# Patient Record
Sex: FEMALE | Race: WHITE | NOT HISPANIC OR LATINO | URBAN - METROPOLITAN AREA
[De-identification: names, ages, dates, MRNs, and addresses within clinical notes are randomized per-mention and may not be internally consistent; named-entity substitution may affect disease eponyms.]

---

## 2021-03-01 ENCOUNTER — INPATIENT (INPATIENT)
Facility: HOSPITAL | Age: 76
LOS: 3 days | Discharge: HOME | End: 2021-03-05
Attending: STUDENT IN AN ORGANIZED HEALTH CARE EDUCATION/TRAINING PROGRAM | Admitting: STUDENT IN AN ORGANIZED HEALTH CARE EDUCATION/TRAINING PROGRAM
Payer: MEDICARE

## 2021-03-01 VITALS
HEART RATE: 71 BPM | DIASTOLIC BLOOD PRESSURE: 97 MMHG | RESPIRATION RATE: 19 BRPM | OXYGEN SATURATION: 95 % | TEMPERATURE: 97 F | WEIGHT: 153 LBS | SYSTOLIC BLOOD PRESSURE: 108 MMHG

## 2021-03-01 DIAGNOSIS — D18.00 HEMANGIOMA UNSPECIFIED SITE: Chronic | ICD-10-CM

## 2021-03-01 LAB
ALBUMIN SERPL ELPH-MCNC: 3.9 G/DL — SIGNIFICANT CHANGE UP (ref 3.5–5.2)
ALP SERPL-CCNC: 102 U/L — SIGNIFICANT CHANGE UP (ref 30–115)
ALT FLD-CCNC: 18 U/L — SIGNIFICANT CHANGE UP (ref 0–41)
ANION GAP SERPL CALC-SCNC: 17 MMOL/L — HIGH (ref 7–14)
ANISOCYTOSIS BLD QL: SLIGHT — SIGNIFICANT CHANGE UP
APTT BLD: 26.9 SEC — LOW (ref 27–39.2)
AST SERPL-CCNC: 44 U/L — HIGH (ref 0–41)
BASOPHILS # BLD AUTO: 0.06 K/UL — SIGNIFICANT CHANGE UP (ref 0–0.2)
BASOPHILS NFR BLD AUTO: 0.5 % — SIGNIFICANT CHANGE UP (ref 0–1)
BILIRUB SERPL-MCNC: 0.2 MG/DL — SIGNIFICANT CHANGE UP (ref 0.2–1.2)
BLD GP AB SCN SERPL QL: SIGNIFICANT CHANGE UP
BUN SERPL-MCNC: 34 MG/DL — HIGH (ref 10–20)
CALCIUM SERPL-MCNC: 9.6 MG/DL — SIGNIFICANT CHANGE UP (ref 8.5–10.1)
CHLORIDE SERPL-SCNC: 105 MMOL/L — SIGNIFICANT CHANGE UP (ref 98–110)
CO2 SERPL-SCNC: 16 MMOL/L — LOW (ref 17–32)
CREAT SERPL-MCNC: 1.5 MG/DL — SIGNIFICANT CHANGE UP (ref 0.7–1.5)
DACRYOCYTES BLD QL SMEAR: SLIGHT — SIGNIFICANT CHANGE UP
ELLIPTOCYTES BLD QL SMEAR: SLIGHT — SIGNIFICANT CHANGE UP
EOSINOPHIL # BLD AUTO: 0 K/UL — SIGNIFICANT CHANGE UP (ref 0–0.7)
EOSINOPHIL NFR BLD AUTO: 0 % — SIGNIFICANT CHANGE UP (ref 0–8)
GLUCOSE SERPL-MCNC: 119 MG/DL — HIGH (ref 70–99)
HCT VFR BLD CALC: 16.2 % — LOW (ref 37–47)
HGB BLD-MCNC: 4.9 G/DL — CRITICAL LOW (ref 12–16)
IMM GRANULOCYTES NFR BLD AUTO: 0.6 % — HIGH (ref 0.1–0.3)
INR BLD: 1.03 RATIO — SIGNIFICANT CHANGE UP (ref 0.65–1.3)
LYMPHOCYTES # BLD AUTO: 1.16 K/UL — LOW (ref 1.2–3.4)
LYMPHOCYTES # BLD AUTO: 9.6 % — LOW (ref 20.5–51.1)
MAGNESIUM SERPL-MCNC: 2.1 MG/DL — SIGNIFICANT CHANGE UP (ref 1.8–2.4)
MANUAL SMEAR VERIFICATION: SIGNIFICANT CHANGE UP
MCHC RBC-ENTMCNC: 20.2 PG — LOW (ref 27–31)
MCHC RBC-ENTMCNC: 30.2 G/DL — LOW (ref 32–37)
MCV RBC AUTO: 66.9 FL — LOW (ref 81–99)
MICROCYTES BLD QL: SIGNIFICANT CHANGE UP
MONOCYTES # BLD AUTO: 0.56 K/UL — SIGNIFICANT CHANGE UP (ref 0.1–0.6)
MONOCYTES NFR BLD AUTO: 4.6 % — SIGNIFICANT CHANGE UP (ref 1.7–9.3)
NEUTROPHILS # BLD AUTO: 10.22 K/UL — HIGH (ref 1.4–6.5)
NEUTROPHILS NFR BLD AUTO: 84.7 % — HIGH (ref 42.2–75.2)
NRBC # BLD: 0 /100 WBCS — SIGNIFICANT CHANGE UP (ref 0–0)
NRBC # BLD: 1 /100 — HIGH (ref 0–0)
NT-PROBNP SERPL-SCNC: HIGH PG/ML (ref 0–300)
OVALOCYTES BLD QL SMEAR: SIGNIFICANT CHANGE UP
PLAT MORPH BLD: NORMAL — SIGNIFICANT CHANGE UP
PLATELET # BLD AUTO: 452 K/UL — HIGH (ref 130–400)
POIKILOCYTOSIS BLD QL AUTO: SLIGHT — SIGNIFICANT CHANGE UP
POTASSIUM SERPL-MCNC: 4.2 MMOL/L — SIGNIFICANT CHANGE UP (ref 3.5–5)
POTASSIUM SERPL-SCNC: 4.2 MMOL/L — SIGNIFICANT CHANGE UP (ref 3.5–5)
PROT SERPL-MCNC: 7 G/DL — SIGNIFICANT CHANGE UP (ref 6–8)
PROTHROM AB SERPL-ACNC: 11.9 SEC — SIGNIFICANT CHANGE UP (ref 9.95–12.87)
RAPID RVP RESULT: SIGNIFICANT CHANGE UP
RBC # BLD: 2.42 M/UL — LOW (ref 4.2–5.4)
RBC # FLD: 16.5 % — HIGH (ref 11.5–14.5)
RBC BLD AUTO: ABNORMAL
SARS-COV-2 RNA SPEC QL NAA+PROBE: SIGNIFICANT CHANGE UP
SCHISTOCYTES BLD QL AUTO: SLIGHT — SIGNIFICANT CHANGE UP
SODIUM SERPL-SCNC: 138 MMOL/L — SIGNIFICANT CHANGE UP (ref 135–146)
TARGETS BLD QL SMEAR: SLIGHT — SIGNIFICANT CHANGE UP
TROPONIN T SERPL-MCNC: 0.12 NG/ML — CRITICAL HIGH
WBC # BLD: 12.07 K/UL — HIGH (ref 4.8–10.8)
WBC # FLD AUTO: 12.07 K/UL — HIGH (ref 4.8–10.8)

## 2021-03-01 PROCEDURE — 71045 X-RAY EXAM CHEST 1 VIEW: CPT | Mod: 26

## 2021-03-01 PROCEDURE — 99291 CRITICAL CARE FIRST HOUR: CPT

## 2021-03-01 PROCEDURE — 99285 EMERGENCY DEPT VISIT HI MDM: CPT

## 2021-03-01 RX ORDER — MORPHINE SULFATE 50 MG/1
2 CAPSULE, EXTENDED RELEASE ORAL ONCE
Refills: 0 | Status: DISCONTINUED | OUTPATIENT
Start: 2021-03-01 | End: 2021-03-01

## 2021-03-01 RX ORDER — FUROSEMIDE 40 MG
60 TABLET ORAL ONCE
Refills: 0 | Status: COMPLETED | OUTPATIENT
Start: 2021-03-01 | End: 2021-03-01

## 2021-03-01 RX ORDER — ASPIRIN/CALCIUM CARB/MAGNESIUM 324 MG
324 TABLET ORAL ONCE
Refills: 0 | Status: COMPLETED | OUTPATIENT
Start: 2021-03-01 | End: 2021-03-01

## 2021-03-01 RX ORDER — MORPHINE SULFATE 50 MG/1
4 CAPSULE, EXTENDED RELEASE ORAL ONCE
Refills: 0 | Status: DISCONTINUED | OUTPATIENT
Start: 2021-03-01 | End: 2021-03-01

## 2021-03-01 RX ORDER — ALPRAZOLAM 0.25 MG
1 TABLET ORAL ONCE
Refills: 0 | Status: DISCONTINUED | OUTPATIENT
Start: 2021-03-01 | End: 2021-03-01

## 2021-03-01 RX ADMIN — MORPHINE SULFATE 4 MILLIGRAM(S): 50 CAPSULE, EXTENDED RELEASE ORAL at 23:51

## 2021-03-01 RX ADMIN — MORPHINE SULFATE 2 MILLIGRAM(S): 50 CAPSULE, EXTENDED RELEASE ORAL at 21:33

## 2021-03-01 RX ADMIN — Medication 324 MILLIGRAM(S): at 20:03

## 2021-03-01 RX ADMIN — Medication 1 MILLIGRAM(S): at 20:02

## 2021-03-01 RX ADMIN — Medication 60 MILLIGRAM(S): at 23:15

## 2021-03-01 NOTE — ED PROVIDER NOTE - CLINICAL SUMMARY MEDICAL DECISION MAKING FREE TEXT BOX
75yF pmhx htn   + smoker -presents  form NP for  CT of chest -  Ppt had routine appointment  today with NP  and mentioned that over past 2 weeks right shoulder  /back pain sharp - constant -   s.w  SOB .   EKG  with avR elevation and  diffuse  st depressions,   hgb 4,   dw cardiology  no stemi , + demand ischemia,   trop returned at  0.12,  rectal  negative   pt  denies any bleeding. CT chest

## 2021-03-01 NOTE — ED PROVIDER NOTE - PROGRESS NOTE DETAILS
WKG with avr elevation and  diffuse st depression-   pt is clinically pale appearing - likely subendocardial demand ischemia -  medical treatment-  willg et cardiology on board but no STEmi call - no elevatison in any other lead including avL-  pt is pain free and asymptomatic LYDIA: late entry-- consent for blood obtained and given to  to scan. not a candidate for ICU. discussed all with dr. lewis who accepts admission

## 2021-03-01 NOTE — ED PROVIDER NOTE - SECONDARY DIAGNOSIS.
JERMAINE (acute kidney injury) Chest pain NSTEMI (non-ST elevated myocardial infarction) Anemia Thyroid mass

## 2021-03-01 NOTE — CONSULT NOTE ADULT - ASSESSMENT
IMPRESSION/PLAN:  NSTEMI  R sided chest pain  Anemia  Myasthenia Gravis  Chest nodule/mass    Pt found to be profoundly anemic without any noted bloody bm or abd pain, there is possible mass in the chest , anemia can possibly be from undiagnosed malignancy.  Recommend GI and Hem evals. Transfuse 2U PRBC's.  Pt has mildly elevated TN-T, in the setting of profound anemia may very well be demand ischemia.  F/U serial CE repeat EKG and cardio eval. Obtain Ct chest to further characterize nodule/mass. Continue Mestinon, neuro eval for Myasthenia f/u. reconsult ICU prn.            CRITICAL CARE TIME SPENT: 30 minutes

## 2021-03-01 NOTE — ED PROVIDER NOTE - CARE PLAN
Principal Discharge DX:	Shortness of breath  Secondary Diagnosis:	NSTEMI (non-ST elevated myocardial infarction)  Secondary Diagnosis:	Anemia  Secondary Diagnosis:	JERMAINE (acute kidney injury)  Secondary Diagnosis:	Thyroid mass  Secondary Diagnosis:	Chest pain

## 2021-03-01 NOTE — ED ADULT NURSE REASSESSMENT NOTE - NS ED NURSE REASSESS COMMENT FT1
Pt took her own pyridostigmine bromide extended release 180 mg pill for her myasthenia gravis, as per MD Chance. RN  witnessed self medication administration

## 2021-03-01 NOTE — ED PROVIDER NOTE - OBJECTIVE STATEMENT
76 y/o F with PMH HTN, smoker, myasthenia gravis (with only ocular manifestations) on mesantoin, depression sent in by her NP PCP for abnormal CXR indicating L tracheal deviation 2/2 goiter v medsiastinal mass and b/l pleural effusions performed today.  pt relates a 2 wk hx of R CP, R upper back pain and SOB-- constant moderate pressure like, non-radiating, worse today. no palliating/provoking factors.   She is fully vaccianted against covid (2nd dose 2/14/2021).   no cough/congestion/fever.  Denies hemoptysis, recent surgery/immobilization, hx cancers, hx PE/DVT,  hormone use, leg pain or swelling.   does not follow with cardio; has never had a stress test.   Pts parter ibis = [phone]

## 2021-03-01 NOTE — ED PROVIDER NOTE - NS ED ROS FT
Review of Systems    Constitutional: (-) fever   Eyes/ENT: (-) vision changes  Cardiovascular: (+) chest pain, (-) syncope (-) palpitations  Respiratory: (-) cough, (+) shortness of breath  Gastrointestinal: (-) vomiting, (-) diarrhea  (-) abdominal pain  Genitourinary:  (-) dysuria   Musculoskeletal: (-) neck pain, (-) back pain, (-) leg pain/swelling  Integumentary: (-) rash, (-) edema  Neurological: (-) headache, (-) confusion  Hematologic: (-) easy bruising   Allergic/Immunologic: (-) pruritus

## 2021-03-01 NOTE — ED PROVIDER NOTE - PHYSICAL EXAMINATION
PHYSICAL EXAM:    GENERAL: Alert, appears stated age, well appearing, non-toxic  SKIN: Warm, pale and dry.   HEAD: NC  EYE: Normal lids/conjunctiva  ENT: Normal hearing, patent oropharynx   NECK: +supple. No meningismus, or JVD  Pulm: Bilateral BS, normal resp effort, no wheezes, stridor, or retractions. speaking in complete sentences.   CV: RRR, no M/R/G, 2+and = radial pulses  Abd: soft, non-tender, non-distended, no rebound/guarding.   Mskel: no erythema, cyanosis, edema. no calf tenderness  Neuro: AAOx3, 5/5 strength throughout. normal gait.

## 2021-03-01 NOTE — ED ADULT TRIAGE NOTE - CHIEF COMPLAINT QUOTE
Dr Byers wanted pt to come to ED for CT Scan / On xray pt had a deviated trachea. MD wanted to rule out goiter vs mediastinal lesion

## 2021-03-02 LAB
ALBUMIN SERPL ELPH-MCNC: 4 G/DL — SIGNIFICANT CHANGE UP (ref 3.5–5.2)
ALP SERPL-CCNC: 104 U/L — SIGNIFICANT CHANGE UP (ref 30–115)
ALT FLD-CCNC: 14 U/L — SIGNIFICANT CHANGE UP (ref 0–41)
ANION GAP SERPL CALC-SCNC: 12 MMOL/L — SIGNIFICANT CHANGE UP (ref 7–14)
APPEARANCE UR: CLEAR — SIGNIFICANT CHANGE UP
AST SERPL-CCNC: 22 U/L — SIGNIFICANT CHANGE UP (ref 0–41)
BACTERIA # UR AUTO: SIGNIFICANT CHANGE UP /HPF
BASOPHILS # BLD AUTO: 0.05 K/UL — SIGNIFICANT CHANGE UP (ref 0–0.2)
BASOPHILS NFR BLD AUTO: 0.4 % — SIGNIFICANT CHANGE UP (ref 0–1)
BILIRUB DIRECT SERPL-MCNC: <0.2 MG/DL — SIGNIFICANT CHANGE UP (ref 0–0.2)
BILIRUB INDIRECT FLD-MCNC: >0.5 MG/DL — SIGNIFICANT CHANGE UP (ref 0.2–1.2)
BILIRUB SERPL-MCNC: 0.7 MG/DL — SIGNIFICANT CHANGE UP (ref 0.2–1.2)
BILIRUB UR-MCNC: NEGATIVE — SIGNIFICANT CHANGE UP
BUN SERPL-MCNC: 26 MG/DL — HIGH (ref 10–20)
CALCIUM SERPL-MCNC: 9.4 MG/DL — SIGNIFICANT CHANGE UP (ref 8.5–10.1)
CHLORIDE SERPL-SCNC: 104 MMOL/L — SIGNIFICANT CHANGE UP (ref 98–110)
CK MB CFR SERPL CALC: 8.3 NG/ML — HIGH (ref 0.6–6.3)
CK SERPL-CCNC: 118 U/L — SIGNIFICANT CHANGE UP (ref 0–225)
CO2 SERPL-SCNC: 22 MMOL/L — SIGNIFICANT CHANGE UP (ref 17–32)
COLOR SPEC: YELLOW — SIGNIFICANT CHANGE UP
CREAT ?TM UR-MCNC: 130 MG/DL — SIGNIFICANT CHANGE UP
CREAT SERPL-MCNC: 1 MG/DL — SIGNIFICANT CHANGE UP (ref 0.7–1.5)
DIFF PNL FLD: ABNORMAL
EOSINOPHIL # BLD AUTO: 0.02 K/UL — SIGNIFICANT CHANGE UP (ref 0–0.7)
EOSINOPHIL NFR BLD AUTO: 0.2 % — SIGNIFICANT CHANGE UP (ref 0–8)
EPI CELLS # UR: ABNORMAL /HPF
GLUCOSE SERPL-MCNC: 127 MG/DL — HIGH (ref 70–99)
GLUCOSE UR QL: NEGATIVE MG/DL — SIGNIFICANT CHANGE UP
HCT VFR BLD CALC: 27.9 % — LOW (ref 37–47)
HCT VFR BLD CALC: 30.4 % — LOW (ref 37–47)
HGB BLD-MCNC: 8.9 G/DL — LOW (ref 12–16)
HGB BLD-MCNC: 9.4 G/DL — LOW (ref 12–16)
IMM GRANULOCYTES NFR BLD AUTO: 0.3 % — SIGNIFICANT CHANGE UP (ref 0.1–0.3)
IRON SATN MFR SERPL: 21 UG/DL — LOW (ref 35–150)
IRON SATN MFR SERPL: 21 UG/DL — LOW (ref 35–150)
IRON SATN MFR SERPL: 5 % — LOW (ref 15–50)
KETONES UR-MCNC: NEGATIVE — SIGNIFICANT CHANGE UP
LDH SERPL L TO P-CCNC: 180 — SIGNIFICANT CHANGE UP (ref 50–242)
LEUKOCYTE ESTERASE UR-ACNC: ABNORMAL
LYMPHOCYTES # BLD AUTO: 1.04 K/UL — LOW (ref 1.2–3.4)
LYMPHOCYTES # BLD AUTO: 8.9 % — LOW (ref 20.5–51.1)
MAGNESIUM SERPL-MCNC: 1.8 MG/DL — SIGNIFICANT CHANGE UP (ref 1.8–2.4)
MANUAL DIF COMMENT BLD-IMP: SIGNIFICANT CHANGE UP
MCHC RBC-ENTMCNC: 22.5 PG — LOW (ref 27–31)
MCHC RBC-ENTMCNC: 22.8 PG — LOW (ref 27–31)
MCHC RBC-ENTMCNC: 30.9 G/DL — LOW (ref 32–37)
MCHC RBC-ENTMCNC: 31.9 G/DL — LOW (ref 32–37)
MCV RBC AUTO: 71.4 FL — LOW (ref 81–99)
MCV RBC AUTO: 72.9 FL — LOW (ref 81–99)
MONOCYTES # BLD AUTO: 0.82 K/UL — HIGH (ref 0.1–0.6)
MONOCYTES NFR BLD AUTO: 7 % — SIGNIFICANT CHANGE UP (ref 1.7–9.3)
NEUTROPHILS # BLD AUTO: 9.77 K/UL — HIGH (ref 1.4–6.5)
NEUTROPHILS NFR BLD AUTO: 83.2 % — HIGH (ref 42.2–75.2)
NITRITE UR-MCNC: NEGATIVE — SIGNIFICANT CHANGE UP
NRBC # BLD: 0 /100 WBCS — SIGNIFICANT CHANGE UP (ref 0–0)
NRBC # BLD: 0 /100 WBCS — SIGNIFICANT CHANGE UP (ref 0–0)
OSMOLALITY SERPL: 290 MOS/KG — SIGNIFICANT CHANGE UP (ref 280–301)
PH UR: 5.5 — SIGNIFICANT CHANGE UP (ref 5–8)
PLATELET # BLD AUTO: 370 K/UL — SIGNIFICANT CHANGE UP (ref 130–400)
PLATELET # BLD AUTO: 435 K/UL — HIGH (ref 130–400)
POTASSIUM SERPL-MCNC: 3.3 MMOL/L — LOW (ref 3.5–5)
POTASSIUM SERPL-SCNC: 3.3 MMOL/L — LOW (ref 3.5–5)
PROT SERPL-MCNC: 6.7 G/DL — SIGNIFICANT CHANGE UP (ref 6–8)
PROT UR-MCNC: NEGATIVE MG/DL — SIGNIFICANT CHANGE UP
RBC # BLD: 3.91 M/UL — LOW (ref 4.2–5.4)
RBC # BLD: 3.91 M/UL — LOW (ref 4.2–5.4)
RBC # BLD: 4.17 M/UL — LOW (ref 4.2–5.4)
RBC # FLD: 19.9 % — HIGH (ref 11.5–14.5)
RBC # FLD: 19.9 % — HIGH (ref 11.5–14.5)
RBC CASTS # UR COMP ASSIST: SIGNIFICANT CHANGE UP /HPF
RETICS #: 82.9 K/UL — SIGNIFICANT CHANGE UP (ref 25–125)
RETICS/RBC NFR: 2.1 % — HIGH (ref 0.5–1.5)
SODIUM SERPL-SCNC: 138 MMOL/L — SIGNIFICANT CHANGE UP (ref 135–146)
SODIUM UR-SCNC: <20 MMOL/L — SIGNIFICANT CHANGE UP
SP GR SPEC: 1.01 — SIGNIFICANT CHANGE UP (ref 1.01–1.03)
TIBC SERPL-MCNC: 437 UG/DL — HIGH (ref 220–430)
TROPONIN T SERPL-MCNC: 0.22 NG/ML — CRITICAL HIGH
TROPONIN T SERPL-MCNC: 0.26 NG/ML — CRITICAL HIGH
UIBC SERPL-MCNC: 416 UG/DL — HIGH (ref 110–370)
UROBILINOGEN FLD QL: 0.2 MG/DL — SIGNIFICANT CHANGE UP (ref 0.2–0.2)
WBC # BLD: 11.74 K/UL — HIGH (ref 4.8–10.8)
WBC # BLD: 11.87 K/UL — HIGH (ref 4.8–10.8)
WBC # FLD AUTO: 11.74 K/UL — HIGH (ref 4.8–10.8)
WBC # FLD AUTO: 11.87 K/UL — HIGH (ref 4.8–10.8)
WBC UR QL: >50 /HPF

## 2021-03-02 PROCEDURE — 99222 1ST HOSP IP/OBS MODERATE 55: CPT

## 2021-03-02 PROCEDURE — 99223 1ST HOSP IP/OBS HIGH 75: CPT

## 2021-03-02 PROCEDURE — 71275 CT ANGIOGRAPHY CHEST: CPT | Mod: 26

## 2021-03-02 PROCEDURE — 99223 1ST HOSP IP/OBS HIGH 75: CPT | Mod: 25

## 2021-03-02 PROCEDURE — 99406 BEHAV CHNG SMOKING 3-10 MIN: CPT

## 2021-03-02 RX ORDER — OXYCODONE AND ACETAMINOPHEN 5; 325 MG/1; MG/1
1 TABLET ORAL EVERY 8 HOURS
Refills: 0 | Status: DISCONTINUED | OUTPATIENT
Start: 2021-03-02 | End: 2021-03-05

## 2021-03-02 RX ORDER — PYRIDOSTIGMINE BROMIDE 60 MG/5ML
60 SOLUTION ORAL
Refills: 0 | Status: DISCONTINUED | OUTPATIENT
Start: 2021-03-02 | End: 2021-03-05

## 2021-03-02 RX ORDER — MORPHINE SULFATE 50 MG/1
2 CAPSULE, EXTENDED RELEASE ORAL ONCE
Refills: 0 | Status: DISCONTINUED | OUTPATIENT
Start: 2021-03-02 | End: 2021-03-02

## 2021-03-02 RX ORDER — PYRIDOSTIGMINE BROMIDE 60 MG/5ML
0 SOLUTION ORAL
Qty: 0 | Refills: 0 | DISCHARGE

## 2021-03-02 RX ORDER — POTASSIUM CHLORIDE 20 MEQ
20 PACKET (EA) ORAL ONCE
Refills: 0 | Status: COMPLETED | OUTPATIENT
Start: 2021-03-02 | End: 2021-03-02

## 2021-03-02 RX ORDER — AMLODIPINE BESYLATE 2.5 MG/1
5 TABLET ORAL DAILY
Refills: 0 | Status: DISCONTINUED | OUTPATIENT
Start: 2021-03-02 | End: 2021-03-05

## 2021-03-02 RX ORDER — SERTRALINE 25 MG/1
100 TABLET, FILM COATED ORAL DAILY
Refills: 0 | Status: DISCONTINUED | OUTPATIENT
Start: 2021-03-02 | End: 2021-03-05

## 2021-03-02 RX ORDER — PYRIDOSTIGMINE BROMIDE 60 MG/5ML
180 SOLUTION ORAL
Refills: 0 | Status: DISCONTINUED | OUTPATIENT
Start: 2021-03-02 | End: 2021-03-05

## 2021-03-02 RX ORDER — PANTOPRAZOLE SODIUM 20 MG/1
40 TABLET, DELAYED RELEASE ORAL EVERY 12 HOURS
Refills: 0 | Status: DISCONTINUED | OUTPATIENT
Start: 2021-03-02 | End: 2021-03-05

## 2021-03-02 RX ORDER — SODIUM CHLORIDE 9 MG/ML
1000 INJECTION INTRAMUSCULAR; INTRAVENOUS; SUBCUTANEOUS
Refills: 0 | Status: DISCONTINUED | OUTPATIENT
Start: 2021-03-02 | End: 2021-03-03

## 2021-03-02 RX ORDER — LIDOCAINE 4 G/100G
1 CREAM TOPICAL DAILY
Refills: 0 | Status: DISCONTINUED | OUTPATIENT
Start: 2021-03-02 | End: 2021-03-05

## 2021-03-02 RX ADMIN — PANTOPRAZOLE SODIUM 40 MILLIGRAM(S): 20 TABLET, DELAYED RELEASE ORAL at 18:30

## 2021-03-02 RX ADMIN — AMLODIPINE BESYLATE 5 MILLIGRAM(S): 2.5 TABLET ORAL at 06:59

## 2021-03-02 RX ADMIN — PANTOPRAZOLE SODIUM 40 MILLIGRAM(S): 20 TABLET, DELAYED RELEASE ORAL at 06:59

## 2021-03-02 RX ADMIN — Medication 20 MILLIEQUIVALENT(S): at 15:10

## 2021-03-02 RX ADMIN — MORPHINE SULFATE 2 MILLIGRAM(S): 50 CAPSULE, EXTENDED RELEASE ORAL at 06:00

## 2021-03-02 RX ADMIN — OXYCODONE AND ACETAMINOPHEN 1 TABLET(S): 5; 325 TABLET ORAL at 10:21

## 2021-03-02 RX ADMIN — PYRIDOSTIGMINE BROMIDE 60 MILLIGRAM(S): 60 SOLUTION ORAL at 15:11

## 2021-03-02 RX ADMIN — PYRIDOSTIGMINE BROMIDE 180 MILLIGRAM(S): 60 SOLUTION ORAL at 06:21

## 2021-03-02 RX ADMIN — SODIUM CHLORIDE 65 MILLILITER(S): 9 INJECTION INTRAMUSCULAR; INTRAVENOUS; SUBCUTANEOUS at 10:22

## 2021-03-02 RX ADMIN — PYRIDOSTIGMINE BROMIDE 180 MILLIGRAM(S): 60 SOLUTION ORAL at 21:26

## 2021-03-02 RX ADMIN — SERTRALINE 100 MILLIGRAM(S): 25 TABLET, FILM COATED ORAL at 10:23

## 2021-03-02 NOTE — CONSULT NOTE ADULT - CONSULT REASON
abnormal CT chest
thyroid mass
Bx of mass abutting left T2 neuroforamen
Anemia
microcytic anemia no gross GI bleeding
abnormal cardiac enzymes

## 2021-03-02 NOTE — CHART NOTE - NSCHARTNOTEFT_GEN_A_CORE
Patient seen and examined. Changes to plan as noted below    -- Imaging ---    3/1 CXR -- Pulmonary vascular congestion. Bilateral opacities and effusions. See subsequently performed chest CT report for detailed evaluation.    3/2 CTA chest   1.  No pulmonary embolus.  2.  Right thyroid lobe 3.4 cm mass with resulting mild leftward tracheal deviation. Recommend ultrasound of the thyroid for further evaluation.  3.  Bilateral pleural effusions with adjacent compressive atelectasis.  4.  Left upper lobe 7 mm groundglass nodule. Recommend CT chest in 6-12 months for continued follow-up.  5.  3.5 x 1.8 cm ovoid mass abutting left T2 neural foramen. It is probably pleural-based. This may also be neural in origin. Further evaluation with MRI with and without contrast may be of benefit. Patient seen and examined. Changes to plan as noted below    - Thyroid mass -- Thyroid US pending, Endocrinology consulted  - Pleural based lesion invading in T2 -- Pulmonary / Neurosurgery / IR consulted. MR chest pending  - Anemia with NSTEMI II -- s/p 3u PRBCs. iron deficiency. Pending further anemia workup. GI post-poning EGD/C-scope for outpatient due to NSTEMI II, cardiology following, monitoring troponins  - JERMAINE -- resolved with transfusions. 1.6 --> 1.0  - bilateral effusions /Elevated BNP -- Echo pending

## 2021-03-02 NOTE — CONSULT NOTE ADULT - SUBJECTIVE AND OBJECTIVE BOX
Consult called for incidental ~ 3 cm right lobe thyroid mass on CT (with tracheal deviation but no compression).  This requires an outpatient work-up; pt should be scheduled for an office visit where we can do a sonogram-guided fine needle aspiration.  Office phone is [phone]; we will reach out to the patient as well to schedule.
  Patient is a 75y old  Female who presents with a chief complaint of r sided chest pain x 2 weeks, sent by her PMD for further eval and for findings of tracheal deviation on cxr    HPI: as above      PAST MEDICAL & SURGICAL HISTORY:  High cholesterol    HTN (hypertension)    Myasthenia gravis    Hemangioma      Allergies    No Known Allergies    Intolerances      FAMILY HISTORY:    Home Medications:  losartan 100 mg oral tablet: 1 tab(s) orally once a day (01 Mar 2021 22:49)  pyridostigmine 180 mg oral tablet, extended release: 1 tab(s) orally 2 times a day (01 Mar 2021 22:49)  pyridostigmine 60 mg oral tablet: orally 5 times a day (01 Mar 2021 22:49)  sertraline 100 mg oral tablet: 1 tab(s) orally once a day (01 Mar 2021 22:49)    Occupation:  Alochol: Denied  Smoking: Denied  Drug Use: Denied  Marital Status:         ROS: as in HPI; All other systems reviewed are negative    ICU Vital Signs Last 24 Hrs  T(C): 36.5 (01 Mar 2021 22:50), Max: 36.5 (01 Mar 2021 22:50)  T(F): 97.7 (01 Mar 2021 22:50), Max: 97.7 (01 Mar 2021 22:50)  HR: 100 (01 Mar 2021 22:50) (71 - 100)  BP: 131/63 (01 Mar 2021 22:50) (108/97 - 134/61)  BP(mean): --  ABP: --  ABP(mean): --  RR: 18 (01 Mar 2021 22:50) (18 - 20)  SpO2: 99% (01 Mar 2021 22:50) (95% - 99%)        Physical Examination:    General: No acute distress.  Alert, oriented, interactive, nonfocal    HEENT: Pupils equal, reactive to light.  Symmetric.    PULM: Clear to auscultation bilaterally, no significant sputum production    CVS: Regular rate and rhythm, no murmurs, rubs, or gallops    ABD: Soft, nondistended, nontender, normoactive bowel sounds, no masses    EXT: No edema, nontender    SKIN: Warm and well perfused, no rashes noted.              I&O's Detail        LABS:                        4.9    12.07 )-----------( 452      ( 01 Mar 2021 19:26 )             16.2     01 Mar 2021 19:26    138    |  105    |  34     ----------------------------<  119    4.2     |  16     |  1.5      Ca    9.6        01 Mar 2021 19:26  Mg     2.1       01 Mar 2021 19:26    TPro  7.0    /  Alb  3.9    /  TBili  0.2    /  DBili  x      /  AST  44     /  ALT  18     /  AlkPhos  102    01 Mar 2021 19:26  Amylase x     lipase x          CARDIAC MARKERS ( 01 Mar 2021 19:26 )  x     / 0.12 ng/mL / x     / x     / x          CAPILLARY BLOOD GLUCOSE        PT/INR - ( 01 Mar 2021 19:26 )   PT: 11.90 sec;   INR: 1.03 ratio         PTT - ( 01 Mar 2021 19:26 )  PTT:26.9 sec    Culture        MEDICATIONS  (STANDING):    MEDICATIONS  (PRN):        RADIOLOGY: ***     CXR: tracheal deviation         
CARDIOLOGY CONSULT NOTE     CHIEF COMPLAINT/REASON FOR CONSULT:    HPI:  75 yr old female with hx of Myasthenia gravis, HTN was sent by PMD for progressive sob, tracheal deviation on XR. For the past two weeks patietn having upper back pain (right scapula area), 6-8/10 intensity, localized, worse with mvt. She also noticed having sob on exertion for the past two weeks also. She went to her pmd for check up and CXR showing tracheal deviation so pmd referred pt to ER. Patient denies any fever, chills, sob, chest pain, no abdominal pain, no urinary or bowel issues. Lives home with partner, fully functional , social negx3 (02 Mar 2021 03:03)      PAST MEDICAL & SURGICAL HISTORY:  High cholesterol    HTN (hypertension)    Myasthenia gravis    Hemangioma        Cardiac Risks:   [x ]HTN, [ ] DM, [ x] Smoking, [ x] FH,  [ x] Lipids   Quit smoking 2 weeks        MEDICATIONS:  MEDICATIONS  (STANDING):  amLODIPine   Tablet 5 milliGRAM(s) Oral daily  pantoprazole    Tablet 40 milliGRAM(s) Oral every 12 hours  pyridostigmine  milliGRAM(s) Oral two times a day  sertraline 100 milliGRAM(s) Oral daily  sodium chloride 0.9%. 1000 milliLiter(s) (65 mL/Hr) IV Continuous <Continuous>      FAMILY HISTORY:      SOCIAL HISTORY:      [ ] Marital status partner  Allergies    No Known Allergies      	    REVIEW OF SYSTEMS:  CONSTITUTIONAL: No fever, weight loss, or fatigue  EYES: No eye pain, visual disturbances, or discharge  ENMT:  No difficulty hearing, tinnitus, vertigo; No sinus or throat pain  NECK: No pain or stiffness  RESPIRATORY: No cough, wheezing, chills or hemoptysis; No Shortness of Breath  CARDIOVASCULAR: No chest pain, palpitations, passing out, dizziness, or leg swelling  GASTROINTESTINAL: No abdominal or epigastric pain. No nausea, vomiting, or hematemesis; No diarrhea or constipation. No melena or hematochezia.  GENITOURINARY: No dysuria, frequency, hematuria, or incontinence  NEUROLOGICAL: No headaches, memory loss, loss of strength, numbness, or tremors  SKIN: No itching, burning, rashes, or lesions   	        PHYSICAL EXAM:  T(C): 35.9 (03-02-21 @ 05:25), Max: 37.1 (03-02-21 @ 04:40)  HR: 105 (03-02-21 @ 05:25) (71 - 105)  BP: 141/65 (03-02-21 @ 05:25) (108/97 - 141/65)  RR: 18 (03-02-21 @ 05:25) (17 - 20)  SpO2: 100% (03-02-21 @ 04:40) (95% - 100%)  Wt(kg): --  I&O's Summary      Appearance: Normal	  Psychiatry: A & O x 3, Mood & affect appropriate  HEENT:   Normal oral mucosa, PERRL, EOMI	  Lymphatic: No lymphadenopathy  Cardiovascular: Normal S1 S2,RRR, No JVD, i/vi cyndie lsb  Respiratory: Lungs clear to auscultation	  Gastrointestinal:  Soft, Non-tender, + BS	  Skin: No rashes, No ecchymoses, No cyanosis	  Neurologic: Non-focal  Extremities: Normal range of motion, No clubbing, cyanosis or edema  Vascular: Peripheral pulses palpable 2+ bilaterally      ECG:  	st inf lat ischemia    	  LABS:	 	    CARDIAC MARKERS:          Serum Pro-Brain Natriuretic Peptide: 84765 pg/mL (03-01 @ 19:26)                            4.9    12.07 )-----------( 452      ( 01 Mar 2021 19:26 )             16.2     03-01    138  |  105  |  34<H>  ----------------------------<  119<H>  4.2   |  16<L>  |  1.5    Ca    9.6      01 Mar 2021 19:26  Mg     2.1     03-01    TPro  7.0  /  Alb  3.9  /  TBili  0.2  /  DBili  x   /  AST  44<H>  /  ALT  18  /  AlkPhos  102  03-01    PT/INR - ( 01 Mar 2021 19:26 )   PT: 11.90 sec;   INR: 1.03 ratio         PTT - ( 01 Mar 2021 19:26 )  PTT:26.9 sec  proBNP: Serum Pro-Brain Natriuretic Peptide: 39404 pg/mL (03-01 @ 19:26)              
Chief complaint/Reason for consult: microcytic anemia     HPI:  75 yr old female with hx of Myasthenia gravis, HTN was sent by PMD for progressive sob, tracheal deviation on XR. For the past two weeks patient having upper back pain (right scapula area), 6-8/10 intensity, localized, worse with mvt. She also noticed having sob on exertion for the past two weeks also. She went to her pmd for check up and XR showing tracheal deviation so pmd referred pt to ER. Patient denies any fever, chills, sob, chest pain, no abdominal pain, no urinary or bowel issues. Lives home with partner, fully functional , social negx3 (02 Mar 2021 03:03)    GI Updates: 75yFemale pmh MG, HTN presented to ER after concerning chest x-ray findings. GI consulted for anemia, Patient denies nausea, vomiting, hematemesis, melena, blood in stool, diarrhea, constipation, abdominal pain. Patient has never had an endoscopy or colonoscopy and notes she was taking N-Saids for shoulder pain about 8 a day for a month.    PAST MEDICAL & SURGICAL HISTORY:   High cholesterol    HTN (hypertension)    Myasthenia gravis    Hemangioma          Family history:  FAMILY HISTORY:    No GI cancers in first or second degree relatives    Social History: No smoking. No alcohol. No illegal drug use.    Allergies:   No Known Allergies            MEDICATIONS: Home Medications:  losartan 100 mg oral tablet: 1 tab(s) orally once a day (01 Mar 2021 22:49)  pyridostigmine 180 mg oral tablet, extended release: 1 tab(s) orally 2 times a day (01 Mar 2021 22:49)  pyridostigmine 60 mg oral tablet: 1 tab(s) orally 3 to 4 times a day, As Needed (02 Mar 2021 04:23)  sertraline 100 mg oral tablet: 1 tab(s) orally once a day (01 Mar 2021 22:49)    MEDICATIONS  (STANDING):  amLODIPine   Tablet 5 milliGRAM(s) Oral daily  pantoprazole    Tablet 40 milliGRAM(s) Oral every 12 hours  pyridostigmine  milliGRAM(s) Oral two times a day  sertraline 100 milliGRAM(s) Oral daily  sodium chloride 0.9%. 1000 milliLiter(s) (65 mL/Hr) IV Continuous <Continuous>    MEDICATIONS  (PRN):  oxycodone    5 mG/acetaminophen 325 mG 1 Tablet(s) Oral every 8 hours PRN Moderate Pain (4 - 6)  pyridostigmine 60 milliGRAM(s) Oral four times a day PRN double vision, ptosis        REVIEW OF SYSTEMS  General:  No weight loss, fevers, or chills.  Eyes:  No reported pain or visual changes  ENT:  No sore throat or runny nose.  NECK: No stiffness or lymphadenopathy  CV:  No chest pain or palpitations.  Resp:  No shortness of breath, cough, wheezing or hemoptysis  GI:  No abdominal pain, nausea, vomiting, dysphagia, diarrhea or constipation. No rectal bleeding, melena, or hematemesis.  Neuro:  No tingling, numbness       VITALS:   T(F): 96.7 (03-02-21 @ 05:25), Max: 98.7 (03-02-21 @ 04:40)  HR: 105 (03-02-21 @ 05:25) (71 - 105)  BP: 141/65 (03-02-21 @ 05:25) (108/97 - 141/65)  RR: 18 (03-02-21 @ 05:25) (17 - 20)  SpO2: 100% (03-02-21 @ 04:40) (95% - 100%)    PHYSICAL EXAM:  GENERAL: AAOx3, no acute distress.  HEAD:  Atraumatic, Normocephalic  EYES: conjunctiva and sclera clear  NECK: Supple, No thyromegaly   CHEST/LUNG: b/l rales  HEART: Regular rate and rhythm; normal S1, S2, No murmurs.  ABDOMEN: Soft, nontender, nondistended; Bowel sounds present  NEUROLOGY: No asterixis or tremor  SKIN: Intact, no jaundice  Rectal exam-brown stool in rectal vault and on finger        LABS:  03-01    138  |  105  |  34<H>  ----------------------------<  119<H>  4.2   |  16<L>  |  1.5    Ca    9.6      01 Mar 2021 19:26  Mg     2.1     03-01    TPro  7.0  /  Alb  3.9  /  TBili  0.2  /  DBili  x   /  AST  44<H>  /  ALT  18  /  AlkPhos  102  03-01                          4.9    12.07 )-----------( 452      ( 01 Mar 2021 19:26 )             16.2     LIVER FUNCTIONS - ( 01 Mar 2021 19:26 )  Alb: 3.9 g/dL / Pro: 7.0 g/dL / ALK PHOS: 102 U/L / ALT: 18 U/L / AST: 44 U/L / GGT: x           PT/INR - ( 01 Mar 2021 19:26 )   PT: 11.90 sec;   INR: 1.03 ratio         PTT - ( 01 Mar 2021 19:26 )  PTT:26.9 sec    IMAGING:    < from: CT Angio Chest PE Protocol w/ IV Cont (03.02.21 @ 02:14) >  EXAM:  CT ANGIO CHEST PE PROTOCOL IC            PROCEDURE DATE:  03/02/2021            INTERPRETATION:  CLINICAL STATEMENT: Evaluate for pulmonary embolus. Shortness of breath and chest pain.    TECHNIQUE: Multislice helical sections were obtained from the thoracic inlet to the lung bases during rapid administration of 95 cc Optiray 320 intravenous contrast using a CTA protocol. Thin sections were reconstructed through the pulmonary vasculature. Sagittal and coronal reformatted images were acquired, as well as MIP reconstructed images.    COMPARISON CT: None.    OTHER STUDIES USED FOR CORRELATION: None.      FINDINGS:    PULMONARY EMBOLUS: No pulmonary embolus.    LUNGS: Emphysema. Bilateral pleural effusions with adjacent compressive atelectasis. Additional areas of linear scarring/atelectasis. No lobar consolidation or pneumothorax. Left upper lobe 7 mm groundglass nodule. 3.5 x 1.8 cm ovoid mass abutting left T2 neural foramen    PLEURA/PERICARDIUM: Heart measures upper limits of normal. No pericardial effusion.    MEDIASTINUM/THORACIC NODES: No mediastinal, hilar or axillary lymphadenopathy. Partially calcified 3 x 3.4 cm mass within the right thyroid lobe. There is mass effect upon the trachea with resulting mild leftward tracheal deviation.    VISUALIZED UPPER ABDOMEN: Probable cholelithiasis    BONES/SOFT TISSUES: No acute osseous abnormality. Degenerative changes of the spine.    OTHER: Severe atherosclerotic disease throughout the aorta. Irregular intraluminal aortic plaque and the level of the kidneys and hiatus.      IMPRESSION:  1.  No pulmonary embolus.  2.  Right thyroid lobe 3.4 cm mass with resulting mild leftward tracheal deviation. Recommend ultrasound of the thyroid for further evaluation.  3.  Bilateral pleural effusions with adjacent compressive atelectasis.  4.  Left upper lobe 7 mm groundglass nodule. Recommend CT chest in 6-12 months for continued follow-up.  5.  3.5 x 1.8 cm ovoid mass abutting left T2 neural foramen. It is probably pleural-based. This may also be neural in origin. Further evaluation with MRI with and without contrast may be of benefit.            FREDRICK WATT M.D., RESIDENT RADIOLOGIST  This document has been electronically signed.  KI MAIER MD; Attending Radiologist  This document has been electronically signed. Mar  2 2021  2:51AM    < end of copied text >      
I discussed the case in detail with the hospitalist.  I reviewed the radiology tests and hospital record prior to visiting the patient.      HPI:  75 yr old female with hx of Myasthenia gravis, HTN was sent by PMD for progressive sob, tracheal deviation on XR. For the past two weeks patient having upper back pain (right scapula area), 6-8/10 intensity, localized, worse with mvt. She also noticed having sob on exertion for the past two weeks also. She went to her pmd for check up and XR showing tracheal deviation so pmd referred pt to ER. Patient denies any fever, chills, sob, chest pain, no abdominal pain, no urinary or bowel issues. Lives home with partner, fully functional , social negx3 (02 Mar 2021 03:03)      I discussed the case in detail case with the hospitalist . I reviewed the radiology tests and hospital record prior to me visiting the patient.    CC/ HPI Patient is a 75y old  Female who presents with a chief complaint of thyroid mass (02 Mar 2021 11:17)      SHORTNESS OF BREATH;  NSTEMI(NON-ST ELEVATED MYOCARDIAL INFARCTION);  ANEMIA    ^SOB      High cholesterol    HTN (hypertension)    Myasthenia gravis    Shortness of breath    Hemangioma    SOB    Chest pain    Thyroid mass    JERMAINE (acute kidney injury)    Anemia    NSTEMI (non-ST elevated myocardial infarction)        FAMILY HISTORY:      No Known Allergies        Marital Status:  ( x  )    (   ) Single    (   )    (  )   Occupation:   Lives with: (  ) alone  (  ) children   (x  ) spouse   (  ) parents  (  ) other  Recent Travel:     Substance Use (street drugs): ( x ) never used  (  ) other:  Tobacco Usage:  (   ) never smoked   (   ) former smoker   (   ) current smoker  (     ) pack year  Alcohol Usage:        Home prescriptions  losartan 100 mg oral tablet: 1 tab(s) orally once a day  pyridostigmine 180 mg oral tablet, extended release: 1 tab(s) orally 2 times a day  pyridostigmine 60 mg oral tablet: 1 tab(s) orally 3 to 4 times a day, As Needed  sertraline 100 mg oral tablet: 1 tab(s) orally once a day      MEDICATIONS  (STANDING):  amLODIPine   Tablet 5 milliGRAM(s) Oral daily  pantoprazole    Tablet 40 milliGRAM(s) Oral every 12 hours  pyridostigmine  milliGRAM(s) Oral two times a day  sertraline 100 milliGRAM(s) Oral daily  sodium chloride 0.9%. 1000 milliLiter(s) (65 mL/Hr) IV Continuous <Continuous>    MEDICATIONS  (PRN):  oxycodone    5 mG/acetaminophen 325 mG 1 Tablet(s) Oral every 8 hours PRN Moderate Pain (4 - 6)  pyridostigmine 60 milliGRAM(s) Oral four times a day PRN double vision, ptosis      sodium chloride 0.9%.: Solution, 1000 milliLiter(s) infuse at 65 mL/Hr  Provider's Contact #: [phone]      T(C): 35.9 (21 @ 05:25), Max: 37.1 (21 @ 04:40)  HR: 105 (21 @ 05:25) (71 - 105)  BP: 141/65 (21 @ 05:25) (108/97 - 141/65)  RR: 18 (21 @ 05:25) (17 - 20)  SpO2: 100% (21 @ 04:40) (95% - 100%)  ICU Vital Signs Last 24 Hrs  T(C): 35.9 (02 Mar 2021 05:25), Max: 37.1 (02 Mar 2021 04:40)  T(F): 96.7 (02 Mar 2021 05:25), Max: 98.7 (02 Mar 2021 04:40)  HR: 105 (02 Mar 2021 05:25) (71 - 105)  BP: 141/65 (02 Mar 2021 05:25) (108/97 - 141/65)  BP(mean): 82 (02 Mar 2021 00:17) (82 - 82)  RR: 18 (02 Mar 2021 05:25) (17 - 20)  SpO2: 100% (02 Mar 2021 04:40) (95% - 100%)      I&O's Summary      I&O's Detail      Drug Dosing Weight  Height (cm): 167.6 (02 Mar 2021 05:25)  Weight (kg): 71.5 (02 Mar 2021 05:25)  BMI (kg/m2): 25.5 (02 Mar 2021 05:25)  BSA (m2): 1.81 (02 Mar 2021 05:25)    LABS:                          4.9    12.07 )-----------( 452      ( 01 Mar 2021 19:26 )             16.2           138  |  105  |  34<H>  ----------------------------<  119<H>  4.2   |  16<L>  |  1.5    Ca    9.6      01 Mar 2021 19:26  Mg     2.1     -    TPro  7.0  /  Alb  3.9  /  TBili  0.2  /  DBili  x   /  AST  44<H>  /  ALT  18  /  AlkPhos  102  03-      LIVER FUNCTIONS - ( 01 Mar 2021 19:26 )  Alb: 3.9 g/dL / Pro: 7.0 g/dL / ALK PHOS: 102 U/L / ALT: 18 U/L / AST: 44 U/L / GGT: x             CARDIAC MARKERS ( 01 Mar 2021 19:26 )  x     / 0.12 ng/mL / x     / x     / x                Serum Pro-Brain Natriuretic Peptide: 03947 pg/mL (21 @ 19:26)              Urinalysis Basic - ( 02 Mar 2021 02:45 )    Color: Yellow / Appearance: Clear / S.015 / pH: x  Gluc: x / Ketone: Negative  / Bili: Negative / Urobili: 0.2 mg/dL   Blood: x / Protein: Negative mg/dL / Nitrite: Negative   Leuk Esterase: Small / RBC: 1-2 /HPF / WBC >50 /HPF   Sq Epi: x / Non Sq Epi: Few /HPF / Bacteria: Occasional /HPF                      RADIOLOGY:  I have personally reviewed all chest and other pertinent radiology films.      REVIEW OF SYSTEMS:     · CONSTITUTIONAL:   no fever   no chills.      · EYES:   no discharge,   no irritation,    no visual changes.    · ENMT:   Ears: no ear pain and no hearing problems.  Nose: no nasal congestion and no nasal drainage.      · CARDIOVASCULAR:   no chest pain,   no swelling  no palpitations      · RESPIRATORY:  no SOB,  no wheezing ,  no respiratory difficulty  no sputum production    · GASTROINTESTINAL:   no abdominal pain,    no nausea   no vomiting.    · GENITOURINARY:  no dysuria,   no frequency,       · MUSCULOSKELETAL:   no back pain,   no neck pain,   no weakness.    · SKIN:   no pruritis,   no rashes.    · NEURO:   no loss of consciousness,   no headache,   no weakness.        ALLERGIC/IMMUNOLOGIC:   No active allergic or immunologic issues    all other systems are negative        PHYSICAL EXAM:     · CONSTITUTIONAL:   not septic appearing,   well nourished,   NAD    · ENMT:   Airway patent,   Nasal mucosa clear.  Mouth with normal mucosa.   No thrush    · EYES:   Clear bilaterally,   pupils equal,   round and reactive to light.    · CARDIAC:   Normal rate,   regular rhythm.    Heart sounds S1, S2.   no thrills or bruits on palpitation  normal  cardiac impulse  No murmurs, no rubs or gallops on auscultation  no edema        CAROTID:   normal systolic impulse  no bruits    · RESPIRATORY:   rales  no stridor  normal chest expansion  not tachypneic,  no retractions or use of accessory muscles  palpation of chest is normal with no fremitus  percussion of chest demonstrates no hyperresonance or dullness    · GASTROINTESTINAL:  Abdomen soft,   non-tender,   no guarding,   + BS  liver spleen not palpable    · MUSCULOSKELETAL:   range of motion is not limited,  no clubbing, cyanosis      · NEUROLOGICAL:   Alert and oriented   no obvious focal deficits in cranial nerve areas        · SKIN:   Skin normal color for race,   warm,   dry and intact.       · HEME LYMPH:   no splenomegaly.  No cervical  lymphadenopathy.  no inguinal lymphadenopathy

## 2021-03-02 NOTE — H&P ADULT - HISTORY OF PRESENT ILLNESS
75 yr old famale with hx of Myasthenia gravis, HTN, current smoker was sent by NP for abnormal imaging. As per patient, past 2 weeks pt having right shoulder / back pain sharp - constant so went to see NP.       Patient denies any fever, chills, sob, chest pain, no abdominal pain, no urinary or bowel issues. Lives home with  75 yr old female with hx of Myasthenia gravis, HTN was sent by PMD for progressive sob, tracheal deviation on XR. For the past two weeks patietn having upper back pain (right scapula area), 6-8/10 intensity, localized, worse with mvt. She also noticed having sob on exertion for the past two weeks also. She went to her pmd for check up and XR showing tracheal deviation so pmd referred pt to ER. Patient denies any fever, chills, sob, chest pain, no abdominal pain, no urinary or bowel issues. Lives home with partner, fully functional , social negx3

## 2021-03-02 NOTE — CONSULT NOTE ADULT - ASSESSMENT
Patient with myasthenia gravis. Not able to take beta. She has for 2 weeks because shoulder pain taken 8 advil a day. She developed marked OLIVAREZ. No chest pain. She presented hemoglobin 4.9. Troponin increased. Suspect type 2 mi. Secondary profound anemia. Anemia possibly secondary gastritis. Secondary advil. She has CAD. Transfuse. Check stool occult blood. Amlodipine. No asa now. Statin echo. W/u thyroid abnormal CT chest. Prognosis guarded

## 2021-03-02 NOTE — H&P ADULT - ATTENDING COMMENTS
75 yr old female with hx of Myasthenia gravis, HTN, current smoker was sent by NP for abnormal imaging.    # Anemia  - Hb 4.9 --> s/p 3U pRBC   - hemodynamically stable; pt denies any bleeding   - ROMA neg  - CTA chest: No pulmonary embolus  - trend h/h daily  - clear liquid diet  - start Protonix PO  - f/u occult stool   - active type and screen  - GI consult    # Thyroid mass  - CT chest: Right thyroid lobe 3.4 cm mass with resulting mild leftward tracheal deviation.  - check thyroid profile   - f/u thyroid US    # Mass compressing left T2 foramen   - CT chest: 3.5 x 1.8 cm ovoid mass abutting left T2 neural foramen. It is probably pleural-based. This may also be neural in origin.  - order MR chest with and without IV contrast once JERMAINE resolves     # Elevated Trop  - Trop 0.12 --> f/u AM trop  - EKG: NSR with anterolateral ST depression  - BNP: 68959  - CT chest: Bilateral pleural effusions with adjacent compressive atelectasis.  - clinically euvolemic  - check ECHO  - cardiology consult      # JERMAINE vs CKD 3  - f/u urine studies   - start IVF@65      # HTN  - bp controlled  - holding losartan due to JERMAINE  - if bp elevated --> start amlodipine    # Hx of Myasthenia gravis  - c/w pyridostigmine     # DVT ppx  - SCD    # Ambulate as tolerated    # Full code 75 yr old female with hx of Myasthenia gravis, HTN, current smoker was sent by PMD for progressive sob, tracheal deviation on XR.     # Anemia  - hemodynamically stable; pt denies any bleeding   - ROMA neg  - CTA chest: No pulmonary embolus; - Hb 4.9  - s/p 3U pRBC   - trend h/h daily  - clear liquid diet  - start Protonix PO  - f/u retic count, LDH, haptoglobin  - f/u iron profile (called lab to run from initial sample)  - f/u occult stool   - active type and screen  - GI consult    # Thyroid mass  - CT chest: Right thyroid lobe 3.4 cm mass with resulting mild leftward tracheal deviation.  - check thyroid profile   - f/u thyroid US    # Right upper back pain   # Mass compressing left T2 foramen   - CT chest: 3.5 x 1.8 cm ovoid mass abutting left T2 neural foramen. It is probably pleural-based. This may also be neural in origin.  - order MR chest with and without IV contrast once JERMAINE resolves   - start percocet prn     # Elevated Trop  - Trop 0.12 --> f/u AM trop  - EKG: NSR with anterolateral ST depression  - BNP: 02858  - CT chest: Bilateral pleural effusions with adjacent compressive atelectasis.  - clinically euvolemic  - check ECHO  - cardiology consult      # JERMAINE vs CKD 3  - likely renal injury secondary to advil use   - f/u urine studies   - start IVF@65      # HTN  - holding losartan due to JERMAINE  - start amlodipine     # Hx of Myasthenia gravis  - c/w pyridostigmine     # DVT Ppx  - SCD    # Nicotine Abuse  - quit 3 weeks ago   - encouraged to refrain from smoking    # Ambulate as tolerated    # Full code

## 2021-03-02 NOTE — PATIENT PROFILE ADULT - NSTRANSFERBELONGINGSDISPO_GEN_A_NUR
Problem: Pain Management  Goal: Pain level will decrease to patient's comfort goal  Outcome: PROGRESSING AS EXPECTED  Pt reporting no pain in nasal region, but admits to having chronic pain. Norco given per pt request; tolerating well.     Problem: Psychosocial Needs:  Goal: Level of anxiety will decrease  Outcome: PROGRESSING AS EXPECTED  Pt verbalizes want to leave, but re educated on why the physician ordered overnight hospital stay. Pt verbalized understanding.        not applicable

## 2021-03-02 NOTE — CONSULT NOTE ADULT - ASSESSMENT
INTERVENTIONAL RADIOLOGY CONSULT:     Procedure Requested:     HPI:  75 yr old female with hx of Myasthenia gravis, HTN was sent by PMD for progressive sob, tracheal deviation on XR. For the past two weeks patietn having upper back pain (right scapula area), 6-8/10 intensity, localized, worse with mvt. She also noticed having sob on exertion for the past two weeks also. She went to her pmd for check up and XR showing tracheal deviation so pmd referred pt to ER. Patient denies any fever, chills, sob, chest pain, no abdominal pain, no urinary or bowel issues. Lives home with partner, fully functional , social negx3 (02 Mar 2021 03:03)      PAST MEDICAL & SURGICAL HISTORY:  High cholesterol    HTN (hypertension)    Myasthenia gravis    Hemangioma        MEDICATIONS  (STANDING):  amLODIPine   Tablet 5 milliGRAM(s) Oral daily  lidocaine   Patch 1 Patch Transdermal daily  pantoprazole    Tablet 40 milliGRAM(s) Oral every 12 hours  potassium chloride    Tablet ER 20 milliEquivalent(s) Oral once  pyridostigmine  milliGRAM(s) Oral two times a day  sertraline 100 milliGRAM(s) Oral daily  sodium chloride 0.9%. 1000 milliLiter(s) (65 mL/Hr) IV Continuous <Continuous>    MEDICATIONS  (PRN):  oxycodone    5 mG/acetaminophen 325 mG 1 Tablet(s) Oral every 8 hours PRN Moderate Pain (4 - 6)  pyridostigmine 60 milliGRAM(s) Oral four times a day PRN double vision, ptosis      Allergies    No Known Allergies    Intolerances        Social History:   Smoking: Yes [ ]  No [ ]   ______pk yrs  ETOH  Yes [ ]  No [ ]  Social [ ]  DRUGS:  Yes [ ]  No [ ]  if so what______________    FAMILY HISTORY:      Physical Exam:   Vital Signs Last 24 Hrs  T(C): 35.9 (02 Mar 2021 05:25), Max: 37.1 (02 Mar 2021 04:40)  T(F): 96.7 (02 Mar 2021 05:25), Max: 98.7 (02 Mar 2021 04:40)  HR: 105 (02 Mar 2021 05:25) (71 - 105)  BP: 141/65 (02 Mar 2021 05:25) (108/97 - 141/65)  BP(mean): 82 (02 Mar 2021 00:17) (82 - 82)  RR: 18 (02 Mar 2021 05:25) (17 - 20)  SpO2: 100% (02 Mar 2021 04:40) (95% - 100%)    Labs:                         8.9    11.74 )-----------( 370      ( 02 Mar 2021 11:35 )             27.9     03-02    138  |  104  |  26<H>  ----------------------------<  127<H>  3.3<L>   |  22  |  1.0    Ca    9.4      02 Mar 2021 11:35  Mg     1.8     03-02    TPro  6.7  /  Alb  4.0  /  TBili  0.7  /  DBili  <0.2  /  AST  22  /  ALT  14  /  AlkPhos  104  03-02    PT/INR - ( 01 Mar 2021 19:26 )   PT: 11.90 sec;   INR: 1.03 ratio         PTT - ( 01 Mar 2021 19:26 )  PTT:26.9 sec    Pertinent labs:                      8.9    11.74 )-----------( 370      ( 02 Mar 2021 11:35 )             27.9       03-02    138  |  104  |  26<H>  ----------------------------<  127<H>  3.3<L>   |  22  |  1.0    Ca    9.4      02 Mar 2021 11:35  Mg     1.8     03-02    TPro  6.7  /  Alb  4.0  /  TBili  0.7  /  DBili  <0.2  /  AST  22  /  ALT  14  /  AlkPhos  104  03-02      PT/INR - ( 01 Mar 2021 19:26 )   PT: 11.90 sec;   INR: 1.03 ratio         PTT - ( 01 Mar 2021 19:26 )  PTT:26.9 sec    ASSESSMENT AND PLAN:  75 yr old female with hx of Myasthenia gravis, HTN was sent by PMD for progressive sob.    Severe anemia with hgb 4.9, which responded appropriately after 3 units.  NSTEMI likely 2/2 severe anemia.  Thyroid mass causing tracheal deviation, but not compressing the trachea. Unlikely to be source of SOB.  Imaging also showed ovoid mass abutting the left T2 neuroforamen    IR consulted for bx of left paraspinal mass.    No need for IR intervention as inpatient.  Pt needs outpatient work up with MRI and PET.    Thank you for the courtesy of this consult, please call [phone] with any further questions.    INTERVENTIONAL RADIOLOGY CONSULT:     Procedure Requested:     HPI:  75 yr old female with hx of Myasthenia gravis, HTN was sent by PMD for progressive sob, tracheal deviation on XR. For the past two weeks patietn having upper back pain (right scapula area), 6-8/10 intensity, localized, worse with mvt. She also noticed having sob on exertion for the past two weeks also. She went to her pmd for check up and XR showing tracheal deviation so pmd referred pt to ER. Patient denies any fever, chills, sob, chest pain, no abdominal pain, no urinary or bowel issues. Lives home with partner, fully functional , social negx3 (02 Mar 2021 03:03)      PAST MEDICAL & SURGICAL HISTORY:  High cholesterol    HTN (hypertension)    Myasthenia gravis    Hemangioma        MEDICATIONS  (STANDING):  amLODIPine   Tablet 5 milliGRAM(s) Oral daily  lidocaine   Patch 1 Patch Transdermal daily  pantoprazole    Tablet 40 milliGRAM(s) Oral every 12 hours  potassium chloride    Tablet ER 20 milliEquivalent(s) Oral once  pyridostigmine  milliGRAM(s) Oral two times a day  sertraline 100 milliGRAM(s) Oral daily  sodium chloride 0.9%. 1000 milliLiter(s) (65 mL/Hr) IV Continuous <Continuous>    MEDICATIONS  (PRN):  oxycodone    5 mG/acetaminophen 325 mG 1 Tablet(s) Oral every 8 hours PRN Moderate Pain (4 - 6)  pyridostigmine 60 milliGRAM(s) Oral four times a day PRN double vision, ptosis      Allergies    No Known Allergies    FAMILY HISTORY:      Physical Exam:   Vital Signs Last 24 Hrs  T(C): 35.9 (02 Mar 2021 05:25), Max: 37.1 (02 Mar 2021 04:40)  T(F): 96.7 (02 Mar 2021 05:25), Max: 98.7 (02 Mar 2021 04:40)  HR: 105 (02 Mar 2021 05:25) (71 - 105)  BP: 141/65 (02 Mar 2021 05:25) (108/97 - 141/65)  BP(mean): 82 (02 Mar 2021 00:17) (82 - 82)  RR: 18 (02 Mar 2021 05:25) (17 - 20)  SpO2: 100% (02 Mar 2021 04:40) (95% - 100%)    Labs:                         8.9    11.74 )-----------( 370      ( 02 Mar 2021 11:35 )             27.9     03-02    138  |  104  |  26<H>  ----------------------------<  127<H>  3.3<L>   |  22  |  1.0    Ca    9.4      02 Mar 2021 11:35  Mg     1.8     03-02    TPro  6.7  /  Alb  4.0  /  TBili  0.7  /  DBili  <0.2  /  AST  22  /  ALT  14  /  AlkPhos  104  03-02    PT/INR - ( 01 Mar 2021 19:26 )   PT: 11.90 sec;   INR: 1.03 ratio      PTT - ( 01 Mar 2021 19:26 )  PTT:26.9 sec    ASSESSMENT AND PLAN:  75 yr old female with hx of Myasthenia gravis, HTN was sent by PMD for progressive sob.    Severe anemia with hgb 4.9, which responded appropriately after 3 units.  NSTEMI likely 2/2 severe anemia.  Thyroid mass causing tracheal deviation, but not compressing the trachea. Unlikely to be source of SOB.  Imaging also showed ovoid mass abutting the left T2 neuroforamen    IR consulted for bx of left paraspinal mass.    No need for IR intervention as inpatient.  Pt needs outpatient work up with MRI and PET.    Thank you for the courtesy of this consult, please call [phone] with any further questions.

## 2021-03-02 NOTE — H&P ADULT - NSHPSOCIALHISTORY_GEN_ALL_CORE
Marital Status:  (   )    (   ) Single    (   )    (  )   Lives with: (  ) alone  (  ) children   (  ) spouse   (  ) parents  (  ) other  Recent Travel: No recent travel  Occupation:    Substance Use (street drugs): ( x ) never used  (  ) other:  Tobacco Usage:  ( x  ) never smoked   (   ) former smoker   (   ) current smoker  (     ) pack year  Alcohol Usage: None Marital Status:  (   )    ( x  ) Single    (   )    (  )   Lives with: (  ) alone  (  ) children   (  ) spouse   (  ) parents  ( x ) other: partner  Recent Travel: No recent travel  Occupation: reitred    Substance Use (street drugs): ( x ) never used  (  ) other:  Tobacco Usage:  (   ) never smoked   ( x  ) former smoker   (   ) current smoker  (     ) pack year: quit last month   Alcohol Usage: None

## 2021-03-02 NOTE — H&P ADULT - NSHPPHYSICALEXAM_GEN_ALL_CORE
T(C): 36.7 (03-02-21 @ 02:45), Max: 36.8 (03-02-21 @ 02:26)  HR: 72 (03-02-21 @ 02:45) (71 - 100)  BP: 129/59 (03-02-21 @ 02:45) (108/97 - 134/61)  RR: 18 (03-02-21 @ 02:45) (17 - 20)  SpO2: 96% (03-02-21 @ 02:45) (95% - 99%)    GENERAL: NAD, well-developed  HEAD:  Atraumatic, Normocephalic  EYES: EOMI, PERRLA, conjunctiva and sclera clear  ENT: Normal tympanic membrane. No nasal obstruction or discharge. No tonsillar exudate, swelling or erythema.  NECK: Supple, No JVD  CHEST/LUNG: Clear to auscultation bilaterally; No wheeze  HEART: Regular rate and rhythm; No murmurs, rubs, or gallops  ABDOMEN: Soft, Nontender, Nondistended; Bowel sounds present  EXTREMITIES:  2+ Peripheral Pulses, No clubbing, cyanosis, or edema  PSYCH: AAOx3  NEUROLOGY: non-focal  SKIN: No rashes or lesions

## 2021-03-02 NOTE — CONSULT NOTE ADULT - ASSESSMENT
IMPRESSION:  severe anemia  R thyroid mass  L paraspinal oval mass  small B/L effusions    SUGGEST:  GI w/u for the anemia  the thyroid mass is NOT obstructing the trachea  the dyspnea is more related to the anemia than thyroid compression  transfuse Hgb >8.0  the patient needs surgical evaluation of the thyroid mass  the paraspinal mass has a wide differential some malignant some not  an MRI followed by a PET is needed to evaluate the paraspinal mass  pleural effusions are likely related to the severe anemia

## 2021-03-02 NOTE — H&P ADULT - NSHPLABSRESULTS_GEN_ALL_CORE
CT Angio Chest PE Protocol w/ IV Cont (03.02.21)  1.  No pulmonary embolus.  2.  Right thyroid lobe 3.4 cm mass with resulting mild leftward tracheal deviation. Recommend ultrasound of the thyroid for further evaluation.  3.  Bilateral pleural effusions with adjacent compressive atelectasis.  4.  Left upper lobe 7 mm ground-glass nodule. Recommend CT chest in 6-12 months for continued follow-up.  5.  3.5 x 1.8 cm ovoid mass abutting left T2 neural foramen. It is probably pleural-based. This may also be neural in origin. Further evaluation with MRI with and without contrast may be of benefit.

## 2021-03-02 NOTE — CONSULT NOTE ADULT - ASSESSMENT
75yFemale pmh MG, HTN presented to ER after concerning chest x-ray findings. GI consulted for anemia, Patient denies nausea, vomiting, hematemesis, melena, blood in stool, diarrhea, constipation, abdominal pain. Patient has never had an endoscopy or colonoscopy and notes she was taking N-Saids for shoulder pain about 8 a day for a month.    Problem 1-Microcytic anemia no gross GI bleeding  -likely N-Said induced gastritis vs PUD  Rec  -diet as tolerated   -PPI BID  -In light of suspected Type 2 MI and no gross GI bleeding, would hold off on EGD/Colonoscopy for one month, Follow up with our [facility] located at [address] [place] [address]. Phone Number: [phone] to schedule EGD/Colonoscopy after cardiac clearance   -Maintain Hemodynamic Stability   -Monitor CBC  -CMP,Optimize Electrolytes  -PT,PTT,INR  -EKG, Chest-Xray   -Transfuse prn to hgb >8  -Two large bore IV lines  -Monitor Vital Signs  -Monitor Stool For blood, frequency, consistency, melena  -Active Type and Screen  -Iron Studies, Folate, Vitamin B12 levels     Problem 2-Right thyroid lobe 3.4 cm mass with resulting mild leftward tracheal deviation.   Rec  - Care as per primary team     Problem 3- Bilateral pleural effusions with adjacent compressive atelectasis. Left upper lobe 7 mm groundglass nodule. 3.5 x 1.8 cm ovoid mass abutting left T2 neural foramen. It is probably pleural-based. This may also be neural in origin  Rec  - Care as per primary team

## 2021-03-02 NOTE — CONSULT NOTE ADULT - CONSULT REQUESTED DATE/TIME
02-Mar-2021 11:42
01-Mar-2021 23:18
02-Mar-2021 08:21
02-Mar-2021 11:17
02-Mar-2021 17:49
02-Mar-2021 13:33

## 2021-03-03 LAB
ANION GAP SERPL CALC-SCNC: 11 MMOL/L — SIGNIFICANT CHANGE UP (ref 7–14)
BUN SERPL-MCNC: 23 MG/DL — HIGH (ref 10–20)
CALCIUM SERPL-MCNC: 9.1 MG/DL — SIGNIFICANT CHANGE UP (ref 8.5–10.1)
CHLORIDE SERPL-SCNC: 110 MMOL/L — SIGNIFICANT CHANGE UP (ref 98–110)
CO2 SERPL-SCNC: 21 MMOL/L — SIGNIFICANT CHANGE UP (ref 17–32)
CREAT SERPL-MCNC: 1.1 MG/DL — SIGNIFICANT CHANGE UP (ref 0.7–1.5)
CULTURE RESULTS: SIGNIFICANT CHANGE UP
FERRITIN SERPL-MCNC: 8 NG/ML — LOW (ref 15–150)
GLUCOSE SERPL-MCNC: 103 MG/DL — HIGH (ref 70–99)
HAPTOGLOB SERPL-MCNC: 286 MG/DL — HIGH (ref 34–200)
HCT VFR BLD CALC: 26.5 % — LOW (ref 37–47)
HCV AB S/CO SERPL IA: 0.03 COI — SIGNIFICANT CHANGE UP
HCV AB SERPL-IMP: SIGNIFICANT CHANGE UP
HGB BLD-MCNC: 8.3 G/DL — LOW (ref 12–16)
MCHC RBC-ENTMCNC: 22.7 PG — LOW (ref 27–31)
MCHC RBC-ENTMCNC: 31.3 G/DL — LOW (ref 32–37)
MCV RBC AUTO: 72.4 FL — LOW (ref 81–99)
NRBC # BLD: 0 /100 WBCS — SIGNIFICANT CHANGE UP (ref 0–0)
OB PNL STL: POSITIVE
OSMOLALITY UR: 552 MOS/KG — SIGNIFICANT CHANGE UP (ref 50–1200)
PLATELET # BLD AUTO: 371 K/UL — SIGNIFICANT CHANGE UP (ref 130–400)
POTASSIUM SERPL-MCNC: 3.6 MMOL/L — SIGNIFICANT CHANGE UP (ref 3.5–5)
POTASSIUM SERPL-SCNC: 3.6 MMOL/L — SIGNIFICANT CHANGE UP (ref 3.5–5)
RBC # BLD: 3.66 M/UL — LOW (ref 4.2–5.4)
RBC # FLD: 19.9 % — HIGH (ref 11.5–14.5)
SARS-COV-2 IGG SERPL QL IA: NEGATIVE — SIGNIFICANT CHANGE UP
SARS-COV-2 IGM SERPL IA-ACNC: 0.11 INDEX — SIGNIFICANT CHANGE UP
SODIUM SERPL-SCNC: 142 MMOL/L — SIGNIFICANT CHANGE UP (ref 135–146)
SPECIMEN SOURCE: SIGNIFICANT CHANGE UP
T3 SERPL-MCNC: 70 NG/DL — LOW (ref 80–200)
T4 AB SER-ACNC: 7.6 UG/DL — SIGNIFICANT CHANGE UP (ref 4.6–12)
TROPONIN T SERPL-MCNC: 0.25 NG/ML — CRITICAL HIGH
TSH SERPL-MCNC: 0.5 UIU/ML — SIGNIFICANT CHANGE UP (ref 0.27–4.2)
WBC # BLD: 8.55 K/UL — SIGNIFICANT CHANGE UP (ref 4.8–10.8)
WBC # FLD AUTO: 8.55 K/UL — SIGNIFICANT CHANGE UP (ref 4.8–10.8)

## 2021-03-03 PROCEDURE — 99233 SBSQ HOSP IP/OBS HIGH 50: CPT

## 2021-03-03 RX ORDER — ALPRAZOLAM 0.25 MG
1 TABLET ORAL ONCE
Refills: 0 | Status: DISCONTINUED | OUTPATIENT
Start: 2021-03-03 | End: 2021-03-03

## 2021-03-03 RX ADMIN — Medication 1 MILLIGRAM(S): at 12:49

## 2021-03-03 RX ADMIN — SODIUM CHLORIDE 65 MILLILITER(S): 9 INJECTION INTRAMUSCULAR; INTRAVENOUS; SUBCUTANEOUS at 02:08

## 2021-03-03 RX ADMIN — PYRIDOSTIGMINE BROMIDE 180 MILLIGRAM(S): 60 SOLUTION ORAL at 17:51

## 2021-03-03 RX ADMIN — SERTRALINE 100 MILLIGRAM(S): 25 TABLET, FILM COATED ORAL at 11:47

## 2021-03-03 RX ADMIN — AMLODIPINE BESYLATE 5 MILLIGRAM(S): 2.5 TABLET ORAL at 06:02

## 2021-03-03 RX ADMIN — PANTOPRAZOLE SODIUM 40 MILLIGRAM(S): 20 TABLET, DELAYED RELEASE ORAL at 17:51

## 2021-03-03 RX ADMIN — PYRIDOSTIGMINE BROMIDE 180 MILLIGRAM(S): 60 SOLUTION ORAL at 06:02

## 2021-03-03 RX ADMIN — PANTOPRAZOLE SODIUM 40 MILLIGRAM(S): 20 TABLET, DELAYED RELEASE ORAL at 06:02

## 2021-03-03 NOTE — PROGRESS NOTE ADULT - ASSESSMENT
75 yr old female with hx of Myasthenia gravis, HTN, current smoker was sent by PMD for progressive sob, tracheal deviation on XR.     # Microcytic anemia possibly related to NSAIDs induced gastritis  -no gross GI bleeding  - hemodynamically stable; pt denies any bleeding   - ROMA neg  - CTA chest: No pulmonary embolus; - Hb 4.9  - s/p 3U pRBC   PLAN  - trend h/h daily  - diet advanced  - started Protonix PO 40mg BID  - GI eval appreciated; in the setting of type 2 MI, plan is to hold off EGD/Colonoscopy for 1 months  - Patient to follow up with [facility] located at [address] [place] [address]. Phone Number: [phone] to schedule EGD/Colonoscopy after cardiac clearance   - Transfuse prn to hgb >8  - Two large bore IV lines    # Right thyroid mass with mild leftward tracheal deviation  - CT chest: Right thyroid lobe 3.4 cm mass with resulting mild leftward tracheal deviation.  PLAN  - f/u thyroid US  - endo eval appreciated: needs outpatient workup; patient will need outpatient office visit for a sonogram-guided fine needle aspiration.  Office phone is [phone]; endo to reach out to the patient as well to schedule  - Pulm eval appreciated: thyroid mass isn't obstructing the trachea   - outpatient surgical eval for thyroid mass    # Right upper back pain- resolved  # Paraspinal mass compressing left T2 foramen   - CT chest: 3.5 x 1.8 cm ovoid mass abutting left T2 neural foramen. It is probably pleural-based. This may also be neural in origin.  PLAN  - percocet prn   - IR was consulted for possible bx of left paraspinal mass: recommended outpatient workup with MRI and PET  - discussed with radiologist today- will give patient referral for outpatient MRI     # b/l pleural effusions  - elevated pBNP  PLAN  - f/u ECHO  - monitor O2    # Elevated Trop likely demand ischemia in the setting of severe anemia  - EKG: NSR with anterolateral ST depression  - BNP: 06777  - Trop 0.12 --> 0.26--> 0.25  - CT chest: Bilateral pleural effusions with adjacent compressive atelectasis.  - clinically euvolemic  PLAN  - check ECHO  - cardiology on board    # JERMAINE vs CKD 3- improved  - likely renal injury secondary to advil use   - improved with IVF  PLAN  - encourage PO intake    # HTN  - holding losartan due to JERMAINE  - start amlodipine     # Hx of Myasthenia gravis  - c/w pyridostigmine     # Nicotine Abuse  - quit 3 weeks ago   - encouraged to refrain from smoking    # DVT Ppx: SCD  # Full code.  Progress Note Handoff  Pending Consults: None  Pending Tests: ECHO, US thyroid  Pending Results: none  Family Discussion: Patient  Disposition: Home___X__/SNF______/Other_____/Unknown at this time_____ 75 yr old female with hx of Myasthenia gravis, HTN, current smoker was sent by PMD for progressive sob, tracheal deviation on XR.     # Microcytic anemia possibly related to NSAIDs induced gastritis  -no gross GI bleeding  - hemodynamically stable; pt denies any bleeding   - ROMA neg  - CTA chest: No pulmonary embolus; - Hb 4.9  - s/p 3U pRBC   PLAN  - trend h/h daily  - diet advanced  - started Protonix PO 40mg BID  - GI eval appreciated; in the setting of type 2 MI, plan is to hold off EGD/Colonoscopy for 1 months  - Patient to follow up with [facility] located at [address] [place] [address]. Phone Number: [phone] to schedule EGD/Colonoscopy after cardiac clearance   - Transfuse prn to hgb >8  - Two large bore IV lines    # Right thyroid mass with mild leftward tracheal deviation  - CT chest: Right thyroid lobe 3.4 cm mass with resulting mild leftward tracheal deviation.  PLAN  - f/u thyroid US  - endo eval appreciated: needs outpatient workup; patient will need outpatient office visit for a sonogram-guided fine needle aspiration.  Office phone is [phone]; endo to reach out to the patient as well to schedule  - Pulm eval appreciated: thyroid mass isn't obstructing the trachea   - outpatient surgical eval for thyroid mass    # Right upper back pain- resolved  # Paraspinal mass compressing left T2 foramen   - CT chest: 3.5 x 1.8 cm ovoid mass abutting left T2 neural foramen. It is probably pleural-based. This may also be neural in origin.  PLAN  - percocet prn   - IR was consulted for possible bx of left paraspinal mass: recommended outpatient workup with MRI and PET  - discussed with radiologist today- will give patient referral for outpatient MRI and phone number for radiology to schedule the scan    # b/l pleural effusions  - elevated pBNP  PLAN  - f/u ECHO  - monitor O2    # Elevated Trop likely demand ischemia in the setting of severe anemia  - EKG: NSR with anterolateral ST depression  - BNP: 70219  - Trop 0.12 --> 0.26--> 0.25  - CT chest: Bilateral pleural effusions with adjacent compressive atelectasis.  - clinically euvolemic  PLAN  - check ECHO  - cardiology on board    # JERMAINE vs CKD 3- improved  - likely renal injury secondary to advil use   - improved with IVF  PLAN  - encourage PO intake    # HTN  - holding losartan due to JERMAINE  - start amlodipine     # Hx of Myasthenia gravis  - c/w pyridostigmine     # Nicotine Abuse  - quit 3 weeks ago   - encouraged to refrain from smoking    # DVT Ppx: SCD  # Full code.  Progress Note Handoff  Pending Consults: None  Pending Tests: ECHO, US thyroid  Pending Results: none  Family Discussion: Patient; spoke to patient's [phone]  Disposition: Home___X__/SNF______/Other_____/Unknown at this time_____

## 2021-03-03 NOTE — PROGRESS NOTE ADULT - ASSESSMENT
75yFemale pmh MG, HTN presented to ER after concerning chest x-ray findings. GI consulted for anemia, Patient denies nausea, vomiting, hematemesis, melena, blood in stool, diarrhea, constipation, abdominal pain. Patient has never had an endoscopy or colonoscopy and notes she was taking N-Saids for shoulder pain about 8 a day for a month.    Problem 1-Microcytic anemia no gross GI bleeding  -likely N-Said induced gastritis vs PUD  Rec  -h and h stable   -diet as tolerated   -PPI BID  -In light of suspected Type 2 MI and no gross GI bleeding, would hold off on EGD/Colonoscopy for one month, Follow up with our [facility] located at [address] [place] [address]. Phone Number: [phone] to schedule EGD/Colonoscopy after cardiac clearance   -Maintain Hemodynamic Stability   -Monitor CBC  -CMP,Optimize Electrolytes  -PT,PTT,INR  -EKG, Chest-Xray   -Transfuse prn to hgb >8  -Two large bore IV lines  -Monitor Vital Signs  -Monitor Stool For blood, frequency, consistency, melena  -Active Type and Screen  -Iron Studies, Folate, Vitamin B12 levels     Problem 2-Right thyroid lobe 3.4 cm mass with resulting mild leftward tracheal deviation.   Rec  - Care as per primary team     Problem 3- Bilateral pleural effusions with adjacent compressive atelectasis. Left upper lobe 7 mm groundglass nodule. 3.5 x 1.8 cm ovoid mass abutting left T2 neural foramen. It is probably pleural-based. This may also be neural in origin  Rec  - Care as per primary team

## 2021-03-03 NOTE — CHART NOTE - NSCHARTNOTEFT_GEN_A_CORE
Imaging reviewed and discussed with attending. Agree with radiology recommendations for outpatient MRI/work up and outpatient follow up with neurosurgery once imaging completed. Imaging reviewed and discussed with attending. Agree with radiology recommendations for outpatient MRI/work up and outpatient follow up with Dr. Woodruff at [address] [place] [address] ave [phone],  once imaging completed.

## 2021-03-04 LAB
ALBUMIN SERPL ELPH-MCNC: 3.5 G/DL — SIGNIFICANT CHANGE UP (ref 3.5–5.2)
ALP SERPL-CCNC: 87 U/L — SIGNIFICANT CHANGE UP (ref 30–115)
ALT FLD-CCNC: 11 U/L — SIGNIFICANT CHANGE UP (ref 0–41)
ANION GAP SERPL CALC-SCNC: 10 MMOL/L — SIGNIFICANT CHANGE UP (ref 7–14)
AST SERPL-CCNC: 16 U/L — SIGNIFICANT CHANGE UP (ref 0–41)
BILIRUB SERPL-MCNC: 0.3 MG/DL — SIGNIFICANT CHANGE UP (ref 0.2–1.2)
BUN SERPL-MCNC: 17 MG/DL — SIGNIFICANT CHANGE UP (ref 10–20)
CALCIUM SERPL-MCNC: 9.2 MG/DL — SIGNIFICANT CHANGE UP (ref 8.5–10.1)
CHLORIDE SERPL-SCNC: 111 MMOL/L — HIGH (ref 98–110)
CO2 SERPL-SCNC: 22 MMOL/L — SIGNIFICANT CHANGE UP (ref 17–32)
CREAT SERPL-MCNC: 0.8 MG/DL — SIGNIFICANT CHANGE UP (ref 0.7–1.5)
GLUCOSE SERPL-MCNC: 85 MG/DL — SIGNIFICANT CHANGE UP (ref 70–99)
HCT VFR BLD CALC: 27.3 % — LOW (ref 37–47)
HGB BLD-MCNC: 8.4 G/DL — LOW (ref 12–16)
MCHC RBC-ENTMCNC: 22.6 PG — LOW (ref 27–31)
MCHC RBC-ENTMCNC: 30.8 G/DL — LOW (ref 32–37)
MCV RBC AUTO: 73.6 FL — LOW (ref 81–99)
NRBC # BLD: 0 /100 WBCS — SIGNIFICANT CHANGE UP (ref 0–0)
PLATELET # BLD AUTO: 388 K/UL — SIGNIFICANT CHANGE UP (ref 130–400)
POTASSIUM SERPL-MCNC: 3.6 MMOL/L — SIGNIFICANT CHANGE UP (ref 3.5–5)
POTASSIUM SERPL-SCNC: 3.6 MMOL/L — SIGNIFICANT CHANGE UP (ref 3.5–5)
PROT SERPL-MCNC: 6 G/DL — SIGNIFICANT CHANGE UP (ref 6–8)
RBC # BLD: 3.71 M/UL — LOW (ref 4.2–5.4)
RBC # FLD: 20.3 % — HIGH (ref 11.5–14.5)
SODIUM SERPL-SCNC: 143 MMOL/L — SIGNIFICANT CHANGE UP (ref 135–146)
WBC # BLD: 7.51 K/UL — SIGNIFICANT CHANGE UP (ref 4.8–10.8)
WBC # FLD AUTO: 7.51 K/UL — SIGNIFICANT CHANGE UP (ref 4.8–10.8)

## 2021-03-04 PROCEDURE — 99233 SBSQ HOSP IP/OBS HIGH 50: CPT

## 2021-03-04 RX ADMIN — PANTOPRAZOLE SODIUM 40 MILLIGRAM(S): 20 TABLET, DELAYED RELEASE ORAL at 17:10

## 2021-03-04 RX ADMIN — PYRIDOSTIGMINE BROMIDE 180 MILLIGRAM(S): 60 SOLUTION ORAL at 05:37

## 2021-03-04 RX ADMIN — PYRIDOSTIGMINE BROMIDE 180 MILLIGRAM(S): 60 SOLUTION ORAL at 17:10

## 2021-03-04 RX ADMIN — PANTOPRAZOLE SODIUM 40 MILLIGRAM(S): 20 TABLET, DELAYED RELEASE ORAL at 05:37

## 2021-03-04 RX ADMIN — OXYCODONE AND ACETAMINOPHEN 1 TABLET(S): 5; 325 TABLET ORAL at 12:02

## 2021-03-04 RX ADMIN — AMLODIPINE BESYLATE 5 MILLIGRAM(S): 2.5 TABLET ORAL at 05:37

## 2021-03-04 RX ADMIN — OXYCODONE AND ACETAMINOPHEN 1 TABLET(S): 5; 325 TABLET ORAL at 13:09

## 2021-03-04 RX ADMIN — SERTRALINE 100 MILLIGRAM(S): 25 TABLET, FILM COATED ORAL at 11:03

## 2021-03-04 NOTE — CHART NOTE - NSCHARTNOTEFT_GEN_A_CORE
PA [facility] 7[address]    Notified by RN for patient  IGNACIO PAL  75y  Female    Complaint of chest pain.  Patient sts has mild right breast pain.  Patient denies any other complaints, denies dyspnea, denies any n/v.   Pt state similar to prior pain but rated as 3/10.  Denies any other issues, asking for pain medicine.    T(C): 36.3 (03-04-21 @ 14:08), Max: 37 (03-03-21 @ 22:13)  HR: 79 (03-04-21 @ 14:08) (79 - 93)  BP: 106/63 (03-04-21 @ 14:08) (106/63 - 138/75)  RR: 16 (03-04-21 @ 14:08) (16 - 16)  SpO2: 98% (03-04-21 @ 11:52) (98% - 98%)  Wt(kg): --        Medications:  amLODIPine   Tablet 5 milliGRAM(s) daily  lidocaine   Patch 1 Patch daily  pantoprazole    Tablet 40 milliGRAM(s) every 12 hours  pyridostigmine  milliGRAM(s) two times a day  sertraline 100 milliGRAM(s) daily        PHYSICAL EXAM:    NERVOUS SYSTEM:  Alert & Oriented X3,  PULMONARY: Clear to percussion bilaterally; No rales, rhonchi, wheezing, or rubs  CARDIOVASCULAR: Regular rate and rhythm; No murmurs, rubs, or gallops  GI: Soft, Nontender, Nondistended; Bowel sounds present  EXTREMITIES:  2+ Peripheral Pulses, No clubbing, cyanosis, or edema  SKIN: warm and dry    Assesment/Plan:  Chest pain- d/w Dr [place]  agrees with PRN Percocet   no further intervention  Advised patient to notify PA for any questions or issues.              Discussed with Provider:

## 2021-03-04 NOTE — PROGRESS NOTE ADULT - SUBJECTIVE AND OBJECTIVE BOX
IGNACIO PAL  75y  Female      Patient is a 75y old  Female who presents with a chief complaint of thyroid mass (03 Mar 2021 11:15)      INTERVAL HPI/OVERNIGHT EVENTS:  Patient seen and examined earlier this morning. Patient denies any complaints; reports her right upper back pain has resolved completely; denies any F/C, CP, palpitations, abd pain, diarrhea, constipation, bloody stool.       REVIEW OF SYSTEMS:  CONSTITUTIONAL: No fever, weight loss, or fatigue  ENMT:  No difficulty hearing, tinnitus, vertigo; No sinus or throat pain  NECK: No pain or stiffness  RESPIRATORY: No cough, wheezing, chills or hemoptysis; No shortness of breath  CARDIOVASCULAR: No chest pain, palpitations, dizziness, or leg swelling  GASTROINTESTINAL: No abdominal or epigastric pain. No nausea, vomiting, or hematemesis; No diarrhea or constipation.  GENITOURINARY: No dysuria, frequency, hematuria, or incontinence  NEUROLOGICAL: No headaches, memory loss, loss of strength, numbness, or tremors  SKIN: No itching, burning, rashes, or lesions   MUSCULOSKELETAL: No joint pain or swelling; No muscle, back, or extremity pain  PSYCHIATRIC: No depression, anxiety, mood swings, or difficulty sleeping      T(C): 36.7 (21 @ 04:59), Max: 36.7 (21 @ 04:59)  HR: 77 (21 @ 04:59) (77 - 88)  BP: 106/52 (21 @ 04:59) (106/52 - 130/63)  RR: 18 (21 @ 04:59) (16 - 18)  SpO2: --    PHYSICAL EXAM:  GENERAL: NAD, well-groomed, well-developed  HEAD:  Atraumatic, Normocephalic  EYES: EOMI, PERRLA, conjunctiva and sclera clear  ENMT: No tonsillar erythema, exudates, or enlargement; Moist mucous membranes  NECK: Supple, No JVD, Normal thyroid  NERVOUS SYSTEM:  Alert & Oriented X3, Good concentration; Motor Strength 5/5 B/L upper and lower extremities  CHEST/LUNG: Clear to percussion bilaterally; No rales, rhonchi, wheezing, or rubs  HEART: Regular rate and rhythm; No murmurs, rubs, or gallops, no LE earl  ABDOMEN: Soft, Nontender, Nondistended; Bowel sounds present  EXTREMITIES:  2+ Peripheral Pulses, No clubbing, cyanosis, or edema  Psych: slightly anxious, normal affect    Consultant(s) Notes Reviewed:  [x ] YES  [ ] NO  Care Discussed with Consultants/Other Providers [ x] YES  [ ] NO    LAB:                        8.3    8.55  )-----------( 371      ( 03 Mar 2021 07:34 )             26.5     03-03    142  |  110  |  23<H>  ----------------------------<  103<H>  3.6   |  21  |  1.1    Ca    9.1      03 Mar 2021 07:34  Mg     1.8     03-02    TPro  6.7  /  Alb  4.0  /  TBili  0.7  /  DBili  <0.2  /  AST  22  /  ALT  14  /  AlkPhos  104  03-02    LIVER FUNCTIONS - ( 02 Mar 2021 11:35 )  Alb: 4.0 g/dL / Pro: 6.7 g/dL / ALK PHOS: 104 U/L / ALT: 14 U/L / AST: 22 U/L / GGT: x           CARDIAC MARKERS ( 03 Mar 2021 07:34 )  x     / 0.25 ng/mL / x     / x     / x      CARDIAC MARKERS ( 02 Mar 2021 19:04 )  x     / 0.26 ng/mL / 118 U/L / x     / 8.3 ng/mL  CARDIAC MARKERS ( 02 Mar 2021 11:35 )  x     / 0.22 ng/mL / x     / x     / x      CARDIAC MARKERS ( 01 Mar 2021 19:26 )  x     / 0.12 ng/mL / x     / x     / x                  Drug Dosing Weight  Height (cm): 167.6 (02 Mar 2021 05:25)  Weight (kg): 71.5 (02 Mar 2021 05:25)  BMI (kg/m2): 25.5 (02 Mar 2021 05:25)  BSA (m2): 1.81 (02 Mar 2021 05:25)    CAPILLARY BLOOD GLUCOSE        I&O's Summary    02 Mar 2021 07:01  -  03 Mar 2021 07:00  --------------------------------------------------------  IN: 0 mL / OUT: 400 mL / NET: -400 mL      Urinalysis Basic - ( 02 Mar 2021 02:45 )    Color: Yellow / Appearance: Clear / S.015 / pH: x  Gluc: x / Ketone: Negative  / Bili: Negative / Urobili: 0.2 mg/dL   Blood: x / Protein: Negative mg/dL / Nitrite: Negative   Leuk Esterase: Small / RBC: 1-2 /HPF / WBC >50 /HPF   Sq Epi: x / Non Sq Epi: Few /HPF / Bacteria: Occasional /HPF        RADIOLOGY & ADDITIONAL TESTS:  Imaging Personally Reviewed:  [x] YES  [ ] NO    HEALTH ISSUES - PROBLEM Dx:          MEDS:  amLODIPine   Tablet 5 milliGRAM(s) Oral daily  lidocaine   Patch 1 Patch Transdermal daily  oxycodone    5 mG/acetaminophen 325 mG 1 Tablet(s) Oral every 8 hours PRN  pantoprazole    Tablet 40 milliGRAM(s) Oral every 12 hours  pyridostigmine 60 milliGRAM(s) Oral four times a day PRN  pyridostigmine  milliGRAM(s) Oral two times a day  sertraline 100 milliGRAM(s) Oral daily  
75yFemale  Being followed for microcytic anemia no gross GI bleeding  Interval history: Patient denies nausea, vomiting, hematemesis, melena, blood in stool, diarrhea, constipation, abdominal pain. Patient tolerating DASH diet.      PAST MEDICAL & SURGICAL HISTORY:  High cholesterol    HTN (hypertension)    Myasthenia gravis    Hemangioma            Social History: No smoking. No alcohol. No illegal drug use.            MEDICATIONS  (STANDING):  amLODIPine   Tablet 5 milliGRAM(s) Oral daily  lidocaine   Patch 1 Patch Transdermal daily  pantoprazole    Tablet 40 milliGRAM(s) Oral every 12 hours  pyridostigmine  milliGRAM(s) Oral two times a day  sertraline 100 milliGRAM(s) Oral daily    MEDICATIONS  (PRN):  ALPRAZolam 1 milliGRAM(s) Oral once PRN give it prior to going for PRN  oxycodone    5 mG/acetaminophen 325 mG 1 Tablet(s) Oral every 8 hours PRN Moderate Pain (4 - 6)  pyridostigmine 60 milliGRAM(s) Oral four times a day PRN double vision, ptosis      Allergies:  No Known Allergies              REVIEW OF SYSTEMS:  General:  No weight loss, fevers, or chills.  Eyes:  No reported pain or visual changes  ENT:  No sore throat or runny nose.  NECK: No stiffness   CV:  No chest pain or palpitations.  Resp:  No shortness of breath, cough  GI:  No abdominal pain, nausea, vomiting, dysphagia, diarrhea or constipation. No rectal bleeding, melena, or hematemesis.  Neuro:  No tingling, numbness         VITAL SIGNS:   T(F): 98.1 (03-03-21 @ 04:59), Max: 98.1 (03-03-21 @ 04:59)  HR: 77 (03-03-21 @ 04:59) (77 - 88)  BP: 106/52 (03-03-21 @ 04:59) (106/52 - 130/63)  RR: 18 (03-03-21 @ 04:59) (16 - 18)  SpO2: --    PHYSICAL EXAM:  GENERAL: AAOx3, no acute distress.  HEAD:  Atraumatic, Normocephalic  EYES: conjunctiva and sclera clear  NECK: Supple, no JVD or thyromegaly  CHEST/LUNG: Clear to auscultation bilaterally; No wheeze, rhonchi, or rales  HEART: Regular rate and rhythm; normal S1, S2, No murmurs.  ABDOMEN: Soft, nontender, nondistended; Bowel sounds present  NEUROLOGY: No asterixis or tremor.   SKIN: Intact, no jaundice            LABS:                        8.3    8.55  )-----------( 371      ( 03 Mar 2021 07:34 )             26.5     03-03    142  |  110  |  23<H>  ----------------------------<  103<H>  3.6   |  21  |  1.1    Ca    9.1      03 Mar 2021 07:34  Mg     1.8     03-02    TPro  6.7  /  Alb  4.0  /  TBili  0.7  /  DBili  <0.2  /  AST  22  /  ALT  14  /  AlkPhos  104  03-02    LIVER FUNCTIONS - ( 02 Mar 2021 11:35 )  Alb: 4.0 g/dL / Pro: 6.7 g/dL / ALK PHOS: 104 U/L / ALT: 14 U/L / AST: 22 U/L / GGT: x           PT/INR - ( 01 Mar 2021 19:26 )   PT: 11.90 sec;   INR: 1.03 ratio         PTT - ( 01 Mar 2021 19:26 )  PTT:26.9 sec    IMAGING:    < from: CT Angio Chest PE Protocol w/ IV Cont (03.02.21 @ 02:14) >  EXAM:  CT ANGIO CHEST PE PROTOCOL IC            PROCEDURE DATE:  03/02/2021            INTERPRETATION:  CLINICAL STATEMENT: Evaluate for pulmonary embolus. Shortness of breath and chest pain.    TECHNIQUE: Multislice helical sections were obtained from the thoracic inlet to the lung bases during rapid administration of 95 cc Optiray 320 intravenous contrast using a CTA protocol. Thin sections were reconstructed through the pulmonary vasculature. Sagittal and coronal reformatted images were acquired, as well as MIP reconstructed images.    COMPARISON CT: None.    OTHER STUDIES USED FOR CORRELATION: None.      FINDINGS:    PULMONARY EMBOLUS: No pulmonary embolus.    LUNGS: Emphysema. Bilateral pleural effusions with adjacent compressive atelectasis. Additional areas of linear scarring/atelectasis. No lobar consolidation or pneumothorax. Left upper lobe 7 mm groundglass nodule. 3.5 x 1.8 cm ovoid mass abutting left T2 neural foramen    PLEURA/PERICARDIUM: Heart measures upper limits of normal. No pericardial effusion.    MEDIASTINUM/THORACIC NODES: No mediastinal, hilar or axillary lymphadenopathy. Partially calcified 3 x 3.4 cm mass within the right thyroid lobe. There is mass effect upon the trachea with resulting mild leftward tracheal deviation.    VISUALIZED UPPER ABDOMEN: Probable cholelithiasis    BONES/SOFT TISSUES: No acute osseous abnormality. Degenerative changes of the spine.    OTHER: Severe atherosclerotic disease throughout the aorta. Irregular intraluminal aortic plaque and the level of the kidneys and hiatus.      IMPRESSION:  1.  No pulmonary embolus.  2.  Right thyroid lobe 3.4 cm mass with resulting mild leftward tracheal deviation. Recommend ultrasound of the thyroid for further evaluation.  3.  Bilateral pleural effusions with adjacent compressive atelectasis.  4.  Left upper lobe 7 mm groundglass nodule. Recommend CT chest in 6-12 months for continued follow-up.  5.  3.5 x 1.8 cm ovoid mass abutting left T2 neural foramen. It is probably pleural-based. This may also be neural in origin. Further evaluation with MRI with and without contrast may be of benefit.            FREDRICK WATT M.D., RESIDENT RADIOLOGIST  This document has been electronically signed.  KI MAIER MD; Attending Radiologist  This document has been electronically signed. Mar  2 2021  2:51AM    < end of copied text >        
  IGNACIO PAL  75y  Female      Patient is a 75y old  Female who presents with a chief complaint of thyroid mass (03 Mar 2021 11:15)      INTERVAL HPI/OVERNIGHT EVENTS:  Patient seen and examined earlier this morning. Patient denies any complaints; Denies any F/C, CP, palpitations, abd pain, diarrhea, constipation, bloody stool. She reports that she had very mild right sided chest pain which she thinks is musculoskeletal and now resolved.       REVIEW OF SYSTEMS:  CONSTITUTIONAL: No fever, weight loss, or fatigue  ENMT:  No difficulty hearing, tinnitus, vertigo; No sinus or throat pain  NECK: No pain or stiffness  RESPIRATORY: No cough, wheezing, chills or hemoptysis; No shortness of breath  CARDIOVASCULAR: No chest pain, palpitations, dizziness, or leg swelling  GASTROINTESTINAL: No abdominal or epigastric pain. No nausea, vomiting, or hematemesis; No diarrhea or constipation.  GENITOURINARY: No dysuria, frequency, hematuria, or incontinence  NEUROLOGICAL: No headaches, memory loss, loss of strength, numbness, or tremors  SKIN: No itching, burning, rashes, or lesions   MUSCULOSKELETAL: No joint pain or swelling; No muscle, back, or extremity pain  PSYCHIATRIC: No depression, anxiety, mood swings, or difficulty sleeping    Vital Signs Last 24 Hrs  T(C): 36.6 (04 Mar 2021 05:20), Max: 37 (03 Mar 2021 22:13)  T(F): 97.8 (04 Mar 2021 05:20), Max: 98.6 (03 Mar 2021 22:13)  HR: 80 (04 Mar 2021 11:52) (80 - 93)  BP: 131/60 (04 Mar 2021 11:52) (115/55 - 138/75)  BP(mean): --  RR: 16 (04 Mar 2021 11:52) (16 - 18)  SpO2: 98% (04 Mar 2021 11:52) (98% - 98%)    PHYSICAL EXAM:  GENERAL: NAD, well-groomed, well-developed  HEAD:  Atraumatic, Normocephalic  EYES: EOMI, PERRLA, conjunctiva and sclera clear  ENMT: No tonsillar erythema, exudates, or enlargement; Moist mucous membranes  NECK: Supple, No JVD, Normal thyroid  NERVOUS SYSTEM:  Alert & Oriented X3, Good concentration; Motor Strength 5/5 B/L upper and lower extremities  CHEST/LUNG: Clear to percussion bilaterally; No rales, rhonchi, wheezing, or rubs  HEART: Regular rate and rhythm; No murmurs, rubs, or gallops, no LE edema  ABDOMEN: Soft, Nontender, Nondistended; Bowel sounds present  EXTREMITIES:  2+ Peripheral Pulses, No clubbing, cyanosis, or edema  Psych: slightly anxious, normal affect    Consultant(s) Notes Reviewed:  [x ] YES  [ ] NO  Care Discussed with Consultants/Other Providers [ x] YES  [ ] NO    LAB:                        8.4    7.51  )-----------( 388      ( 04 Mar 2021 07:04 )             27.3   03-04    143  |  111<H>  |  17  ----------------------------<  85  3.6   |  22  |  0.8    Ca    9.2      04 Mar 2021 07:04    TPro  6.0  /  Alb  3.5  /  TBili  0.3  /  DBili  x   /  AST  16  /  ALT  11  /  AlkPhos  87  03-04            Drug Dosing Weight  Height (cm): 167.6 (02 Mar 2021 05:25)  Weight (kg): 71.5 (02 Mar 2021 05:25)  BMI (kg/m2): 25.5 (02 Mar 2021 05:25)  BSA (m2): 1.81 (02 Mar 2021 05:25)    CAPILLARY BLOOD GLUCOSE      I&O's Summary        Urinalysis Basic - ( 02 Mar 2021 02:45 )    Color: Yellow / Appearance: Clear / S.015 / pH: x  Gluc: x / Ketone: Negative  / Bili: Negative / Urobili: 0.2 mg/dL   Blood: x / Protein: Negative mg/dL / Nitrite: Negative   Leuk Esterase: Small / RBC: 1-2 /HPF / WBC >50 /HPF   Sq Epi: x / Non Sq Epi: Few /HPF / Bacteria: Occasional /HPF        RADIOLOGY & ADDITIONAL TESTS:  Imaging Personally Reviewed:  [x] YES  [ ] NO    HEALTH ISSUES - PROBLEM Dx:      MEDICATIONS  (STANDING):  amLODIPine   Tablet 5 milliGRAM(s) Oral daily  lidocaine   Patch 1 Patch Transdermal daily  pantoprazole    Tablet 40 milliGRAM(s) Oral every 12 hours  pyridostigmine  milliGRAM(s) Oral two times a day  sertraline 100 milliGRAM(s) Oral daily    MEDICATIONS  (PRN):  oxycodone    5 mG/acetaminophen 325 mG 1 Tablet(s) Oral every 8 hours PRN Moderate Pain (4 - 6)  pyridostigmine 60 milliGRAM(s) Oral four times a day PRN double vision, ptosis

## 2021-03-04 NOTE — PROGRESS NOTE ADULT - ASSESSMENT
75 yr old female with hx of Myasthenia gravis, HTN, current smoker was sent by PMD for progressive sob, tracheal deviation on XR.     # Microcytic anemia possibly related to NSAIDs induced gastritis  -no gross GI bleeding  - hemodynamically stable; pt denies any bleeding   - ROMA neg  - CTA chest: No pulmonary embolus; - Hb 4.9  - s/p 3U pRBC   PLAN  - trend h/h daily  - Protonix PO 40mg BID  - GI eval appreciated; in the setting of type 2 MI, plan is to hold off EGD/Colonoscopy for 1 months  - Patient to follow up with [facility] located at [address] [place] [address]. Phone Number: [phone] to schedule EGD/Colonoscopy after cardiac clearance   - Transfuse prn to hgb >8  - Two large bore IV lines    # Right thyroid mass with mild leftward tracheal deviation  - CT chest: Right thyroid lobe 3.4 cm mass with resulting mild leftward tracheal deviation.  PLAN  - f/u thyroid US  - endo eval appreciated: needs outpatient workup; patient will need outpatient office visit for a sonogram-guided fine needle aspiration.  Office phone is [phone]; endo to reach out to the patient as well to schedule  - Pulm eval appreciated: thyroid mass isn't obstructing the trachea   - outpatient surgical eval for thyroid mass    # Right upper back pain- resolved  # Paraspinal mass compressing left T2 foramen   - CT chest: 3.5 x 1.8 cm ovoid mass abutting left T2 neural foramen. It is probably pleural-based. This may also be neural in origin.  PLAN  - percocet prn   - IR was consulted for possible bx of left paraspinal mass: recommended outpatient workup with MRI and PET  - discussed with radiologist today- will give patient referral for outpatient MRI and phone number for radiology to schedule the scan    # b/l pleural effusions  - elevated pBNP  PLAN  - f/u ECHO report  - monitor O2    # Elevated Trop likely demand ischemia in the setting of severe anemia  - EKG: NSR with anterolateral ST depression  - BNP: 23639  - Trop 0.12 --> 0.26--> 0.25  - CT chest: Bilateral pleural effusions with adjacent compressive atelectasis.  - clinically euvolemic  PLAN  - check ECHO  - cardiology on board    # JERMAINE vs CKD 3- improved  - likely renal injury secondary to advil use   - improved with IVF  PLAN  - encourage PO intake    # HTN  - holding losartan due to JERMAINE  - start amlodipine     # Hx of Myasthenia gravis  - c/w pyridostigmine     # Nicotine Abuse  - quit 3 weeks ago   - encouraged to refrain from smoking    # DVT Ppx: SCD  # Full code.  Progress Note Handoff  Pending Consults: None  Pending Tests: ECHO, US thyroid  Pending Results: none  Family Discussion: Patient; spoke to patient's -[phone]  Disposition: Home___X__/SNF______/Other_____/Unknown at this time_____

## 2021-03-05 ENCOUNTER — TRANSCRIPTION ENCOUNTER (OUTPATIENT)
Age: 76
End: 2021-03-05

## 2021-03-05 VITALS
HEART RATE: 82 BPM | SYSTOLIC BLOOD PRESSURE: 130 MMHG | TEMPERATURE: 99 F | DIASTOLIC BLOOD PRESSURE: 61 MMHG | RESPIRATION RATE: 16 BRPM

## 2021-03-05 LAB
ALBUMIN SERPL ELPH-MCNC: 3.4 G/DL — LOW (ref 3.5–5.2)
ALP SERPL-CCNC: 85 U/L — SIGNIFICANT CHANGE UP (ref 30–115)
ALT FLD-CCNC: 15 U/L — SIGNIFICANT CHANGE UP (ref 0–41)
ANION GAP SERPL CALC-SCNC: 8 MMOL/L — SIGNIFICANT CHANGE UP (ref 7–14)
AST SERPL-CCNC: 19 U/L — SIGNIFICANT CHANGE UP (ref 0–41)
BILIRUB SERPL-MCNC: 0.4 MG/DL — SIGNIFICANT CHANGE UP (ref 0.2–1.2)
BUN SERPL-MCNC: 19 MG/DL — SIGNIFICANT CHANGE UP (ref 10–20)
CALCIUM SERPL-MCNC: 9.4 MG/DL — SIGNIFICANT CHANGE UP (ref 8.5–10.1)
CHLORIDE SERPL-SCNC: 110 MMOL/L — SIGNIFICANT CHANGE UP (ref 98–110)
CO2 SERPL-SCNC: 26 MMOL/L — SIGNIFICANT CHANGE UP (ref 17–32)
CREAT SERPL-MCNC: 0.9 MG/DL — SIGNIFICANT CHANGE UP (ref 0.7–1.5)
GLUCOSE SERPL-MCNC: 97 MG/DL — SIGNIFICANT CHANGE UP (ref 70–99)
HCT VFR BLD CALC: 25.8 % — LOW (ref 37–47)
HGB BLD-MCNC: 7.8 G/DL — LOW (ref 12–16)
MCHC RBC-ENTMCNC: 22.5 PG — LOW (ref 27–31)
MCHC RBC-ENTMCNC: 30.2 G/DL — LOW (ref 32–37)
MCV RBC AUTO: 74.6 FL — LOW (ref 81–99)
NRBC # BLD: 0 /100 WBCS — SIGNIFICANT CHANGE UP (ref 0–0)
PLATELET # BLD AUTO: 347 K/UL — SIGNIFICANT CHANGE UP (ref 130–400)
POTASSIUM SERPL-MCNC: 4 MMOL/L — SIGNIFICANT CHANGE UP (ref 3.5–5)
POTASSIUM SERPL-SCNC: 4 MMOL/L — SIGNIFICANT CHANGE UP (ref 3.5–5)
PROT SERPL-MCNC: 5.9 G/DL — LOW (ref 6–8)
RBC # BLD: 3.46 M/UL — LOW (ref 4.2–5.4)
RBC # FLD: 20.9 % — HIGH (ref 11.5–14.5)
SODIUM SERPL-SCNC: 144 MMOL/L — SIGNIFICANT CHANGE UP (ref 135–146)
WBC # BLD: 8.3 K/UL — SIGNIFICANT CHANGE UP (ref 4.8–10.8)
WBC # FLD AUTO: 8.3 K/UL — SIGNIFICANT CHANGE UP (ref 4.8–10.8)

## 2021-03-05 PROCEDURE — 99239 HOSP IP/OBS DSCHRG MGMT >30: CPT

## 2021-03-05 PROCEDURE — 76536 US EXAM OF HEAD AND NECK: CPT | Mod: 26

## 2021-03-05 RX ORDER — FERROUS SULFATE 325(65) MG
1 TABLET ORAL
Qty: 30 | Refills: 0
Start: 2021-03-05 | End: 2021-04-03

## 2021-03-05 RX ORDER — PANTOPRAZOLE SODIUM 20 MG/1
1 TABLET, DELAYED RELEASE ORAL
Qty: 60 | Refills: 0
Start: 2021-03-05 | End: 2021-04-03

## 2021-03-05 RX ORDER — LOSARTAN POTASSIUM 100 MG/1
1 TABLET, FILM COATED ORAL
Qty: 0 | Refills: 0 | DISCHARGE

## 2021-03-05 RX ORDER — FERROUS SULFATE 325(65) MG
325 TABLET ORAL DAILY
Refills: 0 | Status: DISCONTINUED | OUTPATIENT
Start: 2021-03-05 | End: 2021-03-05

## 2021-03-05 RX ORDER — AMLODIPINE BESYLATE 2.5 MG/1
1 TABLET ORAL
Qty: 30 | Refills: 0
Start: 2021-03-05 | End: 2021-04-03

## 2021-03-05 RX ADMIN — OXYCODONE AND ACETAMINOPHEN 1 TABLET(S): 5; 325 TABLET ORAL at 13:54

## 2021-03-05 RX ADMIN — SERTRALINE 100 MILLIGRAM(S): 25 TABLET, FILM COATED ORAL at 11:46

## 2021-03-05 RX ADMIN — LIDOCAINE 1 PATCH: 4 CREAM TOPICAL at 09:17

## 2021-03-05 RX ADMIN — Medication 325 MILLIGRAM(S): at 11:45

## 2021-03-05 RX ADMIN — AMLODIPINE BESYLATE 5 MILLIGRAM(S): 2.5 TABLET ORAL at 05:16

## 2021-03-05 RX ADMIN — PANTOPRAZOLE SODIUM 40 MILLIGRAM(S): 20 TABLET, DELAYED RELEASE ORAL at 05:16

## 2021-03-05 RX ADMIN — PYRIDOSTIGMINE BROMIDE 180 MILLIGRAM(S): 60 SOLUTION ORAL at 05:17

## 2021-03-05 NOTE — DISCHARGE NOTE PROVIDER - PROVIDER TOKENS
PROVIDER:[TOKEN:[[id number]],FOLLOWUP:[2 weeks]] PROVIDER:[TOKEN:[[id number]],FOLLOWUP:[2 weeks]],PROVIDER:[TOKEN:[[id number]],FOLLOWUP:[1 month]],PROVIDER:[TOKEN:[[id number]],FOLLOWUP:[2 weeks]],PROVIDER:[TOKEN:[[id number]],FOLLOWUP:[2 weeks]],PROVIDER:[TOKEN:[[id number]],FOLLOWUP:[1 month]]

## 2021-03-05 NOTE — DISCHARGE NOTE PROVIDER - CARE PROVIDERS DIRECT ADDRESSES
,[email or web address] [email or web address]

## 2021-03-05 NOTE — DISCHARGE NOTE PROVIDER - NSDCCPCAREPLAN_GEN_ALL_CORE_FT
PRINCIPAL DISCHARGE DIAGNOSIS  Diagnosis: Shortness of breath  Assessment and Plan of Treatment: -improved  -ECHO was unremarkable (LV > 55%, no WMA, mild AR, mild AS, mild to moderate MR)      SECONDARY DISCHARGE DIAGNOSES  Diagnosis: Chest pain  Assessment and Plan of Treatment: -resolved    Diagnosis: Thyroid mass  Assessment and Plan of Treatment: -follow up with PCP within 1-2 weeks  -For the thyroid mass, patient needs endocrine follow up as outpatient office visit for a sonogram-guided fine needle aspiration.  Office phone is [phone]; endo to reach out to the patient as well to schedule  -Patient also needs outpatient surgical eval for thyroid mass  -Patient will need outpatient MRI and PET scan for further workup of paraspinal mass compressing left T2 foramen (CT chest: 3.5 x 1.8 cm ovoid mass abutting left T2 neural foramen. It is probably pleural-based. This may also be neural in origin); please followup with your PCP   -Referral is being given referral for outpatient MRI and phone number for radiology to schedule the scan; Please follow up with Dr. Woodruff (Neurosurgery) at [address] [place] [address] [phone] once MRI is completed    Diagnosis: JERMAINE (acute kidney injury)  Assessment and Plan of Treatment: Drink plenty of fluids a day (6-8 glasses of water a day)    Diagnosis: Anemia  Assessment and Plan of Treatment: -follow up with PCP within 1-2 weeks  -For anemia, patient will need endoscopy and colonoscopy in 1 month as outpatient; patient will need follow up with [facility] located at [address] [place] [address]. Phone Number: [phone] to schedule Endoscopy/Colonoscopy after cardiac clearance   -patient will need cardiology outpatient follow up  -Patient needs Protonix 40mg twice a day  -take iron tablets daily    Diagnosis: NSTEMI (non-ST elevated myocardial infarction)  Assessment and Plan of Treatment: Downtrended; likely demand ischemia due to anemia

## 2021-03-05 NOTE — DISCHARGE NOTE PROVIDER - NSDCFUADDINST_GEN_ALL_CORE_FT
Things to follow up:  -follow up with PCP within 1-2 weeks  -For anemia, patient will need endoscopy and colonoscopy in 1 month as outpatient; patient will need follow up with [facility] located at [address] [place] [address]. Phone Number: [phone] to schedule Endoscopy/Colonoscopy after cardiac clearance   -patient will need cardiology outpatient follow up  -Patient needs Protonix 40mg twice a day  -For the thyroid mass, patient needs endocrine follow up as outpatient office visit for a sonogram-guided fine needle aspiration.  Office phone is [phone]; endo to reach out to the patient as well to schedule  -Patient also needs outpatient surgical eval for thyroid mass  -Patient will need outpatient MRI and PET scan for further workup of paraspinal mass compressing left T2 foramen (CT chest: 3.5 x 1.8 cm ovoid mass abutting left T2 neural foramen. It is probably pleural-based. This may also be neural in origin); please followup with your PCP   -Referral is being given referral for outpatient MRI and phone number for radiology to schedule the scan; Please follow up with Dr. Woodruff (Neurosurgery) at [address] [place] [address] [phone] once MRI is completed  -Patient's losartan was stopped and was started on Amlodipine instead

## 2021-03-05 NOTE — DISCHARGE NOTE NURSING/CASE MANAGEMENT/SOCIAL WORK - PATIENT PORTAL LINK FT
You can access the FollowMyHealth Patient Portal offered by [facility] by registering at the following website: [email or web address]. By joining [employer]’s FollowMyHealth portal, you will also be able to view your health information using other applications (apps) compatible with our system.

## 2021-03-05 NOTE — DISCHARGE NOTE PROVIDER - HOSPITAL COURSE
HPI: 75 yr old female with hx of Myasthenia gravis, HTN was sent by PMD for progressive sob, tracheal deviation on XR. For the past two weeks patietn having upper back pain (right scapula area), 6-8/10 intensity, localized, worse with mvt. She also noticed having sob on exertion for the past two weeks also. She went to her pmd for check up and XR showing tracheal deviation so pmd referred pt to ER. Patient denies any fever, chills, sob, chest pain, no abdominal pain, no urinary or bowel issues. Lives home with partner, fully functional , social negx3    Hospital course:  Patient is a 75 year old female with hx of Myasthenia gravis, HTN, current smoker was sent by PMD for progressive sob, tracheal deviation on XR. She was found to be anemic to 4.9; there was no gross GI bleed; patient was hemodynamically stable . She was transfused with 3 units of pRBC. She was also found to have elevated troponins which was thought to be due to demand ischemia in the setting of severe anemia. Her pBNP was found to be elevated and CXR and CT chest showed minimal bilateral pleural effusion. CTA chest revealed no PE. She was monitored on telemetry and evaluated by cardiologist. Her ECHO revealed____________________________________________________. She was also found to have JERMAINE which was treated with IVF; her JERMAINE resolved. For microcytic anemia, GI evaluated her and her anemia was thought to be possibly NSAIDs related gastritis. She was started on protonix 40mg BID. In the setting of type 2 MI, GI planned is to hold off EGD/Colonoscopy for 1 month. Her CT chest showed Right thyroid lobe 3.4 cm mass with resulting mild leftward tracheal deviation. She was evaluated by endocrinologist who recommended outpatient workup including an outpatient office visit for a sonogram-guided fine needle aspiration. Pulmonologist also evaluated her regarding the tracheal deviation and it was determined that thyroid mass isn't obstructing the trachea. Patient was also expiencing right upper back pain which has now resolved. On CT chest she was found to have Paraspinal mass compressing left T2 foramen (CT chest: 3.5 x 1.8 cm ovoid mass abutting left T2 neural foramen. It is probably pleural-based. This may also be neural in origin.) She was treated with pain control. Interventional radiologist evaluated her for possible bx of left paraspinal mass; they recommended outpatient workup with MRI and PET. Case was discussed with radiologist as well who recommended referral for outpatient MRI.     Things to follow up:  -follow up with PCP within 1-2 weeks  -For anemia, patient will need endoscopy and colonoscopy in 1 month as outpatient; patient will need follow up with [facility] located at [address] [place] [address]. Phone Number: [phone] to schedule Endoscopy/Colonoscopy after cardiac clearance   -patient will need cardiology outpatient follow up  -Patient needs Protonix 40mg twice a day  -For the thyroid mass, patient needs endocrine follow up as outpatient office visit for a sonogram-guided fine needle aspiration.  Office phone is [phone]; endo to reach out to the patient as well to schedule  -Patient also needs outpatient surgical eval for thyroid mass  -Patient will need outpatient MRI and PET scan for further workup of paraspinal mass compressing left T2 foramen (CT chest: 3.5 x 1.8 cm ovoid mass abutting left T2 neural foramen. It is probably pleural-based. This may also be neural in origin); please followup with your PCP   -Referral is being given referral for outpatient MRI and phone number for radiology to schedule the scan  -Patient's losartan was stopped and was started on Amlodipine instead       HPI: 75 yr old female with hx of Myasthenia gravis, HTN was sent by PMD for progressive sob, tracheal deviation on XR. For the past two weeks patietn having upper back pain (right scapula area), 6-8/10 intensity, localized, worse with mvt. She also noticed having sob on exertion for the past two weeks also. She went to her pmd for check up and XR showing tracheal deviation so pmd referred pt to ER. Patient denies any fever, chills, sob, chest pain, no abdominal pain, no urinary or bowel issues. Lives home with partner, fully functional , social negx3    Hospital course:  Patient is a 75 year old female with hx of Myasthenia gravis, HTN, current smoker was sent by PMD for progressive sob, tracheal deviation on XR. She was found to be anemic to 4.9; there was no gross GI bleed; patient was hemodynamically stable . She was transfused with 3 units of pRBC. She was also found to have elevated troponins which was thought to be due to demand ischemia in the setting of severe anemia. Her pBNP was found to be elevated and CXR and CT chest showed minimal bilateral pleural effusion. CTA chest revealed no PE. She was monitored on telemetry and evaluated by cardiologist. Her ECHO revealed____________________________________________________. She was also found to have JERMAINE which was treated with IVF; her JERMAINE resolved. For microcytic anemia, GI evaluated her and her anemia was thought to be possibly NSAIDs related gastritis. She was started on protonix 40mg BID. In the setting of type 2 MI, GI planned is to hold off EGD/Colonoscopy for 1 month. Her CT chest showed Right thyroid lobe 3.4 cm mass with resulting mild leftward tracheal deviation. She was evaluated by endocrinologist who recommended outpatient workup including an outpatient office visit for a sonogram-guided fine needle aspiration. Pulmonologist also evaluated her regarding the tracheal deviation and it was determined that thyroid mass isn't obstructing the trachea. Patient was also expiencing right upper back pain which has now resolved. On CT chest she was found to have Paraspinal mass compressing left T2 foramen (CT chest: 3.5 x 1.8 cm ovoid mass abutting left T2 neural foramen. It is probably pleural-based. This may also be neural in origin.) She was treated with pain control. Interventional radiologist evaluated her for possible bx of left paraspinal mass; they recommended outpatient workup with MRI and PET. Case was discussed with radiologist as well who recommended referral for outpatient MRI.     Things to follow up:  -follow up with PCP within 1-2 weeks  -For anemia, patient will need endoscopy and colonoscopy in 1 month as outpatient; patient will need follow up with [facility] located at [address] [place] [address]. Phone Number: [phone] to schedule Endoscopy/Colonoscopy after cardiac clearance   -patient will need cardiology outpatient follow up  -Patient needs Protonix 40mg twice a day  -For the thyroid mass, patient needs endocrine follow up as outpatient office visit for a sonogram-guided fine needle aspiration.  Office phone is [phone]; endo to reach out to the patient as well to schedule  -Patient also needs outpatient surgical eval for thyroid mass  -Patient will need outpatient MRI and PET scan for further workup of paraspinal mass compressing left T2 foramen (CT chest: 3.5 x 1.8 cm ovoid mass abutting left T2 neural foramen. It is probably pleural-based. This may also be neural in origin); please followup with your PCP   -Referral is being given referral for outpatient MRI and phone number for radiology to schedule the scan; Please follow up with Dr. Woodruff (Neurosurgery) at [facility] [address] [phone] once MRI is completed  -Patient's losartan was stopped and was started on Amlodipine instead       HPI: 75 yr old female with hx of Myasthenia gravis, HTN was sent by PMD for progressive sob, tracheal deviation on XR. For the past two weeks patietn having upper back pain (right scapula area), 6-8/10 intensity, localized, worse with mvt. She also noticed having sob on exertion for the past two weeks also. She went to her pmd for check up and XR showing tracheal deviation so pmd referred pt to ER. Patient denies any fever, chills, sob, chest pain, no abdominal pain, no urinary or bowel issues. Lives home with partner, fully functional , social negx3    Hospital course:  Patient is a 75 year old female with hx of Myasthenia gravis, HTN, current smoker was sent by PMD for progressive sob, tracheal deviation on XR. She was found to be anemic to 4.9; there was no gross GI bleed; patient was hemodynamically stable . She was transfused with 3 units of pRBC. She was also found to have elevated troponins which was thought to be due to demand ischemia in the setting of severe anemia. Her pBNP was found to be elevated and CXR and CT chest showed minimal bilateral pleural effusion. CTA chest revealed no PE. She was monitored on telemetry and evaluated by cardiologist. Her ECHO revealed normal EF >55%, no wall motion abnormality, mild AR, mild AS, mild to moderate MR. She was also found to have JERMAINE which was treated with IVF; her JERMAINE resolved. For microcytic anemia, GI evaluated her and her anemia was thought to be possibly NSAIDs related gastritis. She was started on protonix 40mg BID. In the setting of type 2 MI, GI planned is to hold off EGD/Colonoscopy for 1 month. Her CT chest showed Right thyroid lobe 3.4 cm mass with resulting mild leftward tracheal deviation. She was evaluated by endocrinologist who recommended outpatient workup including an outpatient office visit for a sonogram-guided fine needle aspiration. Pulmonologist also evaluated her regarding the tracheal deviation and it was determined that thyroid mass isn't obstructing the trachea. Ultrasound of thyroid showed Both nodule 1 and nodule 2 require further evaluation with fine-needle aspiration to exclude thyroid neoplasm. Patient was also expiencing right upper back pain which has now resolved. On CT chest she was found to have Paraspinal mass compressing left T2 foramen (CT chest: 3.5 x 1.8 cm ovoid mass abutting left T2 neural foramen. It is probably pleural-based. This may also be neural in origin.) She was treated with pain control. Interventional radiologist evaluated her for possible bx of left paraspinal mass; they recommended outpatient workup with MRI and PET. Case was discussed with radiologist as well who recommended referral for outpatient MRI.     Patient was seen and examined this AM.  Vital Signs Last 24 Hrs  T(C): 36.8 (05 Mar 2021 05:48), Max: 37.1 (04 Mar 2021 21:00)  T(F): 98.3 (05 Mar 2021 05:48), Max: 98.8 (04 Mar 2021 21:00)  HR: 86 (05 Mar 2021 05:48) (79 - 86)  BP: 116/53 (05 Mar 2021 05:48) (106/63 - 116/53)  BP(mean): --  RR: 16 (04 Mar 2021 21:00) (16 - 16)  SpO2: --    PHYSICAL EXAM:  GENERAL: NAD, well-groomed, well-developed  HEAD:  Atraumatic, Normocephalic  EYES: EOMI, PERRLA, conjunctiva and sclera clear  ENMT: No tonsillar erythema, exudates, or enlargement; Moist mucous membranes  NECK: Supple, No JVD, Normal thyroid  NERVOUS SYSTEM:  Alert & Oriented X3, Good concentration; Motor Strength 5/5 B/L upper and lower extremities  CHEST/LUNG: Clear to percussion bilaterally; No rales, rhonchi, wheezing, or rubs  HEART: Regular rate and rhythm; No murmurs, rubs, or gallops, no LE edema  ABDOMEN: Soft, Nontender, Nondistended; Bowel sounds present  EXTREMITIES:  2+ Peripheral Pulses, No clubbing, cyanosis, or edema  Psych: slightly anxious, normal affect    Things to follow up:  -follow up with PCP within 1-2 weeks  -For anemia, patient will need endoscopy and colonoscopy in 1 month as outpatient; patient will need follow up with [facility] located at [address] [place] [address]. Phone Number: [phone] to schedule Endoscopy/Colonoscopy after cardiac clearance   -patient will need cardiology outpatient follow up  -Patient needs Protonix 40mg twice a day  -For the thyroid mass, patient needs endocrine follow up as outpatient office visit for a sonogram-guided fine needle aspiration.  Office phone is [phone]; endo to reach out to the patient as well to schedule  -Patient also needs outpatient surgical eval for thyroid mass  -Patient will need outpatient MRI and PET scan for further workup of paraspinal mass compressing left T2 foramen (CT chest: 3.5 x 1.8 cm ovoid mass abutting left T2 neural foramen. It is probably pleural-based. This may also be neural in origin); please followup with your PCP   -Referral is being given referral for outpatient MRI and phone number for radiology to schedule the scan; Please follow up with Dr. Woodruff (Neurosurgery) at [address] [place] [address] ave [phone] once MRI is completed  -Patient's losartan was stopped and was started on Amlodipine instead

## 2021-03-05 NOTE — DISCHARGE NOTE PROVIDER - CARE PROVIDER_API CALL
Modesta John (NP; RN)  NP in Family Health  [address] [place] [address]  Phone: [phone]  Fax: [phone]  Follow Up Time: 2 weeks   Modesta John (NP; RN)  NP in Family Health  [address] [place] [address]  Phone: [phone]  Fax: [phone]  Follow Up Time: 2 weeks    Idris Matamoros)  Gastroenterology; Internal Medicine  [address] [place] [address]  Phone: [phone]  Fax: [phone]  Follow Up Time: 1 month    Yuval Tsai)  EndocrinologyMetabDiabetes; Internal Medicine  [address] [place] [address]  Phone: [phone]  Fax: [phone]  Follow Up Time: 2 weeks    Eris Guan)  Cardiovascular Disease; Internal Medicine  [address] [place] [address]  Phone: [phone]  Fax: [phone]  Follow Up Time: 2 weeks    Emely Woodruff)  Neurosurgery  [address] [facility], [address]  [place] [address]  Phone: [phone]  Fax: [phone]  Follow Up Time: 1 month

## 2021-03-05 NOTE — DISCHARGE NOTE PROVIDER - NSDCMRMEDTOKEN_GEN_ALL_CORE_FT
losartan 100 mg oral tablet: 1 tab(s) orally once a day  pyridostigmine 180 mg oral tablet, extended release: 1 tab(s) orally 2 times a day  pyridostigmine 60 mg oral tablet: 1 tab(s) orally 3 to 4 times a day, As Needed  sertraline 100 mg oral tablet: 1 tab(s) orally once a day   amLODIPine 5 mg oral tablet: 1 tab(s) orally once a day  ferrous sulfate 325 mg (65 mg elemental iron) oral tablet: 1 tab(s) orally once a day  pantoprazole 40 mg oral delayed release tablet: 1 tab(s) orally every 12 hours  pyridostigmine 180 mg oral tablet, extended release: 1 tab(s) orally 2 times a day  pyridostigmine 60 mg oral tablet: 1 tab(s) orally 3 to 4 times a day, As Needed  sertraline 100 mg oral tablet: 1 tab(s) orally once a day

## 2021-03-08 ENCOUNTER — INPATIENT (INPATIENT)
Facility: HOSPITAL | Age: 76
LOS: 14 days | Discharge: HOME HEALTH | End: 2021-03-23
Attending: INTERNAL MEDICINE | Admitting: INTERNAL MEDICINE
Payer: MEDICARE

## 2021-03-08 VITALS
SYSTOLIC BLOOD PRESSURE: 91 MMHG | RESPIRATION RATE: 20 BRPM | HEIGHT: 66 IN | HEART RATE: 58 BPM | DIASTOLIC BLOOD PRESSURE: 53 MMHG | WEIGHT: 151.9 LBS | TEMPERATURE: 98 F | OXYGEN SATURATION: 100 %

## 2021-03-08 DIAGNOSIS — D18.00 HEMANGIOMA UNSPECIFIED SITE: Chronic | ICD-10-CM

## 2021-03-08 PROBLEM — I10 ESSENTIAL (PRIMARY) HYPERTENSION: Chronic | Status: ACTIVE | Noted: 2021-03-01

## 2021-03-08 PROBLEM — G70.00 MYASTHENIA GRAVIS WITHOUT (ACUTE) EXACERBATION: Chronic | Status: ACTIVE | Noted: 2021-03-01

## 2021-03-08 PROBLEM — E78.00 PURE HYPERCHOLESTEROLEMIA, UNSPECIFIED: Chronic | Status: ACTIVE | Noted: 2021-03-01

## 2021-03-08 LAB
ALBUMIN SERPL ELPH-MCNC: 3.5 G/DL — SIGNIFICANT CHANGE UP (ref 3.5–5.2)
ALP SERPL-CCNC: 85 U/L — SIGNIFICANT CHANGE UP (ref 30–115)
ALT FLD-CCNC: 12 U/L — SIGNIFICANT CHANGE UP (ref 0–41)
ANION GAP SERPL CALC-SCNC: 16 MMOL/L — HIGH (ref 7–14)
APTT BLD: 28.4 SEC — SIGNIFICANT CHANGE UP (ref 27–39.2)
AST SERPL-CCNC: 18 U/L — SIGNIFICANT CHANGE UP (ref 0–41)
BASOPHILS # BLD AUTO: 0.06 K/UL — SIGNIFICANT CHANGE UP (ref 0–0.2)
BASOPHILS NFR BLD AUTO: 0.4 % — SIGNIFICANT CHANGE UP (ref 0–1)
BILIRUB SERPL-MCNC: <0.2 MG/DL — SIGNIFICANT CHANGE UP (ref 0.2–1.2)
BLD GP AB SCN SERPL QL: SIGNIFICANT CHANGE UP
BUN SERPL-MCNC: 32 MG/DL — HIGH (ref 10–20)
CALCIUM SERPL-MCNC: 9.2 MG/DL — SIGNIFICANT CHANGE UP (ref 8.5–10.1)
CHLORIDE SERPL-SCNC: 102 MMOL/L — SIGNIFICANT CHANGE UP (ref 98–110)
CO2 SERPL-SCNC: 19 MMOL/L — SIGNIFICANT CHANGE UP (ref 17–32)
CREAT SERPL-MCNC: 1.2 MG/DL — SIGNIFICANT CHANGE UP (ref 0.7–1.5)
EOSINOPHIL # BLD AUTO: 0.06 K/UL — SIGNIFICANT CHANGE UP (ref 0–0.7)
EOSINOPHIL NFR BLD AUTO: 0.4 % — SIGNIFICANT CHANGE UP (ref 0–8)
GLUCOSE SERPL-MCNC: 143 MG/DL — HIGH (ref 70–99)
HCT VFR BLD CALC: 14 % — LOW (ref 37–47)
HGB BLD-MCNC: 4.3 G/DL — CRITICAL LOW (ref 12–16)
IMM GRANULOCYTES NFR BLD AUTO: 1.4 % — HIGH (ref 0.1–0.3)
INR BLD: 1.03 RATIO — SIGNIFICANT CHANGE UP (ref 0.65–1.3)
LACTATE SERPL-SCNC: 2.4 MMOL/L — HIGH (ref 0.7–2)
LACTATE SERPL-SCNC: 6.9 MMOL/L — CRITICAL HIGH (ref 0.7–2)
LYMPHOCYTES # BLD AUTO: 1.49 K/UL — SIGNIFICANT CHANGE UP (ref 1.2–3.4)
LYMPHOCYTES # BLD AUTO: 10.4 % — LOW (ref 20.5–51.1)
MCHC RBC-ENTMCNC: 23 PG — LOW (ref 27–31)
MCHC RBC-ENTMCNC: 30.7 G/DL — LOW (ref 32–37)
MCV RBC AUTO: 74.9 FL — LOW (ref 81–99)
MONOCYTES # BLD AUTO: 0.64 K/UL — HIGH (ref 0.1–0.6)
MONOCYTES NFR BLD AUTO: 4.5 % — SIGNIFICANT CHANGE UP (ref 1.7–9.3)
NEUTROPHILS # BLD AUTO: 11.9 K/UL — HIGH (ref 1.4–6.5)
NEUTROPHILS NFR BLD AUTO: 82.9 % — HIGH (ref 42.2–75.2)
NRBC # BLD: 0 /100 WBCS — SIGNIFICANT CHANGE UP (ref 0–0)
PLATELET # BLD AUTO: 339 K/UL — SIGNIFICANT CHANGE UP (ref 130–400)
POTASSIUM SERPL-MCNC: 4.5 MMOL/L — SIGNIFICANT CHANGE UP (ref 3.5–5)
POTASSIUM SERPL-SCNC: 4.5 MMOL/L — SIGNIFICANT CHANGE UP (ref 3.5–5)
PROT SERPL-MCNC: 5.6 G/DL — LOW (ref 6–8)
PROTHROM AB SERPL-ACNC: 11.9 SEC — SIGNIFICANT CHANGE UP (ref 9.95–12.87)
RAPID RVP RESULT: SIGNIFICANT CHANGE UP
RBC # BLD: 1.87 M/UL — LOW (ref 4.2–5.4)
RBC # FLD: 22.5 % — HIGH (ref 11.5–14.5)
SARS-COV-2 RNA SPEC QL NAA+PROBE: SIGNIFICANT CHANGE UP
SODIUM SERPL-SCNC: 137 MMOL/L — SIGNIFICANT CHANGE UP (ref 135–146)
TROPONIN T SERPL-MCNC: 0.09 NG/ML — CRITICAL HIGH
WBC # BLD: 14.35 K/UL — HIGH (ref 4.8–10.8)
WBC # FLD AUTO: 14.35 K/UL — HIGH (ref 4.8–10.8)

## 2021-03-08 PROCEDURE — 99291 CRITICAL CARE FIRST HOUR: CPT | Mod: CS

## 2021-03-08 PROCEDURE — 99223 1ST HOSP IP/OBS HIGH 75: CPT

## 2021-03-08 PROCEDURE — 71045 X-RAY EXAM CHEST 1 VIEW: CPT | Mod: 26,76

## 2021-03-08 PROCEDURE — 99223 1ST HOSP IP/OBS HIGH 75: CPT | Mod: 25,AI

## 2021-03-08 RX ORDER — MORPHINE SULFATE 50 MG/1
2 CAPSULE, EXTENDED RELEASE ORAL EVERY 4 HOURS
Refills: 0 | Status: DISCONTINUED | OUTPATIENT
Start: 2021-03-08 | End: 2021-03-08

## 2021-03-08 RX ORDER — PYRIDOSTIGMINE BROMIDE 60 MG/5ML
180 SOLUTION ORAL
Refills: 0 | Status: DISCONTINUED | OUTPATIENT
Start: 2021-03-08 | End: 2021-03-23

## 2021-03-08 RX ORDER — HYDROMORPHONE HYDROCHLORIDE 2 MG/ML
1 INJECTION INTRAMUSCULAR; INTRAVENOUS; SUBCUTANEOUS ONCE
Refills: 0 | Status: DISCONTINUED | OUTPATIENT
Start: 2021-03-08 | End: 2021-03-08

## 2021-03-08 RX ORDER — MORPHINE SULFATE 50 MG/1
4 CAPSULE, EXTENDED RELEASE ORAL ONCE
Refills: 0 | Status: DISCONTINUED | OUTPATIENT
Start: 2021-03-08 | End: 2021-03-08

## 2021-03-08 RX ORDER — SODIUM CHLORIDE 9 MG/ML
1000 INJECTION, SOLUTION INTRAVENOUS ONCE
Refills: 0 | Status: COMPLETED | OUTPATIENT
Start: 2021-03-08 | End: 2021-03-08

## 2021-03-08 RX ORDER — PANTOPRAZOLE SODIUM 20 MG/1
8 TABLET, DELAYED RELEASE ORAL
Qty: 80 | Refills: 0 | Status: DISCONTINUED | OUTPATIENT
Start: 2021-03-08 | End: 2021-03-12

## 2021-03-08 RX ORDER — PYRIDOSTIGMINE BROMIDE 60 MG/5ML
60 SOLUTION ORAL EVERY 6 HOURS
Refills: 0 | Status: DISCONTINUED | OUTPATIENT
Start: 2021-03-08 | End: 2021-03-08

## 2021-03-08 RX ORDER — PANTOPRAZOLE SODIUM 20 MG/1
80 TABLET, DELAYED RELEASE ORAL ONCE
Refills: 0 | Status: COMPLETED | OUTPATIENT
Start: 2021-03-08 | End: 2021-03-08

## 2021-03-08 RX ORDER — PYRIDOSTIGMINE BROMIDE 60 MG/5ML
180 SOLUTION ORAL ONCE
Refills: 0 | Status: DISCONTINUED | OUTPATIENT
Start: 2021-03-08 | End: 2021-03-16

## 2021-03-08 RX ORDER — SERTRALINE 25 MG/1
100 TABLET, FILM COATED ORAL DAILY
Refills: 0 | Status: DISCONTINUED | OUTPATIENT
Start: 2021-03-08 | End: 2021-03-23

## 2021-03-08 RX ORDER — ONDANSETRON 8 MG/1
8 TABLET, FILM COATED ORAL ONCE
Refills: 0 | Status: DISCONTINUED | OUTPATIENT
Start: 2021-03-08 | End: 2021-03-08

## 2021-03-08 RX ORDER — FUROSEMIDE 40 MG
40 TABLET ORAL ONCE
Refills: 0 | Status: COMPLETED | OUTPATIENT
Start: 2021-03-08 | End: 2021-03-08

## 2021-03-08 RX ORDER — ONDANSETRON 8 MG/1
4 TABLET, FILM COATED ORAL ONCE
Refills: 0 | Status: COMPLETED | OUTPATIENT
Start: 2021-03-08 | End: 2021-03-08

## 2021-03-08 RX ORDER — MORPHINE SULFATE 50 MG/1
2 CAPSULE, EXTENDED RELEASE ORAL ONCE
Refills: 0 | Status: DISCONTINUED | OUTPATIENT
Start: 2021-03-08 | End: 2021-03-08

## 2021-03-08 RX ORDER — HYDROMORPHONE HYDROCHLORIDE 2 MG/ML
1 INJECTION INTRAMUSCULAR; INTRAVENOUS; SUBCUTANEOUS EVERY 6 HOURS
Refills: 0 | Status: DISCONTINUED | OUTPATIENT
Start: 2021-03-08 | End: 2021-03-11

## 2021-03-08 RX ADMIN — PYRIDOSTIGMINE BROMIDE 180 MILLIGRAM(S): 60 SOLUTION ORAL at 17:43

## 2021-03-08 RX ADMIN — MORPHINE SULFATE 2 MILLIGRAM(S): 50 CAPSULE, EXTENDED RELEASE ORAL at 16:54

## 2021-03-08 RX ADMIN — PANTOPRAZOLE SODIUM 10 MG/HR: 20 TABLET, DELAYED RELEASE ORAL at 22:14

## 2021-03-08 RX ADMIN — SODIUM CHLORIDE 1000 MILLILITER(S): 9 INJECTION, SOLUTION INTRAVENOUS at 13:00

## 2021-03-08 RX ADMIN — HYDROMORPHONE HYDROCHLORIDE 1 MILLIGRAM(S): 2 INJECTION INTRAMUSCULAR; INTRAVENOUS; SUBCUTANEOUS at 21:00

## 2021-03-08 RX ADMIN — Medication 40 MILLIGRAM(S): at 15:04

## 2021-03-08 RX ADMIN — HYDROMORPHONE HYDROCHLORIDE 1 MILLIGRAM(S): 2 INJECTION INTRAMUSCULAR; INTRAVENOUS; SUBCUTANEOUS at 16:54

## 2021-03-08 RX ADMIN — ONDANSETRON 4 MILLIGRAM(S): 8 TABLET, FILM COATED ORAL at 20:41

## 2021-03-08 RX ADMIN — MORPHINE SULFATE 2 MILLIGRAM(S): 50 CAPSULE, EXTENDED RELEASE ORAL at 15:04

## 2021-03-08 RX ADMIN — SERTRALINE 100 MILLIGRAM(S): 25 TABLET, FILM COATED ORAL at 22:10

## 2021-03-08 RX ADMIN — MORPHINE SULFATE 4 MILLIGRAM(S): 50 CAPSULE, EXTENDED RELEASE ORAL at 11:04

## 2021-03-08 RX ADMIN — PANTOPRAZOLE SODIUM 10 MG/HR: 20 TABLET, DELAYED RELEASE ORAL at 11:05

## 2021-03-08 RX ADMIN — HYDROMORPHONE HYDROCHLORIDE 1 MILLIGRAM(S): 2 INJECTION INTRAMUSCULAR; INTRAVENOUS; SUBCUTANEOUS at 20:40

## 2021-03-08 RX ADMIN — MORPHINE SULFATE 4 MILLIGRAM(S): 50 CAPSULE, EXTENDED RELEASE ORAL at 13:00

## 2021-03-08 RX ADMIN — HYDROMORPHONE HYDROCHLORIDE 1 MILLIGRAM(S): 2 INJECTION INTRAMUSCULAR; INTRAVENOUS; SUBCUTANEOUS at 15:05

## 2021-03-08 RX ADMIN — PANTOPRAZOLE SODIUM 80 MILLIGRAM(S): 20 TABLET, DELAYED RELEASE ORAL at 11:05

## 2021-03-08 NOTE — ED ADULT TRIAGE NOTE - CHIEF COMPLAINT QUOTE
pt states she vomited blood x 1 today and has had black stool. Pt states she was here 1 week ago and received a blood transfusion

## 2021-03-08 NOTE — ED PROVIDER NOTE - OBJECTIVE STATEMENT
77 yo female, pmh of MG, htn, hld, anemia, presents to ed for one episode of vomitus with steaks of blood, a/w black stools, no abd pain or radiation, noted today, d/c from hospital; found to have thyroid mass causing tracheal shift, thoracic mass found, pt was anemic at that time transfused, dc with op f/u for anemia, thyroid mass and thoracic mass. Pt denies fever, chills, cp, sob, abd pain, diarrhea.

## 2021-03-08 NOTE — ED PROVIDER NOTE - CLINICAL SUMMARY MEDICAL DECISION MAKING FREE TEXT BOX
76y female above PMH with vomiting food particles with scant blood and 2-3 episodes black tarry stool, +weakness, no abd pain, up until a week ago was taking frequent nsaid for neck pain, no asa/ac use, on exam vital signs appreciated, nontoxic but pale, cor reg lungs cta abd snt imtiaz with melena, labs and studies reviewed, resuscitated with improved BP, trop likely secondary to demand ischemia, d/w GI and intensivist, will admit ICU for scop e in am

## 2021-03-08 NOTE — H&P ADULT - NSICDXPASTMEDICALHX_GEN_ALL_CORE_FT
PAST MEDICAL HISTORY:  Anemia     High cholesterol     HTN (hypertension)     Myasthenia gravis

## 2021-03-08 NOTE — GOALS OF CARE CONVERSATION - ADVANCED CARE PLANNING - CONVERSATION DETAILS
medical history of MG, GERD, thyroid mass, medical history of MG, GERD, thyroid mass, suspected CAD, chronic anemis  now with GI bleed and acute blood loss anemia  pt wishes to be DNR / DNI

## 2021-03-08 NOTE — H&P ADULT - HISTORY OF PRESENT ILLNESS
76yFemale pmh HTN, [facility] with suspected type 2 MI and chronic anemia patient had no gross GI bleeding at the time. Patient now presents with an episode of vomiting her food early in the morning with speckles of blood in it. Patient also reports two melenic stools earlier in the morning today. Patient also reports two melenic stools earlier in the morning today. Patient with history of severe NSAID usage. Hb in ED was 4.3, lactate 6.8.

## 2021-03-08 NOTE — CONSULT NOTE ADULT - SUBJECTIVE AND OBJECTIVE BOX
Chief complaint/Reason for consult:    HPI:    76yFemale      PAST MEDICAL & SURGICAL HISTORY:      Family history:  FAMILY HISTORY:    No GI cancers in first or second degree relatives    Social History: No smoking. No alcohol. No illegal drug use.    Allergies:        MEDICATIONS:        MEDICATIONS  (STANDING):  lactated ringers Bolus 1000 milliLiter(s) IV Bolus once  morphine  - Injectable 4 milliGRAM(s) IV Push Once  pantoprazole Infusion 8 mG/Hr (10 mL/Hr) IV Continuous <Continuous>  pyridostigmine 180 milliGRAM(s) Oral Once    MEDICATIONS  (PRN):        REVIEW OF SYSTEMS  General:  No weight loss, fevers, or chills.  Eyes:  No reported pain or visual changes  ENT:  No sore throat or runny nose.  NECK: No stiffness or lymphadenopathy  CV:  No chest pain or palpitations.  Resp:  No shortness of breath, cough, wheezing or hemoptysis  GI:  No abdominal pain, nausea, vomiting, dysphagia, diarrhea or constipation. No rectal bleeding, melena, or hematemesis.  Muscle:  No aches or weakness  Neuro:  No tingling, numbness       VITALS:   T(F): 97.7 (03-08-21 @ 09:51), Max: 97.7 (03-08-21 @ 09:51)  HR: 89 (03-08-21 @ 10:52) (58 - 89)  BP: 98/54 (03-08-21 @ 10:52) (91/53 - 98/54)  RR: 20 (03-08-21 @ 10:52) (20 - 20)  SpO2: 98% (03-08-21 @ 10:52) (98% - 100%)    PHYSICAL EXAM:  GENERAL: AAOx3, no acute distress.  HEAD:  Atraumatic, Normocephalic  EYES: conjunctiva and sclera clear  NECK: Supple, No thyromegaly   CHEST/LUNG: Clear to auscultation bilaterally; No wheeze, rhonchi, or rales  HEART: Regular rate and rhythm; normal S1, S2, No murmurs.  ABDOMEN: Soft, nontender, nondistended; Bowel sounds present, no abdominal bruit, masses, or hepatosplenomegaly  EXTREMITIES:  2+ Peripheral Pulses. No clubbing, cyanosis, or edema, warm   NEUROLOGY: No asterixis or tremor  SKIN: Intact, no jaundice  Rectal exam-no melenic stools or fresh blood appreciated         LABS:  03-08    137  |  102  |  32<H>  ----------------------------<  143<H>  4.5   |  19  |  1.2    Ca    9.2      08 Mar 2021 10:39    TPro  5.6<L>  /  Alb  3.5  /  TBili  <0.2  /  DBili  x   /  AST  18  /  ALT  12  /  AlkPhos  85  03-08                          4.3    14.35 )-----------( 339      ( 08 Mar 2021 10:39 )             14.0     LIVER FUNCTIONS - ( 08 Mar 2021 10:39 )  Alb: 3.5 g/dL / Pro: 5.6 g/dL / ALK PHOS: 85 U/L / ALT: 12 U/L / AST: 18 U/L / GGT: x           PT/INR - ( 08 Mar 2021 10:39 )   PT: 11.90 sec;   INR: 1.03 ratio         PTT - ( 08 Mar 2021 10:39 )  PTT:28.4 sec    IMAGING:         Chief complaint/Reason for consult: hematemesis, intermittent melenic stools    HPI: 76yFemale pmh HTN[place] 2 [place] and chronic anemia patient had no gross GI bleeding at the time. Patient now presents with an episode of vomiting her food early in the morning with speckles of blood in it. Patient also reports two melenic stools earlier in the morning today.      PAST MEDICAL & SURGICAL HISTORY:   Anemia    High cholesterol    HTN (hypertension)    Myasthenia gravis    Hemangioma          Family history:  FAMILY HISTORY:    No GI cancers in first or second degree relatives    Social History: No smoking. No alcohol. No illegal drug use.    Allergies:  No Known Allergies              MEDICATIONS: Home Medications:  pyridostigmine 180 mg oral tablet, extended release: 1 tab(s) orally 2 times a day (01 Mar 2021 22:49)  pyridostigmine 60 mg oral tablet: 1 tab(s) orally 3 to 4 times a day, As Needed (02 Mar 2021 04:23)  sertraline 100 mg oral tablet: 1 tab(s) orally once a day (01 Mar 2021 22:49)    MEDICATIONS  (STANDING):  lactated ringers Bolus 1000 milliLiter(s) IV Bolus once  morphine  - Injectable 4 milliGRAM(s) IV Push Once  pantoprazole Infusion 8 mG/Hr (10 mL/Hr) IV Continuous <Continuous>  pyridostigmine 180 milliGRAM(s) Oral Once            REVIEW OF SYSTEMS  General:  No weight loss, fevers, or chills.  Eyes:  No reported pain or visual changes  ENT:  No sore throat or runny nose.  NECK: No stiffness or lymphadenopathy  CV:  No chest pain or palpitations.  Resp:  No shortness of breath, cough, wheezing or hemoptysis  GI:  No abdominal pain, nausea, vomiting, dysphagia, diarrhea or constipation. No rectal bleeding, melena, or hematemesis.  Muscle:  No aches or weakness  Neuro:  No tingling, numbness       VITALS:   T(F): 97.7 (03-08-21 @ 09:51), Max: 97.7 (03-08-21 @ 09:51)  HR: 89 (03-08-21 @ 10:52) (58 - 89)  BP: 98/54 (03-08-21 @ 10:52) (91/53 - 98/54)  RR: 20 (03-08-21 @ 10:52) (20 - 20)  SpO2: 98% (03-08-21 @ 10:52) (98% - 100%)    PHYSICAL EXAM:  GENERAL: AAOx3, no acute distress.  HEAD:  Atraumatic, Normocephalic  EYES: conjunctiva and sclera clear  NECK: Supple, No thyromegaly   CHEST/LUNG: Clear to auscultation bilaterally; No wheeze, rhonchi, or rales  HEART: Regular rate and rhythm; normal S1, S2, No murmurs.  ABDOMEN: Soft, nontender, nondistended; Bowel sounds present, no abdominal bruit, masses, or hepatosplenomegaly  EXTREMITIES:  2+ Peripheral Pulses. No clubbing, cyanosis, or edema, warm   NEUROLOGY: No asterixis or tremor  SKIN: Intact, no jaundice  Rectal exam-no melenic stools or fresh blood appreciated         LABS:  03-08    137  |  102  |  32<H>  ----------------------------<  143<H>  4.5   |  19  |  1.2    Ca    9.2      08 Mar 2021 10:39    TPro  5.6<L>  /  Alb  3.5  /  TBili  <0.2  /  DBili  x   /  AST  18  /  ALT  12  /  AlkPhos  85  03-08                          4.3    14.35 )-----------( 339      ( 08 Mar 2021 10:39 )             14.0     LIVER FUNCTIONS - ( 08 Mar 2021 10:39 )  Alb: 3.5 g/dL / Pro: 5.6 g/dL / ALK PHOS: 85 U/L / ALT: 12 U/L / AST: 18 U/L / GGT: x           PT/INR - ( 08 Mar 2021 10:39 )   PT: 11.90 sec;   INR: 1.03 ratio         PTT - ( 08 Mar 2021 10:39 )  PTT:28.4 sec    IMAGING:    < from: Xray Chest 1 View-PORTABLE IMMEDIATE (03.08.21 @ 10:25) >  EXAM:  XR CHEST PORTABLE IMMED 1V            PROCEDURE DATE:  03/08/2021            INTERPRETATION:  Clinical History / Reason for exam: Vomiting.    Comparison : Chest radiograph and CT March 2, 2021.    Technique/Positioning: Single frontal chestx-ray obtained.    Findings:    Support devices: None.    Cardiac/mediastinum/hilum: Unchanged.    Lung parenchyma/Pleura: Stable bilateral pleural effusions.    Skeleton/soft tissues: Unchanged.    Impression:    Stable bilateral pleural effusions.                LUIS ANGEL BURROUGHS MD; Attending Radiologist  This document has been electronically signed. Mar  8 2021 10:44AM    < end of copied text >    < from: CT Angio Chest PE Protocol w/ IV Cont (03.02.21 @ 02:14) >  EXAM:  CT ANGIO CHEST PE PROTOCOL IC            PROCEDURE DATE:  03/02/2021            INTERPRETATION:  CLINICAL STATEMENT: Evaluate for pulmonary embolus. Shortness of breath and chest pain.    TECHNIQUE: Multislice helical sections were obtained from the thoracic inlet to the lung bases during rapid administration of 95 cc Optiray 320 intravenous contrast using a CTA protocol. Thin sections were reconstructed through the pulmonary vasculature. Sagittal and coronal reformatted images were acquired, as well as MIP reconstructed images.    COMPARISON CT: None.    OTHER STUDIES USED FOR CORRELATION: None.      FINDINGS:    PULMONARY EMBOLUS: No pulmonary embolus.    LUNGS: Emphysema. Bilateral pleural effusions with adjacent compressive atelectasis. Additional areas of linear scarring/atelectasis. No lobar consolidation or pneumothorax. Left upper lobe 7 mm groundglass nodule. 3.5 x 1.8 cm ovoid mass abutting left T2 neural foramen    PLEURA/PERICARDIUM: Heart measures upper limits of normal. No pericardial effusion.    MEDIASTINUM/THORACIC NODES: No mediastinal, hilar or axillary lymphadenopathy. Partially calcified 3 x 3.4 cm mass within the right thyroid lobe. There is mass effect upon the trachea with resulting mild leftward tracheal deviation.    VISUALIZED UPPER ABDOMEN: Probable cholelithiasis    BONES/SOFT TISSUES: No acute osseous abnormality. Degenerative changes of the spine.    OTHER: Severe atherosclerotic disease throughout the aorta. Irregular intraluminal aortic plaque and the level of the kidneys and hiatus.      IMPRESSION:  1.  No pulmonary embolus.  2.  Right thyroid lobe 3.4 cm mass with resulting mild leftward tracheal deviation. Recommend ultrasound of the thyroid for further evaluation.  3.  Bilateral pleural effusions with adjacent compressive atelectasis.  4.  Left upper lobe 7 mm groundglass nodule. Recommend CT chest in 6-12 months for continued follow-up.  5.  3.5 x 1.8 cm ovoid mass abutting left T2 neural foramen. It is probably pleural-based. This may also be neural in origin. Further evaluation with MRI with and without contrast may be of benefit.            FREDRICK WATT M.D., RESIDENT RADIOLOGIST  This document has been electronically signed.  KI MAIER MD; Attending Radiologist  This document has been electronically signed. Mar  2 2021  2:51AM    < end of copied text >     Chief complaint/Reason for consult: hematemesis, intermittent melenic stools    HPI: 76yFemale pmh HTN, [place] 2 [place] and chronic anemia patient had no gross GI bleeding at the time. Patient now presents with an episode of vomiting her food early in the morning with speckles of blood in it. Patient also reports two melenic stools earlier in the morning today.      PAST MEDICAL & SURGICAL HISTORY:   Anemia    High cholesterol    HTN (hypertension)    Myasthenia gravis    Hemangioma          Family history:  FAMILY HISTORY:    No GI cancers in first or second degree relatives    Social History: No smoking. No alcohol. No illegal drug use.    Allergies:  No Known Allergies              MEDICATIONS: Home Medications:  pyridostigmine 180 mg oral tablet, extended release: 1 tab(s) orally 2 times a day (01 Mar 2021 22:49)  pyridostigmine 60 mg oral tablet: 1 tab(s) orally 3 to 4 times a day, As Needed (02 Mar 2021 04:23)  sertraline 100 mg oral tablet: 1 tab(s) orally once a day (01 Mar 2021 22:49)    MEDICATIONS  (STANDING):  lactated ringers Bolus 1000 milliLiter(s) IV Bolus once  morphine  - Injectable 4 milliGRAM(s) IV Push Once  pantoprazole Infusion 8 mG/Hr (10 mL/Hr) IV Continuous <Continuous>  pyridostigmine 180 milliGRAM(s) Oral Once            REVIEW OF SYSTEMS  General:  No weight loss, fevers, or chills.  Eyes:  No reported pain or visual changes  ENT:  No sore throat or runny nose.  NECK: No stiffness or lymphadenopathy  CV:  No chest pain or palpitations.  Resp:  No shortness of breath, cough, wheezing or hemoptysis  GI:  No abdominal pain, nausea, vomiting, dysphagia, diarrhea or constipation. No rectal bleeding, melena, or hematemesis.  Muscle:  No aches or weakness  Neuro:  No tingling, numbness       VITALS:   T(F): 97.7 (03-08-21 @ 09:51), Max: 97.7 (03-08-21 @ 09:51)  HR: 89 (03-08-21 @ 10:52) (58 - 89)  BP: 98/54 (03-08-21 @ 10:52) (91/53 - 98/54)  RR: 20 (03-08-21 @ 10:52) (20 - 20)  SpO2: 98% (03-08-21 @ 10:52) (98% - 100%)    PHYSICAL EXAM:  GENERAL: AAOx3, no acute distress.  HEAD:  Atraumatic, Normocephalic  EYES: conjunctiva and sclera clear  NECK: Supple, No thyromegaly   CHEST/LUNG: Clear to auscultation bilaterally; No wheeze, rhonchi, or rales  HEART: Regular rate and rhythm; normal S1, S2, No murmurs.  ABDOMEN: Soft, nontender, nondistended; Bowel sounds present, no abdominal bruit, masses, or hepatosplenomegaly  NEUROLOGY: No asterixis or tremor  SKIN: Intact, no jaundice  Rectal exam-no melenic stools or fresh blood appreciated         LABS:  03-08    137  |  102  |  32<H>  ----------------------------<  143<H>  4.5   |  19  |  1.2    Ca    9.2      08 Mar 2021 10:39    TPro  5.6<L>  /  Alb  3.5  /  TBili  <0.2  /  DBili  x   /  AST  18  /  ALT  12  /  AlkPhos  85  03-08                          4.3    14.35 )-----------( 339      ( 08 Mar 2021 10:39 )             14.0     LIVER FUNCTIONS - ( 08 Mar 2021 10:39 )  Alb: 3.5 g/dL / Pro: 5.6 g/dL / ALK PHOS: 85 U/L / ALT: 12 U/L / AST: 18 U/L / GGT: x           PT/INR - ( 08 Mar 2021 10:39 )   PT: 11.90 sec;   INR: 1.03 ratio         PTT - ( 08 Mar 2021 10:39 )  PTT:28.4 sec    IMAGING:    < from: Xray Chest 1 View-PORTABLE IMMEDIATE (03.08.21 @ 10:25) >  EXAM:  XR CHEST PORTABLE IMMED 1V            PROCEDURE DATE:  03/08/2021            INTERPRETATION:  Clinical History / Reason for exam: Vomiting.    Comparison : Chest radiograph and CT March 2, 2021.    Technique/Positioning: Single frontal chestx-ray obtained.    Findings:    Support devices: None.    Cardiac/mediastinum/hilum: Unchanged.    Lung parenchyma/Pleura: Stable bilateral pleural effusions.    Skeleton/soft tissues: Unchanged.    Impression:    Stable bilateral pleural effusions.                LUIS ANGEL BURROUGHS MD; Attending Radiologist  This document has been electronically signed. Mar  8 2021 10:44AM    < end of copied text >    < from: CT Angio Chest PE Protocol w/ IV Cont (03.02.21 @ 02:14) >  EXAM:  CT ANGIO CHEST PE PROTOCOL IC            PROCEDURE DATE:  03/02/2021            INTERPRETATION:  CLINICAL STATEMENT: Evaluate for pulmonary embolus. Shortness of breath and chest pain.    TECHNIQUE: Multislice helical sections were obtained from the thoracic inlet to the lung bases during rapid administration of 95 cc Optiray 320 intravenous contrast using a CTA protocol. Thin sections were reconstructed through the pulmonary vasculature. Sagittal and coronal reformatted images were acquired, as well as MIP reconstructed images.    COMPARISON CT: None.    OTHER STUDIES USED FOR CORRELATION: None.      FINDINGS:    PULMONARY EMBOLUS: No pulmonary embolus.    LUNGS: Emphysema. Bilateral pleural effusions with adjacent compressive atelectasis. Additional areas of linear scarring/atelectasis. No lobar consolidation or pneumothorax. Left upper lobe 7 mm groundglass nodule. 3.5 x 1.8 cm ovoid mass abutting left T2 neural foramen    PLEURA/PERICARDIUM: Heart measures upper limits of normal. No pericardial effusion.    MEDIASTINUM/THORACIC NODES: No mediastinal, hilar or axillary lymphadenopathy. Partially calcified 3 x 3.4 cm mass within the right thyroid lobe. There is mass effect upon the trachea with resulting mild leftward tracheal deviation.    VISUALIZED UPPER ABDOMEN: Probable cholelithiasis    BONES/SOFT TISSUES: No acute osseous abnormality. Degenerative changes of the spine.    OTHER: Severe atherosclerotic disease throughout the aorta. Irregular intraluminal aortic plaque and the level of the kidneys and hiatus.      IMPRESSION:  1.  No pulmonary embolus.  2.  Right thyroid lobe 3.4 cm mass with resulting mild leftward tracheal deviation. Recommend ultrasound of the thyroid for further evaluation.  3.  Bilateral pleural effusions with adjacent compressive atelectasis.  4.  Left upper lobe 7 mm groundglass nodule. Recommend CT chest in 6-12 months for continued follow-up.  5.  3.5 x 1.8 cm ovoid mass abutting left T2 neural foramen. It is probably pleural-based. This may also be neural in origin. Further evaluation with MRI with and without contrast may be of benefit.            FREDRICK WATT M.D., RESIDENT RADIOLOGIST  This document has been electronically signed.  KI MAIER MD; Attending Radiologist  This document has been electronically signed. Mar  2 2021  2:51AM    < end of copied text >

## 2021-03-08 NOTE — ED PROVIDER NOTE - CARE PLAN
Principal Discharge DX:	GI bleed  Secondary Diagnosis:	Elevated lactic acid level   Principal Discharge DX:	GI bleed  Secondary Diagnosis:	Elevated lactic acid level  Secondary Diagnosis:	Elevated troponin  Secondary Diagnosis:	Anemia  Secondary Diagnosis:	Myasthenia gravis

## 2021-03-08 NOTE — ED ADULT NURSE REASSESSMENT NOTE - NS ED NURSE REASSESS COMMENT FT1
1430- pt to CCU, pt c/o anxiety, HR 120s before leaving ED ASHLEY Griggs evaluated opt, pt okay to go to ccu, pt transferred and bedside report given to PATRICIA Lewis

## 2021-03-08 NOTE — ED PROVIDER NOTE - NS ED ATTENDING STATEMENT MOD
I have personally performed a face to face diagnostic evaluation on this patient. I have reviewed the ACP note and agree with the history, exam and plan of care, except as noted. I have personally provided the amount of critical care time documented below concurrently with the resident/fellow.  This time excludes time spent on separate procedures and time spent teaching. I have reviewed the resident’s / fellow’s documentation and I agree with the history, exam, and assessment and plan of care.

## 2021-03-08 NOTE — PATIENT PROFILE ADULT - FUNCTIONAL SCREEN CURRENT LEVEL: SWALLOWING (IF SCORE 2 OR MORE FOR ANY ITEM, CONSULT REHAB SERVICES), MLM)
NPO, but endorses no problems with swallowing foods or liquids/0 = swallows foods/liquids without difficulty

## 2021-03-08 NOTE — ED PROVIDER NOTE - PHYSICAL EXAMINATION
Physical Exam    Vital Signs: I have reviewed the initial vital signs.  Constitutional: well-nourished, appears stated age, no acute distress  Eyes: Conjunctiva pink, Sclera clear,  Cardiovascular: S1 and S2, regular rate, regular rhythm, well-perfused extremities, radial pulses equal and 2+  Respiratory: unlabored respiratory effort, clear to auscultation bilaterally no wheezing, rales and rhonchi  Gastrointestinal: soft, non-tender abdomen, no pulsatile mass, normal bowl sounds  Rectal: black stool with blood streaks.   Musculoskeletal: supple neck, no lower extremity edema, no midline tenderness  Integumentary: warm, dry, no rash  Neurologic: awake, alert, nvi

## 2021-03-08 NOTE — H&P ADULT - ASSESSMENT
77 y/o Female with PMH of HTN, [place] 2 [place] and chronic anemia patient had no gross GI bleeding at the time. Patient now presents with an episode of vomiting her food early in the morning with speckles of blood in it. Patient also reports two melenic stools earlier in the morning today. Patient with history of severe NSAID usage     # Acute blood loss anemia likely 2/2 Upper GI bleed   -c/o melena and hematemesis  -s/p 1Liter bolus. Hemodynamically stable  - Hb 4 on admission. s/p 2U pRBC  -Repeat CBC at 1500, 2200 and am  -Keep NPO, protonix gtt  -Active Type and Screen  -Transfuse PRN to hgb >8  -never had EGD/Colonoscopy before  -Cardiology clearance before EGD  -Two large bore IV lines  -GI following    #Hypertension  -Hold amlodipine    #Myasthenia Gravis  -c/w pyridostigmine    #Diet: NPO  #DVT ppx: SCD  #GI ppx: protonix  #Dispo: Acute

## 2021-03-08 NOTE — ED ADULT NURSE REASSESSMENT NOTE - NS ED NURSE REASSESS COMMENT FT1
1155- pt hypotensive, alert and awake, Dr. Powell aware ordered for blood to be put on pressure bag and given within 1 hour  1220- pt tolerating blood well, no s/s of reactions noted, bp remains low 86/36 hr 96  1304- 1st unit PRBC's completed, pt tolerated blood well bp improved 125/53 hr 95, pt denies any SOB/chest pain at this time, LS clear

## 2021-03-08 NOTE — ED PROVIDER NOTE - PROGRESS NOTE DETAILS
s/w gi vincenzo, transfuse will get ip cardiac clearance, plan for scope in am. dov approves unit, samel aware of pt. will follow lactate rpt.

## 2021-03-08 NOTE — ED PROVIDER NOTE - NS ED MD DISPO DIVISION
GDM ENCOUNTER    EDUCATION & GOAL PLAN    AADE7 Self-Care Behaviors    Pursuant to the emergency declaration under the [employer] and [employer], 1135 waiver authority and the [employer] Act, this Virtual Visit was conducted, with patient's consent, to reduce the patient's risk of exposure to COVID-19 and provide continuity of care for an established patient. Services were provided through a video synchronous discussion virtually to substitute for in-person appointment. I was in the office for the appointment and time spent: 60 minutes    Comments: This is her 2nd pregnancy and had GDM first time. Has good recall of testing steps. Little recall of what is GDM and meal plan which were both reviewed today as well as steps to prevent diabetes in the future. Goal:  To follow GDM meal plan daily    Next Steps:  1. Call [employer] and see what meter they are paying for today. Call Dr. Ladene Burkitt office with the meter name so they can order from pharmacy and start testing    Keep a 3 day record of what you eat and will share at our next telehealth visit on 4/13/20 along with BS results      Gino Weiss RD, [facility]    Topic Rating Education Completed Geri Dotson's  Personalized Goals   Healthy Eating       2  [x] How food impacts             BG levels   [] Carb food sources   [x] Reading food labels   [] Balanced Plate   [x] Meal size & portions   [x] Meal timing, schedule   [x] Carb counting                           Sample meal plan provided. Pt instructed on 2000 Kcal based on current trimester needs. Goal:    Being Active   2  [x] Exercise effects on        blood glucose   [] Physical activity &         insulin   [x] Goals for exercise Daily activity:none while under quarantine  Exercise goals:  If able walk around block daily   Monitoring  3  [] Glucometer teaching   [x] Frequency of   monitoring, BG schedule   [] A1c interpretation    BG Trends         [] Per meter         [] Per log book         Goals:   Fastin-95 mg/dl  1 hour post meals: <140 mg/dl    Last visit A1C:   No results found for: HBA1C, HGBE8, CLI9RCLM, [id number]    GTT:   Fasting 101 mg/dl  1 hr  243 mg/dl  2 hr 237 mg/dl  3 hr 209 mg/dl   Taking Medication n/a  [] Medication review    [] Medication schedule   [] Insulin: rapid- and        long-acting   [] Using pens or         vial/syringe    [] Insulin storage       /expiration-  UNOPENED AND STORED IN REFRIGERATOR : good until expiration date on the label  OPENED AND STORED AT ROOM TEMPERATURE : good for 28 days  Exceptions : Tresiba good for 52 days; Toujeo good for 42 days;  Levemir good for 42 days  Opened is defined as: the seal has been broken by pen needle or pierced by syringe  Note: it is not recommended to refrigerate pens after opening  [] Rotation of sites                        Problem Solving             n/a  Hypoglycemia   [] Signs/Symptoms   [] Prevention/Treatment   [] Rule of 15      Hyperglycemia    [] Signs/Symptoms   [] Prevention/Treatment   [] Sick days        Healthy Coping            2  [x] Barriers to        adequate control       [x] Knowledge deficits       [] Economic       [] Social, behavioral       [] Lack of support   [x] Readiness for change   [] Goals / next steps Barriers identified: recall of previous info             Reducing Risks   2  [x] Health Maintenance       [x] 6-12 week post            partum check of             glucose   [x] After Delivery Care        covered                   Ratings: 1 = needs instruction; 2 = needs review; 3 = comprehends key points;     4 = demonstrates competency; N/A = not assessed [facility]

## 2021-03-08 NOTE — CONSULT NOTE ADULT - ASSESSMENT
75yFemale pmh MG, HTN presented to ER after concerning chest x-ray findings. GI consulted for anemia, Patient denies nausea, vomiting, hematemesis, melena, blood in stool, diarrhea, constipation, abdominal pain. Patient has never had an endoscopy or colonoscopy and notes she was taking N-Saids for shoulder pain about 8 a day for a month.    Problem 1-Microcytic anemia no gross GI bleeding  -likely N-Said induced gastritis vs PUD  Rec  -h and h stable   -diet as tolerated   -PPI BID  -In light of suspected Type 2 MI and no gross GI bleeding, would hold off on EGD/Colonoscopy for one month, Follow up with our [facility] located at [address] [place] [address]. Phone Number: [phone] to schedule EGD/Colonoscopy after cardiac clearance   -Maintain Hemodynamic Stability   -Monitor CBC  -CMP,Optimize Electrolytes  -PT,PTT,INR  -EKG, Chest-Xray   -Transfuse prn to hgb >8  -Two large bore IV lines  -Monitor Vital Signs  -Monitor Stool For blood, frequency, consistency, melena  -Active Type and Screen  -Iron Studies, Folate, Vitamin B12 levels     Problem 2-Right thyroid lobe 3.4 cm mass with resulting mild leftward tracheal deviation.   Rec  - Care as per primary team     Problem 3- Bilateral pleural effusions with adjacent compressive atelectasis. Left upper lobe 7 mm groundglass nodule. 3.5 x 1.8 cm ovoid mass abutting left T2 neural foramen. It is probably pleural-based. This may also be neural in origin  Rec  - Care as per primary team    76yFemale pmh HTN, [place] 2 MI and chronic anemia patient had no gross GI bleeding at the time. Patient now presents with an episode of vomiting her food early in the morning with speckles of blood in it. Patient also reports two melenic stools earlier in the morning today. Patient with history of severe N-SAID usage     Problem 1-Microcytic anemia with intermittent melenic stools and one episode of hematemesis   never had EGD/Colonoscopy before  -likely N-Said induced gastritis vs PUD  Rec  -Aim for EGD tomorrow risks and benefits discussed   -Cardio evaluation and risk stratification in AM  -h and h stable   -diet as tolerated   -PPI BID  -Maintain Hemodynamic Stability   -Monitor CBC  -CMP,Optimize Electrolytes  -PT,PTT,INR  -EKG, Chest-Xray   -Transfuse prn to hgb >8  -Two large bore IV lines  -Monitor Vital Signs  -Monitor Stool For blood, frequency, consistency, melena  -Active Type and Screen      Problem 2-Right thyroid lobe 3.4 cm mass with resulting mild leftward tracheal deviation.   Rec  - Care as per primary team     Problem 3- Bilateral pleural effusions with adjacent compressive atelectasis. Left upper lobe 7 mm groundglass nodule. 3.5 x 1.8 cm ovoid mass abutting left T2 neural foramen. It is probably pleural-based. This may also be neural in origin  Rec  - Care as per primary team    76yFemale pmh HTN, [place] type 2 MI and chronic anemia patient had no gross GI bleeding at the time. Patient now presents with an episode of vomiting her food early in the morning with speckles of blood in it. Patient also reports two melenic stools earlier in the morning today. Patient with history of severe N-SAID usage     Problem 1-Microcytic anemia with intermittent melenic stools and one episode of hematemesis   never had EGD/Colonoscopy before  -likely N-Said induced gastritis vs PUD  Rec  - EGD tomorrow risks and benefits discussed   -Cardio evaluation and risk stratification in AM  -h and h stable   -diet as tolerated   -PPI BID  -Maintain Hemodynamic Stability   -Monitor CBC  -CMP,Optimize Electrolytes  -PT,PTT,INR  -EKG, Chest-Xray   -Transfuse prn to hgb >8  -Two large bore IV lines  -Monitor Vital Signs  -Monitor Stool For blood, frequency, consistency, melena  -Active Type and Screen      Problem 2-Right thyroid lobe 3.4 cm mass with resulting mild leftward tracheal deviation.   Rec  - Care as per primary team     Problem 3- Bilateral pleural effusions with adjacent compressive atelectasis. Left upper lobe 7 mm groundglass nodule. 3.5 x 1.8 cm ovoid mass abutting left T2 neural foramen. It is probably pleural-based. This may also be neural in origin  Rec  - Care as per primary team    76yFemale pmh HTN, [place] type 2 MI and chronic anemia patient had no gross GI bleeding at the time. Patient now presents with an episode of vomiting her food early in the morning with speckles of blood in it. Patient also reports two melenic stools earlier in the morning today. Patient with history of severe N-SAID usage     Problem 1-Microcytic anemia with intermittent melenic stools and one episode of hematemesis   never had EGD/Colonoscopy before  -likely N-Said induced gastritis vs PUD  Rec  - EGD tomorrow risks and benefits discussed   -Clear liquids when h and h stable  -NPO after midnight   -Cardio evaluation and risk stratification in AM  -h and h stable   -PPI BID  -Maintain Hemodynamic Stability   -Monitor CBC  -CMP,Optimize Electrolytes  -PT,PTT,INR  -EKG, Chest-Xray   -Transfuse prn to hgb >8  -Two large bore IV lines  -Monitor Vital Signs  -Monitor Stool For blood, frequency, consistency, melena  -Active Type and Screen      Problem 2-Right thyroid lobe 3.4 cm mass with resulting mild leftward tracheal deviation.   Rec  - Care as per primary team     Problem 3- Bilateral pleural effusions with adjacent compressive atelectasis. Left upper lobe 7 mm groundglass nodule. 3.5 x 1.8 cm ovoid mass abutting left T2 neural foramen. It is probably pleural-based. This may also be neural in origin  Rec  - Care as per primary team

## 2021-03-09 ENCOUNTER — RESULT REVIEW (OUTPATIENT)
Age: 76
End: 2021-03-09

## 2021-03-09 ENCOUNTER — TRANSCRIPTION ENCOUNTER (OUTPATIENT)
Age: 76
End: 2021-03-09

## 2021-03-09 LAB
ALBUMIN SERPL ELPH-MCNC: 3.2 G/DL — LOW (ref 3.5–5.2)
ALP SERPL-CCNC: 80 U/L — SIGNIFICANT CHANGE UP (ref 30–115)
ALT FLD-CCNC: 30 U/L — SIGNIFICANT CHANGE UP (ref 0–41)
ANION GAP SERPL CALC-SCNC: 10 MMOL/L — SIGNIFICANT CHANGE UP (ref 7–14)
AST SERPL-CCNC: 155 U/L — HIGH (ref 0–41)
BILIRUB SERPL-MCNC: 0.3 MG/DL — SIGNIFICANT CHANGE UP (ref 0.2–1.2)
BUN SERPL-MCNC: 52 MG/DL — HIGH (ref 10–20)
CALCIUM SERPL-MCNC: 8.4 MG/DL — LOW (ref 8.5–10.1)
CHLORIDE SERPL-SCNC: 106 MMOL/L — SIGNIFICANT CHANGE UP (ref 98–110)
CO2 SERPL-SCNC: 23 MMOL/L — SIGNIFICANT CHANGE UP (ref 17–32)
CREAT SERPL-MCNC: 2 MG/DL — HIGH (ref 0.7–1.5)
GAS PNL BLDA: SIGNIFICANT CHANGE UP
GAS PNL BLDA: SIGNIFICANT CHANGE UP
GLUCOSE SERPL-MCNC: 130 MG/DL — HIGH (ref 70–99)
HCT VFR BLD CALC: 23.1 % — LOW (ref 37–47)
HCT VFR BLD CALC: 25.6 % — LOW (ref 37–47)
HCT VFR BLD CALC: 27 % — LOW (ref 37–47)
HCT VFR BLD CALC: 28 % — LOW (ref 37–47)
HGB BLD-MCNC: 7.3 G/DL — LOW (ref 12–16)
HGB BLD-MCNC: 8.4 G/DL — LOW (ref 12–16)
HGB BLD-MCNC: 9 G/DL — LOW (ref 12–16)
HGB BLD-MCNC: 9.4 G/DL — LOW (ref 12–16)
LACTATE SERPL-SCNC: 1.4 MMOL/L — SIGNIFICANT CHANGE UP (ref 0.7–2)
LACTATE SERPL-SCNC: 1.7 MMOL/L — SIGNIFICANT CHANGE UP (ref 0.7–2)
MAGNESIUM SERPL-MCNC: 2.5 MG/DL — HIGH (ref 1.8–2.4)
MCHC RBC-ENTMCNC: 25.4 PG — LOW (ref 27–31)
MCHC RBC-ENTMCNC: 26.2 PG — LOW (ref 27–31)
MCHC RBC-ENTMCNC: 27 PG — SIGNIFICANT CHANGE UP (ref 27–31)
MCHC RBC-ENTMCNC: 27.1 PG — SIGNIFICANT CHANGE UP (ref 27–31)
MCHC RBC-ENTMCNC: 31.6 G/DL — LOW (ref 32–37)
MCHC RBC-ENTMCNC: 32.8 G/DL — SIGNIFICANT CHANGE UP (ref 32–37)
MCHC RBC-ENTMCNC: 33.3 G/DL — SIGNIFICANT CHANGE UP (ref 32–37)
MCHC RBC-ENTMCNC: 33.6 G/DL — SIGNIFICANT CHANGE UP (ref 32–37)
MCV RBC AUTO: 79.8 FL — LOW (ref 81–99)
MCV RBC AUTO: 80.5 FL — LOW (ref 81–99)
MCV RBC AUTO: 80.7 FL — LOW (ref 81–99)
MCV RBC AUTO: 81.1 FL — SIGNIFICANT CHANGE UP (ref 81–99)
NRBC # BLD: 0 /100 WBCS — SIGNIFICANT CHANGE UP (ref 0–0)
PLATELET # BLD AUTO: 231 K/UL — SIGNIFICANT CHANGE UP (ref 130–400)
PLATELET # BLD AUTO: 234 K/UL — SIGNIFICANT CHANGE UP (ref 130–400)
PLATELET # BLD AUTO: 272 K/UL — SIGNIFICANT CHANGE UP (ref 130–400)
PLATELET # BLD AUTO: 293 K/UL — SIGNIFICANT CHANGE UP (ref 130–400)
POTASSIUM SERPL-MCNC: 5.3 MMOL/L — HIGH (ref 3.5–5)
POTASSIUM SERPL-SCNC: 5.3 MMOL/L — HIGH (ref 3.5–5)
PROT SERPL-MCNC: 5 G/DL — LOW (ref 6–8)
RBC # BLD: 2.87 M/UL — LOW (ref 4.2–5.4)
RBC # BLD: 3.21 M/UL — LOW (ref 4.2–5.4)
RBC # BLD: 3.33 M/UL — LOW (ref 4.2–5.4)
RBC # BLD: 3.47 M/UL — LOW (ref 4.2–5.4)
RBC # FLD: 17.3 % — HIGH (ref 11.5–14.5)
RBC # FLD: 17.4 % — HIGH (ref 11.5–14.5)
RBC # FLD: 19.1 % — HIGH (ref 11.5–14.5)
RBC # FLD: 19.1 % — HIGH (ref 11.5–14.5)
SARS-COV-2 IGG SERPL QL IA: NEGATIVE — SIGNIFICANT CHANGE UP
SARS-COV-2 IGM SERPL IA-ACNC: 0.08 INDEX — SIGNIFICANT CHANGE UP
SODIUM SERPL-SCNC: 139 MMOL/L — SIGNIFICANT CHANGE UP (ref 135–146)
WBC # BLD: 17.49 K/UL — HIGH (ref 4.8–10.8)
WBC # BLD: 17.98 K/UL — HIGH (ref 4.8–10.8)
WBC # BLD: 18.39 K/UL — HIGH (ref 4.8–10.8)
WBC # BLD: 21.08 K/UL — HIGH (ref 4.8–10.8)
WBC # FLD AUTO: 17.49 K/UL — HIGH (ref 4.8–10.8)
WBC # FLD AUTO: 17.98 K/UL — HIGH (ref 4.8–10.8)
WBC # FLD AUTO: 18.39 K/UL — HIGH (ref 4.8–10.8)
WBC # FLD AUTO: 21.08 K/UL — HIGH (ref 4.8–10.8)

## 2021-03-09 PROCEDURE — 88312 SPECIAL STAINS GROUP 1: CPT | Mod: 26

## 2021-03-09 PROCEDURE — 99233 SBSQ HOSP IP/OBS HIGH 50: CPT

## 2021-03-09 PROCEDURE — 99291 CRITICAL CARE FIRST HOUR: CPT

## 2021-03-09 PROCEDURE — 88305 TISSUE EXAM BY PATHOLOGIST: CPT | Mod: 26

## 2021-03-09 PROCEDURE — 43239 EGD BIOPSY SINGLE/MULTIPLE: CPT | Mod: XU

## 2021-03-09 PROCEDURE — 43255 EGD CONTROL BLEEDING ANY: CPT | Mod: 59

## 2021-03-09 PROCEDURE — 71045 X-RAY EXAM CHEST 1 VIEW: CPT | Mod: 26

## 2021-03-09 RX ORDER — PHENYLEPHRINE HYDROCHLORIDE 10 MG/ML
0.1 INJECTION INTRAVENOUS
Qty: 160 | Refills: 0 | Status: DISCONTINUED | OUTPATIENT
Start: 2021-03-09 | End: 2021-03-09

## 2021-03-09 RX ORDER — MIDAZOLAM HYDROCHLORIDE 1 MG/ML
2 INJECTION, SOLUTION INTRAMUSCULAR; INTRAVENOUS ONCE
Refills: 0 | Status: DISCONTINUED | OUTPATIENT
Start: 2021-03-09 | End: 2021-03-09

## 2021-03-09 RX ORDER — FUROSEMIDE 40 MG
40 TABLET ORAL ONCE
Refills: 0 | Status: COMPLETED | OUTPATIENT
Start: 2021-03-09 | End: 2021-03-09

## 2021-03-09 RX ORDER — NOREPINEPHRINE BITARTRATE/D5W 8 MG/250ML
0.05 PLASTIC BAG, INJECTION (ML) INTRAVENOUS
Qty: 8 | Refills: 0 | Status: DISCONTINUED | OUTPATIENT
Start: 2021-03-09 | End: 2021-03-11

## 2021-03-09 RX ORDER — FENTANYL CITRATE 50 UG/ML
0.5 INJECTION INTRAVENOUS
Qty: 2500 | Refills: 0 | Status: DISCONTINUED | OUTPATIENT
Start: 2021-03-09 | End: 2021-03-10

## 2021-03-09 RX ORDER — SODIUM CHLORIDE 9 MG/ML
250 INJECTION INTRAMUSCULAR; INTRAVENOUS; SUBCUTANEOUS ONCE
Refills: 0 | Status: COMPLETED | OUTPATIENT
Start: 2021-03-09 | End: 2021-03-09

## 2021-03-09 RX ORDER — MAGNESIUM SULFATE 500 MG/ML
2 VIAL (ML) INJECTION ONCE
Refills: 0 | Status: COMPLETED | OUTPATIENT
Start: 2021-03-09 | End: 2021-03-09

## 2021-03-09 RX ORDER — HYDROMORPHONE HYDROCHLORIDE 2 MG/ML
0.5 INJECTION INTRAMUSCULAR; INTRAVENOUS; SUBCUTANEOUS ONCE
Refills: 0 | Status: DISCONTINUED | OUTPATIENT
Start: 2021-03-09 | End: 2021-03-09

## 2021-03-09 RX ORDER — ACETAMINOPHEN 500 MG
650 TABLET ORAL EVERY 6 HOURS
Refills: 0 | Status: DISCONTINUED | OUTPATIENT
Start: 2021-03-09 | End: 2021-03-17

## 2021-03-09 RX ORDER — DEXMEDETOMIDINE HYDROCHLORIDE IN 0.9% SODIUM CHLORIDE 4 UG/ML
0.2 INJECTION INTRAVENOUS
Qty: 400 | Refills: 0 | Status: DISCONTINUED | OUTPATIENT
Start: 2021-03-09 | End: 2021-03-10

## 2021-03-09 RX ADMIN — PANTOPRAZOLE SODIUM 10 MG/HR: 20 TABLET, DELAYED RELEASE ORAL at 20:27

## 2021-03-09 RX ADMIN — PYRIDOSTIGMINE BROMIDE 180 MILLIGRAM(S): 60 SOLUTION ORAL at 06:00

## 2021-03-09 RX ADMIN — DEXMEDETOMIDINE HYDROCHLORIDE IN 0.9% SODIUM CHLORIDE 3.57 MICROGRAM(S)/KG/HR: 4 INJECTION INTRAVENOUS at 22:26

## 2021-03-09 RX ADMIN — FENTANYL CITRATE 3.57 MICROGRAM(S)/KG/HR: 50 INJECTION INTRAVENOUS at 13:00

## 2021-03-09 RX ADMIN — HYDROMORPHONE HYDROCHLORIDE 0.5 MILLIGRAM(S): 2 INJECTION INTRAMUSCULAR; INTRAVENOUS; SUBCUTANEOUS at 08:11

## 2021-03-09 RX ADMIN — HYDROMORPHONE HYDROCHLORIDE 0.5 MILLIGRAM(S): 2 INJECTION INTRAMUSCULAR; INTRAVENOUS; SUBCUTANEOUS at 08:30

## 2021-03-09 RX ADMIN — FENTANYL CITRATE 3.57 MICROGRAM(S)/KG/HR: 50 INJECTION INTRAVENOUS at 22:26

## 2021-03-09 RX ADMIN — DEXMEDETOMIDINE HYDROCHLORIDE IN 0.9% SODIUM CHLORIDE 3.57 MICROGRAM(S)/KG/HR: 4 INJECTION INTRAVENOUS at 20:25

## 2021-03-09 RX ADMIN — MIDAZOLAM HYDROCHLORIDE 2 MILLIGRAM(S): 1 INJECTION, SOLUTION INTRAMUSCULAR; INTRAVENOUS at 12:49

## 2021-03-09 RX ADMIN — Medication 650 MILLIGRAM(S): at 20:18

## 2021-03-09 RX ADMIN — DEXMEDETOMIDINE HYDROCHLORIDE IN 0.9% SODIUM CHLORIDE 3.57 MICROGRAM(S)/KG/HR: 4 INJECTION INTRAVENOUS at 14:30

## 2021-03-09 RX ADMIN — DEXMEDETOMIDINE HYDROCHLORIDE IN 0.9% SODIUM CHLORIDE 3.57 MICROGRAM(S)/KG/HR: 4 INJECTION INTRAVENOUS at 13:44

## 2021-03-09 RX ADMIN — Medication 650 MILLIGRAM(S): at 22:26

## 2021-03-09 RX ADMIN — FENTANYL CITRATE 3.57 MICROGRAM(S)/KG/HR: 50 INJECTION INTRAVENOUS at 20:25

## 2021-03-09 RX ADMIN — PANTOPRAZOLE SODIUM 10 MG/HR: 20 TABLET, DELAYED RELEASE ORAL at 13:00

## 2021-03-09 RX ADMIN — SODIUM CHLORIDE 250 MILLILITER(S): 9 INJECTION INTRAMUSCULAR; INTRAVENOUS; SUBCUTANEOUS at 16:06

## 2021-03-09 RX ADMIN — Medication 6.69 MICROGRAM(S)/KG/MIN: at 20:26

## 2021-03-09 RX ADMIN — DEXMEDETOMIDINE HYDROCHLORIDE IN 0.9% SODIUM CHLORIDE 3.57 MICROGRAM(S)/KG/HR: 4 INJECTION INTRAVENOUS at 17:26

## 2021-03-09 RX ADMIN — HYDROMORPHONE HYDROCHLORIDE 1 MILLIGRAM(S): 2 INJECTION INTRAMUSCULAR; INTRAVENOUS; SUBCUTANEOUS at 03:42

## 2021-03-09 RX ADMIN — MIDAZOLAM HYDROCHLORIDE 2 MILLIGRAM(S): 1 INJECTION, SOLUTION INTRAMUSCULAR; INTRAVENOUS at 14:30

## 2021-03-09 RX ADMIN — Medication 50 GRAM(S): at 03:46

## 2021-03-09 RX ADMIN — Medication 40 MILLIGRAM(S): at 08:11

## 2021-03-09 RX ADMIN — HYDROMORPHONE HYDROCHLORIDE 1 MILLIGRAM(S): 2 INJECTION INTRAMUSCULAR; INTRAVENOUS; SUBCUTANEOUS at 02:54

## 2021-03-09 NOTE — PROGRESS NOTE ADULT - SUBJECTIVE AND OBJECTIVE BOX
IGNACIO PAL 76y Female  MRN#: [id number]     SUBJECTIVE  Patient is a 76y old Female who presents with a chief complaint of Melena (09 Mar 2021 11:47)  Currently admitted to medicine with the primary diagnosis of GI bleed  Today is hospital day 1d, and this morning she reports hematochezia overnight.     OBJECTIVE  PAST MEDICAL & SURGICAL HISTORY  Anemia    High cholesterol    HTN (hypertension)    Myasthenia gravis    Hemangioma      ALLERGIES:  No Known Allergies    MEDICATIONS:  STANDING MEDICATIONS  dexMEDEtomidine Infusion 0.2 MICROgram(s)/kG/Hr IV Continuous <Continuous>  fentaNYL   Infusion. 0.5 MICROgram(s)/kG/Hr IV Continuous <Continuous>  midazolam Injectable 2 milliGRAM(s) IV Push once  norepinephrine Infusion 0.05 MICROgram(s)/kG/Min IV Continuous <Continuous>  pantoprazole Infusion 8 mG/Hr IV Continuous <Continuous>  pyridostigmine 180 milliGRAM(s) Oral Once  pyridostigmine  milliGRAM(s) Oral two times a day  sertraline 100 milliGRAM(s) Oral daily  sodium chloride 0.9% Bolus 250 milliLiter(s) IV Bolus once    PRN MEDICATIONS  HYDROmorphone  Injectable 1 milliGRAM(s) IV Push every 6 hours PRN      VITAL SIGNS: Last 24 Hours  T(C): 35.8 (09 Mar 2021 10:17), Max: 36.6 (08 Mar 2021 22:46)  T(F): 96.5 (09 Mar 2021 08:30), Max: 97.9 (08 Mar 2021 22:46)  HR: 106 (09 Mar 2021 13:56) (78 - 106)  BP: 125/61 (09 Mar 2021 13:56) (83/45 - 125/61)  BP(mean): 84 (09 Mar 2021 13:56) (59 - 85)  RR: 31 (09 Mar 2021 13:56) (18 - 55)  SpO2: 99% (09 Mar 2021 13:56) (90% - 100%)    LABS:                        7.3    17.49 )-----------( 293      ( 09 Mar 2021 06:20 )             23.1     03-09    139  |  106  |  52<H>  ----------------------------<  130<H>  5.3<H>   |  23  |  2.0<H>    Ca    8.4<L>      09 Mar 2021 06:20  Mg     2.5     03-09    TPro  5.0<L>  /  Alb  3.2<L>  /  TBili  0.3  /  DBili  x   /  AST  155<H>  /  ALT  30  /  AlkPhos  80  03-09    PT/INR - ( 09 Mar 2021 02:15 )   PT: 12.40 sec;   INR: 1.08 ratio         PTT - ( 09 Mar 2021 02:15 )  PTT:29.7 sec    ABG - ( 09 Mar 2021 12:32 )  pH, Arterial: 7.31  pH, Blood: x     /  pCO2: 39    /  pO2: 281   / HCO3: 19    / Base Excess: x     /  SaO2: 100         Lactate, Blood: 1.4 mmol/L (03-09-21 @ 06:20)  Lactate, Blood: 1.7 mmol/L (03-09-21 @ 02:15)  Lactate, Blood: 2.4 mmol/L <H> (03-08-21 @ 19:32)      CARDIAC MARKERS ( 08 Mar 2021 10:39 )  x     / 0.09 ng/mL / x     / x     / x          RADIOLOGY:    < from: Xray Chest 1 View-PORTABLE IMMEDIATE (Xray Chest 1 View-PORTABLE IMMEDIATE .) (03.09.21 @ 08:39) >  IMPRESSION:    No significant change in right greater than left airspace opacities.      < end of copied text >    PHYSICAL EXAM:    GENERAL: NAD, well-developed, AAOx3  HEENT:  Atraumatic, Normocephalic. EOMI, PERRLA, conjunctiva and sclera clear, No JVD  PULMONARY: Clear to auscultation bilaterally; No wheeze  CARDIOVASCULAR: Regular rate and rhythm; No murmurs, rubs, or gallops  GASTROINTESTINAL: Soft, Nontender, Nondistended; Bowel sounds present  MUSCULOSKELETAL:  2+ Peripheral Pulses, No clubbing, cyanosis, or edema  NEUROLOGY: non-focal  SKIN: No rashes or lesions      ADMISSION SUMMARY  Patient is a 76y old Female who presents with a chief complaint of Melena (09 Mar 2021 11:47)  Currently admitted to medicine with the primary diagnosis of GI bleed    ASSESSMENT & PLAN     IGNACIO PAL 76y Female  MRN#: [id number]     SUBJECTIVE  Patient is a 76y old Female who presents with a chief complaint of Melena (09 Mar 2021 11:47)  Currently admitted to medicine with the primary diagnosis of GI bleed  Today is hospital day 1d, and this morning she reports hematochezia overnight.     OBJECTIVE  PAST MEDICAL & SURGICAL HISTORY  Anemia    High cholesterol    HTN (hypertension)    Myasthenia gravis    Hemangioma      ALLERGIES:  No Known Allergies    MEDICATIONS:  STANDING MEDICATIONS  dexMEDEtomidine Infusion 0.2 MICROgram(s)/kG/Hr IV Continuous <Continuous>  fentaNYL   Infusion. 0.5 MICROgram(s)/kG/Hr IV Continuous <Continuous>  midazolam Injectable 2 milliGRAM(s) IV Push once  norepinephrine Infusion 0.05 MICROgram(s)/kG/Min IV Continuous <Continuous>  pantoprazole Infusion 8 mG/Hr IV Continuous <Continuous>  pyridostigmine 180 milliGRAM(s) Oral Once  pyridostigmine  milliGRAM(s) Oral two times a day  sertraline 100 milliGRAM(s) Oral daily  sodium chloride 0.9% Bolus 250 milliLiter(s) IV Bolus once    PRN MEDICATIONS  HYDROmorphone  Injectable 1 milliGRAM(s) IV Push every 6 hours PRN      VITAL SIGNS: Last 24 Hours  T(C): 35.8 (09 Mar 2021 10:17), Max: 36.6 (08 Mar 2021 22:46)  T(F): 96.5 (09 Mar 2021 08:30), Max: 97.9 (08 Mar 2021 22:46)  HR: 106 (09 Mar 2021 13:56) (78 - 106)  BP: 125/61 (09 Mar 2021 13:56) (83/45 - 125/61)  BP(mean): 84 (09 Mar 2021 13:56) (59 - 85)  RR: 31 (09 Mar 2021 13:56) (18 - 55)  SpO2: 99% (09 Mar 2021 13:56) (90% - 100%)    LABS:                        7.3    17.49 )-----------( 293      ( 09 Mar 2021 06:20 )             23.1     03-09    139  |  106  |  52<H>  ----------------------------<  130<H>  5.3<H>   |  23  |  2.0<H>    Ca    8.4<L>      09 Mar 2021 06:20  Mg     2.5     03-09    TPro  5.0<L>  /  Alb  3.2<L>  /  TBili  0.3  /  DBili  x   /  AST  155<H>  /  ALT  30  /  AlkPhos  80  03-09    PT/INR - ( 09 Mar 2021 02:15 )   PT: 12.40 sec;   INR: 1.08 ratio         PTT - ( 09 Mar 2021 02:15 )  PTT:29.7 sec    ABG - ( 09 Mar 2021 12:32 )  pH, Arterial: 7.31  pH, Blood: x     /  pCO2: 39    /  pO2: 281   / HCO3: 19    / Base Excess: x     /  SaO2: 100         Lactate, Blood: 1.4 mmol/L (03-09-21 @ 06:20)  Lactate, Blood: 1.7 mmol/L (03-09-21 @ 02:15)  Lactate, Blood: 2.4 mmol/L <H> (03-08-21 @ 19:32)      CARDIAC MARKERS ( 08 Mar 2021 10:39 )  x     / 0.09 ng/mL / x     / x     / x          RADIOLOGY:    < from: Xray Chest 1 View-PORTABLE IMMEDIATE (Xray Chest 1 View-PORTABLE IMMEDIATE .) (03.09.21 @ 08:39) >  IMPRESSION:    No significant change in right greater than left airspace opacities.      < end of copied text >    < from: CT Angio Chest PE Protocol w/ IV Cont (03.02.21 @ 02:14) >    IMPRESSION:  1.  No pulmonary embolus.  2.  Right thyroid lobe 3.4 cm mass with resulting mild leftward tracheal deviation. Recommend ultrasound of the thyroid for further evaluation.  3.  Bilateral pleural effusions with adjacent compressive atelectasis.  4.  Left upper lobe 7 mm groundglass nodule. Recommend CT chest in 6-12 months for continued follow-up.  5.  3.5 x 1.8 cm ovoid mass abutting left T2 neural foramen. It is probably pleural-based. This may also be neural in origin. Further evaluation with MRI with and without contrast may be of benefit.    < end of copied text >    PHYSICAL EXAM:    GENERAL: NAD, well-developed, AAOx3  HEENT:  Atraumatic, Normocephalic. EOMI, PERRLA, conjunctiva and sclera clear, No JVD  PULMONARY: Clear to auscultation bilaterally; No wheeze  CARDIOVASCULAR: Regular rate and rhythm; No murmurs, rubs, or gallops  GASTROINTESTINAL: Soft, Nontender, Nondistended; Bowel sounds present  MUSCULOSKELETAL:  2+ Peripheral Pulses, No clubbing, cyanosis, or edema  NEUROLOGY: non-focal  SKIN: No rashes or lesions      ADMISSION SUMMARY  Patient is a 76y old Female who presents with a chief complaint of Melena (09 Mar 2021 11:47)  Currently admitted to medicine with the primary diagnosis of GI bleed    ASSESSMENT & PLAN  77 y/o Female with PMH of HTN, [place] type 2 MI and chronic anemia patient had no gross GI bleeding at the time. Patient now presents with an episode of vomiting her food early in the morning with speckles of blood in it. Patient also reports two melenic stools earlier in the morning today. Patient with history of severe NSAID usage     # Acute blood loss anemia 2/2 Bleeding duodenal ulcer  -c/o melena and hematemesis, h/o NSAID abuse  -Hb 4 on admission. s/p 5U PRBC ( 3U today)  -Patient intubated for EGD today due to history of MG and tracheal deviation, considered high risk as per anesthesia. Currently remains intubated  -EGD showed actively spurting vessel in posterior wall of duodenal bulb. s/p epinephrine inj and clipping  -Repeat CBC at 1500, 2200 and am  -Keep NPO, protonix gtt  -Active Type and Screen  -Transfuse PRN to hgb >8  -Two large bore IV lines, A line  -GI following    #Thyroid mass  -Patient has 2 solid nodules in the thyroid abutting the trachea, paraspinal mass at T2 level likely pleural in origin  -Mass was never biopsied on last admission  -c/o severe shoulder pain, was using NSAIDs for intractable pain from the mass  -Might need biopsy before discharge    #Hypertension  -Hold amlodipine    #Myasthenia Gravis  -c/w pyridostigmine    #Diet: NPO  #DVT ppx: SCD  #GI ppx: protonix  #Dispo: Acute    Updated the plan to her partner.

## 2021-03-09 NOTE — CONSULT NOTE ADULT - ATTENDING COMMENTS
Patient seen and examined with surgery team in CCU and discussed management plans. Patient admitted yesterday with acute GI bleeding and underwent endoscopy for bleeding control. Received total of 6 units for past 24 hours. This am patient comfortable. c/o dry mouth , no abd pain, abd soft benign Vs stable now. will watch for recurrent bleeding. to remain in unit for 24 hours. Radiology studies reviewed. Patient has a scar left neck of prior surgery.

## 2021-03-09 NOTE — CONSULT NOTE ADULT - SUBJECTIVE AND OBJECTIVE BOX
IGNACIO PAL [id number]  76y Female  1d    HPI:  76yFemale pmh HTN, MG, thyroid mass, [place] type 2 [place], chronic anemia and left paraspinal mass - no gross GI bleeding at the time. Patient now presents with an episode of vomiting her food early in the morning with speckles of blood in it. Patient also reports two melenic stools earlier in the morning today. Patient also reports two melenic stools earlier in the morning today. Patient with history of severe NSAID usage. Hb in ED was 4.3, lactate 6.8.  (08 Mar 2021 14:19)    SURGERY: Surgical consult requested as pt still with melena. She received 2 units PRBC, getting an additional unit now; became dyspneic earlier today, was on NRB, no on NC;  had drop in Hgb today. SHe denied any abdominal pain prior to admission; admits to NSAID use for "neck pain".  Admits to having an anxiety attack at this moment and difficult for her to answer questions because of it. Remainder of HPI from chart. She is going for emergent EGD today      PAST MEDICAL & SURGICAL HISTORY:  Anemia    High cholesterol    HTN (hypertension)    Myasthenia gravis    Hemangioma      MEDICATIONS  (STANDING):  norepinephrine Infusion 0.05 MICROgram(s)/kG/Min (6.69 mL/Hr) IV Continuous <Continuous>  pantoprazole Infusion 8 mG/Hr (10 mL/Hr) IV Continuous <Continuous>  phenylephrine    Infusion 0.1 MICROgram(s)/kG/Min (1.34 mL/Hr) IV Continuous <Continuous>  pyridostigmine 180 milliGRAM(s) Oral Once  pyridostigmine  milliGRAM(s) Oral two times a day  sertraline 100 milliGRAM(s) Oral daily    MEDICATIONS  (PRN):  HYDROmorphone  Injectable 1 milliGRAM(s) IV Push every 6 hours PRN Pain      Allergies    No Known Allergies    Intolerances    Social Hx:  denies tob/etoh/illicit drugs    REVIEW OF SYSTEMS    [ ] A ten-point review of systems was otherwise negative except as noted.  [ ] Due to altered mental status/intubation, subjective information were not able to be obtained from the patient. History was obtained, to the extent possible, from review of the chart and collateral sources of information.      Vital Signs Last 24 Hrs  T(C): 35.8 (09 Mar 2021 10:17), Max: 36.6 (08 Mar 2021 22:46)  T(F): 96.5 (09 Mar 2021 08:30), Max: 97.9 (08 Mar 2021 22:46)  HR: 89 (09 Mar 2021 12:19) (78 - 136)  BP: 102/52 (09 Mar 2021 10:17) (62/37 - 129/71)  BP(mean): 75 (09 Mar 2021 10:17) (59 - 85)  RR: 18 (09 Mar 2021 10:17) (18 - 35)  SpO2: 100% (09 Mar 2021 12:19) (90% - 100%)    PHYSICAL EXAM:  ABDOMEN: Soft, Nontender, Nondistended;       LABS:                          7.3    17.49 )-----------( 293      ( 09 Mar 2021 06:20 )             23.1       Hemoglobin: 7.3 g/dL (03-09 @ 06:20)  Hemoglobin: 8.4 g/dL (03-09 @ 02:15)  Hemoglobin: 4.3 g/dL (03-08 @ 10:39)  Hemoglobin: 7.8 g/dL (03-05 @ 06:07)      03-09    139  |  106  |  52<H>  ----------------------------<  130<H>  5.3<H>   |  23  |  2.0<H>      Calcium, Total Serum: 8.4 mg/dL (03-09-21 @ 06:20)      LFTs:             5.0  | 0.3  | 155      ------------------[80      ( 09 Mar 2021 06:20 )  3.2  | x    | 30          Lipase:x      Amylase:x         Blood Gas Arterial, Lactate: 1.4 mmoL/L (03-09-21 @ 10:20)  Lactate, Blood: 1.4 mmol/L (03-09-21 @ 06:20)  Lactate, Blood: 1.7 mmol/L (03-09-21 @ 02:15)  Lactate, Blood: 2.4 mmol/L (03-08-21 @ 19:32)  Lactate, Blood: 6.9 mmol/L (03-08-21 @ 10:39)    ABG - ( 09 Mar 2021 10:20 )  pH: 7.42  /  pCO2: 32    /  pO2: 67    / HCO3: 21    / Base Excess: -2.9  /  SaO2: 95          Coags:     12.40  ----< 1.08    ( 09 Mar 2021 02:15 )     29.7        CARDIAC MARKERS ( 08 Mar 2021 10:39 )  x     / 0.09 ng/mL / x     / x     / x          Serum Pro-Brain Natriuretic Peptide: 20446 pg/mL (03-01-21 @ 19:26)    EGD (03.09.21 @ 10:30) >  Impressions:    Normal mucosa in the whole esophagus.    Erythema in the stomach compatible with non-erosive gastritis. (Biopsy).    Moderate amount of fresh blood suctioned from stomach. No ulcler in stomach-.    Ulcer with visible vessel spurting, in the posterior wall of duodenal bulb.  (Injection, Endoclip). Excellent hemostacis obtained. .     Plan: Protonix 8 mg/hr continuous drip  NPO  Await pathology results  EGD in 2 months    RADIOLOGY & ADDITIONAL STUDIES:    Xray Chest 1 View-PORTABLE IMMEDIATE (Xray Chest 1 View-PORTABLE IMMEDIATE .) (03.08.21 @ 16:00) >  Impression:    Right greater than left airspace opacities/pleural effusions, increased since prior x-ray.    US Thyroid (03.05.21 @ 09:55) >  Impression:    Both nodule 1 and nodule 2 require further evaluation with fine-needle aspiration to exclude thyroid neoplasm    CT Angio Chest PE Protocol w/ IV Cont (03.02.21 @ 02:14) >    IMPRESSION:  1.  No pulmonary embolus.  2.  Right thyroid lobe 3.4 cm mass with resulting mild leftward tracheal deviation. Recommend ultrasound of the thyroid for further evaluation.  3.  Bilateral pleural effusions with adjacent compressive atelectasis.  4.  Left upper lobe 7 mm groundglass nodule. Recommend CT chest in 6-12 months for continued follow-up.  5.  3.5 x 1.8 cm ovoid mass abutting left T2 neural foramen. It is probably pleural-based. This may also be neural in origin. Further evaluation with MRI with and without contrast may be of benefit.

## 2021-03-09 NOTE — CONSULT NOTE ADULT - SUBJECTIVE AND OBJECTIVE BOX
CARDIOLOGY CONSULT NOTE     CHIEF COMPLAINT/REASON FOR CONSULT:    HPI:  76yFemale pmh HTN, [place] 2 MI and chronic anemia patient had no gross GI bleeding at the time. Patient now presents with an episode of vomiting her food early in the morning with speckles of blood in it. Patient also reports two melenic stools earlier in the morning today. Patient also reports two melenic stools earlier in the morning today. Patient with history of severe NSAID usage. Hb in ED was 4.3, lactate 6.8.        (08 Mar 2021 14:19)      PAST MEDICAL & SURGICAL HISTORY:  Anemia    High cholesterol    HTN (hypertension)    Myasthenia gravis    Hemangioma        Cardiac Risks:   [ ]HTN, [ ] DM, [ ] Smoking, [ ] FH,  [x ] Lipids        MEDICATIONS:  MEDICATIONS  (STANDING):  pantoprazole Infusion 8 mG/Hr (10 mL/Hr) IV Continuous <Continuous>  pyridostigmine 180 milliGRAM(s) Oral Once  pyridostigmine  milliGRAM(s) Oral two times a day  sertraline 100 milliGRAM(s) Oral daily      FAMILY HISTORY:      SOCIAL HISTORY:      [ ] Marital status single  Allergies    No Known Allergies    Intolerances    	    REVIEW OF SYSTEMS:  CONSTITUTIONAL: No fever, weight loss, or fatigue  EYES: No eye pain, visual disturbances, or discharge  ENMT:  No difficulty hearing, tinnitus, vertigo; No sinus or throat pain  NECK: No pain or stiffness  RESPIRATORY: No cough, wheezing, chills or hemoptysis; No Shortness of Breath  CARDIOVASCULAR: No chest pain, palpitations, passing out, dizziness, or leg swelling  GASTROINTESTINAL: See above  GENITOURINARY: No dysuria, frequency, hematuria, or incontinence  NEUROLOGICAL: No headaches, memory loss, loss of strength, numbness, or tremors  SKIN: No itching, burning, rashes, or lesions   	        PHYSICAL EXAM:  T(C): 36.2 (03-08-21 @ 23:00), Max: 36.6 (03-08-21 @ 22:46)  HR: 97 (03-09-21 @ 06:00) (58 - 136)  BP: 109/56 (03-09-21 @ 06:00) (62/37 - 129/71)  RR: 33 (03-09-21 @ 06:00) (18 - 35)  SpO2: 91% (03-09-21 @ 06:00) (90% - 100%)  Wt(kg): --  I&O's Summary    08 Mar 2021 07:01  -  09 Mar 2021 07:00  --------------------------------------------------------  IN: 962 mL / OUT: 200 mL / NET: 762 mL        Appearance: Normal	  Psychiatry: A & O x 3, Mood & affect appropriate  HEENT:   Normal oral mucosa, PERRL, EOMI	  Lymphatic: No lymphadenopathy  Cardiovascular: Normal S1 S2,RRR, No JVD, i/vi cyndie lsb  Respiratory: Lungs clear to auscultation	  Gastrointestinal:  Soft, Non-tender, + BS	  Skin: No rashes, No ecchymoses, No cyanosis	  Neurologic: Non-focal  Extremities: Normal range of motion, No clubbing, cyanosis or edema  Vascular: Peripheral pulses palpable 2+ bilaterally      ECG:  	  < from: 12 Lead ECG (03.01.21 @ 19:40) >  Diagnosis Line Sinus tachycardia  Marked ST abnormality, possible inferolateral subendocardial injury  Abnormal ECG    Confirmed by TOSIN GIBBS MD ([id number]) on 3/2/2021 11:33:04 AM    < end of copied text >    	  LABS:	 	    CARDIAC MARKERS:                                    8.4    17.98 )-----------( 272      ( 09 Mar 2021 02:15 )             25.6     03-09    136  |  104  |  47<H>  ----------------------------<  118<H>  6.3<HH>   |  21  |  1.8<H>    Ca    8.6      09 Mar 2021 02:15  Mg     1.6     03-09    TPro  5.9<L>  /  Alb  3.3<L>  /  TBili  0.3  /  DBili  x   /  AST  154<H>  /  ALT  29  /  AlkPhos  86  03-09    PT/INR - ( 09 Mar 2021 02:15 )   PT: 12.40 sec;   INR: 1.08 ratio         PTT - ( 09 Mar 2021 02:15 )  PTT:29.7 sec

## 2021-03-09 NOTE — CONSULT NOTE ADULT - SUBJECTIVE AND OBJECTIVE BOX
Patient is a 76y old  Female who presents with a chief complaint of Melena (09 Mar 2021 07:00)      HPI:    76yFemale pmh HTN, [place] [facility] [place] 2 [place] and chronic anemia patient had no gross GI bleeding at the time. Patient now presents with an episode of vomiting her food early in the morning with speckles of blood in it. Patient also reports two melenic stools earlier in the morning today. Patient also reports two melenic stools earlier in the morning today. Patient with history of severe NSAID usage. Hb in ED was 4.3, lactate 6.8.     S/p 2 units prbc, became short of breath and required NRB. now on nasal canula, having balck stools thuis am   (08 Mar 2021 14:19)      Allergies    No Known Allergies    Intolerances        pyridostigmine 180 mg oral tablet, extended release: 1 tab(s) orally 2 times a day (08 Mar 2021 14:24)  pyridostigmine 60 mg oral tablet: 1 tab(s) orally 3 to 4 times a day, As Needed (08 Mar 2021 14:24)  sertraline 100 mg oral tablet: 1 tab(s) orally once a day (08 Mar 2021 14:24)      PAST MEDICAL & SURGICAL HISTORY:  Anemia    High cholesterol    HTN (hypertension)    Myasthenia gravis    Hemangioma        SOCIAL HX:     Smoking  none                        ETOH  Occupation                             FAMILY HISTORY:  :  No known cardiovacular/pulmonary family hisotry     All ROS are negative except per HPI     PHYSICAL EXAM    ICU Vital Signs Last 24 Hrs  T(C): 35.8 (09 Mar 2021 08:30), Max: 36.6 (08 Mar 2021 22:46)  T(F): 96.5 (09 Mar 2021 08:30), Max: 97.9 (08 Mar 2021 22:46)  HR: 86 (09 Mar 2021 08:30) (58 - 136)  BP: 102/52 (09 Mar 2021 08:30) (62/37 - 129/71)  BP(mean): 75 (09 Mar 2021 08:00) (59 - 85)  ABP: --  ABP(mean): --  RR: 18 (09 Mar 2021 08:30) (18 - 35)  SpO2: 95% (09 Mar 2021 08:30) (90% - 100%)      CONSTITUTIONAL:  Well nourished.  NAD    ENT:   Airway patent,   Mouth with normal mucosa.   No thrush    EYES:   pupils equal,   round and reactive to light.    CARDIAC:   Normal rate,   Regular rhythm.    Heart sounds S1, S2.   No edema    Vascular:   normal systolic impulse  no bruits    RESPIRATORY:   No wheezing   Normal chest expansion  No use of accessory muscles    GASTROINTESTINAL:  Abdomen soft   Non-tender,   No guarding,   + BS    GENITOURINARY  normal genitalia for sex  no edema    MUSCULOSKELETAL:   Range of motion is not limited,  Nno clubbing, cyanosis    NEUROLOGICAL:   Alert and oriented   No motor or sensory deficits.  Pertinent DTRs normal    SKIN:   Skin normal color for race,   Warm and dry  No evidence of rash.    PSYCHIATRIC:   Normal mood and affect.   No apparent risk to self or others.    HEME LYMPH:   No cervical  lymphadenopathy.  No inguinal lymphadenopathy      03-08-21 @ 07:01  -  03-09-21 @ 07:00  --------------------------------------------------------  IN: 962 mL / OUT: 200 mL / NET: 762 mL    03-09-21 @ 07:01  -  03-09-21 @ 09:36  --------------------------------------------------------  IN: 20 mL / OUT: 0 mL / NET: 20 mL        LABS:                          7.3    17.49 )-----------( 293      ( 09 Mar 2021 06:20 )             23.1                                               03-09    139  |  106  |  52<H>  ----------------------------<  130<H>  5.3<H>   |  23  |  2.0<H>    Ca    8.4<L>      09 Mar 2021 06:20  Mg     2.5     03-09    TPro  5.0<L>  /  Alb  3.2<L>  /  TBili  0.3  /  DBili  x   /  AST  155<H>  /  ALT  30  /  AlkPhos  80  03-09      PT/INR - ( 09 Mar 2021 02:15 )   PT: 12.40 sec;   INR: 1.08 ratio         PTT - ( 09 Mar 2021 02:15 )  PTT:29.7 sec                                           CARDIAC MARKERS ( 08 Mar 2021 10:39 )  x     / 0.09 ng/mL / x     / x     / x                                                LIVER FUNCTIONS - ( 09 Mar 2021 06:20 )  Alb: 3.2 g/dL / Pro: 5.0 g/dL / ALK PHOS: 80 U/L / ALT: 30 U/L / AST: 155 U/L / GGT: x                                                                                                                                                CXR interpreted by me:    MEDICATIONS  (STANDING):  pantoprazole Infusion 8 mG/Hr (10 mL/Hr) IV Continuous <Continuous>  pyridostigmine 180 milliGRAM(s) Oral Once  pyridostigmine  milliGRAM(s) Oral two times a day  sertraline 100 milliGRAM(s) Oral daily    MEDICATIONS  (PRN):  HYDROmorphone  Injectable 1 milliGRAM(s) IV Push every 6 hours PRN Pain         Patient is a 76y old  Female who presents with a chief complaint of Melena (09 Mar 2021 07:00)      HPI:    76yFemale pmh HTN, [place] [facility] [place] type 2 MI and chronic anemia patient had no gross GI bleeding at the time. Patient now presents with an episode of vomiting her food early in the morning with speckles of blood in it. Patient also reports two melenic stools earlier in the morning today. Patient also reports two melenic stools earlier in the morning today. Patient with history of severe NSAID usage. Hb in ED was 4.3, lactate 6.8.     S/p 2 units prbc, became short of breath and required NRB. now on nasal canula, having balck stools thuis am   (08 Mar 2021 14:19)      Allergies    No Known Allergies    Intolerances        pyridostigmine 180 mg oral tablet, extended release: 1 tab(s) orally 2 times a day (08 Mar 2021 14:24)  pyridostigmine 60 mg oral tablet: 1 tab(s) orally 3 to 4 times a day, As Needed (08 Mar 2021 14:24)  sertraline 100 mg oral tablet: 1 tab(s) orally once a day (08 Mar 2021 14:24)      PAST MEDICAL & SURGICAL HISTORY:  Anemia    High cholesterol    HTN (hypertension)    Myasthenia gravis    Hemangioma        SOCIAL HX:     Smoking  none                        ETOH  Occupation                             FAMILY HISTORY:  :  No known cardiovacular/pulmonary family hisotry     All ROS are negative except per HPI     PHYSICAL EXAM    ICU Vital Signs Last 24 Hrs  T(C): 35.8 (09 Mar 2021 08:30), Max: 36.6 (08 Mar 2021 22:46)  T(F): 96.5 (09 Mar 2021 08:30), Max: 97.9 (08 Mar 2021 22:46)  HR: 86 (09 Mar 2021 08:30) (58 - 136)  BP: 102/52 (09 Mar 2021 08:30) (62/37 - 129/71)  BP(mean): 75 (09 Mar 2021 08:00) (59 - 85)  ABP: --  ABP(mean): --  RR: 18 (09 Mar 2021 08:30) (18 - 35)  SpO2: 95% (09 Mar 2021 08:30) (90% - 100%)      CONSTITUTIONAL:  Well nourished.  NAD    ENT:   Airway patent,   Mouth with normal mucosa.   No thrush    EYES:   pupils equal,   round and reactive to light.    CARDIAC:   Normal rate,   Regular rhythm.    Heart sounds S1, S2.   No edema    Vascular:   normal systolic impulse  no bruits    RESPIRATORY:   No wheezing   Normal chest expansion  No use of accessory muscles    GASTROINTESTINAL:  Abdomen soft   Non-tender,   No guarding,   + BS    GENITOURINARY  normal genitalia for sex  no edema    MUSCULOSKELETAL:   Range of motion is not limited,  Nno clubbing, cyanosis    NEUROLOGICAL:   Alert and oriented   No motor or sensory deficits.  Pertinent DTRs normal    SKIN:   pale   Warm and dry  No evidence of rash.    PSYCHIATRIC:   Normal mood and affect.   No apparent risk to self or others.    HEME LYMPH:   No cervical  lymphadenopathy.  No inguinal lymphadenopathy      03-08-21 @ 07:01  -  03-09-21 @ 07:00  --------------------------------------------------------  IN: 962 mL / OUT: 200 mL / NET: 762 mL    03-09-21 @ 07:01  -  03-09-21 @ 09:36  --------------------------------------------------------  IN: 20 mL / OUT: 0 mL / NET: 20 mL        LABS:                          7.3    17.49 )-----------( 293      ( 09 Mar 2021 06:20 )             23.1                                               03-09    139  |  106  |  52<H>  ----------------------------<  130<H>  5.3<H>   |  23  |  2.0<H>    Ca    8.4<L>      09 Mar 2021 06:20  Mg     2.5     03-09    TPro  5.0<L>  /  Alb  3.2<L>  /  TBili  0.3  /  DBili  x   /  AST  155<H>  /  ALT  30  /  AlkPhos  80  03-09      PT/INR - ( 09 Mar 2021 02:15 )   PT: 12.40 sec;   INR: 1.08 ratio         PTT - ( 09 Mar 2021 02:15 )  PTT:29.7 sec                                           CARDIAC MARKERS ( 08 Mar 2021 10:39 )  x     / 0.09 ng/mL / x     / x     / x                                                LIVER FUNCTIONS - ( 09 Mar 2021 06:20 )  Alb: 3.2 g/dL / Pro: 5.0 g/dL / ALK PHOS: 80 U/L / ALT: 30 U/L / AST: 155 U/L / GGT: x                                                                                                                                                CXR interpreted by me:    MEDICATIONS  (STANDING):  pantoprazole Infusion 8 mG/Hr (10 mL/Hr) IV Continuous <Continuous>  pyridostigmine 180 milliGRAM(s) Oral Once  pyridostigmine  milliGRAM(s) Oral two times a day  sertraline 100 milliGRAM(s) Oral daily    MEDICATIONS  (PRN):  HYDROmorphone  Injectable 1 milliGRAM(s) IV Push every 6 hours PRN Pain

## 2021-03-09 NOTE — CONSULT NOTE ADULT - ASSESSMENT
Patient denies chest pian or sob. She presented very anemic gi bleed. No cardiac symptoms. Recent type 2 mi. Risk gi w/u intermediate. Transfuse. Need repeat lytes k high. ? lab error.

## 2021-03-09 NOTE — CONSULT NOTE ADULT - ATTENDING COMMENTS
patient seen and examined agree above note   patient now active bleeding fresh blood per rectum   cxr b/l edema   s/p lasix   case discussed with anesthesia and GI in setting of active bleed and pulmonary edema will need more transfusion   and history of thyroid mass and tracheal deviation want patient to be intubated electively for procedure because high chance then need to be intubated emergently during scope   case discussed with patient and over phone with her partner   she canceled the DNR,DNi and agree for intubation and procedure   repeat K 4.5   patient being transfuse now

## 2021-03-09 NOTE — CONSULT NOTE ADULT - ASSESSMENT
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 maintain pox 92-98%, fup CT chest 6-12 month, pyridostigmine    CARDIOVASCULAR: Goal MAP >65, I=O    GI: GI prophylaxis npo, fup gi, PPI drip    RENAL:  F/u  lytes.  Correct as needed.  Accurate I/O    INFECTIOUS DISEASE: no abx    HEMATOLOGICAL:  SCDs, transfuse prbc to keep hb>7    ENDOCRINE:  Follow up FS.  Insulin protocol if needed.    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 maintain pox 92-98%, fup CT chest 6-12 month, pyridostigmine    CARDIOVASCULAR: Goal MAP >65, I=O    GI: GI prophylaxis npo, fup gi, PPI drip    RENAL:  F/u  lytes.  Correct as needed.  Accurate I/O    INFECTIOUS DISEASE: no abx    HEMATOLOGICAL:  SCDs, transfuse prbc to keep hb>7    ENDOCRINE:  Follow up FS.  Insulin protocol if needed.    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 maintain pox 92-98%, fup CT chest 6-12 month, pyridostigmine    CARDIOVASCULAR: Goal MAP >65, I=O    GI: GI prophylaxis npo, fup gi, PPI drip    RENAL:  F/u  lytes.  Correct as needed.  Accurate I/O    INFECTIOUS DISEASE: no abx    HEMATOLOGICAL:  SCDs, transfuse prbc to keep hb>7    ENDOCRINE:  Follow up FS.  Insulin protocol if needed.    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 maintain pox 92-98%, fup CT chest 6-12 month, pyridostigmine    CARDIOVASCULAR: Goal MAP >65, I=O    GI: GI prophylaxis npo, fup gi, PPI drip    RENAL:  F/u  lytes.  Correct as needed.  Accurate I/O    INFECTIOUS DISEASE: no abx    HEMATOLOGICAL:  SCDs, transfuse prbc to keep hb>7    ENDOCRINE:  Follow up FS.  Insulin protocol if needed.    MUSCULOSKELETAL: Bedrest    CODE STATUS: FULL CODE    DISPOSITION: Pt requires continued monitoring in the MICU  Patient needs intubation for endoscopy  please see attestation note

## 2021-03-09 NOTE — CONSULT NOTE ADULT - ASSESSMENT
76 yr old F with PMHX: HTN, MG, thyroid mass, [place] [facility] [place] 2 [place], chronic anemia and left paraspinal mass - now presents with melena, ?hematemesis and worsening anemia. She is s/p EGD today which showed actively bleeding duodenal ulcer, s/p injection/clipping with hemostasis    1. UGIB - duodenal ulcer, s/p injection/clipping; likely NSAID induced   - no acute surgical intervention   - keep NPO for now   - Protonix drip   - serial CBC's  - continue critical care monitoring   - will follow  - above D/W Dr. Michelle 76 yr old F with PMHX: HTN, MG, thyroid mass, [place] [facility] [place] type 2 MI, chronic anemia and left paraspinal mass - now presents with melena, ?hematemesis and worsening anemia. She is s/p EGD today which showed actively bleeding duodenal ulcer, s/p injection/clipping with hemostasis    1. UGIB - duodenal ulcer, s/p injection/clipping; likely NSAID induced   - no acute surgical intervention   - keep NPO for now   - Protonix drip   - serial CBC's  - continue critical care monitoring   - will follow  - above D/W Dr. Michelle    ADDENDUM:  Case D/W anesthesia: paralytics were not used due to Hx myasthenia gravis; she had some initial hypotension, apprx 80/50- ?due to induction/active bleeding -  and placed on low dose phenylephrine but BP remained stable throughout the case. She also received 2 units PRBC during EGD (for a total of 3 units today).  She was kept intubated post procedure and returned to CCU in stable condition.  Pt seen in CCU: intubated, awake, still on low dose phenylephrine, which will try to be weaned as per RN and ICU resident. She will remained intubated overnight

## 2021-03-10 DIAGNOSIS — D64.9 ANEMIA, UNSPECIFIED: ICD-10-CM

## 2021-03-10 DIAGNOSIS — Z51.5 ENCOUNTER FOR PALLIATIVE CARE: ICD-10-CM

## 2021-03-10 DIAGNOSIS — E07.9 DISORDER OF THYROID, UNSPECIFIED: ICD-10-CM

## 2021-03-10 DIAGNOSIS — G70.00 MYASTHENIA GRAVIS WITHOUT (ACUTE) EXACERBATION: ICD-10-CM

## 2021-03-10 LAB
ALBUMIN SERPL ELPH-MCNC: 2.8 G/DL — LOW (ref 3.5–5.2)
ALP SERPL-CCNC: 72 U/L — SIGNIFICANT CHANGE UP (ref 30–115)
ALT FLD-CCNC: 22 U/L — SIGNIFICANT CHANGE UP (ref 0–41)
ANION GAP SERPL CALC-SCNC: 12 MMOL/L — SIGNIFICANT CHANGE UP (ref 7–14)
AST SERPL-CCNC: 67 U/L — HIGH (ref 0–41)
BILIRUB SERPL-MCNC: 0.4 MG/DL — SIGNIFICANT CHANGE UP (ref 0.2–1.2)
BUN SERPL-MCNC: 46 MG/DL — HIGH (ref 10–20)
CALCIUM SERPL-MCNC: 7.8 MG/DL — LOW (ref 8.5–10.1)
CHLORIDE SERPL-SCNC: 108 MMOL/L — SIGNIFICANT CHANGE UP (ref 98–110)
CO2 SERPL-SCNC: 22 MMOL/L — SIGNIFICANT CHANGE UP (ref 17–32)
CREAT SERPL-MCNC: 1.5 MG/DL — SIGNIFICANT CHANGE UP (ref 0.7–1.5)
GLUCOSE SERPL-MCNC: 137 MG/DL — HIGH (ref 70–99)
HCT VFR BLD CALC: 24.9 % — LOW (ref 37–47)
HCT VFR BLD CALC: 25.9 % — LOW (ref 37–47)
HCT VFR BLD CALC: 27.7 % — LOW (ref 37–47)
HGB BLD-MCNC: 8.2 G/DL — LOW (ref 12–16)
HGB BLD-MCNC: 8.4 G/DL — LOW (ref 12–16)
HGB BLD-MCNC: 9.1 G/DL — LOW (ref 12–16)
MAGNESIUM SERPL-MCNC: 2 MG/DL — SIGNIFICANT CHANGE UP (ref 1.8–2.4)
MCHC RBC-ENTMCNC: 26.8 PG — LOW (ref 27–31)
MCHC RBC-ENTMCNC: 27.2 PG — SIGNIFICANT CHANGE UP (ref 27–31)
MCHC RBC-ENTMCNC: 27.2 PG — SIGNIFICANT CHANGE UP (ref 27–31)
MCHC RBC-ENTMCNC: 32.4 G/DL — SIGNIFICANT CHANGE UP (ref 32–37)
MCHC RBC-ENTMCNC: 32.9 G/DL — SIGNIFICANT CHANGE UP (ref 32–37)
MCHC RBC-ENTMCNC: 32.9 G/DL — SIGNIFICANT CHANGE UP (ref 32–37)
MCV RBC AUTO: 82.5 FL — SIGNIFICANT CHANGE UP (ref 81–99)
MCV RBC AUTO: 82.5 FL — SIGNIFICANT CHANGE UP (ref 81–99)
MCV RBC AUTO: 82.7 FL — SIGNIFICANT CHANGE UP (ref 81–99)
NRBC # BLD: 0 /100 WBCS — SIGNIFICANT CHANGE UP (ref 0–0)
PLATELET # BLD AUTO: 228 K/UL — SIGNIFICANT CHANGE UP (ref 130–400)
PLATELET # BLD AUTO: 231 K/UL — SIGNIFICANT CHANGE UP (ref 130–400)
PLATELET # BLD AUTO: 238 K/UL — SIGNIFICANT CHANGE UP (ref 130–400)
POTASSIUM SERPL-MCNC: 3.9 MMOL/L — SIGNIFICANT CHANGE UP (ref 3.5–5)
POTASSIUM SERPL-SCNC: 3.9 MMOL/L — SIGNIFICANT CHANGE UP (ref 3.5–5)
PROT SERPL-MCNC: 4.8 G/DL — LOW (ref 6–8)
RBC # BLD: 3.02 M/UL — LOW (ref 4.2–5.4)
RBC # BLD: 3.14 M/UL — LOW (ref 4.2–5.4)
RBC # BLD: 3.35 M/UL — LOW (ref 4.2–5.4)
RBC # FLD: 17.7 % — HIGH (ref 11.5–14.5)
RBC # FLD: 17.8 % — HIGH (ref 11.5–14.5)
RBC # FLD: 17.8 % — HIGH (ref 11.5–14.5)
SODIUM SERPL-SCNC: 142 MMOL/L — SIGNIFICANT CHANGE UP (ref 135–146)
SURGICAL PATHOLOGY STUDY: SIGNIFICANT CHANGE UP
WBC # BLD: 17.1 K/UL — HIGH (ref 4.8–10.8)
WBC # BLD: 19.22 K/UL — HIGH (ref 4.8–10.8)
WBC # BLD: 19.31 K/UL — HIGH (ref 4.8–10.8)
WBC # FLD AUTO: 17.1 K/UL — HIGH (ref 4.8–10.8)
WBC # FLD AUTO: 19.22 K/UL — HIGH (ref 4.8–10.8)
WBC # FLD AUTO: 19.31 K/UL — HIGH (ref 4.8–10.8)

## 2021-03-10 PROCEDURE — 99233 SBSQ HOSP IP/OBS HIGH 50: CPT

## 2021-03-10 PROCEDURE — 99222 1ST HOSP IP/OBS MODERATE 55: CPT

## 2021-03-10 PROCEDURE — 99497 ADVNCD CARE PLAN 30 MIN: CPT | Mod: 25

## 2021-03-10 PROCEDURE — 71045 X-RAY EXAM CHEST 1 VIEW: CPT | Mod: 26

## 2021-03-10 RX ORDER — SODIUM CHLORIDE 9 MG/ML
1000 INJECTION, SOLUTION INTRAVENOUS
Refills: 0 | Status: DISCONTINUED | OUTPATIENT
Start: 2021-03-10 | End: 2021-03-11

## 2021-03-10 RX ORDER — FUROSEMIDE 40 MG
20 TABLET ORAL ONCE
Refills: 0 | Status: COMPLETED | OUTPATIENT
Start: 2021-03-10 | End: 2021-03-10

## 2021-03-10 RX ADMIN — Medication 650 MILLIGRAM(S): at 05:22

## 2021-03-10 RX ADMIN — PANTOPRAZOLE SODIUM 10 MG/HR: 20 TABLET, DELAYED RELEASE ORAL at 10:24

## 2021-03-10 RX ADMIN — SODIUM CHLORIDE 50 MILLILITER(S): 9 INJECTION, SOLUTION INTRAVENOUS at 19:30

## 2021-03-10 RX ADMIN — Medication 650 MILLIGRAM(S): at 03:42

## 2021-03-10 RX ADMIN — SODIUM CHLORIDE 50 MILLILITER(S): 9 INJECTION, SOLUTION INTRAVENOUS at 13:00

## 2021-03-10 RX ADMIN — DEXMEDETOMIDINE HYDROCHLORIDE IN 0.9% SODIUM CHLORIDE 3.57 MICROGRAM(S)/KG/HR: 4 INJECTION INTRAVENOUS at 06:04

## 2021-03-10 RX ADMIN — PYRIDOSTIGMINE BROMIDE 180 MILLIGRAM(S): 60 SOLUTION ORAL at 17:36

## 2021-03-10 RX ADMIN — Medication 20 MILLIGRAM(S): at 08:32

## 2021-03-10 RX ADMIN — PANTOPRAZOLE SODIUM 10 MG/HR: 20 TABLET, DELAYED RELEASE ORAL at 19:30

## 2021-03-10 RX ADMIN — DEXMEDETOMIDINE HYDROCHLORIDE IN 0.9% SODIUM CHLORIDE 3.57 MICROGRAM(S)/KG/HR: 4 INJECTION INTRAVENOUS at 02:43

## 2021-03-10 RX ADMIN — SERTRALINE 100 MILLIGRAM(S): 25 TABLET, FILM COATED ORAL at 12:24

## 2021-03-10 NOTE — PROGRESS NOTE ADULT - ASSESSMENT
Appreciate egd. Patient on vent low dose  pressors and sedation. But awake no complaints. Febrile. Check labs cxr. Check ekg. When stable try wean vent. CDT prophylaxis

## 2021-03-10 NOTE — PROGRESS NOTE ADULT - CONVERSATION DETAILS
would like to be dnr/i - was only rescinded for procedure - would still like medical workup and biopsy of lesions found on CT

## 2021-03-10 NOTE — PROGRESS NOTE ADULT - ASSESSMENT
76yFemale pmh HTN, MG [place] [facility] [place] [facility] type 2 MI and chronic anemia patient had no gross GI bleeding at the time. Patient now presents with an episode of vomiting her food early in the morning with speckles of blood in it. Patient also reports two melenic stools earlier in the morning today. Patient with history of severe N-SAID usage     Problem 1-Microcytic anemia with intermittent melenic stools and one episode of hematemesis   never had EGD/Colonoscopy before  Rec  s/p EGD yesterday   Impressions:    Normal mucosa in the whole esophagus.    Erythema in the stomach compatible with non-erosive gastritis. (Biopsy).    Moderate amount of fresh blood suctioned from stomach. No ulcler in stomach-.    Ulcer with visible vessel spurting, in the posterior wall of duodenal bulb.  (Injection, Endoclip). Excellent hemostacis obtained. .     Plan:   -Protonix 8 mg/hr continuous drip for 72 hours total  -can start clear liquids for dinner if no further bleeding   -Await pathology results  -EGD in 2 months  -Maintain Hemodynamic Stability   -Monitor CBC  -CMP,Optimize Electrolytes  -PT,PTT,INR  -Transfuse prn to hgb >8  -Two large bore IV lines  -Monitor Vital Signs  -Monitor Stool For blood, frequency, consistency, melena  -Active Type and Screen  - Follow up with our [facility] located at [address] [place] [address]. Phone Number: [phone]       Problem 2-Right thyroid lobe 3.4 cm mass with resulting mild leftward tracheal deviation.   Rec  - Care as per primary team     Problem 3- Bilateral pleural effusions with adjacent compressive atelectasis. Left upper lobe 7 mm groundglass nodule. 3.5 x 1.8 cm ovoid mass abutting left T2 neural foramen. It is probably pleural-based. This may also be neural in origin  Rec  - Care as per primary team  76yFemale pmh HTN, MG [place] type 2 MI and chronic anemia patient had no gross GI bleeding at the time. Patient now presents with an episode of vomiting her food early in the morning with speckles of blood in it. Patient also reports two melenic stools earlier in the morning today. Patient with history of severe N-SAID usage     Problem 1-Microcytic anemia with intermittent melenic stools and one episode of hematemesis   never had EGD/Colonoscopy before  Rec  s/p EGD yesterday   Impressions:    Normal mucosa in the whole esophagus.    Erythema in the stomach compatible with non-erosive gastritis. (Biopsy).    Moderate amount of fresh blood suctioned from stomach. No ulcler in stomach-.    Ulcer with visible vessel spurting, in the posterior wall of duodenal bulb.  (Injection, Endoclip). Excellent hemostacis obtained. .     Plan:   -Protonix 80 mg/hr continuous drip for 72 hours total  -can start clear liquids for dinner if no further bleeding   -Await pathology results  -EGD in 2 months  -Maintain Hemodynamic Stability   -Monitor CBC  -CMP,Optimize Electrolytes  -Transfuse prn to hgb >8  -Two large bore IV lines  -Monitor Vital Signs  -Monitor Stool For blood, frequency, consistency, melena  -Active Type and Screen  - Follow up with our [facility] located at [address] [place] [address]. Phone Number: [phone]       Problem 2-Right thyroid lobe 3.4 cm mass with resulting mild leftward tracheal deviation.   Rec  - Care as per primary team     Problem 3- Bilateral pleural effusions with adjacent compressive atelectasis. Left upper lobe 7 mm groundglass nodule. 3.5 x 1.8 cm ovoid mass abutting left T2 neural foramen. It is probably pleural-based. This may also be neural in origin  Rec  - Care as per primary team

## 2021-03-10 NOTE — CHART NOTE - NSCHARTNOTEFT_GEN_A_CORE
Palliative Care Biopsychosocial Assessment:    Patient is a 76yFemale pmh HTN, [place] [facility] [place] type 2 MI and chronic anemia patient had no gross GI bleeding at the time. Patient now presents with an episode of vomiting her food early in the morning with speckles of blood in it. Patient also reports two melenic stools earlier in the morning today. Patient also reports two melenic stools earlier in the morning today. Patient with history of severe NSAID usage. Palliative care team consulted for assistance with goals of care.    Patient is A&Ox4 and able to direct self care. Patient resides with partner Dalila Justin. Patient identified partner as her caregiver/emergency contact. Patient would benefit from completing a HCP; no HCP found in onecontent. Patient is indpendent with all needs at home. Currently, she would like to pursue medical work up. Endorsed DNR/DNI.  No needs identified at this time. Will remain available as appropriate.     Patient Coping Status:       [  xx ]   coping well        [   ]    coping with  some difficulty       [   ]   difficulty coping     [   ]   other                                                       Patient Emotional Status:     [  xx ]   anxious         [   ]   depressed           [   ]  overwhelmed          [   ]   angry         [   ]accepting       [   ]   not accepting           [   ]   other     Patient Mental Status:      [  xx ]   alert              [xx  ]   oriented         [    ]   confused         [   ] lethargic         [   ]   non-responsive   [   ]   other     Advance Directives:     [   ]    Health Care Surrogate: Name:                             [   ]    Health Care Proxy: Name:   [ xx  ]    MOLST  [   ]    Living Will  [  xx ]    DNR  [ xx  ]    DNI    Patient Needs:     [   ]   Supportive Counseling                  [   ]   Family Meeting            [   ]    Education            [   ]   Advance Care Planning         Caregiver Name:   Caregiver needs:     [   ]   Supportive Counseling      [   ]   Family Conference      [   ]   Education    [   ]   Other     Referral:      [   ]   Community Resources         [   ]   Cancer Support Group     [   ]    Hospice       [   ]   Bereavement support     [   ]   Pastoral Care      [   ]  [facility] [place]       [   ]  Child Life Services     [ xx  ]   Other: TBD                    Spectra #: [phone]

## 2021-03-10 NOTE — PROGRESS NOTE ADULT - ASSESSMENT
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  s/p lasix   s/p extubation keep pox > 92 %     CARDIOVASCULAR: Goal MAP >65, I=O  s/p lasix   start LR 50cc/ hr     GI: GI prophylaxis npo, fu/p gi, PPI drip    RENAL:  F/u  lytes.  Correct as needed.  Accurate I/O    INFECTIOUS DISEASE: no abx    HEMATOLOGICAL:  SCDs,  seriel cbc     ENDOCRINE:  Follow up FS.  Insulin protocol if needed.    MUSCULOSKELETAL: Bedrest    CODE STATUS: FULL CODE

## 2021-03-10 NOTE — PROGRESS NOTE ADULT - SUBJECTIVE AND OBJECTIVE BOX
Patient is a 76y old  Female who presents with a chief complaint of Melena (10 Mar 2021 07:14)      Over Night Events:  Patient seen and examined.   s/p EGD and clipping duodenal ulcer   h/h stable seen this morning s/p weaning trial and was extubated     ROS:  See HPI    PHYSICAL EXAM    ICU Vital Signs Last 24 Hrs  T(C): 37 (10 Mar 2021 07:01), Max: 39 (09 Mar 2021 23:31)  T(F): 98.6 (10 Mar 2021 07:01), Max: 102.2 (09 Mar 2021 23:31)  HR: 95 (10 Mar 2021 09:20) (75 - 108)  BP: 96/50 (10 Mar 2021 07:10) (85/53 - 143/77)  BP(mean): 71 (10 Mar 2021 07:10) (55 - 103)  ABP: 83/63 (10 Mar 2021 07:10) (80/72 - 146/66)  ABP(mean): 73 (10 Mar 2021 07:10) (59 - 97)  RR: 16 (10 Mar 2021 07:10) (15 - 55)  SpO2: 100% (10 Mar 2021 09:20) (94% - 100%)      General: awake   HEENT:           prema     Lymph Nodes: NO cervical LN   Lungs: Bilateral BS  Cardiovascular: Regular   Abdomen: Soft, Positive BS  Extremities: No clubbing   Skin: warm   Neurological: no focal   Musculoskeletal: move all ext     I&O's Detail    09 Mar 2021 07:01  -  10 Mar 2021 07:00  --------------------------------------------------------  IN:    Dexmedetomidine: 509.2 mL    FentaNYL: 485 mL    Norepinephrine: 141.5 mL    Pantoprazole: 220 mL    PRBCs (Packed Red Blood Cells): 317 mL    Sodium Chloride 0.9% Bolus: 250 mL  Total IN: 1922.7 mL    OUT:    Indwelling Catheter - Urethral (mL): 1605 mL  Total OUT: 1605 mL    Total NET: 317.7 mL      10 Mar 2021 07:01  -  10 Mar 2021 09:52  --------------------------------------------------------  IN:  Total IN: 0 mL    OUT:    Indwelling Catheter - Urethral (mL): 375 mL  Total OUT: 375 mL    Total NET: -375 mL          LABS:                          8.4    17.10 )-----------( 231      ( 10 Mar 2021 05:30 )             25.9         10 Mar 2021 05:30    142    |  108    |  46     ----------------------------<  137    3.9     |  22     |  1.5      Ca    7.8        10 Mar 2021 05:30  Mg     2.0       10 Mar 2021 05:30    TPro  4.8    /  Alb  2.8    /  TBili  0.4    /  DBili  x      /  AST  67     /  ALT  22     /  AlkPhos  72     10 Mar 2021 05:30  Amylase x     lipase x                                                 PT/INR - ( 09 Mar 2021 02:15 )   PT: 12.40 sec;   INR: 1.08 ratio         PTT - ( 09 Mar 2021 02:15 )  PTT:29.7 sec                                           Lactate, Blood: 1.4 mmol/L (03-09-21 @ 06:20)  Lactate, Blood: 1.7 mmol/L (03-09-21 @ 02:15)  Lactate, Blood: 2.4 mmol/L (03-08-21 @ 19:32)  Lactate, Blood: 6.9 mmol/L (03-08-21 @ 10:39)    CARDIAC MARKERS ( 08 Mar 2021 10:39 )  x     / 0.09 ng/mL / x     / x     / x                                                                                                         Mode: standby                                      ABG - ( 10 Mar 2021 04:47 )  pH, Arterial: 7.39  pH, Blood: x     /  pCO2: 39    /  pO2: 180   / HCO3: 24    / Base Excess: -1.1  /  SaO2: 100                 MEDICATIONS  (STANDING):  dexMEDEtomidine Infusion 0.2 MICROgram(s)/kG/Hr (3.57 mL/Hr) IV Continuous <Continuous>  fentaNYL   Infusion. 0.5 MICROgram(s)/kG/Hr (3.57 mL/Hr) IV Continuous <Continuous>  norepinephrine Infusion 0.05 MICROgram(s)/kG/Min (6.69 mL/Hr) IV Continuous <Continuous>  pantoprazole Infusion 8 mG/Hr (10 mL/Hr) IV Continuous <Continuous>  pyridostigmine 180 milliGRAM(s) Oral Once  pyridostigmine  milliGRAM(s) Oral two times a day  sertraline 100 milliGRAM(s) Oral daily    MEDICATIONS  (PRN):  acetaminophen  Suppository .. 650 milliGRAM(s) Rectal every 6 hours PRN Temp greater or equal to 38C (100.4F)  HYDROmorphone  Injectable 1 milliGRAM(s) IV Push every 6 hours PRN Pain          Xrays:  TLC:  OG:  ET tube:                                                                                    b/l opacity ,ild    ECHO:  [facility] ICU:

## 2021-03-10 NOTE — GOALS OF CARE CONVERSATION - ADVANCED CARE PLANNING - CONVERSATION DETAILS
Palliative care team in collaboration with the primary team met with patient at bedside to review goals of care. Patient provided with a current medical update. Patient would like to continue pursuing a medical work up. Patient endorsed DNR/DNI status.  Palliative care team will remain available as appropriate.     Current plan is to continue ongoing medical management. DNR/DNI.

## 2021-03-10 NOTE — CONSULT NOTE ADULT - ASSESSMENT
76yFemale being evaluated for goals of care in setting of upper GIB related to NSAID use with further findings of thyroid mass/lung nodules. Hx as stated in HPI.         See Recs below.    Please call [phone] with questions or concerns 24/7.   We will continue to follow.

## 2021-03-10 NOTE — PROGRESS NOTE ADULT - SUBJECTIVE AND OBJECTIVE BOX
Patient is a 76y old  Female who presents with a chief complaint of Melena (09 Mar 2021 14:39)      T(F): 101 (03-10-21 @ 03:43), Max: 102.2 (03-09-21 @ 23:31)  HR: 76 (03-10-21 @ 06:40)  BP: 95/53 (03-10-21 @ 06:40)  RR: 15 (03-10-21 @ 06:40)  SpO2: 100% (03-10-21 @ 06:40) (93% - 100%)    PHYSICAL EXAM:  GENERAL: NAD, well-groomed, well-developed  HEAD:  Atraumatic, Normocephalic  EYES: EOMI, PERRLA, conjunctiva and sclera clear  ENMT: No tonsillar erythema, exudates, or enlargement; Moist mucous membranes, Good dentition, No lesions  NECK: Supple, No JVD, Normal thyroid  NERVOUS SYSTEM:  Alert & Oriented X3,  Motor Strength 5/5 B/L upper and lower extremities  CHEST/LUNG: Clear to percussion bilaterally; No rales, rhonchi, wheezing, or rubs  HEART: Regular rate and rhythm; No murmurs, rubs, or gallops  ABDOMEN: Soft, Nontender, Nondistended; Bowel sounds present  EXTREMITIES:   No clubbing, cyanosis, or edema  LYMPH: No lymphadenopathy noted  SKIN: No rashes or lesions    labs  03-09    139  |  106  |  52<H>  ----------------------------<  130<H>  5.3<H>   |  23  |  2.0<H>    Ca    8.4<L>      09 Mar 2021 06:20  Mg     2.5     03-09    TPro  5.0<L>  /  Alb  3.2<L>  /  TBili  0.3  /  DBili  x   /  AST  155<H>  /  ALT  30  /  AlkPhos  80  03-09                          8.4    17.10 )-----------( 231      ( 10 Mar 2021 05:30 )             25.9       PT/INR - ( 09 Mar 2021 02:15 )   PT: 12.40 sec;   INR: 1.08 ratio         PTT - ( 09 Mar 2021 02:15 )  PTT:29.7 sec  Mode: Auto Mode: PRVC/ Volume Support  RR (machine): 15  TV (machine): 450  FiO2: 40  PEEP: 5  MAP: 9  PIP: 17        acetaminophen  Suppository .. 650 milliGRAM(s) Rectal every 6 hours PRN  dexMEDEtomidine Infusion 0.2 MICROgram(s)/kG/Hr IV Continuous <Continuous>  fentaNYL   Infusion. 0.5 MICROgram(s)/kG/Hr IV Continuous <Continuous>  HYDROmorphone  Injectable 1 milliGRAM(s) IV Push every 6 hours PRN  norepinephrine Infusion 0.05 MICROgram(s)/kG/Min IV Continuous <Continuous>  pantoprazole Infusion 8 mG/Hr IV Continuous <Continuous>  pyridostigmine 180 milliGRAM(s) Oral Once  pyridostigmine  milliGRAM(s) Oral two times a day  sertraline 100 milliGRAM(s) Oral daily

## 2021-03-10 NOTE — PROGRESS NOTE ADULT - SUBJECTIVE AND OBJECTIVE BOX
IGNACIO PAL 76y Female  MRN#: [id number]     SUBJECTIVE  Patient is a 76y old Female who presents with a chief complaint of Melena (10 Mar 2021 10:21)  Currently admitted to medicine with the primary diagnosis of GI bleed  Today is hospital day 2d, and this morning she reports no overnight events.     OBJECTIVE  PAST MEDICAL & SURGICAL HISTORY  Anemia    High cholesterol    HTN (hypertension)    Myasthenia gravis    Hemangioma      ALLERGIES:  No Known Allergies    MEDICATIONS:  STANDING MEDICATIONS  norepinephrine Infusion 0.05 MICROgram(s)/kG/Min IV Continuous <Continuous>  pantoprazole Infusion 8 mG/Hr IV Continuous <Continuous>  pyridostigmine 180 milliGRAM(s) Oral Once  pyridostigmine  milliGRAM(s) Oral two times a day  sertraline 100 milliGRAM(s) Oral daily    PRN MEDICATIONS  acetaminophen  Suppository .. 650 milliGRAM(s) Rectal every 6 hours PRN  HYDROmorphone  Injectable 1 milliGRAM(s) IV Push every 6 hours PRN      VITAL SIGNS: Last 24 Hours  T(C): 37 (10 Mar 2021 07:01), Max: 39 (09 Mar 2021 23:31)  T(F): 98.6 (10 Mar 2021 07:01), Max: 102.2 (09 Mar 2021 23:31)  HR: 95 (10 Mar 2021 09:20) (75 - 108)  BP: 96/50 (10 Mar 2021 07:10) (85/53 - 143/77)  BP(mean): 71 (10 Mar 2021 07:10) (55 - 103)  RR: 20 (10 Mar 2021 11:00) (15 - 38)  SpO2: 100% (10 Mar 2021 09:20) (94% - 100%)    LABS:                        8.4    17.10 )-----------( 231      ( 10 Mar 2021 05:30 )             25.9     03-10    142  |  108  |  46<H>  ----------------------------<  137<H>  3.9   |  22  |  1.5    Ca    7.8<L>      10 Mar 2021 05:30  Mg     2.0     03-10    TPro  4.8<L>  /  Alb  2.8<L>  /  TBili  0.4  /  DBili  x   /  AST  67<H>  /  ALT  22  /  AlkPhos  72  03-10    PT/INR - ( 09 Mar 2021 02:15 )   PT: 12.40 sec;   INR: 1.08 ratio         PTT - ( 09 Mar 2021 02:15 )  PTT:29.7 sec    ABG - ( 10 Mar 2021 04:47 )  pH, Arterial: 7.39  pH, Blood: x     /  pCO2: 39    /  pO2: 180   / HCO3: 24    / Base Excess: -1.1  /  SaO2: 100       RADIOLOGY:    < from: Xray Chest 1 View-PORTABLE IMMEDIATE (Xray Chest 1 View-PORTABLE IMMEDIATE .) (03.09.21 @ 08:39) >  IMPRESSION:    No significant change in right greater than left airspace opacities.      < end of copied text >    < from: CT Angio Chest PE Protocol w/ IV Cont (03.02.21 @ 02:14) >    IMPRESSION:  1.  No pulmonary embolus.  2.  Right thyroid lobe 3.4 cm mass with resulting mild leftward tracheal deviation. Recommend ultrasound of the thyroid for further evaluation.  3.  Bilateral pleural effusions with adjacent compressive atelectasis.  4.  Left upper lobe 7 mm groundglass nodule. Recommend CT chest in 6-12 months for continued follow-up.  5.  3.5 x 1.8 cm ovoid mass abutting left T2 neural foramen. It is probably pleural-based. This may also be neural in origin. Further evaluation with MRI with and without contrast may be of benefit.    < end of copied text >    PHYSICAL EXAM:    GENERAL: NAD, well-developed, AAOx3  HEENT:  Atraumatic, Normocephalic. EOMI, PERRLA, conjunctiva and sclera clear, No JVD  PULMONARY: Clear to auscultation bilaterally; No wheeze  CARDIOVASCULAR: Regular rate and rhythm; No murmurs, rubs, or gallops  GASTROINTESTINAL: Soft, Nontender, Nondistended; Bowel sounds present  MUSCULOSKELETAL:  2+ Peripheral Pulses, No clubbing, cyanosis, or edema  NEUROLOGY: non-focal  SKIN: No rashes or lesions      ADMISSION SUMMARY  Patient is a 76y old Female who presents with a chief complaint of Melena (10 Mar 2021 10:21)  Currently admitted to medicine with the primary diagnosis of GI bleed    ASSESSMENT & PLAN  77 y/o Female with PMH of HTN, [place] 2 MI and chronic anemia patient had no gross GI bleeding at the time. Patient now presents with an episode of vomiting her food early in the morning with speckles of blood in it. Patient also reports two melenic stools earlier in the morning today. Patient with history of severe NSAID usage     # Acute blood loss anemia 2/2 Bleeding duodenal ulcer  -c/o melena and hematemesis, h/o NSAID abuse  -Hb 4 on admission. s/p 5U PRBC in total  -Patient intubated for EGD on 3/9 and extubated  today due to history of MG and tracheal deviation, considered high risk as per anesthesia  -EGD on 3/9 showed actively spurting vessel in posterior wall of duodenal bulb. s/p epinephrine inj and clipping  -Repeat CBC at 1500 and am  -Still requiring low dose pressors  -Keep NPO, protonix gtt  -Active Type and Screen  -Transfuse PRN to hgb >8  -Two large bore IV lines, A line  -GI following    #Thyroid mass  -Patient has 2 solid nodules in the thyroid abutting the trachea, paraspinal mass at T2 level likely pleural in origin  -Mass was never biopsied on last admission  -c/o severe shoulder pain, was using NSAIDs for intractable pain from the mass  -Might need biopsy before discharge    #Hypertension  -Hold amlodipine    #Myasthenia Gravis  -c/w pyridostigmine    #Diet: NPO  #DVT ppx: SCD  #GI ppx: protonix  #Dispo: Acute

## 2021-03-10 NOTE — PROGRESS NOTE ADULT - SUBJECTIVE AND OBJECTIVE BOX
76yFemale  Being followed for duodenal ulcer  Interval history: Patient denies nausea, vomiting, hematemesis, melena, blood in stool, diarrhea, constipation, abdominal pain. Patient without any further melenic stools overnight.      PAST MEDICAL & SURGICAL HISTORY:   Anemia    High cholesterol    HTN (hypertension)    Myasthenia gravis    Hemangioma            Social History: No smoking. No alcohol. No illegal drug use.          MEDICATIONS  (STANDING):  dexMEDEtomidine Infusion 0.2 MICROgram(s)/kG/Hr (3.57 mL/Hr) IV Continuous <Continuous>  fentaNYL   Infusion. 0.5 MICROgram(s)/kG/Hr (3.57 mL/Hr) IV Continuous <Continuous>  norepinephrine Infusion 0.05 MICROgram(s)/kG/Min (6.69 mL/Hr) IV Continuous <Continuous>  pantoprazole Infusion 8 mG/Hr (10 mL/Hr) IV Continuous <Continuous>  pyridostigmine 180 milliGRAM(s) Oral Once  pyridostigmine  milliGRAM(s) Oral two times a day  sertraline 100 milliGRAM(s) Oral daily    MEDICATIONS  (PRN):  acetaminophen  Suppository .. 650 milliGRAM(s) Rectal every 6 hours PRN Temp greater or equal to 38C (100.4F)  HYDROmorphone  Injectable 1 milliGRAM(s) IV Push every 6 hours PRN Pain      Allergies:  No Known Allergies          REVIEW OF SYSTEMS:  General:  No weight loss, fevers, or chills.  Eyes:  No reported pain or visual changes  ENT:  No sore throat or runny nose.  NECK: No stiffness   CV:  No chest pain or palpitations.  Resp:  No shortness of breath, cough  GI:  No abdominal pain, nausea, vomiting, dysphagia, diarrhea or constipation. No rectal bleeding, melena, or hematemesis.  Neuro:  No tingling, numbness         VITAL SIGNS:   T(F): 98.6 (03-10-21 @ 07:01), Max: 102.2 (21 @ 23:31)  HR: 95 (03-10-21 @ 09:20) (75 - 108)  BP: 96/50 (03-10-21 @ 07:10) (85/53 - 143/77)  RR: 16 (03-10-21 @ 07:10) (15 - 38)  SpO2: 100% (03-10-21 @ 09:20) (94% - 100%)    PHYSICAL EXAM:  GENERAL: AAOx3, no acute distress.  HEAD:  Atraumatic, Normocephalic  EYES: conjunctiva and sclera clear  NECK: Supple, no JVD or thyromegaly  CHEST/LUNG: Clear to auscultation bilaterally; No wheeze, rhonchi, or rales  HEART: Regular rate and rhythm; normal S1, S2, No murmurs.  ABDOMEN: Soft, nontender, nondistended; Bowel sounds present  NEUROLOGY: No asterixis or tremor.   SKIN: Intact, no jaundice            LABS:                        8.4    17.10 )-----------( 231      ( 10 Mar 2021 05:30 )             25.9     03-10    142  |  108  |  46<H>  ----------------------------<  137<H>  3.9   |  22  |  1.5    Ca    7.8<L>      10 Mar 2021 05:30  Mg     2.0     03-10    TPro  4.8<L>  /  Alb  2.8<L>  /  TBili  0.4  /  DBili  x   /  AST  67<H>  /  ALT  22  /  AlkPhos  72  03-10    LIVER FUNCTIONS - ( 10 Mar 2021 05:30 )  Alb: 2.8 g/dL / Pro: 4.8 g/dL / ALK PHOS: 72 U/L / ALT: 22 U/L / AST: 67 U/L / GGT: x           PT/INR - ( 09 Mar 2021 02:15 )   PT: 12.40 sec;   INR: 1.08 ratio         PTT - ( 09 Mar 2021 02:15 )  PTT:29.7 sec    IMAGING:    < from: Xray Chest 1 View-PORTABLE IMMEDIATE (Xray Chest 1 View-PORTABLE IMMEDIATE .) (21 @ 12:57) >    EXAM:  XR CHEST PORTABLE IMMED 1V            PROCEDURE DATE:  2021            INTERPRETATION:  CLINICAL HISTORY: s/p Intubation.    COMPARISON: Chest radiograph from earlier the same day.    TECHNIQUE: Portable frontal chest radiograph. Adequate positioning.    FINDINGS:    Support devices: Endotracheal tube is in the midtrachea.    Cardiac/mediastinum/hilum: Stable.    Lung parenchyma/Pleura: No change in bilateral opacities/effusions. No pneumothorax.    Skeleton/soft tissues: Stable.      IMPRESSION:    Endotracheal tube is in the midtrachea.    No change in bilateral opacities/effusions.              JIMENA GALAVIZ MD; Attending Radiologist  This document has been electronically signed. Mar  9 2021  6:11PM    < end of copied text >    < from: EGD (21 @ 10:30) >    EGD Report Date: 3/9/2021 10:30 AM   Patient Name: IGNACIO PAL   MRN: [id number]   Account Number: [id number]  Gender: Female    (age): 1945 (76)   Instrument(s): [id number] ([id number])([id number])    Attending/Fellow:   Idris Matamoros MD       Procedure Room #: [facility]    Referring Physician:   ED PHYSICIAN UNASSIGNED         ASA Class: P4 - 3/9/2021 11:46 AM Idris Matamoros MD         History of Present Illness:   H&P was previously reviewed.       Indications: Gastrointestinal hemorrhage: 578.9 - K92.2  Procedure:   The procedure, indications, preparation and potential complications were  explained to the patient, who indicated understanding and signed the  corresponding consent forms. MAC was administered by anesthesiologist.  Continuous pulse oximetry and blood pressure monitoring were used throughout the  procedure. Supplemental oxygen was used. Patient was placed in the left lateral  decubitus position. The endoscope was introduced through the mouth and advanced  under direct visualization until the second part of the duodenum was reached.  Patient tolerance to the procedure was good. The procedure was not difficult.  Blood loss was minimal.      Limitations/Complications: Blood limited visualization.  Findings:   Esophagus Mucosa Normal mucosa was noted in the whole esophagus.   Stomach Mucosa Diffuse erythema of the mucosa was noted in the stomach. These  findings are compatible with non-erosive gastritis. Multiple cold forceps  biopsies were performed for histology.   Additional stomach findings Moderate amount of fresh blood suctioned from  stomach. No ulcler in stomach-.   Duodenum Excavated lesions A single cratered spurting 3 cm ulcer was found in  the duodenal bulb and posterior bulb. 2 cc of 1/10,000 epi injected into the  actively bleeding visible vessel. 2 endoclips placed with excellent hemostacis.  Epinephrine 1/15937 injection was successfully applied for hemostasis. One  endoclip was successfully applied for the purpose of hemostasis.                          Impressions:    Normal mucosa in the whole esophagus.    Erythema in the stomach compatible with non-erosive gastritis. (Biopsy).    Moderate amount of fresh blood suctioned from stomach. No ulcler in stomach-.    Ulcer with visible vessel spurting, in the posterior wall of duodenal bulb.  (Injection, Endoclip). Excellent hemostacis obtained. .     Plan: Protonix 8 mg/hr continuous drip  NPO  Await pathology results  EGD in 2 months         Idris Matamoros MD    Version 1, Electronically signed on 3/9/2021 11:59:07 AM by Idris Matamoros MD    < end of copied text >

## 2021-03-10 NOTE — CONSULT NOTE ADULT - SUBJECTIVE AND OBJECTIVE BOX
IGNACIO PAL          MRN-[id number]              HPI:  76yFemale pmh HTN, [place] and chronic anemia patient had no gross GI bleeding at the time. Patient now presents with an episode of vomiting her food early in the morning with speckles of blood in it. Patient also reports two melenic stools earlier in the morning today. Patient also reports two melenic stools earlier in the morning today. Patient with history of severe NSAID usage. Hb in ED was 4.3, lactate 6.8.        (08 Mar 2021 14:19)    Pt seen, chart reviewed, case d/w ICU team and Hospitalist. Pt extubated this am, c/o dry mouth. No pain      PAST MEDICAL & SURGICAL HISTORY:  Anemia    High cholesterol    HTN (hypertension)    Myasthenia gravis    Hemangioma        FAMILY HISTORY:   Reviewed and found non contributory in mother or father    SOCIAL HISTORY: see HPI     ROS:    Unable to attain due to:                      Dyspnea (Jamin 0-10): 0                       N/V (Y/N): No                             Secretions (Y/N) : No                                          Agitation(Y/N): No                              Pain (Y/N): No                                 -Provocation/Palliation: N/A  -Quality/Quantity: N/A  -Radiating: N/A  -Severity: No pain  -Timing/Frequency: N/A  -Impact on ADLs: N/A    General:  Denied  HEENT:    Denied  Neck:  Denied  CVS:  Denied  Resp:  Denied  GI:  Denied    :  Denied  Musc:  Denied  Neuro:  Denied  Psych:  Denied  Skin:  Denied  Lymph:  Denied    Allergies    No Known Allergies    Intolerances      Opiate Naive (Y/N): Y  -iStop reviewed (Y/N):   Ref#:              Medications:      MEDICATIONS  (STANDING):  lactated ringers. 1000 milliLiter(s) (50 mL/Hr) IV Continuous <Continuous>  norepinephrine Infusion 0.05 MICROgram(s)/kG/Min (6.69 mL/Hr) IV Continuous <Continuous>  pantoprazole Infusion 8 mG/Hr (10 mL/Hr) IV Continuous <Continuous>  pyridostigmine 180 milliGRAM(s) Oral Once  pyridostigmine  milliGRAM(s) Oral two times a day  sertraline 100 milliGRAM(s) Oral daily    MEDICATIONS  (PRN):  acetaminophen  Suppository .. 650 milliGRAM(s) Rectal every 6 hours PRN Temp greater or equal to 38C (100.4F)  HYDROmorphone  Injectable 1 milliGRAM(s) IV Push every 6 hours PRN Pain      Labs:    CBC:                        8.4    17.10 )-----------( 231      ( 10 Mar 2021 05:30 )             25.9     CMP:    03-10    142  |  108  |  46<H>  ----------------------------<  137<H>  3.9   |  22  |  1.5    Ca    7.8<L>      10 Mar 2021 05:30  Mg     2.0     03-10    TPro  4.8<L>  /  Alb  2.8<L>  /  TBili  0.4  /  DBili  x   /  AST  67<H>  /  ALT  22  /  AlkPhos  72  03-10     Albumin, Serum: 2.8 g/dL (03-10-21 @ 05:30)    PT/INR - ( 09 Mar 2021 02:15 )   PT: 12.40 sec;   INR: 1.08 ratio         PTT - ( 09 Mar 2021 02:15 )  PTT:29.7 sec         Imaging:  Reviewed    PEx:  T(C): 36.9 (03-10-21 @ 11:00), Max: 39 (21 @ 23:31)  HR: 95 (03-10-21 @ 09:20) (75 - 108)  BP: 96/50 (03-10-21 @ 07:10) (85/53 - 143/77)  RR: 20 (03-10-21 @ 11:00) (15 - 38)  SpO2: 100% (03-10-21 @ 09:20) (94% - 100%)  Wt(kg): --  Daily     Daily Weight in k.1 (10 Mar 2021 06:10)    General: AAOx3    found in bed in NAD  HEENT:  NCAT PERRL EOMI Non icteric MOM  Neck: Supple no masses  CVS: RR S1S2 No M/G/R  Resp: Unlabored Non tachypneic No increased WOB  GI:  Soft NT ND BS+  :   Nuñez   Musc: No C/C/E    Neuro: Follows commands No focal deficits  Psych: Calm Pleasant  Skin: Non jaundiced   Lymph: Normal    Preadmit Karnofsky: 80  %           Current Karnofsky: 50    %  http://www.npcrc.org/files/news/karnofsky_performance_scale.pdf   http://www.npcrc.org/files/news/palliative_performance_scale_PPSv2.pdf  Cachexia (Y/N):   BMI:    Advanced Directives:       DNR/DNI        Decision maker: The patient is able to participate in complex medical decision making conversations.   Legal surrogate:    GOALS OF CARE DISCUSSION       Palliative care info/counseling provided	             Documentation of GOC: see GOC note 	    REFERRALS	        Palliative Med

## 2021-03-11 DIAGNOSIS — T39.395A ADVERSE EFFECT OF OTHER NONSTEROIDAL ANTI-INFLAMMATORY DRUGS [NSAID], INITIAL ENCOUNTER: ICD-10-CM

## 2021-03-11 DIAGNOSIS — K29.60 OTHER GASTRITIS WITHOUT BLEEDING: ICD-10-CM

## 2021-03-11 DIAGNOSIS — R91.1 SOLITARY PULMONARY NODULE: ICD-10-CM

## 2021-03-11 DIAGNOSIS — G70.00 MYASTHENIA GRAVIS WITHOUT (ACUTE) EXACERBATION: ICD-10-CM

## 2021-03-11 DIAGNOSIS — I21.A1 MYOCARDIAL INFARCTION TYPE 2: ICD-10-CM

## 2021-03-11 DIAGNOSIS — R06.02 SHORTNESS OF BREATH: ICD-10-CM

## 2021-03-11 DIAGNOSIS — E07.9 DISORDER OF THYROID, UNSPECIFIED: ICD-10-CM

## 2021-03-11 DIAGNOSIS — F17.200 NICOTINE DEPENDENCE, UNSPECIFIED, UNCOMPLICATED: ICD-10-CM

## 2021-03-11 DIAGNOSIS — E04.1 NONTOXIC SINGLE THYROID NODULE: ICD-10-CM

## 2021-03-11 DIAGNOSIS — D50.9 IRON DEFICIENCY ANEMIA, UNSPECIFIED: ICD-10-CM

## 2021-03-11 DIAGNOSIS — N17.9 ACUTE KIDNEY FAILURE, UNSPECIFIED: ICD-10-CM

## 2021-03-11 DIAGNOSIS — J39.8 OTHER SPECIFIED DISEASES OF UPPER RESPIRATORY TRACT: ICD-10-CM

## 2021-03-11 DIAGNOSIS — J90 PLEURAL EFFUSION, NOT ELSEWHERE CLASSIFIED: ICD-10-CM

## 2021-03-11 DIAGNOSIS — I24.8 OTHER FORMS OF ACUTE ISCHEMIC HEART DISEASE: ICD-10-CM

## 2021-03-11 DIAGNOSIS — N18.30 CHRONIC KIDNEY DISEASE, STAGE 3 UNSPECIFIED: ICD-10-CM

## 2021-03-11 LAB
ALBUMIN SERPL ELPH-MCNC: 3.5 G/DL — SIGNIFICANT CHANGE UP (ref 3.5–5.2)
ALP SERPL-CCNC: 315 U/L — HIGH (ref 30–115)
ALT FLD-CCNC: 77 U/L — HIGH (ref 0–41)
ANION GAP SERPL CALC-SCNC: 10 MMOL/L — SIGNIFICANT CHANGE UP (ref 7–14)
ANION GAP SERPL CALC-SCNC: 19 MMOL/L — HIGH (ref 7–14)
ANISOCYTOSIS BLD QL: SIGNIFICANT CHANGE UP
AST SERPL-CCNC: 142 U/L — HIGH (ref 0–41)
BILIRUB SERPL-MCNC: 0.9 MG/DL — SIGNIFICANT CHANGE UP (ref 0.2–1.2)
BLD GP AB SCN SERPL QL: SIGNIFICANT CHANGE UP
BLD GP AB SCN SERPL QL: SIGNIFICANT CHANGE UP
BUN SERPL-MCNC: 30 MG/DL — HIGH (ref 10–20)
BUN SERPL-MCNC: 32 MG/DL — HIGH (ref 10–20)
CALCIUM SERPL-MCNC: 8.4 MG/DL — LOW (ref 8.5–10.1)
CALCIUM SERPL-MCNC: 9.1 MG/DL — SIGNIFICANT CHANGE UP (ref 8.5–10.1)
CHLORIDE SERPL-SCNC: 106 MMOL/L — SIGNIFICANT CHANGE UP (ref 98–110)
CHLORIDE SERPL-SCNC: 107 MMOL/L — SIGNIFICANT CHANGE UP (ref 98–110)
CK MB CFR SERPL CALC: 11.6 NG/ML — HIGH (ref 0.6–6.3)
CK MB CFR SERPL CALC: 7.7 NG/ML — HIGH (ref 0.6–6.3)
CK MB CFR SERPL CALC: 8.4 NG/ML — HIGH (ref 0.6–6.3)
CO2 SERPL-SCNC: 17 MMOL/L — SIGNIFICANT CHANGE UP (ref 17–32)
CO2 SERPL-SCNC: 25 MMOL/L — SIGNIFICANT CHANGE UP (ref 17–32)
CREAT SERPL-MCNC: 1.2 MG/DL — SIGNIFICANT CHANGE UP (ref 0.7–1.5)
CREAT SERPL-MCNC: 1.3 MG/DL — SIGNIFICANT CHANGE UP (ref 0.7–1.5)
GLUCOSE SERPL-MCNC: 152 MG/DL — HIGH (ref 70–99)
GLUCOSE SERPL-MCNC: 85 MG/DL — SIGNIFICANT CHANGE UP (ref 70–99)
HCT VFR BLD CALC: 24.5 % — LOW (ref 37–47)
HCT VFR BLD CALC: 30.7 % — LOW (ref 37–47)
HCT VFR BLD CALC: 31 % — LOW (ref 37–47)
HGB BLD-MCNC: 7.8 G/DL — LOW (ref 12–16)
HGB BLD-MCNC: 9.5 G/DL — LOW (ref 12–16)
HGB BLD-MCNC: 9.5 G/DL — LOW (ref 12–16)
HYPOCHROMIA BLD QL: SLIGHT — SIGNIFICANT CHANGE UP
MAGNESIUM SERPL-MCNC: 2 MG/DL — SIGNIFICANT CHANGE UP (ref 1.8–2.4)
MANUAL SMEAR VERIFICATION: SIGNIFICANT CHANGE UP
MCHC RBC-ENTMCNC: 26.5 PG — LOW (ref 27–31)
MCHC RBC-ENTMCNC: 26.9 PG — LOW (ref 27–31)
MCHC RBC-ENTMCNC: 26.9 PG — LOW (ref 27–31)
MCHC RBC-ENTMCNC: 30.6 G/DL — LOW (ref 32–37)
MCHC RBC-ENTMCNC: 30.9 G/DL — LOW (ref 32–37)
MCHC RBC-ENTMCNC: 31.8 G/DL — LOW (ref 32–37)
MCV RBC AUTO: 84.5 FL — SIGNIFICANT CHANGE UP (ref 81–99)
MCV RBC AUTO: 86.4 FL — SIGNIFICANT CHANGE UP (ref 81–99)
MCV RBC AUTO: 87 FL — SIGNIFICANT CHANGE UP (ref 81–99)
NRBC # BLD: 0 /100 WBCS — SIGNIFICANT CHANGE UP (ref 0–0)
NRBC # BLD: 0 /100 — SIGNIFICANT CHANGE UP (ref 0–0)
PLAT MORPH BLD: NORMAL — SIGNIFICANT CHANGE UP
PLATELET # BLD AUTO: 207 K/UL — SIGNIFICANT CHANGE UP (ref 130–400)
PLATELET # BLD AUTO: 347 K/UL — SIGNIFICANT CHANGE UP (ref 130–400)
PLATELET # BLD AUTO: 394 K/UL — SIGNIFICANT CHANGE UP (ref 130–400)
PLATELET CLUMP BLD QL SMEAR: SIGNIFICANT CHANGE UP
PLATELET COUNT - ESTIMATE: NORMAL — SIGNIFICANT CHANGE UP
POTASSIUM SERPL-MCNC: 3.4 MMOL/L — LOW (ref 3.5–5)
POTASSIUM SERPL-MCNC: 4.1 MMOL/L — SIGNIFICANT CHANGE UP (ref 3.5–5)
POTASSIUM SERPL-SCNC: 3.4 MMOL/L — LOW (ref 3.5–5)
POTASSIUM SERPL-SCNC: 4.1 MMOL/L — SIGNIFICANT CHANGE UP (ref 3.5–5)
PROT SERPL-MCNC: 6.1 G/DL — SIGNIFICANT CHANGE UP (ref 6–8)
RBC # BLD: 2.9 M/UL — LOW (ref 4.2–5.4)
RBC # BLD: 3.53 M/UL — LOW (ref 4.2–5.4)
RBC # BLD: 3.59 M/UL — LOW (ref 4.2–5.4)
RBC # FLD: 17.9 % — HIGH (ref 11.5–14.5)
RBC # FLD: 18 % — HIGH (ref 11.5–14.5)
RBC # FLD: 18.4 % — HIGH (ref 11.5–14.5)
RBC BLD AUTO: ABNORMAL
SODIUM SERPL-SCNC: 142 MMOL/L — SIGNIFICANT CHANGE UP (ref 135–146)
SODIUM SERPL-SCNC: 142 MMOL/L — SIGNIFICANT CHANGE UP (ref 135–146)
TROPONIN T SERPL-MCNC: 2.67 NG/ML — CRITICAL HIGH
TROPONIN T SERPL-MCNC: 2.93 NG/ML — CRITICAL HIGH
TROPONIN T SERPL-MCNC: 3.16 NG/ML — CRITICAL HIGH
TROPONIN T SERPL-MCNC: 3.41 NG/ML — CRITICAL HIGH
WBC # BLD: 13.02 K/UL — HIGH (ref 4.8–10.8)
WBC # BLD: 24.75 K/UL — HIGH (ref 4.8–10.8)
WBC # BLD: 31.53 K/UL — HIGH (ref 4.8–10.8)
WBC # FLD AUTO: 13.02 K/UL — HIGH (ref 4.8–10.8)
WBC # FLD AUTO: 24.75 K/UL — HIGH (ref 4.8–10.8)
WBC # FLD AUTO: 31.53 K/UL — HIGH (ref 4.8–10.8)

## 2021-03-11 PROCEDURE — 99233 SBSQ HOSP IP/OBS HIGH 50: CPT

## 2021-03-11 PROCEDURE — 99232 SBSQ HOSP IP/OBS MODERATE 35: CPT

## 2021-03-11 PROCEDURE — 71045 X-RAY EXAM CHEST 1 VIEW: CPT | Mod: 26,76

## 2021-03-11 RX ORDER — ALPRAZOLAM 0.25 MG
0.5 TABLET ORAL ONCE
Refills: 0 | Status: DISCONTINUED | OUTPATIENT
Start: 2021-03-11 | End: 2021-03-11

## 2021-03-11 RX ORDER — CEFTRIAXONE 500 MG/1
1000 INJECTION, POWDER, FOR SOLUTION INTRAMUSCULAR; INTRAVENOUS EVERY 24 HOURS
Refills: 0 | Status: DISCONTINUED | OUTPATIENT
Start: 2021-03-11 | End: 2021-03-16

## 2021-03-11 RX ORDER — FUROSEMIDE 40 MG
40 TABLET ORAL ONCE
Refills: 0 | Status: COMPLETED | OUTPATIENT
Start: 2021-03-11 | End: 2021-03-11

## 2021-03-11 RX ORDER — ASPIRIN/CALCIUM CARB/MAGNESIUM 324 MG
81 TABLET ORAL DAILY
Refills: 0 | Status: DISCONTINUED | OUTPATIENT
Start: 2021-03-11 | End: 2021-03-11

## 2021-03-11 RX ORDER — MORPHINE SULFATE 50 MG/1
2 CAPSULE, EXTENDED RELEASE ORAL
Refills: 0 | Status: DISCONTINUED | OUTPATIENT
Start: 2021-03-11 | End: 2021-03-16

## 2021-03-11 RX ORDER — ALPRAZOLAM 0.25 MG
0.25 TABLET ORAL ONCE
Refills: 0 | Status: DISCONTINUED | OUTPATIENT
Start: 2021-03-11 | End: 2021-03-11

## 2021-03-11 RX ORDER — FUROSEMIDE 40 MG
40 TABLET ORAL
Refills: 0 | Status: DISCONTINUED | OUTPATIENT
Start: 2021-03-11 | End: 2021-03-13

## 2021-03-11 RX ORDER — POTASSIUM CHLORIDE 20 MEQ
20 PACKET (EA) ORAL ONCE
Refills: 0 | Status: COMPLETED | OUTPATIENT
Start: 2021-03-11 | End: 2021-03-11

## 2021-03-11 RX ORDER — CHLORHEXIDINE GLUCONATE 213 G/1000ML
1 SOLUTION TOPICAL
Refills: 0 | Status: DISCONTINUED | OUTPATIENT
Start: 2021-03-11 | End: 2021-03-23

## 2021-03-11 RX ORDER — MORPHINE SULFATE 50 MG/1
2 CAPSULE, EXTENDED RELEASE ORAL ONCE
Refills: 0 | Status: DISCONTINUED | OUTPATIENT
Start: 2021-03-11 | End: 2021-03-11

## 2021-03-11 RX ORDER — NITROGLYCERIN 6.5 MG
0.4 CAPSULE, EXTENDED RELEASE ORAL
Refills: 0 | Status: COMPLETED | OUTPATIENT
Start: 2021-03-11 | End: 2021-03-11

## 2021-03-11 RX ORDER — ONDANSETRON 8 MG/1
4 TABLET, FILM COATED ORAL EVERY 8 HOURS
Refills: 0 | Status: DISCONTINUED | OUTPATIENT
Start: 2021-03-11 | End: 2021-03-16

## 2021-03-11 RX ORDER — MORPHINE SULFATE 50 MG/1
2 CAPSULE, EXTENDED RELEASE ORAL EVERY 4 HOURS
Refills: 0 | Status: DISCONTINUED | OUTPATIENT
Start: 2021-03-11 | End: 2021-03-11

## 2021-03-11 RX ORDER — ASPIRIN/CALCIUM CARB/MAGNESIUM 324 MG
81 TABLET ORAL DAILY
Refills: 0 | Status: DISCONTINUED | OUTPATIENT
Start: 2021-03-11 | End: 2021-03-23

## 2021-03-11 RX ADMIN — MORPHINE SULFATE 2 MILLIGRAM(S): 50 CAPSULE, EXTENDED RELEASE ORAL at 14:28

## 2021-03-11 RX ADMIN — Medication 0.4 MILLIGRAM(S): at 16:11

## 2021-03-11 RX ADMIN — MORPHINE SULFATE 2 MILLIGRAM(S): 50 CAPSULE, EXTENDED RELEASE ORAL at 18:59

## 2021-03-11 RX ADMIN — MORPHINE SULFATE 2 MILLIGRAM(S): 50 CAPSULE, EXTENDED RELEASE ORAL at 15:13

## 2021-03-11 RX ADMIN — Medication 0.25 MILLIGRAM(S): at 12:07

## 2021-03-11 RX ADMIN — PYRIDOSTIGMINE BROMIDE 180 MILLIGRAM(S): 60 SOLUTION ORAL at 05:30

## 2021-03-11 RX ADMIN — Medication 81 MILLIGRAM(S): at 17:58

## 2021-03-11 RX ADMIN — HYDROMORPHONE HYDROCHLORIDE 1 MILLIGRAM(S): 2 INJECTION INTRAMUSCULAR; INTRAVENOUS; SUBCUTANEOUS at 12:22

## 2021-03-11 RX ADMIN — MORPHINE SULFATE 2 MILLIGRAM(S): 50 CAPSULE, EXTENDED RELEASE ORAL at 21:11

## 2021-03-11 RX ADMIN — PANTOPRAZOLE SODIUM 10 MG/HR: 20 TABLET, DELAYED RELEASE ORAL at 19:47

## 2021-03-11 RX ADMIN — Medication 40 MILLIGRAM(S): at 19:47

## 2021-03-11 RX ADMIN — Medication 0.25 MILLIGRAM(S): at 21:02

## 2021-03-11 RX ADMIN — MORPHINE SULFATE 2 MILLIGRAM(S): 50 CAPSULE, EXTENDED RELEASE ORAL at 23:34

## 2021-03-11 RX ADMIN — CHLORHEXIDINE GLUCONATE 1 APPLICATION(S): 213 SOLUTION TOPICAL at 13:46

## 2021-03-11 RX ADMIN — MORPHINE SULFATE 2 MILLIGRAM(S): 50 CAPSULE, EXTENDED RELEASE ORAL at 19:14

## 2021-03-11 RX ADMIN — MORPHINE SULFATE 2 MILLIGRAM(S): 50 CAPSULE, EXTENDED RELEASE ORAL at 20:41

## 2021-03-11 RX ADMIN — MORPHINE SULFATE 2 MILLIGRAM(S): 50 CAPSULE, EXTENDED RELEASE ORAL at 14:59

## 2021-03-11 RX ADMIN — Medication 0.4 MILLIGRAM(S): at 19:50

## 2021-03-11 RX ADMIN — Medication 50 MILLIEQUIVALENT(S): at 09:00

## 2021-03-11 RX ADMIN — PANTOPRAZOLE SODIUM 10 MG/HR: 20 TABLET, DELAYED RELEASE ORAL at 18:44

## 2021-03-11 RX ADMIN — MORPHINE SULFATE 2 MILLIGRAM(S): 50 CAPSULE, EXTENDED RELEASE ORAL at 23:04

## 2021-03-11 RX ADMIN — Medication 0.4 MILLIGRAM(S): at 15:29

## 2021-03-11 RX ADMIN — PYRIDOSTIGMINE BROMIDE 180 MILLIGRAM(S): 60 SOLUTION ORAL at 17:58

## 2021-03-11 RX ADMIN — HYDROMORPHONE HYDROCHLORIDE 1 MILLIGRAM(S): 2 INJECTION INTRAMUSCULAR; INTRAVENOUS; SUBCUTANEOUS at 12:07

## 2021-03-11 RX ADMIN — MORPHINE SULFATE 2 MILLIGRAM(S): 50 CAPSULE, EXTENDED RELEASE ORAL at 14:13

## 2021-03-11 RX ADMIN — SODIUM CHLORIDE 50 MILLILITER(S): 9 INJECTION, SOLUTION INTRAVENOUS at 05:30

## 2021-03-11 RX ADMIN — Medication 40 MILLIGRAM(S): at 12:05

## 2021-03-11 NOTE — PROGRESS NOTE ADULT - ASSESSMENT
Appreciate egd. Patient extubated . Awake no complaits. Check labs cxr. Check ekg.  Post troponin. May need transfuse. Possible asa 81 mg in future if gi agrees. When bp better will consider low dose beta. correct k if needed

## 2021-03-11 NOTE — PROGRESS NOTE ADULT - ASSESSMENT
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  s/p extubation did well  keep pox > 92 %     CARDIOVASCULAR: Goal MAP >65, I=O  d/c iv fluid   start lopressor 12.5 Q 12 hrs   cardiology follow up     GI: GI prophylaxis start clear liquid as per GI   follow GI   on protonix drip     RENAL:  F/u  lytes.  Correct as needed.  Accurate I/O    INFECTIOUS DISEASE: no abx    HEMATOLOGICAL:  SCDs,  seriel cbc     ENDOCRINE:  Follow up FS.  Insulin protocol if needed.    MUSCULOSKELETAL: Bedrest    CODE STATUS: FULL CODE

## 2021-03-11 NOTE — PROGRESS NOTE ADULT - SUBJECTIVE AND OBJECTIVE BOX
76yFemale  Being followed for duodenal ulcer  Interval history: Patient denies nausea, vomiting, hematemesis, melena, blood in stool, diarrhea, constipation, abdominal pain. Patient currently NPO and hungry.      PAST MEDICAL & SURGICAL HISTORY:   Anemia    High cholesterol    HTN (hypertension)    Myasthenia gravis    Hemangioma            Social History: No smoking. No alcohol. No illegal drug use.            MEDICATIONS  (STANDING):  chlorhexidine 4% Liquid 1 Application(s) Topical <User Schedule>  lactated ringers. 1000 milliLiter(s) (50 mL/Hr) IV Continuous <Continuous>  norepinephrine Infusion 0.05 MICROgram(s)/kG/Min (6.69 mL/Hr) IV Continuous <Continuous>  pantoprazole Infusion 8 mG/Hr (10 mL/Hr) IV Continuous <Continuous>  potassium chloride  20 mEq/100 mL IVPB 20 milliEquivalent(s) IV Intermittent once  pyridostigmine 180 milliGRAM(s) Oral Once  pyridostigmine  milliGRAM(s) Oral two times a day  sertraline 100 milliGRAM(s) Oral daily    MEDICATIONS  (PRN):  acetaminophen  Suppository .. 650 milliGRAM(s) Rectal every 6 hours PRN Temp greater or equal to 38C (100.4F)  HYDROmorphone  Injectable 1 milliGRAM(s) IV Push every 6 hours PRN Pain      Allergies:   No Known Allergies        REVIEW OF SYSTEMS:  General:  No weight loss, fevers, or chills.  Eyes:  No reported pain or visual changes  ENT:  No sore throat or runny nose.  NECK: No stiffness   CV:  No chest pain or palpitations.  Resp:  No shortness of breath, cough  GI:  No abdominal pain, nausea, vomiting, dysphagia, diarrhea or constipation. No rectal bleeding, melena, or hematemesis.  Neuro:  No tingling, numbness           VITAL SIGNS:   T(F): 96.8 (03-11-21 @ 07:05), Max: 99.1 (03-10-21 @ 19:00)  HR: 71 (03-11-21 @ 09:00) (68 - 93)  BP: 100/49 (03-11-21 @ 09:00) (91/52 - 117/59)  RR: 20 (03-11-21 @ 09:00) (16 - 31)  SpO2: 98% (03-11-21 @ 09:00) (91% - 100%)    PHYSICAL EXAM:  GENERAL: AAOx3, no acute distress.  HEAD:  Atraumatic, Normocephalic  EYES: conjunctiva and sclera clear  NECK: Supple, no JVD or thyromegaly  CHEST/LUNG: Clear to auscultation bilaterally; No wheeze, rhonchi, or rales  HEART: Regular rate and rhythm; normal S1, S2, No murmurs.  ABDOMEN: Soft, nontender, nondistended; Bowel sounds present  NEUROLOGY: No asterixis or tremor.   SKIN: Intact, no jaundice            LABS:                        7.8    13.02 )-----------( 207      ( 11 Mar 2021 05:46 )             24.5     03-11    142  |  107  |  32<H>  ----------------------------<  85  3.4<L>   |  25  |  1.2    Ca    8.4<L>      11 Mar 2021 05:46  Mg     2.0     03-11    TPro  4.8<L>  /  Alb  2.8<L>  /  TBili  0.4  /  DBili  x   /  AST  67<H>  /  ALT  22  /  AlkPhos  72  03-10    LIVER FUNCTIONS - ( 10 Mar 2021 05:30 )  Alb: 2.8 g/dL / Pro: 4.8 g/dL / ALK PHOS: 72 U/L / ALT: 22 U/L / AST: 67 U/L / GGT: x               IMAGING:    < from: Xray Chest 1 View- PORTABLE-Routine (Xray Chest 1 View- PORTABLE-Routine in AM.) (03.10.21 @ 06:39) >  EXAM:  XR CHEST PORTABLE  ROUTINE 1V            PROCEDURE DATE:  03/10/2021            INTERPRETATION:  Clinical History / Reason for exam: Intubated    Comparison : Chest radiograph March 9, 2021.    Technique/Positioning: Frontal chest radiograph.    Findings:    Support devices: Stable ET tube.    Cardiac/mediastinum/hilum: Unchanged.    Lung parenchyma/Pleura: Unchanged right greater than left airspace opacities. Improved right pleural effusion. No pneumothorax.    Skeleton/soft tissues: Unchanged.    Impression:    Unchanged right greater than left airspace opacities and improved right pleural effusion.              DAVID BUSTILLOS MD; Attending Radiologist  This document has been electronically signed. Mar 10 2021  2:32PM    < end of copied text >         76yFemale  Being followed for duodenal ulcer  Interval history: Patient denies nausea, vomiting, hematemesis, diarrhea, constipation, abdominal pain. Patient currently NPO and hungry. Patient had two melenic stools today likely old blood.       PAST MEDICAL & SURGICAL HISTORY:   Anemia    High cholesterol    HTN (hypertension)    Myasthenia gravis    Hemangioma            Social History: No smoking. No alcohol. No illegal drug use.            MEDICATIONS  (STANDING):  chlorhexidine 4% Liquid 1 Application(s) Topical <User Schedule>  lactated ringers. 1000 milliLiter(s) (50 mL/Hr) IV Continuous <Continuous>  norepinephrine Infusion 0.05 MICROgram(s)/kG/Min (6.69 mL/Hr) IV Continuous <Continuous>  pantoprazole Infusion 8 mG/Hr (10 mL/Hr) IV Continuous <Continuous>  potassium chloride  20 mEq/100 mL IVPB 20 milliEquivalent(s) IV Intermittent once  pyridostigmine 180 milliGRAM(s) Oral Once  pyridostigmine  milliGRAM(s) Oral two times a day  sertraline 100 milliGRAM(s) Oral daily    MEDICATIONS  (PRN):  acetaminophen  Suppository .. 650 milliGRAM(s) Rectal every 6 hours PRN Temp greater or equal to 38C (100.4F)  HYDROmorphone  Injectable 1 milliGRAM(s) IV Push every 6 hours PRN Pain      Allergies:   No Known Allergies        REVIEW OF SYSTEMS:  General:  No weight loss, fevers, or chills.  Eyes:  No reported pain or visual changes  ENT:  No sore throat or runny nose.  NECK: No stiffness   CV:  No chest pain or palpitations.  Resp:  No shortness of breath, cough  GI:  No abdominal pain, nausea, vomiting, dysphagia, diarrhea or constipation. No rectal bleeding, or hematemesis. +melenic stools   Neuro:  No tingling, numbness           VITAL SIGNS:   T(F): 96.8 (03-11-21 @ 07:05), Max: 99.1 (03-10-21 @ 19:00)  HR: 71 (03-11-21 @ 09:00) (68 - 93)  BP: 100/49 (03-11-21 @ 09:00) (91/52 - 117/59)  RR: 20 (03-11-21 @ 09:00) (16 - 31)  SpO2: 98% (03-11-21 @ 09:00) (91% - 100%)    PHYSICAL EXAM:  GENERAL: AAOx3, no acute distress.  HEAD:  Atraumatic, Normocephalic  EYES: conjunctiva and sclera clear  NECK: Supple, no JVD or thyromegaly  CHEST/LUNG: Clear to auscultation bilaterally; No wheeze, rhonchi, or rales  HEART: Regular rate and rhythm; normal S1, S2, No murmurs.  ABDOMEN: Soft, nontender, nondistended; Bowel sounds present  NEUROLOGY: No asterixis or tremor.   SKIN: Intact, no jaundice            LABS:                        7.8    13.02 )-----------( 207      ( 11 Mar 2021 05:46 )             24.5     03-11    142  |  107  |  32<H>  ----------------------------<  85  3.4<L>   |  25  |  1.2    Ca    8.4<L>      11 Mar 2021 05:46  Mg     2.0     03-11    TPro  4.8<L>  /  Alb  2.8<L>  /  TBili  0.4  /  DBili  x   /  AST  67<H>  /  ALT  22  /  AlkPhos  72  03-10    LIVER FUNCTIONS - ( 10 Mar 2021 05:30 )  Alb: 2.8 g/dL / Pro: 4.8 g/dL / ALK PHOS: 72 U/L / ALT: 22 U/L / AST: 67 U/L / GGT: x               IMAGING:    < from: Xray Chest 1 View- PORTABLE-Routine (Xray Chest 1 View- PORTABLE-Routine in AM.) (03.10.21 @ 06:39) >  EXAM:  XR CHEST PORTABLE  ROUTINE 1V            PROCEDURE DATE:  03/10/2021            INTERPRETATION:  Clinical History / Reason for exam: Intubated    Comparison : Chest radiograph March 9, 2021.    Technique/Positioning: Frontal chest radiograph.    Findings:    Support devices: Stable ET tube.    Cardiac/mediastinum/hilum: Unchanged.    Lung parenchyma/Pleura: Unchanged right greater than left airspace opacities. Improved right pleural effusion. No pneumothorax.    Skeleton/soft tissues: Unchanged.    Impression:    Unchanged right greater than left airspace opacities and improved right pleural effusion.              DAVID BUSTILLOS MD; Attending Radiologist  This document has been electronically signed. Mar 10 2021  2:32PM    < end of copied text >

## 2021-03-11 NOTE — PROGRESS NOTE ADULT - SUBJECTIVE AND OBJECTIVE BOX
IGNACIO PAL 76y Female  MRN#: [id number]   CODE STATUS:________      SUBJECTIVE  Patient is a 76y old Female who presents with a chief complaint of Melena (11 Mar 2021 10:24)  Currently admitted to medicine with the primary diagnosis of GI bleed.  Today is hospital day 3d, and this morning she reports no overnight events.     OBJECTIVE  PAST MEDICAL & SURGICAL HISTORY  Anemia    High cholesterol    HTN (hypertension)    Myasthenia gravis    Hemangioma      ALLERGIES:  No Known Allergies    MEDICATIONS:  STANDING MEDICATIONS  chlorhexidine 4% Liquid 1 Application(s) Topical <User Schedule>  lactated ringers. 1000 milliLiter(s) IV Continuous <Continuous>  pantoprazole Infusion 8 mG/Hr IV Continuous <Continuous>  potassium chloride  20 mEq/100 mL IVPB 20 milliEquivalent(s) IV Intermittent once  pyridostigmine 180 milliGRAM(s) Oral Once  pyridostigmine  milliGRAM(s) Oral two times a day  sertraline 100 milliGRAM(s) Oral daily    PRN MEDICATIONS  acetaminophen  Suppository .. 650 milliGRAM(s) Rectal every 6 hours PRN  HYDROmorphone  Injectable 1 milliGRAM(s) IV Push every 6 hours PRN      VITAL SIGNS: Last 24 Hours  T(C): 36 (11 Mar 2021 07:05), Max: 37.3 (10 Mar 2021 19:00)  T(F): 96.8 (11 Mar 2021 07:05), Max: 99.1 (10 Mar 2021 19:00)  HR: 71 (11 Mar 2021 09:00) (68 - 93)  BP: 100/49 (11 Mar 2021 09:00) (91/52 - 117/59)  BP(mean): 70 (11 Mar 2021 09:00) (68 - 86)  RR: 20 (11 Mar 2021 09:00) (16 - 31)  SpO2: 98% (11 Mar 2021 09:00) (91% - 100%)    LABS:                        7.8    13.02 )-----------( 207      ( 11 Mar 2021 05:46 )             24.5     03-11    142  |  107  |  32<H>  ----------------------------<  85  3.4<L>   |  25  |  1.2    Ca    8.4<L>      11 Mar 2021 05:46  Mg     2.0     03-11    TPro  4.8<L>  /  Alb  2.8<L>  /  TBili  0.4  /  DBili  x   /  AST  67<H>  /  ALT  22  /  AlkPhos  72  03-10        ABG - ( 10 Mar 2021 04:47 )  pH, Arterial: 7.39  pH, Blood: x     /  pCO2: 39    /  pO2: 180   / HCO3: 24    / Base Excess: -1.1  /  SaO2: 100         Troponin T, Serum: 2.67 ng/mL <HH> (03-11-21 @ 05:46)      CARDIAC MARKERS ( 11 Mar 2021 05:46 )  x     / 2.67 ng/mL / x     / x     / 7.7 ng/mL      RADIOLOGY:      PHYSICAL EXAM:    GENERAL: NAD, well-developed, AAOx3  HEENT:  Atraumatic, Normocephalic. EOMI, PERRLA, conjunctiva and sclera clear, No JVD  PULMONARY: Clear to auscultation bilaterally; No wheeze  CARDIOVASCULAR: Regular rate and rhythm; No murmurs, rubs, or gallops  GASTROINTESTINAL: Soft, Nontender, Nondistended; Bowel sounds present  MUSCULOSKELETAL:  2+ Peripheral Pulses, No clubbing, cyanosis, or edema  NEUROLOGY: non-focal  SKIN: No rashes or lesions      ADMISSION SUMMARY  Patient is a 76y old Female who presents with a chief complaint of Melena (11 Mar 2021 10:24)  Currently admitted to medicine with the primary diagnosis of GI bleed    ASSESSMENT & PLAN   IGNACIO PAL 76y Female  MRN#: [id number]   CODE STATUS:________      SUBJECTIVE  Patient is a 76y old Female who presents with a chief complaint of Melena (11 Mar 2021 10:24)  Currently admitted to medicine with the primary diagnosis of GI bleed.  Today is hospital day 3d, and this morning she reports no overnight events.     OBJECTIVE  PAST MEDICAL & SURGICAL HISTORY  Anemia    High cholesterol    HTN (hypertension)    Myasthenia gravis    Hemangioma      ALLERGIES:  No Known Allergies    MEDICATIONS:  STANDING MEDICATIONS  chlorhexidine 4% Liquid 1 Application(s) Topical <User Schedule>  lactated ringers. 1000 milliLiter(s) IV Continuous <Continuous>  pantoprazole Infusion 8 mG/Hr IV Continuous <Continuous>  potassium chloride  20 mEq/100 mL IVPB 20 milliEquivalent(s) IV Intermittent once  pyridostigmine 180 milliGRAM(s) Oral Once  pyridostigmine  milliGRAM(s) Oral two times a day  sertraline 100 milliGRAM(s) Oral daily    PRN MEDICATIONS  acetaminophen  Suppository .. 650 milliGRAM(s) Rectal every 6 hours PRN  HYDROmorphone  Injectable 1 milliGRAM(s) IV Push every 6 hours PRN      VITAL SIGNS: Last 24 Hours  T(C): 36 (11 Mar 2021 07:05), Max: 37.3 (10 Mar 2021 19:00)  T(F): 96.8 (11 Mar 2021 07:05), Max: 99.1 (10 Mar 2021 19:00)  HR: 71 (11 Mar 2021 09:00) (68 - 93)  BP: 100/49 (11 Mar 2021 09:00) (91/52 - 117/59)  BP(mean): 70 (11 Mar 2021 09:00) (68 - 86)  RR: 20 (11 Mar 2021 09:00) (16 - 31)  SpO2: 98% (11 Mar 2021 09:00) (91% - 100%)    LABS:                        7.8    13.02 )-----------( 207      ( 11 Mar 2021 05:46 )             24.5     03-11    142  |  107  |  32<H>  ----------------------------<  85  3.4<L>   |  25  |  1.2    Ca    8.4<L>      11 Mar 2021 05:46  Mg     2.0     03-11    TPro  4.8<L>  /  Alb  2.8<L>  /  TBili  0.4  /  DBili  x   /  AST  67<H>  /  ALT  22  /  AlkPhos  72  03-10        ABG - ( 10 Mar 2021 04:47 )  pH, Arterial: 7.39  pH, Blood: x     /  pCO2: 39    /  pO2: 180   / HCO3: 24    / Base Excess: -1.1  /  SaO2: 100         Troponin T, Serum: 2.67 ng/mL <HH> (03-11-21 @ 05:46)      CARDIAC MARKERS ( 11 Mar 2021 05:46 )  x     / 2.67 ng/mL / x     / x     / 7.7 ng/mL      RADIOLOGY:      < from: Xray Chest 1 View-PORTABLE IMMEDIATE (Xray Chest 1 View-PORTABLE IMMEDIATE .) (03.09.21 @ 08:39) >  IMPRESSION:    No significant change in right greater than left airspace opacities.      < end of copied text >    < from: CT Angio Chest PE Protocol w/ IV Cont (03.02.21 @ 02:14) >    IMPRESSION:  1.  No pulmonary embolus.  2.  Right thyroid lobe 3.4 cm mass with resulting mild leftward tracheal deviation. Recommend ultrasound of the thyroid for further evaluation.  3.  Bilateral pleural effusions with adjacent compressive atelectasis.  4.  Left upper lobe 7 mm groundglass nodule. Recommend CT chest in 6-12 months for continued follow-up.  5.  3.5 x 1.8 cm ovoid mass abutting left T2 neural foramen. It is probably pleural-based. This may also be neural in origin. Further evaluation with MRI with and without contrast may be of benefit.    < end of copied text >    PHYSICAL EXAM:    GENERAL: on BiPAP, Ao x3  HEENT:  Atraumatic, Normocephalic. EOMI, PERRLA, conjunctiva and sclera clear, No JVD  PULMONARY: Crackles on auscultation bilaterally; No wheeze  CARDIOVASCULAR: Regular rate and rhythm; No murmurs, rubs, or gallops  GASTROINTESTINAL: Soft, Nontender, Nondistended; Bowel sounds present  MUSCULOSKELETAL:  2+ Peripheral Pulses, No clubbing, cyanosis, or edema  NEUROLOGY: non-focal  SKIN: No rashes or lesions      ADMISSION SUMMARY  Patient is a 76y old Female who presents with a chief complaint of Melena (11 Mar 2021 10:24)  Currently admitted to medicine with the primary diagnosis of GI bleed    ASSESSMENT & PLAN  75 y/o Female with PMH of HTN, [place] 2 [place] and chronic anemia patient had no gross GI bleeding at the time. Patient now presents with an episode of vomiting her food early in the morning with speckles of blood in it. Patient also reports two melenic stools earlier in the morning today. Patient with history of severe NSAID usage     # Acute blood loss anemia 2/2 Bleeding duodenal ulcer  -c/o melena and hematemesis, h/o NSAID abuse  -Hb 4 on admission. s/p 5U PRBC in total  -Patient intubated for EGD on 3/9 and extubated  today due to history of MG and tracheal deviation, considered high risk as per anesthesia  -EGD on 3/9 showed actively spurting vessel in posterior wall of duodenal bulb. s/p epinephrine inj and clipping  -Repeat CBC at 1500 and am  -Still requiring low dose pressors  -Keep NPO, protonix gtt  -Active Type and Screen  -Transfuse PRN to hgb >8  -Two large bore IV lines, A line  -GI following    #NSTEMI  -Troponin trending up 2.67-->2.93 with elevated CKMB  -Serial EKG showed no acute ischemic changes  -Will trend trops for now  -Discussed with Cardiology, due to active GI bleeding on admission patient cannot be on antiplatelets or anticoagulation  -Will administer nitroglycerine and morphine for pain    #Thyroid mass  -Patient has 2 solid nodules in the thyroid abutting the trachea, paraspinal mass at T2 level likely pleural in origin  -Mass was never biopsied on last admission  -c/o severe shoulder pain, was using NSAIDs for intractable pain from the mass  -Might need biopsy before discharge    #Hypertension  -Hold amlodipine    #Myasthenia Gravis  -c/w pyridostigmine    #Diet: NPO  #DVT ppx: SCD  #GI ppx: protonix  #Dispo: Acute

## 2021-03-11 NOTE — CHART NOTE - NSCHARTNOTEFT_GEN_A_CORE
called by RN at bedside that pt is c/o of chest pain, and on assessment pt c/o upper back pain more. Pt received one dose Nitrostat sublingual and morphine 2mg IVP. STAT ECG done and no elevations or depressions. Stat troponin ordered and has been uptrending 3.41 from 3.16. Pt is on O2 via face mask @40% FIO2. Ordered morphine 2mg IV q2hrs PRN for pain. Pt anxious/restless and received 0.25mg alprazolam PO x1. Upon reevaluation Pt's pain has improved and is more calm. Will continue to assess and monitor, and cardiology f/u in the am for uptrending troponin. Case d/w Dr. Cheatham

## 2021-03-11 NOTE — PROGRESS NOTE ADULT - ASSESSMENT
76yFemale pmh HTN, [place] [facility] with [facility] type 2 [facility] and chronic anemia patient had no gross GI bleeding at the time. Patient now presents with an episode of vomiting her food early in the morning with speckles of blood in it. Patient also reports two melenic stools earlier in the morning today. Patient with history of severe N-SAID usage     Problem 1-Microcytic anemia with intermittent melenic stools and one episode of hematemesis   never had EGD/Colonoscopy before  Rec  s/p EGD yesterday   Impressions:    Normal mucosa in the whole esophagus.    Erythema in the stomach compatible with non-erosive gastritis. (Biopsy).    Moderate amount of fresh blood suctioned from stomach. No ulcler in stomach-.    Ulcer with visible vessel spurting, in the posterior wall of duodenal bulb.  (Injection, Endoclip). Excellent hemostacis obtained. .     Plan:   -Protonix 80 mg/hr continuous drip for 72 hours total then PPI IV BID  -start clear liquids   -Await pathology results  -EGD in 2 months  -Maintain Hemodynamic Stability   -Monitor CBC  -CMP,Optimize Electrolytes  -Transfuse prn to hgb >8  -Two large bore IV lines  -Monitor Vital Signs  -Monitor Stool For blood, frequency, consistency, melena  -Active Type and Screen  - Follow up with our [facility] located at [address] [place] [address]. 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   Rec  - Care as per primary team     Problem 3- Bilateral pleural effusions with adjacent compressive atelectasis. Left upper lobe 7 mm groundglass nodule. 3.5 x 1.8 cm ovoid mass abutting left T2 neural foramen. It is probably pleural-based. This may also be neural in origin  Rec  - Care as per primary team  76yFemale pmh HTN, [facility] type 2 MI and chronic anemia patient had no gross GI bleeding at the time. Patient now presents with an episode of vomiting her food early in the morning with speckles of blood in it. Patient also reports two melenic stools earlier in the morning today. Patient with history of severe N-SAID usage     Problem 1-Microcytic anemia with intermittent melenic stools and one episode of hematemesis   never had EGD/Colonoscopy before  Rec  s/p EGD yesterday   Impressions:    Normal mucosa in the whole esophagus.    Erythema in the stomach compatible with non-erosive gastritis. (Biopsy).    Moderate amount of fresh blood suctioned from stomach. No ulcler in stomach-.    Ulcer with visible vessel spurting, in the posterior wall of duodenal bulb.  (Injection, Endoclip). Excellent hemostacis obtained. .     Plan:   -melenic stools expected for up to 5 days after procedure   -IR on board if further bleeding, transfer [place] for embolization   -okay to start aspirin with PPI   - PPI IV BID  -Clear liquids   -Await pathology results  -EGD in 2 months  -Maintain Hemodynamic Stability   -Monitor CBC  -CMP,Optimize Electrolytes  -Transfuse prn to hgb >8  -Two large bore IV lines  -Monitor Vital Signs  -Monitor Stool For blood, frequency, consistency, melena  -Active Type and Screen  - Follow up with our [facility] located at [address] [place] [address]. 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   Rec  - Care as per primary team     Problem 3- Bilateral pleural effusions with adjacent compressive atelectasis. Left upper lobe 7 mm groundglass nodule. 3.5 x 1.8 cm ovoid mass abutting left T2 neural foramen. It is probably pleural-based. This may also be neural in origin  Rec  - Care as per primary team  76yFemale pmh HTN, [facility] with suspected type 2 MI and chronic anemia patient had no gross GI bleeding at the time. Patient now presents with an episode of vomiting her food early in the morning with speckles of blood in it. Patient also reports two melenic stools earlier in the morning today. Patient with history of severe N-SAID usage     Problem 1-Microcytic anemia with intermittent melenic stools and one episode of hematemesis   never had EGD/Colonoscopy before  Rec  s/p EGD yesterday   Impressions:    Normal mucosa in the whole esophagus.    Erythema in the stomach compatible with non-erosive gastritis. (Biopsy).    Moderate amount of fresh blood suctioned from stomach. No ulcler in stomach-.    Ulcer with visible vessel spurting, in the posterior wall of duodenal bulb.  (Injection, Endoclip). Excellent hemostacis obtained. .     Plan:   -melenic stools expected for up to 5 days after procedure   -IR on board if further bleeding, transfer [place] for embolization   -okay to start aspirin with PPI   - PPI IV BID  -Clear liquids   -Await pathology results  -EGD in 2 months  -Maintain Hemodynamic Stability   -Monitor CBC  -CMP,Optimize Electrolytes  -Transfuse prn to hgb >8  -Two large bore IV lines  -Monitor Vital Signs  -Monitor Stool For blood, frequency, consistency, melena  -Active Type and Screen  - Follow up with our [facility] located at [address] [place] [address]. Phone Number: [phone]     Problem 2-Altered liver chemistries  likely cholestasis of sepsis   Rec  -Monitor LFTs  -Treat cause for sepsis   -Maintain hemodynamic stability  -RUQ ultrasound       Problem 3-Right thyroid lobe 3.4 cm mass with resulting mild leftward tracheal deviation.   Rec  - Care as per primary team     Problem 4- Bilateral pleural effusions with adjacent compressive atelectasis. Left upper lobe 7 mm groundglass nodule. 3.5 x 1.8 cm ovoid mass abutting left T2 neural foramen. It is probably pleural-based. This may also be neural in origin  Rec  - Care as per primary team  76yFemale pmh HTN, [facility] type 2 MI and chronic anemia patient had no gross GI bleeding at the time. Patient now presents with an episode of vomiting her food early in the morning with speckles of blood in it. Patient also reports two melenic stools earlier in the morning today. Patient with history of severe N-SAID usage     Problem 1-Microcytic anemia with intermittent melenic stools and one episode of hematemesis   never had EGD/Colonoscopy before  Rec  s/p EGD yesterday : hemoglobin is stable, no evidence of active bleeding.  Impressions:        Ulcer with visible vessel spurting, in the posterior wall of duodenal bulb.  (Injection, Endoclip). Excellent hemostacis obtained. . Bleeding controlled and stopped.   -Note that melenic stools can be  expected for up to 5 days after procedure   -IR  is aware and on board:  if further bleeding, transfer [place] for embolization   -okay to start aspirin with PPI   - PPI IV BID  Plan:     -Clear liquids   -Await pathology results  -EGD in 2 months  -Maintain Hemodynamic Stability   -Monitor CBC  -CMP,Optimize Electrolytes  -Transfuse prn to hgb >8  -Two large bore IV lines  -Monitor Vital Signs  -Monitor Stool For blood, frequency, consistency, melena  -Active Type and Screen  - Follow up with our [facility] located at [address] [place] [address]. Phone Number: [phone]     Problem 2-Altered liver chemistries  likely cholestasis of sepsis   Rec  -Monitor LFTs  -Treat cause for sepsis   -Maintain hemodynamic stability  -RUQ ultrasound       Problem 3-Right thyroid lobe 3.4 cm mass with resulting mild leftward tracheal deviation.   Rec  - Care as per primary team     Problem 4- Bilateral pleural effusions with adjacent compressive atelectasis. Left upper lobe 7 mm groundglass nodule. 3.5 x 1.8 cm ovoid mass abutting left T2 neural foramen. It is probably pleural-based. This may also be neural in origin  Rec  - Care as per primary team

## 2021-03-11 NOTE — PROGRESS NOTE ADULT - SUBJECTIVE AND OBJECTIVE BOX
Patient is a 76y old  Female who presents with a chief complaint of Melena (11 Mar 2021 07:28)      Over Night Events:  Patient seen and examined.   no active bleed     ROS:  See HPI    PHYSICAL EXAM    ICU Vital Signs Last 24 Hrs  T(C): 36 (11 Mar 2021 07:05), Max: 37.3 (10 Mar 2021 19:00)  T(F): 96.8 (11 Mar 2021 07:05), Max: 99.1 (10 Mar 2021 19:00)  HR: 68 (11 Mar 2021 06:00) (68 - 95)  BP: 98/47 (11 Mar 2021 06:00) (91/52 - 149/105)  BP(mean): 68 (11 Mar 2021 06:00) (68 - 121)  ABP: 61/55 (10 Mar 2021 13:10) (52/45 - 93/81)  ABP(mean): 58 (10 Mar 2021 13:10) (49 - 85)  RR: 31 (11 Mar 2021 07:05) (16 - 31)  SpO2: 99% (11 Mar 2021 06:00) (91% - 100%)      General: awake oriented   HEENT:  prema              Lymph Nodes: NO cervical LN   Lungs: Bilateral BS  Cardiovascular: Regular   Abdomen: Soft, Positive BS  Extremities: No clubbing   Skin: warm   Neurological:  no focal   Musculoskeletal: move all ext     I&O's Detail    10 Mar 2021 07:01  -  11 Mar 2021 07:00  --------------------------------------------------------  IN:    Dexmedetomidine: 80.4 mL    Lactated Ringers: 950 mL    Norepinephrine: 72 mL    Oral Fluid: 50 mL    Pantoprazole: 230 mL  Total IN: 1382.4 mL    OUT:    FentaNYL: 0 mL    Indwelling Catheter - Urethral (mL): 1600 mL    Voided (mL): 800 mL  Total OUT: 2400 mL    Total NET: -1017.6 mL      11 Mar 2021 07:01  -  11 Mar 2021 08:42  --------------------------------------------------------  IN:  Total IN: 0 mL    OUT:    Voided (mL): 100 mL  Total OUT: 100 mL    Total NET: -100 mL          LABS:                          7.8    13.02 )-----------( 207      ( 11 Mar 2021 05:46 )             24.5         11 Mar 2021 05:46    142    |  107    |  32     ----------------------------<  85     3.4     |  25     |  1.2      Ca    8.4        11 Mar 2021 05:46  Mg     2.0       11 Mar 2021 05:46                                                                                          Lactate, Blood: 1.4 mmol/L (03-09-21 @ 06:20)  Lactate, Blood: 1.7 mmol/L (03-09-21 @ 02:15)  Lactate, Blood: 2.4 mmol/L (03-08-21 @ 19:32)  Lactate, Blood: 6.9 mmol/L (03-08-21 @ 10:39)    CARDIAC MARKERS ( 11 Mar 2021 05:46 )  x     / 2.67 ng/mL / x     / x     / x                                                                                                         Mode: standby                                      ABG - ( 10 Mar 2021 04:47 )  pH, Arterial: 7.39  pH, Blood: x     /  pCO2: 39    /  pO2: 180   / HCO3: 24    / Base Excess: -1.1  /  SaO2: 100                 MEDICATIONS  (STANDING):  chlorhexidine 4% Liquid 1 Application(s) Topical <User Schedule>  lactated ringers. 1000 milliLiter(s) (50 mL/Hr) IV Continuous <Continuous>  norepinephrine Infusion 0.05 MICROgram(s)/kG/Min (6.69 mL/Hr) IV Continuous <Continuous>  pantoprazole Infusion 8 mG/Hr (10 mL/Hr) IV Continuous <Continuous>  potassium chloride  20 mEq/100 mL IVPB 20 milliEquivalent(s) IV Intermittent once  pyridostigmine 180 milliGRAM(s) Oral Once  pyridostigmine  milliGRAM(s) Oral two times a day  sertraline 100 milliGRAM(s) Oral daily    MEDICATIONS  (PRN):  acetaminophen  Suppository .. 650 milliGRAM(s) Rectal every 6 hours PRN Temp greater or equal to 38C (100.4F)  HYDROmorphone  Injectable 1 milliGRAM(s) IV Push every 6 hours PRN Pain          Xrays:  TLC:  OG:  ET tube:                                                                                       ECHO:  [facility] ICU:

## 2021-03-11 NOTE — PROGRESS NOTE ADULT - SUBJECTIVE AND OBJECTIVE BOX
Patient is a 76y old  Female who presents with a chief complaint of Melena (10 Mar 2021 13:26)      T(F): 96.4 (03-11-21 @ 03:00), Max: 99.1 (03-10-21 @ 19:00)  HR: 68 (03-11-21 @ 06:00)  BP: 98/47 (03-11-21 @ 06:00)  RR: 20 (03-11-21 @ 06:00)  SpO2: 99% (03-11-21 @ 06:00) (91% - 100%)    PHYSICAL EXAM:  GENERAL: NAD, well-groomed, well-developed  HEAD:  Atraumatic, Normocephalic  EYES: EOMI, PERRLA, conjunctiva and sclera clear  ENMT: No tonsillar erythema, exudates, or enlargement; Moist mucous membranes, Good dentition, No lesions  NECK: Supple, No JVD, Normal thyroid  NERVOUS SYSTEM:  Alert & Oriented X3,  Motor Strength 5/5 B/L upper and lower extremities  CHEST/LUNG: Clear to percussion bilaterally; No rales, rhonchi, wheezing, or rubs  HEART: Regular rate and rhythm; No murmurs, rubs, or gallops  ABDOMEN: Soft, Nontender, Nondistended; Bowel sounds present  EXTREMITIES:   No clubbing, cyanosis, or edema  LYMPH: No lymphadenopathy noted  SKIN: No rashes or lesions    labs  03-11    142  |  107  |  32<H>  ----------------------------<  85  3.4<L>   |  25  |  1.2    Ca    8.4<L>      11 Mar 2021 05:46  Mg     2.0     03-11    TPro  4.8<L>  /  Alb  2.8<L>  /  TBili  0.4  /  DBili  x   /  AST  67<H>  /  ALT  22  /  AlkPhos  72  03-10                          7.8    13.02 )-----------( 207      ( 11 Mar 2021 05:46 )             24.5         Mode: standby    Troponin T, Serum: 2.67 ng/mL <HH> (03-11-21 @ 05:46)      acetaminophen  Suppository .. 650 milliGRAM(s) Rectal every 6 hours PRN  chlorhexidine 4% Liquid 1 Application(s) Topical <User Schedule>  HYDROmorphone  Injectable 1 milliGRAM(s) IV Push every 6 hours PRN  lactated ringers. 1000 milliLiter(s) IV Continuous <Continuous>  norepinephrine Infusion 0.05 MICROgram(s)/kG/Min IV Continuous <Continuous>  pantoprazole Infusion 8 mG/Hr IV Continuous <Continuous>  pyridostigmine 180 milliGRAM(s) Oral Once  pyridostigmine  milliGRAM(s) Oral two times a day  sertraline 100 milliGRAM(s) Oral daily

## 2021-03-11 NOTE — CHART NOTE - NSCHARTNOTEFT_GEN_A_CORE
Spoke to the GI attending . Due to elevation in cardiac enzymes and the possibility of NSTEMI, and the hemoglobin being stable in the last 2-3 days post EGD, recommended to start the patient on aspirin 81mg.

## 2021-03-12 DIAGNOSIS — R77.8 OTHER SPECIFIED ABNORMALITIES OF PLASMA PROTEINS: ICD-10-CM

## 2021-03-12 DIAGNOSIS — K92.2 GASTROINTESTINAL HEMORRHAGE, UNSPECIFIED: ICD-10-CM

## 2021-03-12 LAB
ALBUMIN SERPL ELPH-MCNC: 3.1 G/DL — LOW (ref 3.5–5.2)
ALP SERPL-CCNC: 263 U/L — HIGH (ref 30–115)
ALT FLD-CCNC: 72 U/L — HIGH (ref 0–41)
ANION GAP SERPL CALC-SCNC: 19 MMOL/L — HIGH (ref 7–14)
AST SERPL-CCNC: 74 U/L — HIGH (ref 0–41)
BILIRUB DIRECT SERPL-MCNC: <0.2 MG/DL — SIGNIFICANT CHANGE UP (ref 0–0.2)
BILIRUB INDIRECT FLD-MCNC: >0.2 MG/DL — SIGNIFICANT CHANGE UP (ref 0.2–1.2)
BILIRUB SERPL-MCNC: 0.4 MG/DL — SIGNIFICANT CHANGE UP (ref 0.2–1.2)
BUN SERPL-MCNC: 42 MG/DL — HIGH (ref 10–20)
CALCIUM SERPL-MCNC: 8.8 MG/DL — SIGNIFICANT CHANGE UP (ref 8.5–10.1)
CHLORIDE SERPL-SCNC: 106 MMOL/L — SIGNIFICANT CHANGE UP (ref 98–110)
CK MB CFR SERPL CALC: 14.6 NG/ML — HIGH (ref 0.6–6.3)
CO2 SERPL-SCNC: 21 MMOL/L — SIGNIFICANT CHANGE UP (ref 17–32)
CREAT SERPL-MCNC: 1.9 MG/DL — HIGH (ref 0.7–1.5)
GLUCOSE SERPL-MCNC: 84 MG/DL — SIGNIFICANT CHANGE UP (ref 70–99)
HCT VFR BLD CALC: 27.6 % — LOW (ref 37–47)
HCT VFR BLD CALC: 29.3 % — LOW (ref 37–47)
HGB BLD-MCNC: 8.7 G/DL — LOW (ref 12–16)
HGB BLD-MCNC: 9.6 G/DL — LOW (ref 12–16)
MAGNESIUM SERPL-MCNC: 2 MG/DL — SIGNIFICANT CHANGE UP (ref 1.8–2.4)
MCHC RBC-ENTMCNC: 26.8 PG — LOW (ref 27–31)
MCHC RBC-ENTMCNC: 27.5 PG — SIGNIFICANT CHANGE UP (ref 27–31)
MCHC RBC-ENTMCNC: 31.5 G/DL — LOW (ref 32–37)
MCHC RBC-ENTMCNC: 32.8 G/DL — SIGNIFICANT CHANGE UP (ref 32–37)
MCV RBC AUTO: 84 FL — SIGNIFICANT CHANGE UP (ref 81–99)
MCV RBC AUTO: 84.9 FL — SIGNIFICANT CHANGE UP (ref 81–99)
NRBC # BLD: 0 /100 WBCS — SIGNIFICANT CHANGE UP (ref 0–0)
NRBC # BLD: 0 /100 WBCS — SIGNIFICANT CHANGE UP (ref 0–0)
PLATELET # BLD AUTO: 276 K/UL — SIGNIFICANT CHANGE UP (ref 130–400)
PLATELET # BLD AUTO: 293 K/UL — SIGNIFICANT CHANGE UP (ref 130–400)
POTASSIUM SERPL-MCNC: 3.6 MMOL/L — SIGNIFICANT CHANGE UP (ref 3.5–5)
POTASSIUM SERPL-SCNC: 3.6 MMOL/L — SIGNIFICANT CHANGE UP (ref 3.5–5)
PROT SERPL-MCNC: 5.2 G/DL — LOW (ref 6–8)
RBC # BLD: 3.25 M/UL — LOW (ref 4.2–5.4)
RBC # BLD: 3.49 M/UL — LOW (ref 4.2–5.4)
RBC # FLD: 17.7 % — HIGH (ref 11.5–14.5)
RBC # FLD: 17.8 % — HIGH (ref 11.5–14.5)
SODIUM SERPL-SCNC: 146 MMOL/L — SIGNIFICANT CHANGE UP (ref 135–146)
TROPONIN T SERPL-MCNC: 3.16 NG/ML — CRITICAL HIGH
TROPONIN T SERPL-MCNC: 3.19 NG/ML — CRITICAL HIGH
WBC # BLD: 11.92 K/UL — HIGH (ref 4.8–10.8)
WBC # BLD: 14.41 K/UL — HIGH (ref 4.8–10.8)
WBC # FLD AUTO: 11.92 K/UL — HIGH (ref 4.8–10.8)
WBC # FLD AUTO: 14.41 K/UL — HIGH (ref 4.8–10.8)

## 2021-03-12 PROCEDURE — 99233 SBSQ HOSP IP/OBS HIGH 50: CPT

## 2021-03-12 PROCEDURE — 71045 X-RAY EXAM CHEST 1 VIEW: CPT | Mod: 26

## 2021-03-12 PROCEDURE — 76705 ECHO EXAM OF ABDOMEN: CPT | Mod: 26

## 2021-03-12 RX ORDER — PANTOPRAZOLE SODIUM 20 MG/1
40 TABLET, DELAYED RELEASE ORAL EVERY 12 HOURS
Refills: 0 | Status: DISCONTINUED | OUTPATIENT
Start: 2021-03-12 | End: 2021-03-18

## 2021-03-12 RX ORDER — ISOSORBIDE MONONITRATE 60 MG/1
30 TABLET, EXTENDED RELEASE ORAL DAILY
Refills: 0 | Status: DISCONTINUED | OUTPATIENT
Start: 2021-03-12 | End: 2021-03-23

## 2021-03-12 RX ORDER — DILTIAZEM HCL 120 MG
30 CAPSULE, EXT RELEASE 24 HR ORAL EVERY 8 HOURS
Refills: 0 | Status: DISCONTINUED | OUTPATIENT
Start: 2021-03-12 | End: 2021-03-13

## 2021-03-12 RX ORDER — FUROSEMIDE 40 MG
40 TABLET ORAL ONCE
Refills: 0 | Status: COMPLETED | OUTPATIENT
Start: 2021-03-12 | End: 2021-03-12

## 2021-03-12 RX ORDER — ATORVASTATIN CALCIUM 80 MG/1
80 TABLET, FILM COATED ORAL AT BEDTIME
Refills: 0 | Status: DISCONTINUED | OUTPATIENT
Start: 2021-03-12 | End: 2021-03-23

## 2021-03-12 RX ORDER — METOPROLOL TARTRATE 50 MG
12.5 TABLET ORAL DAILY
Refills: 0 | Status: DISCONTINUED | OUTPATIENT
Start: 2021-03-12 | End: 2021-03-12

## 2021-03-12 RX ORDER — ALPRAZOLAM 0.25 MG
0.25 TABLET ORAL EVERY 12 HOURS
Refills: 0 | Status: DISCONTINUED | OUTPATIENT
Start: 2021-03-12 | End: 2021-03-16

## 2021-03-12 RX ORDER — MORPHINE SULFATE 50 MG/1
2 CAPSULE, EXTENDED RELEASE ORAL EVERY 6 HOURS
Refills: 0 | Status: DISCONTINUED | OUTPATIENT
Start: 2021-03-12 | End: 2021-03-13

## 2021-03-12 RX ADMIN — Medication 40 MILLIGRAM(S): at 17:57

## 2021-03-12 RX ADMIN — Medication 40 MILLIGRAM(S): at 06:10

## 2021-03-12 RX ADMIN — MORPHINE SULFATE 2 MILLIGRAM(S): 50 CAPSULE, EXTENDED RELEASE ORAL at 23:44

## 2021-03-12 RX ADMIN — MORPHINE SULFATE 2 MILLIGRAM(S): 50 CAPSULE, EXTENDED RELEASE ORAL at 07:37

## 2021-03-12 RX ADMIN — PYRIDOSTIGMINE BROMIDE 180 MILLIGRAM(S): 60 SOLUTION ORAL at 17:58

## 2021-03-12 RX ADMIN — MORPHINE SULFATE 2 MILLIGRAM(S): 50 CAPSULE, EXTENDED RELEASE ORAL at 05:55

## 2021-03-12 RX ADMIN — Medication 40 MILLIGRAM(S): at 11:26

## 2021-03-12 RX ADMIN — MORPHINE SULFATE 2 MILLIGRAM(S): 50 CAPSULE, EXTENDED RELEASE ORAL at 15:21

## 2021-03-12 RX ADMIN — MORPHINE SULFATE 2 MILLIGRAM(S): 50 CAPSULE, EXTENDED RELEASE ORAL at 01:14

## 2021-03-12 RX ADMIN — MORPHINE SULFATE 2 MILLIGRAM(S): 50 CAPSULE, EXTENDED RELEASE ORAL at 03:21

## 2021-03-12 RX ADMIN — SERTRALINE 100 MILLIGRAM(S): 25 TABLET, FILM COATED ORAL at 11:26

## 2021-03-12 RX ADMIN — MORPHINE SULFATE 2 MILLIGRAM(S): 50 CAPSULE, EXTENDED RELEASE ORAL at 12:11

## 2021-03-12 RX ADMIN — MORPHINE SULFATE 2 MILLIGRAM(S): 50 CAPSULE, EXTENDED RELEASE ORAL at 17:57

## 2021-03-12 RX ADMIN — MORPHINE SULFATE 2 MILLIGRAM(S): 50 CAPSULE, EXTENDED RELEASE ORAL at 12:26

## 2021-03-12 RX ADMIN — MORPHINE SULFATE 2 MILLIGRAM(S): 50 CAPSULE, EXTENDED RELEASE ORAL at 07:52

## 2021-03-12 RX ADMIN — Medication 0.25 MILLIGRAM(S): at 11:46

## 2021-03-12 RX ADMIN — MORPHINE SULFATE 2 MILLIGRAM(S): 50 CAPSULE, EXTENDED RELEASE ORAL at 18:12

## 2021-03-12 RX ADMIN — Medication 30 MILLIGRAM(S): at 21:15

## 2021-03-12 RX ADMIN — MORPHINE SULFATE 2 MILLIGRAM(S): 50 CAPSULE, EXTENDED RELEASE ORAL at 10:11

## 2021-03-12 RX ADMIN — MORPHINE SULFATE 2 MILLIGRAM(S): 50 CAPSULE, EXTENDED RELEASE ORAL at 05:25

## 2021-03-12 RX ADMIN — ATORVASTATIN CALCIUM 80 MILLIGRAM(S): 80 TABLET, FILM COATED ORAL at 23:44

## 2021-03-12 RX ADMIN — ISOSORBIDE MONONITRATE 30 MILLIGRAM(S): 60 TABLET, EXTENDED RELEASE ORAL at 11:46

## 2021-03-12 RX ADMIN — MORPHINE SULFATE 2 MILLIGRAM(S): 50 CAPSULE, EXTENDED RELEASE ORAL at 01:44

## 2021-03-12 RX ADMIN — CHLORHEXIDINE GLUCONATE 1 APPLICATION(S): 213 SOLUTION TOPICAL at 05:32

## 2021-03-12 RX ADMIN — MORPHINE SULFATE 2 MILLIGRAM(S): 50 CAPSULE, EXTENDED RELEASE ORAL at 15:06

## 2021-03-12 RX ADMIN — MORPHINE SULFATE 2 MILLIGRAM(S): 50 CAPSULE, EXTENDED RELEASE ORAL at 10:26

## 2021-03-12 RX ADMIN — CEFTRIAXONE 100 MILLIGRAM(S): 500 INJECTION, POWDER, FOR SOLUTION INTRAMUSCULAR; INTRAVENOUS at 05:25

## 2021-03-12 RX ADMIN — MORPHINE SULFATE 2 MILLIGRAM(S): 50 CAPSULE, EXTENDED RELEASE ORAL at 03:51

## 2021-03-12 RX ADMIN — PANTOPRAZOLE SODIUM 40 MILLIGRAM(S): 20 TABLET, DELAYED RELEASE ORAL at 17:57

## 2021-03-12 RX ADMIN — Medication 81 MILLIGRAM(S): at 11:26

## 2021-03-12 RX ADMIN — Medication 30 MILLIGRAM(S): at 14:39

## 2021-03-12 NOTE — PROGRESS NOTE ADULT - SUBJECTIVE AND OBJECTIVE BOX
76yFemale  Being followed for duodenal ulcer   Interval history: Patient denies nausea, vomiting, hematemesis, melena, blood in stool, diarrhea, constipation, abdominal pain. Patient tolerating clear liquid diet.      PAST MEDICAL & SURGICAL HISTORY:  Anemia    High cholesterol    HTN (hypertension)    Myasthenia gravis    Hemangioma            Social History: No smoking. No alcohol. No illegal drug use.            MEDICATIONS  (STANDING):  aspirin enteric coated 81 milliGRAM(s) Oral daily  atorvastatin 80 milliGRAM(s) Oral at bedtime  cefTRIAXone   IVPB 1000 milliGRAM(s) IV Intermittent every 24 hours  chlorhexidine 4% Liquid 1 Application(s) Topical <User Schedule>  furosemide   Injectable 40 milliGRAM(s) IV Push once  furosemide   Injectable 40 milliGRAM(s) IV Push two times a day  isosorbide   mononitrate ER Tablet (IMDUR) 30 milliGRAM(s) Oral daily  metoprolol succinate ER 12.5 milliGRAM(s) Oral daily  pantoprazole  Injectable 40 milliGRAM(s) IV Push every 12 hours  pyridostigmine 180 milliGRAM(s) Oral Once  pyridostigmine  milliGRAM(s) Oral two times a day  sertraline 100 milliGRAM(s) Oral daily    MEDICATIONS  (PRN):  acetaminophen  Suppository .. 650 milliGRAM(s) Rectal every 6 hours PRN Temp greater or equal to 38C (100.4F)  morphine  - Injectable 2 milliGRAM(s) IV Push every 2 hours PRN Severe Pain (7 - 10)  ondansetron Injectable 4 milliGRAM(s) IV Push every 8 hours PRN Nausea and/or Vomiting      Allergies:   No Known Allergies            REVIEW OF SYSTEMS:  General:  No weight loss, fevers, or chills.  Eyes:  No reported pain or visual changes  ENT:  No sore throat or runny nose.  NECK: No stiffness   CV:  No chest pain or palpitations.  Resp:  No shortness of breath, cough  GI:  No abdominal pain, nausea, vomiting, dysphagia, diarrhea or constipation. No rectal bleeding, melena, or hematemesis.  Neuro:  No tingling, numbness           VITAL SIGNS:   T(F): 96.6 (03-12-21 @ 07:43), Max: 97.2 (03-12-21 @ 04:30)  HR: 82 (03-12-21 @ 11:00) (70 - 113)  BP: 125/57 (03-12-21 @ 11:00) (85/46 - 152/70)  RR: 24 (03-12-21 @ 11:00) (12 - 51)  SpO2: 95% (03-12-21 @ 11:00) (69% - 100%)    PHYSICAL EXAM:  GENERAL: AAOx3, no acute distress.  HEAD:  Atraumatic, Normocephalic  EYES: conjunctiva and sclera clear  NECK: Supple, no JVD or thyromegaly  CHEST/LUNG: Clear to auscultation bilaterally; No wheeze, rhonchi, or rales  HEART: Regular rate and rhythm; normal S1, S2, No murmurs.  ABDOMEN: Soft, nontender, nondistended; Bowel sounds present  NEUROLOGY: No asterixis or tremor.   SKIN: Intact, no jaundice            LABS:                        8.7    14.41 )-----------( 293      ( 12 Mar 2021 05:12 )             27.6     03-12    146  |  106  |  42<H>  ----------------------------<  84  3.6   |  21  |  1.9<H>    Ca    8.8      12 Mar 2021 05:12  Mg     2.0     03-12    TPro  5.2<L>  /  Alb  3.1<L>  /  TBili  0.4  /  DBili  <0.2  /  AST  74<H>  /  ALT  72<H>  /  AlkPhos  263<H>  03-12    LIVER FUNCTIONS - ( 12 Mar 2021 05:12 )  Alb: 3.1 g/dL / Pro: 5.2 g/dL / ALK PHOS: 263 U/L / ALT: 72 U/L / AST: 74 U/L / GGT: x               IMAGING:      < from: Xray Chest 1 View-PORTABLE IMMEDIATE (Xray Chest 1 View-PORTABLE IMMEDIATE .) (03.11.21 @ 14:34) >  EXAM:  XR CHEST PORTABLE IMMED 1V            PROCEDURE DATE:  03/11/2021            INTERPRETATION:  CLINICAL HISTORY: fluid overload.    COMPARISON: Chest radiograph from earlier the same day.    TECHNIQUE: Portable frontal chest radiograph. Adequate positioning.    FINDINGS:    Support devices: None.    Cardiac/mediastinum/hilum: Stable.    Lung parenchyma/Pleura: Mild interval increase in congestive changes throughout the lungs since earlier the same day. Apparent thickening of the horizontal fissure probably due to edema.    Skeleton/soft tissues: Stable.      IMPRESSION:    Mild interval increase in congestive changes throughout the lungs since earlier the same day. Apparent thickening of the horizontal fissure probably due to edema.              JIMENA GALAVIZ MD; Attending Radiologist  This document has been electronically signed. Mar 11 2021  6:15PM    < end of copied text >

## 2021-03-12 NOTE — DIETITIAN INITIAL EVALUATION ADULT. - OTHER CALCULATIONS
Weight used: 71.4 kg Energy needs: 8970-6291 kcal/day (MSJ 1.2-1.3 AF) Protein needs: 71-85 g/day ( 1.0-1.2 g/kg , will continue to monitor renal related labs and adjust prn) Fluid needs: 1ml/kcal or per LIP

## 2021-03-12 NOTE — PROGRESS NOTE ADULT - SUBJECTIVE AND OBJECTIVE BOX
Patient is a 76y old  Female who presents with a chief complaint of Melena (12 Mar 2021 07:04)      Over Night Events:  Patient seen and examined.   had chest pain yesterday s/p lasix   feel better today   ROS:  See HPI    PHYSICAL EXAM    ICU Vital Signs Last 24 Hrs  T(C): 35.9 (12 Mar 2021 07:43), Max: 36.2 (12 Mar 2021 04:30)  T(F): 96.6 (12 Mar 2021 07:43), Max: 97.2 (12 Mar 2021 04:30)  HR: 77 (12 Mar 2021 09:20) (65 - 113)  BP: 98/55 (12 Mar 2021 09:20) (85/46 - 152/70)  BP(mean): 74 (12 Mar 2021 09:20) (61 - 113)  ABP: --  ABP(mean): --  RR: 13 (12 Mar 2021 09:20) (12 - 51)  SpO2: 100% (12 Mar 2021 09:20) (69% - 100%)      General: AOx3  HEENT:    prema            Lymph Nodes: NO cervical LN   Lungs: Bilateral BS  Cardiovascular: Regular   Abdomen: Soft, Positive BS  Extremities: No clubbing   Skin: warm   Neurological: no focal deficit   Musculoskeletal: move all ext     I&O's Detail    11 Mar 2021 07:01  -  12 Mar 2021 07:00  --------------------------------------------------------  IN:    IV PiggyBack: 50 mL    Lactated Ringers: 200 mL    Pantoprazole: 230 mL  Total IN: 480 mL    OUT:    Indwelling Catheter - Urethral (mL): 1275 mL    Voided (mL): 300 mL  Total OUT: 1575 mL    Total NET: -1095 mL      12 Mar 2021 07:01  -  12 Mar 2021 10:03  --------------------------------------------------------  IN:    PRBCs (Packed Red Blood Cells): 311 mL  Total IN: 311 mL    OUT:    Indwelling Catheter - Urethral (mL): 525 mL  Total OUT: 525 mL    Total NET: -214 mL          LABS:                          8.7    14.41 )-----------( 293      ( 12 Mar 2021 05:12 )             27.6         12 Mar 2021 05:12    146    |  106    |  42     ----------------------------<  84     3.6     |  21     |  1.9      Ca    8.8        12 Mar 2021 05:12  Mg     2.0       12 Mar 2021 05:12    TPro  5.2    /  Alb  3.1    /  TBili  0.4    /  DBili  <0.2   /  AST  74     /  ALT  72     /  AlkPhos  263    12 Mar 2021 05:12  Amylase x     lipase x                                                                                              CARDIAC MARKERS ( 12 Mar 2021 05:12 )  x     / 3.16 ng/mL / x     / x     / 14.6 ng/mL  CARDIAC MARKERS ( 11 Mar 2021 20:34 )  x     / 3.41 ng/mL / x     / x     / x      CARDIAC MARKERS ( 11 Mar 2021 16:04 )  x     / 3.16 ng/mL / x     / x     / 11.6 ng/mL  CARDIAC MARKERS ( 11 Mar 2021 13:31 )  x     / 2.93 ng/mL / x     / x     / 8.4 ng/mL  CARDIAC MARKERS ( 11 Mar 2021 05:46 )  x     / 2.67 ng/mL / x     / x     / 7.7 ng/mL                                                                                                                                             MEDICATIONS  (STANDING):  aspirin enteric coated 81 milliGRAM(s) Oral daily  cefTRIAXone   IVPB 1000 milliGRAM(s) IV Intermittent every 24 hours  chlorhexidine 4% Liquid 1 Application(s) Topical <User Schedule>  furosemide   Injectable 40 milliGRAM(s) IV Push once  furosemide   Injectable 40 milliGRAM(s) IV Push two times a day  pantoprazole Infusion 8 mG/Hr (10 mL/Hr) IV Continuous <Continuous>  pyridostigmine 180 milliGRAM(s) Oral Once  pyridostigmine  milliGRAM(s) Oral two times a day  sertraline 100 milliGRAM(s) Oral daily    MEDICATIONS  (PRN):  acetaminophen  Suppository .. 650 milliGRAM(s) Rectal every 6 hours PRN Temp greater or equal to 38C (100.4F)  morphine  - Injectable 2 milliGRAM(s) IV Push every 2 hours PRN Severe Pain (7 - 10)  ondansetron Injectable 4 milliGRAM(s) IV Push every 8 hours PRN Nausea and/or Vomiting          Xrays:  TLC:  OG:  ET tube:                                                                                    b/l effusion    ECHO:  [facility] ICU:

## 2021-03-12 NOTE — PROGRESS NOTE ADULT - ASSESSMENT
76yFemale pmh HTN, [place] [facility] [place] [facility] type 2 MI and chronic anemia patient had no gross GI bleeding at the time. Patient now presents with an episode of vomiting her food early in the morning with speckles of blood in it. Patient also reports two melenic stools earlier in the morning today. Patient with history of severe N-SAID usage     Problem 1-Microcytic anemia with intermittent melenic stools and one episode of hematemesis   never had EGD/Colonoscopy before  Rec  s/p EGD yesterday : hemoglobin is stable, no evidence of active bleeding.  Impressions:        Ulcer with visible vessel spurting, in the posterior wall of duodenal bulb.  (Injection, Endoclip). Excellent hemostacis obtained. . Bleeding controlled and stopped.   -Note that melenic stools can be  expected for up to 5 days after procedure   -IR  is aware and on board:  if further bleeding, transfer [place] for embolization   -okay to start aspirin with PPI   - PPI IV BID  Plan:   - start Clear liquids   -Await pathology results  -EGD in 2 months  -Maintain Hemodynamic Stability   -Monitor CBC  -CMP,Optimize Electrolytes  -Transfuse prn to hgb >8  -Two large bore IV lines  -Monitor Vital Signs  -Monitor Stool For blood, frequency, consistency, melena  -Active Type and Screen  - Follow up with our [facility] located at [address] [place] [address]. Phone Number: [phone]     Problem 2-Altered liver chemistries  likely cholestasis of sepsis   Rec  -Monitor LFTs, LFTs improving   -Treat cause for sepsis   -Maintain hemodynamic stability  -RUQ ultrasound       Problem 3-Right thyroid lobe 3.4 cm mass with resulting mild leftward tracheal deviation.   Rec  - Care as per primary team     Problem 4- Bilateral pleural effusions with adjacent compressive atelectasis. Left upper lobe 7 mm groundglass nodule. 3.5 x 1.8 cm ovoid mass abutting left T2 neural foramen. It is probably pleural-based. This may also be neural in origin  Rec  - Care as per primary team  76yFemale pmh HTN, [place] [facility] [place] [facility] type 2 MI and chronic anemia patient had no gross GI bleeding at the time. Patient now presents with an episode of vomiting her food early in the morning with speckles of blood in it. Patient also reports two melenic stools earlier in the morning today. Patient with history of severe N-SAID usage     Problem 1-Microcytic anemia with intermittent melenic stools and one episode of hematemesis   never had EGD/Colonoscopy before  Rec  s/p EGD yesterday : hemoglobin is stable, no evidence of active bleeding.  Impressions:        Ulcer with visible vessel spurting, in the posterior wall of duodenal bulb.  (Injection, Endoclip). Excellent hemostacis obtained. . Bleeding controlled and stopped.   -Note that melenic stools can be  expected for up to 5 days after procedure   -IR  is aware and on board:  if further bleeding, transfer [place] for embolization   -okay to start aspirin with PPI   - PPI IV BID  Plan:   - start Clear liquids, advance to soft diet tomorrow   -Await pathology results  -EGD in 2 months  -Maintain Hemodynamic Stability   -Monitor CBC  -CMP,Optimize Electrolytes  -Transfuse prn to hgb >8  -Two large bore IV lines  -Monitor Vital Signs  -Monitor Stool For blood, frequency, consistency, melena  -Active Type and Screen  - Follow up with our [facility] located at [address] [place] [address]. Phone Number: [phone]     Problem 2-Altered liver chemistries  likely cholestasis of sepsis   Rec  -Monitor LFTs, LFTs improving   -Treat cause for sepsis   -Maintain hemodynamic stability  -RUQ ultrasound       Problem 3-Right thyroid lobe 3.4 cm mass with resulting mild leftward tracheal deviation.   Rec  - Care as per primary team     Problem 4- Bilateral pleural effusions with adjacent compressive atelectasis. Left upper lobe 7 mm groundglass nodule. 3.5 x 1.8 cm ovoid mass abutting left T2 neural foramen. It is probably pleural-based. This may also be neural in origin  Rec  - Care as per primary team

## 2021-03-12 NOTE — PROGRESS NOTE ADULT - SUBJECTIVE AND OBJECTIVE BOX
Patient is a 76y old  Female who presents with a chief complaint of Melena (12 Mar 2021 12:17)      T(F): 96.6 (03-12-21 @ 07:43), Max: 97.2 (03-12-21 @ 04:30)  HR: 92 (03-12-21 @ 13:00)  BP: 127/60 (03-12-21 @ 13:00)  RR: 20  SpO2: 96% (03-12-21 @ 13:00) (75% - 100%)    PHYSICAL EXAM:  GENERAL: NAD  HEAD:  Atraumatic, Normocephalic  NERVOUS SYSTEM:  no focal deficits   CHEST/LUNG:  bilateral rhonchi  HEART: Regular rate and rhythm; No murmurs, rubs, or gallops  ABDOMEN: Soft, Nontender, Nondistended; Bowel sounds present  EXTREMITIES:  2+ Peripheral Pulses, No clubbing, cyanosis, or edema  LYMPH: No lymphadenopathy noted  SKIN: No rashes or lesions    LABS  03-12    146  |  106  |  42<H>  ----------------------------<  84  3.6   |  21  |  1.9<H>    Ca    8.8      12 Mar 2021 05:12  Mg     2.0     03-12    TPro  5.2<L>  /  Alb  3.1<L>  /  TBili  0.4  /  DBili  <0.2  /  AST  74<H>  /  ALT  72<H>  /  AlkPhos  263<H>  03-12                          8.7    14.41 )-----------( 293      ( 12 Mar 2021 05:12 )             27.6         CARDIAC ENZYMES    CKMB Units: 14.6 (03-12 @ 05:12)  CKMB Units: 11.6 (03-11 @ 16:04)  CKMB Units: 8.4 (03-11 @ 13:31)  CKMB Units: 7.7 (03-11 @ 05:46)    Troponin T, Serum: 3.16 ng/mL (03-12-21 @ 05:12)  Troponin T, Serum: 3.41 ng/mL (03-11-21 @ 20:34)  Troponin T, Serum: 3.16 ng/mL (03-11-21 @ 16:04)  Troponin T, Serum: 2.93 ng/mL (03-11-21 @ 13:31)  Troponin T, Serum: 2.67 ng/mL (03-11-21 @ 05:46)      Culture Results:   <10,000 CFU/mL Normal Urogenital Juanita (03-02-21)    RADIOLOGY  < from: US Abdomen Limited (03.12.21 @ 13:02) >  IMPRESSION:    Mildly dilated pancreatic duct measuring up to 5 mm. Outpatient pancreatic MRI with gadolinium and MRCP imaging is recommended to exclude an obstructing process    Gallstones.    Small right pleural effusion      < end of copied text >  < from: Xray Chest 1 View- PORTABLE-Routine (Xray Chest 1 View- PORTABLE-Routine in AM.) (03.12.21 @ 06:57) >  Impression:  Increased bibasilar opacities and effusions.      < end of copied text >    MEDICATIONS  (STANDING):  ALPRAZolam 0.25 milliGRAM(s) Oral every 12 hours  aspirin enteric coated 81 milliGRAM(s) Oral daily  atorvastatin 80 milliGRAM(s) Oral at bedtime  cefTRIAXone   IVPB 1000 milliGRAM(s) IV Intermittent every 24 hours  chlorhexidine 4% Liquid 1 Application(s) Topical <User Schedule>  diltiazem    Tablet 30 milliGRAM(s) Oral every 8 hours  furosemide   Injectable 40 milliGRAM(s) IV Push two times a day  isosorbide   mononitrate ER Tablet (IMDUR) 30 milliGRAM(s) Oral daily  morphine  - Injectable 2 milliGRAM(s) IV Push every 6 hours  pantoprazole  Injectable 40 milliGRAM(s) IV Push every 12 hours  pyridostigmine 180 milliGRAM(s) Oral Once  pyridostigmine  milliGRAM(s) Oral two times a day  sertraline 100 milliGRAM(s) Oral daily    MEDICATIONS  (PRN):  acetaminophen  Suppository .. 650 milliGRAM(s) Rectal every 6 hours PRN Temp greater or equal to 38C (100.4F)  morphine  - Injectable 2 milliGRAM(s) IV Push every 2 hours PRN Severe Pain (7 - 10)  ondansetron Injectable 4 milliGRAM(s) IV Push every 8 hours PRN Nausea and/or Vomiting

## 2021-03-12 NOTE — DIETITIAN INITIAL EVALUATION ADULT. - PERTINENT MEDS FT
MEDICATIONS  (STANDING):  furosemide   Injectable 40 milliGRAM(s) IV Push once  furosemide   Injectable 40 milliGRAM(s) IV Push two times a day  metoprolol succinate ER 12.5 milliGRAM(s) Oral daily  pantoprazole Infusion 8 mG/Hr (10 mL/Hr) IV Continuous <Continuous>    MEDICATIONS  (PRN):  morphine  - Injectable 2 milliGRAM(s) IV Push every 2 hours PRN Severe Pain (7 - 10)  ondansetron Injectable 4 milliGRAM(s) IV Push every 8 hours PRN Nausea and/or Vomiting

## 2021-03-12 NOTE — DIETITIAN INITIAL EVALUATION ADULT. - PERTINENT LABORATORY DATA
3/12: RBC: 3.25, H/H: 8.7/27.6, BUN: 42, Creatinine: 1.9, eGFR: 26, Alkaline phosphatase: 263, AST/ALT: 74/72

## 2021-03-12 NOTE — PROGRESS NOTE ADULT - SUBJECTIVE AND OBJECTIVE BOX
Patient is a 76y old  Female who presents with a chief complaint of Melena (11 Mar 2021 10:53)      T(F): 97.2 (03-12-21 @ 04:30), Max: 97.2 (03-12-21 @ 04:30)  HR: 78 (03-12-21 @ 05:00)  BP: 103/51 (03-12-21 @ 05:00)  RR: 15 (03-12-21 @ 05:00)  SpO2: 100% (03-12-21 @ 05:00) (69% - 100%)    PHYSICAL EXAM:  GENERAL: NAD, well-groomed, well-developed  HEAD:  Atraumatic, Normocephalic  EYES: EOMI, PERRLA, conjunctiva and sclera clear  ENMT: No tonsillar erythema, exudates, or enlargement; Moist mucous membranes, Good dentition, No lesions  NECK: Supple, No JVD, Normal thyroid  NERVOUS SYSTEM:  Alert & Oriented X3,  Motor Strength 5/5 B/L upper and lower extremities  CHEST/LUNG: Clear to percussion bilaterally; No rales, rhonchi, wheezing, or rubs  HEART: Regular rate and rhythm; No murmurs, rubs, or gallops  ABDOMEN: Soft, Nontender, Nondistended; Bowel sounds present  EXTREMITIES:   No clubbing, cyanosis, or edema  LYMPH: No lymphadenopathy noted  SKIN: No rashes or lesions    labs  03-11    142  |  106  |  30<H>  ----------------------------<  152<H>  4.1   |  17  |  1.3    Ca    9.1      11 Mar 2021 13:31  Mg     2.0     03-11    TPro  6.1  /  Alb  3.5  /  TBili  0.9  /  DBili  x   /  AST  142<H>  /  ALT  77<H>  /  AlkPhos  315<H>  03-11                          8.7    14.41 )-----------( 293      ( 12 Mar 2021 05:12 )             27.6           Troponin T, Serum: 3.41 ng/mL <HH> (03-11-21 @ 20:34)  Troponin T, Serum: 3.16 ng/mL <HH> (03-11-21 @ 16:04)  Troponin T, Serum: 2.93 ng/mL <HH> (03-11-21 @ 13:31)      acetaminophen  Suppository .. 650 milliGRAM(s) Rectal every 6 hours PRN  aspirin enteric coated 81 milliGRAM(s) Oral daily  cefTRIAXone   IVPB 1000 milliGRAM(s) IV Intermittent every 24 hours  chlorhexidine 4% Liquid 1 Application(s) Topical <User Schedule>  furosemide   Injectable 40 milliGRAM(s) IV Push two times a day  morphine  - Injectable 2 milliGRAM(s) IV Push every 2 hours PRN  ondansetron Injectable 4 milliGRAM(s) IV Push every 8 hours PRN  pantoprazole Infusion 8 mG/Hr IV Continuous <Continuous>  pyridostigmine 180 milliGRAM(s) Oral Once  pyridostigmine  milliGRAM(s) Oral two times a day  sertraline 100 milliGRAM(s) Oral daily

## 2021-03-12 NOTE — PROGRESS NOTE ADULT - SUBJECTIVE AND OBJECTIVE BOX
IGNACIO PAL             MRN-[id number]    CC: melena    HPI:  76yFemale pmh HTN, [place] [facility] [place] [facility] type 2 MI and chronic anemia patient had no gross GI bleeding at the time. Patient now presents with an episode of vomiting her food early in the morning with speckles of blood in it. Patient also reports two melenic stools earlier in the morning today. Patient also reports two melenic stools earlier in the morning today. Patient with history of severe NSAID usage. Hb in ED was 4.3, lactate 6.8.        (08 Mar 2021 14:19)      SUBJECTIVE: pt seen in CCU, c/o persistent chest and cervical pain    ROS: neg unless otherwise stated  DYSPNEA: Y / N	  NAUS/VOM: Y / N	  SECRETIONS: Y / N	  AGITATION: Y / N  Pain (Y/N):     Y  -Provocation/Palliation: improved w iv morphine   -Quality/Quantity: sharp  -Radiating: N  -Severity: 7/10  -Timing/Frequency: constant  -Impact on ADLs: Y    OTHER REVIEW OF SYSTEMS:  UNABLE TO OBTAIN  due to:    PEx:  76y              General: awake. well nourished, lying in bed, NAD, conversant  HEENT:  NCAT PERRL EOMI Non icteric   Neck: Supple no masses  CVS: RR S1S2 No M/G/R  Resp: Unlabored Non tachypneic No increased WOB, clear to ascultation bilaterally  GI:  Soft NT ND BS+  : Nuñez  Musc: No C/C/E    Neuro: Follows commands No focal deficits  Psych: Calm, a & o x 3  Skin: Non jaundiced, no rash   Lymph: Normal      Last BM:       ALLERGIES:     OPIATE NAÏVE (Y/N): Y    MEDICATIONS: REVIEWED  MEDICATIONS  (STANDING):  ALPRAZolam 0.25 milliGRAM(s) Oral every 12 hours  aspirin enteric coated 81 milliGRAM(s) Oral daily  atorvastatin 80 milliGRAM(s) Oral at bedtime  cefTRIAXone   IVPB 1000 milliGRAM(s) IV Intermittent every 24 hours  chlorhexidine 4% Liquid 1 Application(s) Topical <User Schedule>  diltiazem    Tablet 30 milliGRAM(s) Oral every 8 hours  furosemide   Injectable 40 milliGRAM(s) IV Push two times a day  isosorbide   mononitrate ER Tablet (IMDUR) 30 milliGRAM(s) Oral daily  morphine  - Injectable 2 milliGRAM(s) IV Push every 6 hours  pantoprazole  Injectable 40 milliGRAM(s) IV Push every 12 hours  pyridostigmine 180 milliGRAM(s) Oral Once  pyridostigmine  milliGRAM(s) Oral two times a day  sertraline 100 milliGRAM(s) Oral daily    MEDICATIONS  (PRN):  acetaminophen  Suppository .. 650 milliGRAM(s) Rectal every 6 hours PRN Temp greater or equal to 38C (100.4F)  morphine  - Injectable 2 milliGRAM(s) IV Push every 2 hours PRN Severe Pain (7 - 10)  ondansetron Injectable 4 milliGRAM(s) IV Push every 8 hours PRN Nausea and/or Vomiting      LABS: REVIEWED  CBC:                        8.7    14.41 )-----------( 293      ( 12 Mar 2021 05:12 )             27.6     CMP:    03-12    146  |  106  |  42<H>  ----------------------------<  84  3.6   |  21  |  1.9<H>    Ca    8.8      12 Mar 2021 05:12  Mg     2.0     03-12    TPro  5.2<L>  /  Alb  3.1<L>  /  TBili  0.4  /  DBili  <0.2  /  AST  74<H>  /  ALT  72<H>  /  AlkPhos  263<H>  03-12  Albumin, Serum: 3.1 g/dL (03-12-21 @ 05:12)      IMAGING: REVIEWED    ADVANCED DIRECTIVES:                   DNR DNI             DECISION MAKER: Pt able to make medical decisions  LEGAL SURROGATE:    PSYCHOSOCIAL-SPIRITUAL ASSESSMENT:       Reviewed      GOALS OF CARE DISCUSSION       Palliative care info/counseling provided	          CURRENT DISPO PLAN:     WILL REMAIN IN HOSPITAL      REFERRALS	        Palliative Med

## 2021-03-12 NOTE — PROGRESS NOTE ADULT - ASSESSMENT
Patient had chest pain sob yesterday. Patient in chf. She had NSTEMI secondary to anemia. Transfuse lasix statin. Beta when able, Prognosis guarded

## 2021-03-12 NOTE — DIETITIAN INITIAL EVALUATION ADULT. - PERSON TAUGHT/METHOD
Patient was sleeping at time of RD visit, spoke to partner about advancing diet to a goal diet of low fiber/low residue as tolerated/verbal instruction/patient instructed

## 2021-03-12 NOTE — PROGRESS NOTE ADULT - SUBJECTIVE AND OBJECTIVE BOX
IGNACIO PAL 76y Female  MRN#: [id number]   CODE STATUS:________      SUBJECTIVE  Patient is a 76y old Female who presents with a chief complaint of Melena (12 Mar 2021 11:11)  Currently admitted to medicine with the primary diagnosis of GI bleed  Today is hospital day 4d, and this morning she reports no overnight events.     OBJECTIVE  PAST MEDICAL & SURGICAL HISTORY  Anemia    High cholesterol    HTN (hypertension)    Myasthenia gravis    Hemangioma      ALLERGIES:  No Known Allergies    MEDICATIONS:  STANDING MEDICATIONS  ALPRAZolam 0.25 milliGRAM(s) Oral every 12 hours  aspirin enteric coated 81 milliGRAM(s) Oral daily  atorvastatin 80 milliGRAM(s) Oral at bedtime  cefTRIAXone   IVPB 1000 milliGRAM(s) IV Intermittent every 24 hours  chlorhexidine 4% Liquid 1 Application(s) Topical <User Schedule>  furosemide   Injectable 40 milliGRAM(s) IV Push two times a day  isosorbide   mononitrate ER Tablet (IMDUR) 30 milliGRAM(s) Oral daily  pantoprazole  Injectable 40 milliGRAM(s) IV Push every 12 hours  pyridostigmine 180 milliGRAM(s) Oral Once  pyridostigmine  milliGRAM(s) Oral two times a day  sertraline 100 milliGRAM(s) Oral daily    PRN MEDICATIONS  acetaminophen  Suppository .. 650 milliGRAM(s) Rectal every 6 hours PRN  morphine  - Injectable 2 milliGRAM(s) IV Push every 2 hours PRN  ondansetron Injectable 4 milliGRAM(s) IV Push every 8 hours PRN      VITAL SIGNS: Last 24 Hours  T(C): 35.9 (12 Mar 2021 07:43), Max: 36.2 (12 Mar 2021 04:30)  T(F): 96.6 (12 Mar 2021 07:43), Max: 97.2 (12 Mar 2021 04:30)  HR: 82 (12 Mar 2021 11:00) (70 - 113)  BP: 125/57 (12 Mar 2021 11:00) (85/46 - 152/70)  BP(mean): 82 (12 Mar 2021 11:00) (61 - 113)  RR: 24 (12 Mar 2021 11:00) (12 - 51)  SpO2: 95% (12 Mar 2021 11:00) (69% - 100%)    LABS:                        8.7    14.41 )-----------( 293      ( 12 Mar 2021 05:12 )             27.6     03-12    146  |  106  |  42<H>  ----------------------------<  84  3.6   |  21  |  1.9<H>    Ca    8.8      12 Mar 2021 05:12  Mg     2.0     03-12    TPro  5.2<L>  /  Alb  3.1<L>  /  TBili  0.4  /  DBili  <0.2  /  AST  74<H>  /  ALT  72<H>  /  AlkPhos  263<H>  03-12          Troponin T, Serum: 3.16 ng/mL <HH> (03-12-21 @ 05:12)  Troponin T, Serum: 3.41 ng/mL <HH> (03-11-21 @ 20:34)  Troponin T, Serum: 3.16 ng/mL <HH> (03-11-21 @ 16:04)  Troponin T, Serum: 2.93 ng/mL <HH> (03-11-21 @ 13:31)      CARDIAC MARKERS ( 12 Mar 2021 05:12 )  x     / 3.16 ng/mL / x     / x     / 14.6 ng/mL  CARDIAC MARKERS ( 11 Mar 2021 20:34 )  x     / 3.41 ng/mL / x     / x     / x      CARDIAC MARKERS ( 11 Mar 2021 16:04 )  x     / 3.16 ng/mL / x     / x     / 11.6 ng/mL  CARDIAC MARKERS ( 11 Mar 2021 13:31 )  x     / 2.93 ng/mL / x     / x     / 8.4 ng/mL  CARDIAC MARKERS ( 11 Mar 2021 05:46 )  x     / 2.67 ng/mL / x     / x     / 7.7 ng/mL      RADIOLOGY:      < from: Xray Chest 1 View-PORTABLE IMMEDIATE (Xray Chest 1 View-PORTABLE IMMEDIATE .) (03.09.21 @ 08:39) >  IMPRESSION:    No significant change in right greater than left airspace opacities.      < end of copied text >    < from: CT Angio Chest PE Protocol w/ IV Cont (03.02.21 @ 02:14) >    IMPRESSION:  1.  No pulmonary embolus.  2.  Right thyroid lobe 3.4 cm mass with resulting mild leftward tracheal deviation. Recommend ultrasound of the thyroid for further evaluation.  3.  Bilateral pleural effusions with adjacent compressive atelectasis.  4.  Left upper lobe 7 mm groundglass nodule. Recommend CT chest in 6-12 months for continued follow-up.  5.  3.5 x 1.8 cm ovoid mass abutting left T2 neural foramen. It is probably pleural-based. This may also be neural in origin. Further evaluation with MRI with and without contrast may be of benefit.    < end of copied text >    PHYSICAL EXAM:    GENERAL: on BiPAP, Ao x3  HEENT:  Atraumatic, Normocephalic. EOMI, PERRLA, conjunctiva and sclera clear, No JVD  PULMONARY: Crackles on auscultation bilaterally; No wheeze  CARDIOVASCULAR: Regular rate and rhythm; No murmurs, rubs, or gallops  GASTROINTESTINAL: Soft, Nontender, Nondistended; Bowel sounds present  MUSCULOSKELETAL:  2+ Peripheral Pulses, No clubbing, cyanosis, or edema  NEUROLOGY: non-focal  SKIN: No rashes or lesions    ADMISSION SUMMARY  Patient is a 76y old Female who presents with a chief complaint of Melena (12 Mar 2021 11:11)  Currently admitted to medicine with the primary diagnosis of GI bleed    ASSESSMENT & PLAN    75 y/o Female with PMH of HTN, [place] [facility] [place] [facility] type 2 MI and chronic anemia patient had no gross GI bleeding at the time. Patient now presents with an episode of vomiting her food early in the morning with speckles of blood in it. Patient also reports two melenic stools earlier in the morning today. Patient with history of severe NSAID usage     # Acute blood loss anemia 2/2 Bleeding duodenal ulcer  -c/o melena and hematemesis, h/o NSAID abuse  -Hb 4 on admission. s/p 5U PRBC in total  -Patient intubated for EGD on 3/9 and extubated 3/10 due to history of MG and tracheal deviation, considered high risk as per anesthesia  -EGD on 3/9 showed actively spurting vessel in posterior wall of duodenal bulb. s/p epinephrine inj and clipping  -Repeat CBC at 1900 and am  -Off pressors  -Clear liquis diet, protonix q12h  -Active Type and Screen  -Transfuse PRN to hgb >8  -Two large bore IV lines, A line  -GI following    #NSTEMI  #CHF   #Fluid overload  -Troponin trending up 2.67-->2.93-->3 with elevated CKMB  -Serial EKG showed no acute ischemic changes  -Will trend trops for now  -Started on aspirin, morphine,statin  -Cannot be on metoprolol due to MG. Will start cardizem 30mg q8h if BP tolerates  -Continue lasix 40mg q12h    #Thyroid mass  -Patient has 2 solid nodules in the thyroid abutting the trachea, paraspinal mass at T2 level likely pleural in origin  -Mass was never biopsied on last admission  -c/o severe shoulder pain, was using NSAIDs for intractable pain from the mass  -Might need biopsy before discharge    #Hypertension  -Hold amlodipine    #Myasthenia Gravis  -c/w pyridostigmine    #Diet: Clear liquid  #DVT ppx: SCD  #GI ppx: protonix  #Dispo: Acute

## 2021-03-12 NOTE — PROGRESS NOTE ADULT - ASSESSMENT
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 switch to nasal cannula to keep pox > 92 %     CARDIOVASCULAR: Goal MAP >65, I=O  lasix once a day and as needed     lopressor 12.5 Q 12 hrs   and asa as per GI   cardiology follow up   clarify with cardiology regarding if need to start Imdur     GI: GI prophylaxis start clear liquid  if ok by Gi   follow GI   on protonix Q 12 hr if ok by gi     RENAL:  F/u  lytes.  Correct as needed.  Accurate I/O    INFECTIOUS DISEASE: no abx    HEMATOLOGICAL:  SCDs,  seriel cbc keep Hb more then 8     ENDOCRINE:  Follow up FS.  Insulin protocol if needed.    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 switch to nasal cannula to keep pox > 92 %     CARDIOVASCULAR: Goal MAP >65, I=O  lasix once a day and as needed     lopressor 12.5 Q 12 hrs   and asa as per GI   cardiology follow up   clarify with cardiology regarding if need to start Imdur     GI: GI prophylaxis start clear liquid  if ok by Gi   follow GI   on protonix Q 12 hr if ok by gi     RENAL:  F/u  lytes.  Correct as needed.  Accurate I/O    INFECTIOUS DISEASE: no abx    HEMATOLOGICAL:  SCDs,  seriel cbc keep Hb more then 8     ENDOCRINE:  Follow up FS.  Insulin protocol if needed.    MUSCULOSKELETAL: Bedrest    dnr/ dni

## 2021-03-12 NOTE — PROGRESS NOTE ADULT - ASSESSMENT
77 y/o Female with PMH of HTN, MG discharged from [facility] recently with suspected type 2 MI and chronic anemia patient  Patient with history of severe NSAID usage     # Acute blood loss anemia secondary to Bleeding duodenal ulcer  -c/o melena and hematemesis, h/o NSAID abuse  -Hb 4 on admission. s/p 5U PRBC in total  -Patient intubated for EGD on 3/9 and extubated  today due to history of MG and tracheal deviation, considered high risk as per anesthesia  -EGD on 3/9 showed actively spurting vessel in posterior wall of duodenal bulb. s/p epinephrine inj and clipping  -advance diet to clears   -  change to Protonix IV q12h   -Active Type and Screen  -Transfuse PRN to hgb >8  -Two large bore IV lines, A line  -GI following    #NSTEMI  -Troponin trending up 2.67-->2.93 with elevated CKMB  -Serial EKG showed no acute ischemic changes  -Will trend trops for now  -Discussed with Cardiology, due to active GI bleeding on admission patient cannot be on antiplatelets or anticoagulation  -Imdur   - morphine prn for pain    #Thyroid mass  -Patient has 2 solid nodules in the thyroid abutting the trachea, paraspinal mass at T2 level likely pleural in origin  -Mass was never biopsied on last admission  -c/o severe shoulder pain, was using NSAIDs for intractable pain from the mass  -Might need biopsy before discharge    #Hypertension  -Hold amlodipine    #Myasthenia Gravis  -c/w pyridostigmine    #DVT ppx: SCD  #GI ppx: protonix  #Dispo: Acute

## 2021-03-12 NOTE — DIETITIAN INITIAL EVALUATION ADULT. - CONTINUE CURRENT NUTRITION CARE PLAN
GOAL: When medically feasible advance diet to low fiber/low residue- GI soft diet as tolerated in the next 4 days. Interventions: meals and snacks, medical nutrition supplements, coordination of care

## 2021-03-12 NOTE — DIETITIAN INITIAL EVALUATION ADULT. - NAME AND PHONE
RD to monitor: diet order, body composition, energy intake, nutrition focused physical finding, renal profile. at risk will f/u in 4 days

## 2021-03-12 NOTE — PROGRESS NOTE ADULT - ASSESSMENT
76yFemale being evaluated for goals of care and pain mngnmt.   Events over last 24hrs noted and discussed w housestaff.     MEDD (morphine equivalent daily dose): 60mg/24hrs       See Recs below.    Please call [phone] with questions or concerns 24/7.   We will continue to follow.

## 2021-03-13 LAB
ALBUMIN SERPL ELPH-MCNC: 3.1 G/DL — LOW (ref 3.5–5.2)
ALP SERPL-CCNC: 208 U/L — HIGH (ref 30–115)
ALT FLD-CCNC: 47 U/L — HIGH (ref 0–41)
ANION GAP SERPL CALC-SCNC: 10 MMOL/L — SIGNIFICANT CHANGE UP (ref 7–14)
ANION GAP SERPL CALC-SCNC: 3 MMOL/L — LOW (ref 7–14)
AST SERPL-CCNC: 31 U/L — SIGNIFICANT CHANGE UP (ref 0–41)
BILIRUB SERPL-MCNC: 0.4 MG/DL — SIGNIFICANT CHANGE UP (ref 0.2–1.2)
BUN SERPL-MCNC: 28 MG/DL — HIGH (ref 10–20)
BUN SERPL-MCNC: 31 MG/DL — HIGH (ref 10–20)
CALCIUM SERPL-MCNC: 8.6 MG/DL — SIGNIFICANT CHANGE UP (ref 8.5–10.1)
CALCIUM SERPL-MCNC: 8.6 MG/DL — SIGNIFICANT CHANGE UP (ref 8.5–10.1)
CHLORIDE SERPL-SCNC: 100 MMOL/L — SIGNIFICANT CHANGE UP (ref 98–110)
CHLORIDE SERPL-SCNC: 96 MMOL/L — LOW (ref 98–110)
CK MB CFR SERPL CALC: 3.3 NG/ML — SIGNIFICANT CHANGE UP (ref 0.6–6.3)
CK SERPL-CCNC: 47 U/L — SIGNIFICANT CHANGE UP (ref 0–225)
CO2 SERPL-SCNC: 28 MMOL/L — SIGNIFICANT CHANGE UP (ref 17–32)
CO2 SERPL-SCNC: 38 MMOL/L — HIGH (ref 17–32)
CREAT SERPL-MCNC: 1.3 MG/DL — SIGNIFICANT CHANGE UP (ref 0.7–1.5)
CREAT SERPL-MCNC: 1.4 MG/DL — SIGNIFICANT CHANGE UP (ref 0.7–1.5)
GLUCOSE SERPL-MCNC: 121 MG/DL — HIGH (ref 70–99)
GLUCOSE SERPL-MCNC: 151 MG/DL — HIGH (ref 70–99)
HCT VFR BLD CALC: 29.6 % — LOW (ref 37–47)
HGB BLD-MCNC: 9.8 G/DL — LOW (ref 12–16)
MAGNESIUM SERPL-MCNC: 1.6 MG/DL — LOW (ref 1.8–2.4)
MCHC RBC-ENTMCNC: 27.6 PG — SIGNIFICANT CHANGE UP (ref 27–31)
MCHC RBC-ENTMCNC: 33.1 G/DL — SIGNIFICANT CHANGE UP (ref 32–37)
MCV RBC AUTO: 83.4 FL — SIGNIFICANT CHANGE UP (ref 81–99)
NRBC # BLD: 0 /100 WBCS — SIGNIFICANT CHANGE UP (ref 0–0)
PLATELET # BLD AUTO: 304 K/UL — SIGNIFICANT CHANGE UP (ref 130–400)
POTASSIUM SERPL-MCNC: 2.8 MMOL/L — LOW (ref 3.5–5)
POTASSIUM SERPL-MCNC: 3.5 MMOL/L — SIGNIFICANT CHANGE UP (ref 3.5–5)
POTASSIUM SERPL-SCNC: 2.8 MMOL/L — LOW (ref 3.5–5)
POTASSIUM SERPL-SCNC: 3.5 MMOL/L — SIGNIFICANT CHANGE UP (ref 3.5–5)
PROCALCITONIN SERPL-MCNC: 4.22 NG/ML — HIGH (ref 0.02–0.1)
PROT SERPL-MCNC: 5 G/DL — LOW (ref 6–8)
RBC # BLD: 3.55 M/UL — LOW (ref 4.2–5.4)
RBC # FLD: 17.4 % — HIGH (ref 11.5–14.5)
SODIUM SERPL-SCNC: 134 MMOL/L — LOW (ref 135–146)
SODIUM SERPL-SCNC: 141 MMOL/L — SIGNIFICANT CHANGE UP (ref 135–146)
TROPONIN T SERPL-MCNC: 3.52 NG/ML — CRITICAL HIGH
TROPONIN T SERPL-MCNC: 3.83 NG/ML — CRITICAL HIGH
WBC # BLD: 10.28 K/UL — SIGNIFICANT CHANGE UP (ref 4.8–10.8)
WBC # FLD AUTO: 10.28 K/UL — SIGNIFICANT CHANGE UP (ref 4.8–10.8)

## 2021-03-13 PROCEDURE — 71045 X-RAY EXAM CHEST 1 VIEW: CPT | Mod: 26

## 2021-03-13 PROCEDURE — 99233 SBSQ HOSP IP/OBS HIGH 50: CPT

## 2021-03-13 PROCEDURE — 99291 CRITICAL CARE FIRST HOUR: CPT

## 2021-03-13 PROCEDURE — 99232 SBSQ HOSP IP/OBS MODERATE 35: CPT

## 2021-03-13 RX ORDER — NITROGLYCERIN 6.5 MG
1 CAPSULE, EXTENDED RELEASE ORAL ONCE
Refills: 0 | Status: COMPLETED | OUTPATIENT
Start: 2021-03-13 | End: 2021-03-13

## 2021-03-13 RX ORDER — NITROGLYCERIN 6.5 MG
5 CAPSULE, EXTENDED RELEASE ORAL
Qty: 50 | Refills: 0 | Status: DISCONTINUED | OUTPATIENT
Start: 2021-03-13 | End: 2021-03-15

## 2021-03-13 RX ORDER — DILTIAZEM HCL 120 MG
30 CAPSULE, EXT RELEASE 24 HR ORAL
Refills: 0 | Status: DISCONTINUED | OUTPATIENT
Start: 2021-03-13 | End: 2021-03-14

## 2021-03-13 RX ORDER — POTASSIUM CHLORIDE 20 MEQ
40 PACKET (EA) ORAL
Refills: 0 | Status: COMPLETED | OUTPATIENT
Start: 2021-03-13 | End: 2021-03-14

## 2021-03-13 RX ORDER — NITROGLYCERIN 6.5 MG
0.4 CAPSULE, EXTENDED RELEASE ORAL
Refills: 0 | Status: DISCONTINUED | OUTPATIENT
Start: 2021-03-13 | End: 2021-03-16

## 2021-03-13 RX ORDER — FUROSEMIDE 40 MG
20 TABLET ORAL DAILY
Refills: 0 | Status: DISCONTINUED | OUTPATIENT
Start: 2021-03-13 | End: 2021-03-16

## 2021-03-13 RX ORDER — MORPHINE SULFATE 50 MG/1
2 CAPSULE, EXTENDED RELEASE ORAL EVERY 4 HOURS
Refills: 0 | Status: DISCONTINUED | OUTPATIENT
Start: 2021-03-13 | End: 2021-03-16

## 2021-03-13 RX ORDER — MAGNESIUM SULFATE 500 MG/ML
2 VIAL (ML) INJECTION ONCE
Refills: 0 | Status: COMPLETED | OUTPATIENT
Start: 2021-03-13 | End: 2021-03-13

## 2021-03-13 RX ORDER — POTASSIUM CHLORIDE 20 MEQ
40 PACKET (EA) ORAL ONCE
Refills: 0 | Status: COMPLETED | OUTPATIENT
Start: 2021-03-13 | End: 2021-03-13

## 2021-03-13 RX ADMIN — MORPHINE SULFATE 2 MILLIGRAM(S): 50 CAPSULE, EXTENDED RELEASE ORAL at 14:15

## 2021-03-13 RX ADMIN — Medication 30 MILLIGRAM(S): at 17:12

## 2021-03-13 RX ADMIN — Medication 30 MILLIGRAM(S): at 05:42

## 2021-03-13 RX ADMIN — MORPHINE SULFATE 2 MILLIGRAM(S): 50 CAPSULE, EXTENDED RELEASE ORAL at 12:30

## 2021-03-13 RX ADMIN — PANTOPRAZOLE SODIUM 40 MILLIGRAM(S): 20 TABLET, DELAYED RELEASE ORAL at 05:42

## 2021-03-13 RX ADMIN — Medication 50 GRAM(S): at 08:53

## 2021-03-13 RX ADMIN — ATORVASTATIN CALCIUM 80 MILLIGRAM(S): 80 TABLET, FILM COATED ORAL at 21:17

## 2021-03-13 RX ADMIN — MORPHINE SULFATE 2 MILLIGRAM(S): 50 CAPSULE, EXTENDED RELEASE ORAL at 00:14

## 2021-03-13 RX ADMIN — MORPHINE SULFATE 2 MILLIGRAM(S): 50 CAPSULE, EXTENDED RELEASE ORAL at 19:42

## 2021-03-13 RX ADMIN — PYRIDOSTIGMINE BROMIDE 180 MILLIGRAM(S): 60 SOLUTION ORAL at 05:41

## 2021-03-13 RX ADMIN — Medication 81 MILLIGRAM(S): at 12:55

## 2021-03-13 RX ADMIN — ISOSORBIDE MONONITRATE 30 MILLIGRAM(S): 60 TABLET, EXTENDED RELEASE ORAL at 12:55

## 2021-03-13 RX ADMIN — Medication 1.5 MICROGRAM(S)/MIN: at 20:41

## 2021-03-13 RX ADMIN — MORPHINE SULFATE 2 MILLIGRAM(S): 50 CAPSULE, EXTENDED RELEASE ORAL at 20:12

## 2021-03-13 RX ADMIN — Medication 0.4 MILLIGRAM(S): at 17:17

## 2021-03-13 RX ADMIN — MORPHINE SULFATE 2 MILLIGRAM(S): 50 CAPSULE, EXTENDED RELEASE ORAL at 07:03

## 2021-03-13 RX ADMIN — MORPHINE SULFATE 2 MILLIGRAM(S): 50 CAPSULE, EXTENDED RELEASE ORAL at 11:41

## 2021-03-13 RX ADMIN — Medication 0.25 MILLIGRAM(S): at 16:58

## 2021-03-13 RX ADMIN — Medication 1 INCH(S): at 18:01

## 2021-03-13 RX ADMIN — Medication 40 MILLIEQUIVALENT(S): at 17:13

## 2021-03-13 RX ADMIN — MORPHINE SULFATE 2 MILLIGRAM(S): 50 CAPSULE, EXTENDED RELEASE ORAL at 06:33

## 2021-03-13 RX ADMIN — Medication 40 MILLIEQUIVALENT(S): at 08:53

## 2021-03-13 RX ADMIN — Medication 40 MILLIGRAM(S): at 05:42

## 2021-03-13 RX ADMIN — MORPHINE SULFATE 2 MILLIGRAM(S): 50 CAPSULE, EXTENDED RELEASE ORAL at 13:55

## 2021-03-13 RX ADMIN — MORPHINE SULFATE 2 MILLIGRAM(S): 50 CAPSULE, EXTENDED RELEASE ORAL at 17:23

## 2021-03-13 RX ADMIN — CHLORHEXIDINE GLUCONATE 1 APPLICATION(S): 213 SOLUTION TOPICAL at 12:56

## 2021-03-13 RX ADMIN — PYRIDOSTIGMINE BROMIDE 180 MILLIGRAM(S): 60 SOLUTION ORAL at 17:14

## 2021-03-13 RX ADMIN — Medication 30 MILLIGRAM(S): at 12:55

## 2021-03-13 RX ADMIN — Medication 0.4 MILLIGRAM(S): at 17:10

## 2021-03-13 RX ADMIN — PANTOPRAZOLE SODIUM 40 MILLIGRAM(S): 20 TABLET, DELAYED RELEASE ORAL at 17:12

## 2021-03-13 RX ADMIN — SERTRALINE 100 MILLIGRAM(S): 25 TABLET, FILM COATED ORAL at 12:56

## 2021-03-13 RX ADMIN — CEFTRIAXONE 100 MILLIGRAM(S): 500 INJECTION, POWDER, FOR SOLUTION INTRAMUSCULAR; INTRAVENOUS at 05:43

## 2021-03-13 NOTE — PROGRESS NOTE ADULT - ASSESSMENT
75yo f admitted for GIB, NSTEMI, anemia, found to have bleeding duodenal ulcer on egd tx with injection and clip.   Pt feels better today, presently denies abd pain and tolerating clears. Last bm 2 days ago, hgb stable this morning at 9.8  -c/w PPI and advance diet as per GI  -will follow on case  -pt seen and examined with Dr Cabrera

## 2021-03-13 NOTE — PROGRESS NOTE ADULT - ASSESSMENT
a/p:   GI bleed  NSTEMI    Hgb stable at this time, s/p EGD and clipping of bleeding vessel. maintain Hgb >8, continue PPI.  Advance diet as per GI.  Cardio to f/u for NSTEMI. Pt remains critically ill.    Time spent >30 minutes

## 2021-03-13 NOTE — PROGRESS NOTE ADULT - SUBJECTIVE AND OBJECTIVE BOX
LENGTH OF HOSPITAL STAY: 5d    CHIEF COMPLAINT:   Patient is a 76y old  Female who presents with a chief complaint of Melena (13 Mar 2021 09:48)      HISTORY OF PRESENTING ILLNESS:    HPI:  76yFemale pmh HTN, MG discharged from [facility] recently with suspected type 2 MI and chronic anemia patient had no gross GI bleeding at the time. Patient now presents with an episode of vomiting her food early in the morning with speckles of blood in it. Patient also reports two melenic stools earlier in the morning today. Patient also reports two melenic stools earlier in the morning today. Patient with history of severe NSAID usage. Hb in ED was 4.3, lactate 6.8.        (08 Mar 2021 14:19)      Pt in bed no distress, stable overnight    PAST MEDICAL & SURGICAL HISTORY  PAST MEDICAL & SURGICAL HISTORY:  Anemia    High cholesterol    HTN (hypertension)    Myasthenia gravis    Hemangioma      SOCIAL HISTORY:    ALLERGIES:  No Known Allergies    MEDICATIONS:  STANDING MEDICATIONS  ALPRAZolam 0.25 milliGRAM(s) Oral every 12 hours  aspirin enteric coated 81 milliGRAM(s) Oral daily  atorvastatin 80 milliGRAM(s) Oral at bedtime  cefTRIAXone   IVPB 1000 milliGRAM(s) IV Intermittent every 24 hours  chlorhexidine 4% Liquid 1 Application(s) Topical <User Schedule>  diltiazem    Tablet 30 milliGRAM(s) Oral four times a day  furosemide    Tablet 20 milliGRAM(s) Oral daily  isosorbide   mononitrate ER Tablet (IMDUR) 30 milliGRAM(s) Oral daily  morphine  - Injectable 2 milliGRAM(s) IV Push every 6 hours  pantoprazole  Injectable 40 milliGRAM(s) IV Push every 12 hours  potassium chloride    Tablet ER 40 milliEquivalent(s) Oral two times a day  pyridostigmine 180 milliGRAM(s) Oral Once  pyridostigmine  milliGRAM(s) Oral two times a day  sertraline 100 milliGRAM(s) Oral daily    PRN MEDICATIONS  acetaminophen  Suppository .. 650 milliGRAM(s) Rectal every 6 hours PRN  morphine  - Injectable 2 milliGRAM(s) IV Push every 2 hours PRN  ondansetron Injectable 4 milliGRAM(s) IV Push every 8 hours PRN    VITALS:   T(F): 97.9  HR: 60  BP: 98/51  RR: 14  SpO2: 96%    LABS:                        9.8    10.28 )-----------( 304      ( 13 Mar 2021 06:35 )             29.6     03-13    141  |  100  |  31<H>  ----------------------------<  121<H>  2.8<L>   |  38<H>  |  1.3    Ca    8.6      13 Mar 2021 06:35  Mg     1.6     03-13    TPro  5.0<L>  /  Alb  3.1<L>  /  TBili  0.4  /  DBili  x   /  AST  31  /  ALT  47<H>  /  AlkPhos  208<H>  03-13          Troponin T, Serum: 3.83 ng/mL <HH> (03-13-21 @ 06:35)  Troponin T, Serum: 3.19 ng/mL <HH> (03-12-21 @ 18:51)      CARDIAC MARKERS ( 13 Mar 2021 06:35 )  x     / 3.83 ng/mL / x     / x     / 3.3 ng/mL  CARDIAC MARKERS ( 12 Mar 2021 18:51 )  x     / 3.19 ng/mL / x     / x     / x      CARDIAC MARKERS ( 12 Mar 2021 05:12 )  x     / 3.16 ng/mL / x     / x     / 14.6 ng/mL  CARDIAC MARKERS ( 11 Mar 2021 20:34 )  x     / 3.41 ng/mL / x     / x     / x      CARDIAC MARKERS ( 11 Mar 2021 16:04 )  x     / 3.16 ng/mL / x     / x     / 11.6 ng/mL  CARDIAC MARKERS ( 11 Mar 2021 13:31 )  x     / 2.93 ng/mL / x     / x     / 8.4 ng/mL      RADIOLOGY:  cxr    Impression:    Bilateral opacities, unchanged.    PHYSICAL EXAM:  GEN: No acute distress  HEENT: clear  LUNGS: Clear to auscultation bilaterally   HEART: S1/S2 present. RRR.   ABD: Soft, non-tender, non-distended. Bowel sounds present  EXT:-c/c/e  NEURO: responsive

## 2021-03-13 NOTE — PROGRESS NOTE ADULT - ASSESSMENT
75 y/o Female with PMH of HTN, [place] [facility] 2 [facility] and chronic anemia patient  Patient with history of severe NSAID usage     # Acute blood loss anemia secondary to Bleeding duodenal ulcer  -c/o melena and hematemesis, h/o NSAID abuse  -Hb 4 on admission. s/p 5U PRBC in total  -Patient intubated for EGD on 3/9 and extubated  today due to history of MG and tracheal deviation, considered high risk as per anesthesia  -EGD on 3/9 showed actively spurting vessel in posterior wall of duodenal bulb. s/p epinephrine inj and clipping  -advance diet to clears   -  change to Protonix IV q12h   -Active Type and Screen  -Transfuse PRN to hgb >8  -Two large bore IV lines, A line  -GI following    #NSTEMI  -Troponin trending up 2.67-->2.93-->3.19-->3.83 with elevated CKMB  -Serial EKG showed no acute ischemic changes  -Will trend trops for now  -Discussed with Cardiology, due to active GI bleeding on admission patient cannot be on antiplatelets or anticoagulation  -Imdur   - morphine prn for pain    #Thyroid mass  -Patient has 2 solid nodules in the thyroid abutting the trachea, paraspinal mass at T2 level likely pleural in origin  -Mass was never biopsied on last admission  -c/o severe shoulder pain, was using NSAIDs for intractable pain from the mass  -Might need biopsy before discharge    #Hypertension  -Hold amlodipine    #Myasthenia Gravis  -c/w pyridostigmine    #DVT ppx: SCD  #GI ppx: protonix  #Dispo: Acute

## 2021-03-13 NOTE — PROGRESS NOTE ADULT - ASSESSMENT
Patient had mi. Echo mid anteroseptal hypokinetic. She had mild chest pain today. Will increase cardizem. Not able use beta because myasthenia gravis. Keep hemoglobin 9 or better. Medical rx for now. Patient aware. Prognosis guarded

## 2021-03-13 NOTE — PROGRESS NOTE ADULT - SUBJECTIVE AND OBJECTIVE BOX
77 y/o Female with PMH of HTN, MG discharged from [facility] in [place] with suspected type 2 MI and chronic anemia patient  Patient with history of severe NSAID usage.    Today:  Seen at bedside, complaints of intermittent chest pressure, not present at the time of our encounter.          REVIEW OF SYSTEMS:  No current complaints      MEDICATIONS  (STANDING):  ALPRAZolam 0.25 milliGRAM(s) Oral every 12 hours  aspirin enteric coated 81 milliGRAM(s) Oral daily  atorvastatin 80 milliGRAM(s) Oral at bedtime  cefTRIAXone   IVPB 1000 milliGRAM(s) IV Intermittent every 24 hours  chlorhexidine 4% Liquid 1 Application(s) Topical <User Schedule>  diltiazem    Tablet 30 milliGRAM(s) Oral four times a day  furosemide    Tablet 20 milliGRAM(s) Oral daily  isosorbide   mononitrate ER Tablet (IMDUR) 30 milliGRAM(s) Oral daily  morphine  - Injectable 2 milliGRAM(s) IV Push every 6 hours  pantoprazole  Injectable 40 milliGRAM(s) IV Push every 12 hours  potassium chloride    Tablet ER 40 milliEquivalent(s) Oral two times a day  pyridostigmine 180 milliGRAM(s) Oral Once  pyridostigmine  milliGRAM(s) Oral two times a day  sertraline 100 milliGRAM(s) Oral daily    MEDICATIONS  (PRN):  acetaminophen  Suppository .. 650 milliGRAM(s) Rectal every 6 hours PRN Temp greater or equal to 38C (100.4F)  morphine  - Injectable 2 milliGRAM(s) IV Push every 2 hours PRN Severe Pain (7 - 10)  ondansetron Injectable 4 milliGRAM(s) IV Push every 8 hours PRN Nausea and/or Vomiting      Allergies  No Known Allergies          Vital Signs Last 24 Hrs  T(C): 36.6 (13 Mar 2021 07:14), Max: 36.9 (12 Mar 2021 19:00)  T(F): 97.9 (13 Mar 2021 07:14), Max: 98.4 (12 Mar 2021 19:00)  HR: 60 (13 Mar 2021 08:50) (60 - 979)  BP: 98/51 (13 Mar 2021 07:14) (91/53 - 119/57)  BP(mean): 73 (13 Mar 2021 07:14) (70 - 86)  RR: 14 (13 Mar 2021 07:14) (13 - 26)  SpO2: 96% (13 Mar 2021 08:50) (92% - 98%)    PHYSICAL EXAM:    GENERAL: NAD, well-groomed, well-developed  HEAD:  Atraumatic, Normocephalic  EYES: EOMI, PERRLA, conjunctiva and sclera clear  ENMT: No tonsillar erythema, exudates, or enlargement; Moist mucous membranes, Good dentition, No lesions  NECK: Supple, No JVD, Normal thyroid  NERVOUS SYSTEM:  Alert & Oriented X3, Good concentration  CHEST/LUNG: Clear to percussion bilaterally; No rales, rhonchi, wheezing, or rubs  HEART: Regular rate and rhythm; No murmurs, rubs, or gallops  ABDOMEN: Soft, Nontender, Nondistended; Bowel sounds present  EXTREMITIES:  2+ Peripheral Pulses, No clubbing, cyanosis, or edema  SKIN: L arm ecchymoses      LABS:                        9.8    10.28 )-----------( 304      ( 13 Mar 2021 06:35 )             29.6     03-13    141  |  100  |  31<H>  ----------------------------<  121<H>  2.8<L>   |  38<H>  |  1.3    Ca    8.6      13 Mar 2021 06:35  Mg     1.6     03-13    TPro  5.0<L>  /  Alb  3.1<L>  /  TBili  0.4  /  DBili  x   /  AST  31  /  ALT  47<H>  /  AlkPhos  208<H>  03-13

## 2021-03-13 NOTE — PROGRESS NOTE ADULT - SUBJECTIVE AND OBJECTIVE BOX
HPI:  Pt feels better today, presently denies abd pain and tolerating clears. Last bm 2 days ago,       PAST MEDICAL & SURGICAL HISTORY:  Anemia    High cholesterol    HTN (hypertension)    Myasthenia gravis    Hemangioma        Allergies    No Known Allergies          MEDICATIONS  (STANDING):  ALPRAZolam 0.25 milliGRAM(s) Oral every 12 hours  aspirin enteric coated 81 milliGRAM(s) Oral daily  atorvastatin 80 milliGRAM(s) Oral at bedtime  cefTRIAXone   IVPB 1000 milliGRAM(s) IV Intermittent every 24 hours  chlorhexidine 4% Liquid 1 Application(s) Topical <User Schedule>  diltiazem    Tablet 30 milliGRAM(s) Oral four times a day  furosemide    Tablet 20 milliGRAM(s) Oral daily  isosorbide   mononitrate ER Tablet (IMDUR) 30 milliGRAM(s) Oral daily  morphine  - Injectable 2 milliGRAM(s) IV Push every 6 hours  pantoprazole  Injectable 40 milliGRAM(s) IV Push every 12 hours  potassium chloride    Tablet ER 40 milliEquivalent(s) Oral two times a day  pyridostigmine 180 milliGRAM(s) Oral Once  pyridostigmine  milliGRAM(s) Oral two times a day  sertraline 100 milliGRAM(s) Oral daily    MEDICATIONS  (PRN):  acetaminophen  Suppository .. 650 milliGRAM(s) Rectal every 6 hours PRN Temp greater or equal to 38C (100.4F)  morphine  - Injectable 2 milliGRAM(s) IV Push every 2 hours PRN Severe Pain (7 - 10)  ondansetron Injectable 4 milliGRAM(s) IV Push every 8 hours PRN Nausea and/or Vomiting    ROS:  General:	no fever, weight loss,  chills  Gastrointestinal:	no nausea, vomiting, diarrhea, abd pain  Genitourinary:	no dysuria, hematuria        Vital Signs Last 24 Hrs  T(F): 97.9 (13 Mar 2021 07:14), Max: 98.4 (12 Mar 2021 19:00)  HR: 60 (13 Mar 2021 08:50) (60 - 979)  BP: 98/51 (13 Mar 2021 07:14) (91/53 - 133/59)  BP(mean): 73 (13 Mar 2021 07:14) (70 - 86)  RR: 14 (13 Mar 2021 07:14) (13 - 26)  SpO2: 96% (13 Mar 2021 08:50) (92% - 100%)    PHYSICAL EXAM:      Constitutional: A&Ox4  Gastrointestinal: soft nt  nd + bs no rebound or guarding  Genitourinary: no cva tenderness  Extremities: normal rom, no edema, calf tenderness                              9.8    10.28 )-----------( 304      ( 13 Mar 2021 06:35 )             29.6       03-13    141  |  100  |  31<H>  ----------------------------<  121<H>  2.8<L>   |  38<H>  |  1.3    Ca    8.6      13 Mar 2021 06:35  Mg     1.6     03-13    TPro  5.0<L>  /  Alb  3.1<L>  /  TBili  0.4  /  DBili  x   /  AST  31  /  ALT  47<H>  /  AlkPhos  208<H>  03-13

## 2021-03-13 NOTE — PROGRESS NOTE ADULT - SUBJECTIVE AND OBJECTIVE BOX
Patient is a 76y old  Female who presents with a chief complaint of Melena (12 Mar 2021 16:16)      T(F): 97.7 (03-13-21 @ 03:01), Max: 98.4 (03-12-21 @ 19:00)  HR: 69 (03-13-21 @ 06:16)  BP: 113/59 (03-13-21 @ 06:16)  RR: 19 (03-13-21 @ 06:16)  SpO2: 96% (03-13-21 @ 06:16) (92% - 100%)    PHYSICAL EXAM:  GENERAL: NAD, well-groomed, well-developed  HEAD:  Atraumatic, Normocephalic  EYES: EOMI, PERRLA, conjunctiva and sclera clear  ENMT: No tonsillar erythema, exudates, or enlargement; Moist mucous membranes, Good dentition, No lesions  NECK: Supple, No JVD, Normal thyroid  NERVOUS SYSTEM:  Alert & Oriented X3,  Motor Strength 5/5 B/L upper and lower extremities  CHEST/LUNG: Clear to percussion bilaterally; No rales, rhonchi, wheezing, or rubs  HEART: Regular rate and rhythm; No murmurs, rubs, or gallops  ABDOMEN: Soft, Nontender, Nondistended; Bowel sounds present  EXTREMITIES:   No clubbing, cyanosis, or edema  LYMPH: No lymphadenopathy noted  SKIN: No rashes or lesions    labs  03-12    146  |  106  |  42<H>  ----------------------------<  84  3.6   |  21  |  1.9<H>    Ca    8.8      12 Mar 2021 05:12  Mg     2.0     03-12    TPro  5.2<L>  /  Alb  3.1<L>  /  TBili  0.4  /  DBili  <0.2  /  AST  74<H>  /  ALT  72<H>  /  AlkPhos  263<H>  03-12                          9.6    11.92 )-----------( 276      ( 12 Mar 2021 18:51 )             29.3           Troponin T, Serum: 3.19 ng/mL <HH> (03-12-21 @ 18:51)      acetaminophen  Suppository .. 650 milliGRAM(s) Rectal every 6 hours PRN  ALPRAZolam 0.25 milliGRAM(s) Oral every 12 hours  aspirin enteric coated 81 milliGRAM(s) Oral daily  atorvastatin 80 milliGRAM(s) Oral at bedtime  cefTRIAXone   IVPB 1000 milliGRAM(s) IV Intermittent every 24 hours  chlorhexidine 4% Liquid 1 Application(s) Topical <User Schedule>  diltiazem    Tablet 30 milliGRAM(s) Oral every 8 hours  furosemide   Injectable 40 milliGRAM(s) IV Push two times a day  isosorbide   mononitrate ER Tablet (IMDUR) 30 milliGRAM(s) Oral daily  morphine  - Injectable 2 milliGRAM(s) IV Push every 2 hours PRN  morphine  - Injectable 2 milliGRAM(s) IV Push every 6 hours  ondansetron Injectable 4 milliGRAM(s) IV Push every 8 hours PRN  pantoprazole  Injectable 40 milliGRAM(s) IV Push every 12 hours  pyridostigmine 180 milliGRAM(s) Oral Once  pyridostigmine  milliGRAM(s) Oral two times a day  sertraline 100 milliGRAM(s) Oral daily

## 2021-03-14 LAB
ALBUMIN SERPL ELPH-MCNC: 3.3 G/DL — LOW (ref 3.5–5.2)
ALP SERPL-CCNC: 205 U/L — HIGH (ref 30–115)
ALT FLD-CCNC: 37 U/L — SIGNIFICANT CHANGE UP (ref 0–41)
ANION GAP SERPL CALC-SCNC: 10 MMOL/L — SIGNIFICANT CHANGE UP (ref 7–14)
AST SERPL-CCNC: 20 U/L — SIGNIFICANT CHANGE UP (ref 0–41)
BASOPHILS # BLD AUTO: 0.02 K/UL — SIGNIFICANT CHANGE UP (ref 0–0.2)
BASOPHILS NFR BLD AUTO: 0.2 % — SIGNIFICANT CHANGE UP (ref 0–1)
BILIRUB SERPL-MCNC: 0.5 MG/DL — SIGNIFICANT CHANGE UP (ref 0.2–1.2)
BUN SERPL-MCNC: 26 MG/DL — HIGH (ref 10–20)
CALCIUM SERPL-MCNC: 9.1 MG/DL — SIGNIFICANT CHANGE UP (ref 8.5–10.1)
CHLORIDE SERPL-SCNC: 98 MMOL/L — SIGNIFICANT CHANGE UP (ref 98–110)
CO2 SERPL-SCNC: 31 MMOL/L — SIGNIFICANT CHANGE UP (ref 17–32)
CREAT SERPL-MCNC: 1.3 MG/DL — SIGNIFICANT CHANGE UP (ref 0.7–1.5)
EOSINOPHIL # BLD AUTO: 0.06 K/UL — SIGNIFICANT CHANGE UP (ref 0–0.7)
EOSINOPHIL NFR BLD AUTO: 0.5 % — SIGNIFICANT CHANGE UP (ref 0–8)
GLUCOSE SERPL-MCNC: 126 MG/DL — HIGH (ref 70–99)
HCT VFR BLD CALC: 32.2 % — LOW (ref 37–47)
HGB BLD-MCNC: 10.3 G/DL — LOW (ref 12–16)
IMM GRANULOCYTES NFR BLD AUTO: 0.4 % — HIGH (ref 0.1–0.3)
LYMPHOCYTES # BLD AUTO: 1.08 K/UL — LOW (ref 1.2–3.4)
LYMPHOCYTES # BLD AUTO: 9.5 % — LOW (ref 20.5–51.1)
MAGNESIUM SERPL-MCNC: 2.1 MG/DL — SIGNIFICANT CHANGE UP (ref 1.8–2.4)
MCHC RBC-ENTMCNC: 27.3 PG — SIGNIFICANT CHANGE UP (ref 27–31)
MCHC RBC-ENTMCNC: 32 G/DL — SIGNIFICANT CHANGE UP (ref 32–37)
MCV RBC AUTO: 85.4 FL — SIGNIFICANT CHANGE UP (ref 81–99)
MONOCYTES # BLD AUTO: 1.02 K/UL — HIGH (ref 0.1–0.6)
MONOCYTES NFR BLD AUTO: 9 % — SIGNIFICANT CHANGE UP (ref 1.7–9.3)
NEUTROPHILS # BLD AUTO: 9.12 K/UL — HIGH (ref 1.4–6.5)
NEUTROPHILS NFR BLD AUTO: 80.4 % — HIGH (ref 42.2–75.2)
NRBC # BLD: 0 /100 WBCS — SIGNIFICANT CHANGE UP (ref 0–0)
PLATELET # BLD AUTO: 339 K/UL — SIGNIFICANT CHANGE UP (ref 130–400)
POTASSIUM SERPL-MCNC: 4 MMOL/L — SIGNIFICANT CHANGE UP (ref 3.5–5)
POTASSIUM SERPL-SCNC: 4 MMOL/L — SIGNIFICANT CHANGE UP (ref 3.5–5)
PROT SERPL-MCNC: 5.6 G/DL — LOW (ref 6–8)
RBC # BLD: 3.77 M/UL — LOW (ref 4.2–5.4)
RBC # FLD: 17.4 % — HIGH (ref 11.5–14.5)
SODIUM SERPL-SCNC: 139 MMOL/L — SIGNIFICANT CHANGE UP (ref 135–146)
TROPONIN T SERPL-MCNC: 3.45 NG/ML — CRITICAL HIGH
WBC # BLD: 11.35 K/UL — HIGH (ref 4.8–10.8)
WBC # FLD AUTO: 11.35 K/UL — HIGH (ref 4.8–10.8)

## 2021-03-14 PROCEDURE — 99232 SBSQ HOSP IP/OBS MODERATE 35: CPT

## 2021-03-14 PROCEDURE — 99233 SBSQ HOSP IP/OBS HIGH 50: CPT

## 2021-03-14 RX ORDER — DILTIAZEM HCL 120 MG
60 CAPSULE, EXT RELEASE 24 HR ORAL
Refills: 0 | Status: DISCONTINUED | OUTPATIENT
Start: 2021-03-14 | End: 2021-03-22

## 2021-03-14 RX ADMIN — SERTRALINE 100 MILLIGRAM(S): 25 TABLET, FILM COATED ORAL at 11:13

## 2021-03-14 RX ADMIN — Medication 60 MILLIGRAM(S): at 18:41

## 2021-03-14 RX ADMIN — CHLORHEXIDINE GLUCONATE 1 APPLICATION(S): 213 SOLUTION TOPICAL at 09:08

## 2021-03-14 RX ADMIN — MORPHINE SULFATE 2 MILLIGRAM(S): 50 CAPSULE, EXTENDED RELEASE ORAL at 05:22

## 2021-03-14 RX ADMIN — Medication 40 MILLIEQUIVALENT(S): at 05:16

## 2021-03-14 RX ADMIN — MORPHINE SULFATE 2 MILLIGRAM(S): 50 CAPSULE, EXTENDED RELEASE ORAL at 14:37

## 2021-03-14 RX ADMIN — Medication 20 MILLIGRAM(S): at 05:17

## 2021-03-14 RX ADMIN — MORPHINE SULFATE 2 MILLIGRAM(S): 50 CAPSULE, EXTENDED RELEASE ORAL at 21:03

## 2021-03-14 RX ADMIN — Medication 60 MILLIGRAM(S): at 11:13

## 2021-03-14 RX ADMIN — Medication 0.25 MILLIGRAM(S): at 09:08

## 2021-03-14 RX ADMIN — MORPHINE SULFATE 2 MILLIGRAM(S): 50 CAPSULE, EXTENDED RELEASE ORAL at 11:12

## 2021-03-14 RX ADMIN — ATORVASTATIN CALCIUM 80 MILLIGRAM(S): 80 TABLET, FILM COATED ORAL at 21:02

## 2021-03-14 RX ADMIN — ISOSORBIDE MONONITRATE 30 MILLIGRAM(S): 60 TABLET, EXTENDED RELEASE ORAL at 11:13

## 2021-03-14 RX ADMIN — Medication 60 MILLIGRAM(S): at 23:46

## 2021-03-14 RX ADMIN — Medication 1.5 MICROGRAM(S)/MIN: at 23:12

## 2021-03-14 RX ADMIN — MORPHINE SULFATE 2 MILLIGRAM(S): 50 CAPSULE, EXTENDED RELEASE ORAL at 15:10

## 2021-03-14 RX ADMIN — CEFTRIAXONE 100 MILLIGRAM(S): 500 INJECTION, POWDER, FOR SOLUTION INTRAMUSCULAR; INTRAVENOUS at 05:17

## 2021-03-14 RX ADMIN — MORPHINE SULFATE 2 MILLIGRAM(S): 50 CAPSULE, EXTENDED RELEASE ORAL at 19:00

## 2021-03-14 RX ADMIN — Medication 1 INCH(S): at 07:00

## 2021-03-14 RX ADMIN — MORPHINE SULFATE 2 MILLIGRAM(S): 50 CAPSULE, EXTENDED RELEASE ORAL at 11:45

## 2021-03-14 RX ADMIN — PANTOPRAZOLE SODIUM 40 MILLIGRAM(S): 20 TABLET, DELAYED RELEASE ORAL at 18:41

## 2021-03-14 RX ADMIN — PYRIDOSTIGMINE BROMIDE 180 MILLIGRAM(S): 60 SOLUTION ORAL at 18:41

## 2021-03-14 RX ADMIN — MORPHINE SULFATE 2 MILLIGRAM(S): 50 CAPSULE, EXTENDED RELEASE ORAL at 01:41

## 2021-03-14 RX ADMIN — Medication 30 MILLIGRAM(S): at 00:01

## 2021-03-14 RX ADMIN — MORPHINE SULFATE 2 MILLIGRAM(S): 50 CAPSULE, EXTENDED RELEASE ORAL at 21:45

## 2021-03-14 RX ADMIN — PYRIDOSTIGMINE BROMIDE 180 MILLIGRAM(S): 60 SOLUTION ORAL at 05:15

## 2021-03-14 RX ADMIN — Medication 81 MILLIGRAM(S): at 11:13

## 2021-03-14 RX ADMIN — PANTOPRAZOLE SODIUM 40 MILLIGRAM(S): 20 TABLET, DELAYED RELEASE ORAL at 05:16

## 2021-03-14 RX ADMIN — Medication 0.25 MILLIGRAM(S): at 20:52

## 2021-03-14 RX ADMIN — MORPHINE SULFATE 2 MILLIGRAM(S): 50 CAPSULE, EXTENDED RELEASE ORAL at 18:41

## 2021-03-14 NOTE — PROGRESS NOTE ADULT - ASSESSMENT
Patient had mi. Echo mid anteroseptal hypokinetic. Patient had angina. Now on iv ntg.  Will increase cardizem. Not able use beta because myasthenia gravis. Keep hemoglobin 9 or better. Medical rx for now. Patient aware. If more pain may need give heparin.  Prognosis guarded

## 2021-03-14 NOTE — PROGRESS NOTE ADULT - ASSESSMENT
75 y/o Female with PMH of HTN, [facility] 2 [facility] and chronic anemia patient  Patient with history of severe NSAID usage     # Acute blood loss anemia secondary to Bleeding duodenal ulcer  -c/o melena and hematemesis, h/o NSAID abuse  -Hb 4 on admission. s/p 5U PRBC in total  -Patient intubated for EGD on 3/9 and extubated  today due to history of MG and tracheal deviation, considered high risk as per anesthesia  -EGD on 3/9 showed actively spurting vessel in posterior wall of duodenal bulb. s/p epinephrine inj and clipping  - now tolerating  diet    -  Protonix  q12h   -Active Type and Screen  -Transfuse PRN to hgb >8  -Two large bore IV lines  -GI following    #NSTEMI    -now on IV nitro with improving symptoms   - meds as per Cardiology   - tele    #Thyroid mass  -Patient has 2 solid nodules in the thyroid abutting the trachea, paraspinal mass at T2 level likely pleural in origin  -c/o severe shoulder pain, was using NSAIDs for intractable pain from the mas    #Hypertension  -Hold amlodipine    #Myasthenia Gravis  -c/w pyridostigmine    #DVT ppx: SCD

## 2021-03-14 NOTE — PROGRESS NOTE ADULT - ASSESSMENT
75 y/o female s/p UGI bleed from duodenal ulcer, clipped and bleeding has been controlled.  She has also had a recent MI.    H/H stable  Continue to monitor H/H, continue soft diet  Care per ICU team

## 2021-03-14 NOTE — PROGRESS NOTE ADULT - SUBJECTIVE AND OBJECTIVE BOX
Patient seen & examined.  No acute events noted overnight. She denies abdominal pain.  No melena or vomiting. She is tolerating a soft diet.  H/H stable. She is still complaining of chest pain.    I&O's Detail    13 Mar 2021 06:01  -  14 Mar 2021 07:00  --------------------------------------------------------  IN:    IV PiggyBack: 100 mL    Nitroglycerin: 64.5 mL  Total IN: 164.5 mL    OUT:    Indwelling Catheter - Urethral (mL): 1605 mL  Total OUT: 1605 mL    Total NET: -1440.5 mL      14 Mar 2021 07:01  -  14 Mar 2021 09:35  --------------------------------------------------------  IN:  Total IN: 0 mL    OUT:    Indwelling Catheter - Urethral (mL): 310 mL  Total OUT: 310 mL    Total NET: -310 mL              MEDICATIONS  (STANDING):  ALPRAZolam 0.25 milliGRAM(s) Oral every 12 hours  aspirin enteric coated 81 milliGRAM(s) Oral daily  atorvastatin 80 milliGRAM(s) Oral at bedtime  cefTRIAXone   IVPB 1000 milliGRAM(s) IV Intermittent every 24 hours  chlorhexidine 4% Liquid 1 Application(s) Topical <User Schedule>  diltiazem    Tablet 60 milliGRAM(s) Oral four times a day  furosemide    Tablet 20 milliGRAM(s) Oral daily  isosorbide   mononitrate ER Tablet (IMDUR) 30 milliGRAM(s) Oral daily  morphine  - Injectable 2 milliGRAM(s) IV Push every 4 hours  nitroglycerin  Infusion 5 MICROgram(s)/Min (1.5 mL/Hr) IV Continuous <Continuous>  pantoprazole  Injectable 40 milliGRAM(s) IV Push every 12 hours  pyridostigmine 180 milliGRAM(s) Oral Once  pyridostigmine  milliGRAM(s) Oral two times a day  sertraline 100 milliGRAM(s) Oral daily    MEDICATIONS  (PRN):  acetaminophen  Suppository .. 650 milliGRAM(s) Rectal every 6 hours PRN Temp greater or equal to 38C (100.4F)  morphine  - Injectable 2 milliGRAM(s) IV Push every 2 hours PRN Severe Pain (7 - 10)  nitroglycerin     SubLingual 0.4 milliGRAM(s) SubLingual every 5 minutes PRN Chest Pain  ondansetron Injectable 4 milliGRAM(s) IV Push every 8 hours PRN Nausea and/or Vomiting          Vital Signs Last 24 Hrs  T(C): 35.8 (14 Mar 2021 09:00), Max: 37.3 (13 Mar 2021 15:00)  T(F): 96.4 (14 Mar 2021 09:00), Max: 99.1 (13 Mar 2021 15:00)  HR: 75 (14 Mar 2021 09:00) (70 - 131)  BP: 117/56 (14 Mar 2021 09:00) (88/52 - 126/60)  BP(mean): 78 (14 Mar 2021 08:30) (62 - 87)  RR: 22 (14 Mar 2021 09:00) (17 - 29)  SpO2: 98% (14 Mar 2021 09:00) (97% - 100%)    Physical Exam:  General:  WD, WN, conversant in NAD.   Abdomen:  + Bowel sounds, soft, no distention, non-tender,  no rebound/guarding or peritoneal signs.            LABS:                        10.3   11.35 )-----------( 339      ( 14 Mar 2021 06:45 )             32.2     03-14    139  |  98  |  26<H>  ----------------------------<  126<H>  4.0   |  31  |  1.3    Ca    9.1      14 Mar 2021 06:45  Mg     2.1     03-14    TPro  5.6<L>  /  Alb  3.3<L>  /  TBili  0.5  /  DBili  x   /  AST  20  /  ALT  37  /  AlkPhos  205<H>  03-14                RADIOLOGY & ADDITIONAL STUDIES:

## 2021-03-14 NOTE — PROGRESS NOTE ADULT - SUBJECTIVE AND OBJECTIVE BOX
· Subjective and Objective:    Patient sen and evaluated this am, pt was eating breakfast - on IV nitro reported chest pressure is improved      T(F): 97.7 (03-14-21 @ 11:00), Max: 99.1 (03-13-21 @ 15:00)  HR: 78 (03-14-21 @ 11:00)  BP: 95/52 (03-14-21 @ 11:00)  RR: 18 (03-14-21 @ 11:00)  SpO2: 97% (03-14-21 @ 11:00) (97% - 100%)    PHYSICAL EXAM:  GENERAL: NAD  HEAD:  Atraumatic, Normocephalic  NERVOUS SYSTEM:  Alert & Oriented X3, no focal deficits   CHEST/LUNG:  bilateral rhonchi  HEART: Regular rate and rhythm; No murmurs, rubs, or gallops  ABDOMEN: Soft, Nontender, Nondistended; Bowel sounds present  EXTREMITIES:  2+ Peripheral Pulses, No clubbing, cyanosis, or edema  LYMPH: No lymphadenopathy noted  SKIN: No rashes or lesions    LABS  03-14    139  |  98  |  26<H>  ----------------------------<  126<H>  4.0   |  31  |  1.3    Ca    9.1      14 Mar 2021 06:45  Mg     2.1     03-14    TPro  5.6<L>  /  Alb  3.3<L>  /  TBili  0.5  /  DBili  x   /  AST  20  /  ALT  37  /  AlkPhos  205<H>  03-14                          10.3   11.35 )-----------( 339      ( 14 Mar 2021 06:45 )             32.2         CARDIAC ENZYMES  Creatine Kinase, Serum: 47 (03-13 @ 17:00)    CKMB Units: 3.3 (03-13 @ 06:35)  CKMB Units: 14.6 (03-12 @ 05:12)  CKMB Units: 11.6 (03-11 @ 16:04)  CKMB Units: 8.4 (03-11 @ 13:31)    Troponin T, Serum: 3.45 ng/mL (03-14-21 @ 06:45)  Troponin T, Serum: 3.52 ng/mL (03-13-21 @ 17:00)  Troponin T, Serum: 3.83 ng/mL (03-13-21 @ 06:35)  Troponin T, Serum: 3.19 ng/mL (03-12-21 @ 18:51)  Troponin T, Serum: 3.16 ng/mL (03-12-21 @ 05:12)  Troponin T, Serum: 3.41 ng/mL (03-11-21 @ 20:34)  Troponin T, Serum: 3.16 ng/mL (03-11-21 @ 16:04)  Troponin T, Serum: 2.93 ng/mL (03-11-21 @ 13:31)      Culture Results:   <10,000 CFU/mL Normal Urogenital Juanita (03-02-21)    RADIOLOGY  < from: Xray Chest 1 View- PORTABLE-Routine (Xray Chest 1 View- PORTABLE-Routine in AM.) (03.13.21 @ 06:10) >  Impression:    Bilateral opacities, unchanged.      < end of copied text >    MEDICATIONS  (STANDING):  ALPRAZolam 0.25 milliGRAM(s) Oral every 12 hours  aspirin enteric coated 81 milliGRAM(s) Oral daily  atorvastatin 80 milliGRAM(s) Oral at bedtime  cefTRIAXone   IVPB 1000 milliGRAM(s) IV Intermittent every 24 hours  chlorhexidine 4% Liquid 1 Application(s) Topical <User Schedule>  diltiazem    Tablet 60 milliGRAM(s) Oral four times a day  furosemide    Tablet 20 milliGRAM(s) Oral daily  isosorbide   mononitrate ER Tablet (IMDUR) 30 milliGRAM(s) Oral daily  morphine  - Injectable 2 milliGRAM(s) IV Push every 4 hours  nitroglycerin  Infusion 5 MICROgram(s)/Min (1.5 mL/Hr) IV Continuous <Continuous>  pantoprazole  Injectable 40 milliGRAM(s) IV Push every 12 hours  pyridostigmine 180 milliGRAM(s) Oral Once  pyridostigmine  milliGRAM(s) Oral two times a day  sertraline 100 milliGRAM(s) Oral daily    MEDICATIONS  (PRN):  acetaminophen  Suppository .. 650 milliGRAM(s) Rectal every 6 hours PRN Temp greater or equal to 38C (100.4F)  morphine  - Injectable 2 milliGRAM(s) IV Push every 2 hours PRN Severe Pain (7 - 10)  nitroglycerin     SubLingual 0.4 milliGRAM(s) SubLingual every 5 minutes PRN Chest Pain  ondansetron Injectable 4 milliGRAM(s) IV Push every 8 hours PRN Nausea and/or Vomiting        Assessment and Plan:   · Assessment	  75 y/o Female with PMH of HTN, MG discharged from [facility] recently with suspected type 2 MI and chronic anemia patient  Patient with history of severe NSAID usage     # Acute blood loss anemia secondary to Bleeding duodenal ulcer  -c/o melena and hematemesis, h/o NSAID abuse  -Hb 4 on admission. s/p 5U PRBC in total  -Patient intubated for EGD on 3/9 and extubated  today due to history of MG and tracheal deviation, considered high risk as per anesthesia  -EGD on 3/9 showed actively spurting vessel in posterior wall of duodenal bulb. s/p epinephrine inj and clipping  - now tolerating  diet    -  Protonix  q12h   -Active Type and Screen  -Transfuse PRN to hgb >8  -Two large bore IV lines  -GI following    #NSTEMI    -now on IV nitro with improving symptoms   - meds as per Cardiology   - tele    #Thyroid mass  -Patient has 2 solid nodules in the thyroid abutting the trachea, paraspinal mass at T2 level likely pleural in origin  -c/o severe shoulder pain, was using NSAIDs for intractable pain from the mas    #Hypertension  -Hold amlodipine    #Myasthenia Gravis  -c/w pyridostigmine    #DVT ppx: SCD

## 2021-03-14 NOTE — PROGRESS NOTE ADULT - SUBJECTIVE AND OBJECTIVE BOX
Patient sen and evaluated this am, pt was eating breakfast - on IV nitro reported chest pressure is improved      T(F): 97.7 (03-14-21 @ 11:00), Max: 99.1 (03-13-21 @ 15:00)  HR: 78 (03-14-21 @ 11:00)  BP: 95/52 (03-14-21 @ 11:00)  RR: 18 (03-14-21 @ 11:00)  SpO2: 97% (03-14-21 @ 11:00) (97% - 100%)    PHYSICAL EXAM:  GENERAL: NAD  HEAD:  Atraumatic, Normocephalic  NERVOUS SYSTEM:  Alert & Oriented X3, no focal deficits   CHEST/LUNG:  bilateral rhonchi  HEART: Regular rate and rhythm; No murmurs, rubs, or gallops  ABDOMEN: Soft, Nontender, Nondistended; Bowel sounds present  EXTREMITIES:  2+ Peripheral Pulses, No clubbing, cyanosis, or edema  LYMPH: No lymphadenopathy noted  SKIN: No rashes or lesions    LABS  03-14    139  |  98  |  26<H>  ----------------------------<  126<H>  4.0   |  31  |  1.3    Ca    9.1      14 Mar 2021 06:45  Mg     2.1     03-14    TPro  5.6<L>  /  Alb  3.3<L>  /  TBili  0.5  /  DBili  x   /  AST  20  /  ALT  37  /  AlkPhos  205<H>  03-14                          10.3   11.35 )-----------( 339      ( 14 Mar 2021 06:45 )             32.2         CARDIAC ENZYMES  Creatine Kinase, Serum: 47 (03-13 @ 17:00)    CKMB Units: 3.3 (03-13 @ 06:35)  CKMB Units: 14.6 (03-12 @ 05:12)  CKMB Units: 11.6 (03-11 @ 16:04)  CKMB Units: 8.4 (03-11 @ 13:31)    Troponin T, Serum: 3.45 ng/mL (03-14-21 @ 06:45)  Troponin T, Serum: 3.52 ng/mL (03-13-21 @ 17:00)  Troponin T, Serum: 3.83 ng/mL (03-13-21 @ 06:35)  Troponin T, Serum: 3.19 ng/mL (03-12-21 @ 18:51)  Troponin T, Serum: 3.16 ng/mL (03-12-21 @ 05:12)  Troponin T, Serum: 3.41 ng/mL (03-11-21 @ 20:34)  Troponin T, Serum: 3.16 ng/mL (03-11-21 @ 16:04)  Troponin T, Serum: 2.93 ng/mL (03-11-21 @ 13:31)      Culture Results:   <10,000 CFU/mL Normal Urogenital Juanita (03-02-21)    RADIOLOGY  < from: Xray Chest 1 View- PORTABLE-Routine (Xray Chest 1 View- PORTABLE-Routine in AM.) (03.13.21 @ 06:10) >  Impression:    Bilateral opacities, unchanged.      < end of copied text >    MEDICATIONS  (STANDING):  ALPRAZolam 0.25 milliGRAM(s) Oral every 12 hours  aspirin enteric coated 81 milliGRAM(s) Oral daily  atorvastatin 80 milliGRAM(s) Oral at bedtime  cefTRIAXone   IVPB 1000 milliGRAM(s) IV Intermittent every 24 hours  chlorhexidine 4% Liquid 1 Application(s) Topical <User Schedule>  diltiazem    Tablet 60 milliGRAM(s) Oral four times a day  furosemide    Tablet 20 milliGRAM(s) Oral daily  isosorbide   mononitrate ER Tablet (IMDUR) 30 milliGRAM(s) Oral daily  morphine  - Injectable 2 milliGRAM(s) IV Push every 4 hours  nitroglycerin  Infusion 5 MICROgram(s)/Min (1.5 mL/Hr) IV Continuous <Continuous>  pantoprazole  Injectable 40 milliGRAM(s) IV Push every 12 hours  pyridostigmine 180 milliGRAM(s) Oral Once  pyridostigmine  milliGRAM(s) Oral two times a day  sertraline 100 milliGRAM(s) Oral daily    MEDICATIONS  (PRN):  acetaminophen  Suppository .. 650 milliGRAM(s) Rectal every 6 hours PRN Temp greater or equal to 38C (100.4F)  morphine  - Injectable 2 milliGRAM(s) IV Push every 2 hours PRN Severe Pain (7 - 10)  nitroglycerin     SubLingual 0.4 milliGRAM(s) SubLingual every 5 minutes PRN Chest Pain  ondansetron Injectable 4 milliGRAM(s) IV Push every 8 hours PRN Nausea and/or Vomiting

## 2021-03-14 NOTE — PROGRESS NOTE ADULT - SUBJECTIVE AND OBJECTIVE BOX
Patient is a 76y old  Female who presents with a chief complaint of Melena (13 Mar 2021 13:37)      T(F): 97.7 (03-14-21 @ 03:18), Max: 99.1 (03-13-21 @ 15:00)  HR: 71 (03-14-21 @ 04:33)  BP: 92/54 (03-14-21 @ 04:33)  RR: 17 (03-14-21 @ 04:33)  SpO2: 99% (03-14-21 @ 04:33) (96% - 100%)    PHYSICAL EXAM:  GENERAL: NAD, well-groomed, well-developed  HEAD:  Atraumatic, Normocephalic  EYES: EOMI, PERRLA, conjunctiva and sclera clear  ENMT: No tonsillar erythema, exudates, or enlargement; Moist mucous membranes, Good dentition, No lesions  NECK: Supple, No JVD, Normal thyroid  NERVOUS SYSTEM:  Alert & Oriented X3,  Motor Strength 5/5 B/L upper and lower extremities  CHEST/LUNG: Clear to percussion bilaterally; No rales, rhonchi, wheezing, or rubs  HEART: Regular rate and rhythm; No murmurs, rubs, or gallops  ABDOMEN: Soft, Nontender, Nondistended; Bowel sounds present  EXTREMITIES:   No clubbing, cyanosis, or edema  LYMPH: No lymphadenopathy noted  SKIN: No rashes or lesions    labs  03-13    134<L>  |  96<L>  |  28<H>  ----------------------------<  151<H>  3.5   |  28  |  1.4    Ca    8.6      13 Mar 2021 19:00  Mg     1.6     03-13    TPro  5.0<L>  /  Alb  3.1<L>  /  TBili  0.4  /  DBili  x   /  AST  31  /  ALT  47<H>  /  AlkPhos  208<H>  03-13                          9.8    10.28 )-----------( 304      ( 13 Mar 2021 06:35 )             29.6           Troponin T, Serum: 3.52 ng/mL <HH> (03-13-21 @ 17:00)  Creatine Kinase, Serum: 47 U/L (03-13-21 @ 17:00)      acetaminophen  Suppository .. 650 milliGRAM(s) Rectal every 6 hours PRN  ALPRAZolam 0.25 milliGRAM(s) Oral every 12 hours  aspirin enteric coated 81 milliGRAM(s) Oral daily  atorvastatin 80 milliGRAM(s) Oral at bedtime  cefTRIAXone   IVPB 1000 milliGRAM(s) IV Intermittent every 24 hours  chlorhexidine 4% Liquid 1 Application(s) Topical <User Schedule>  diltiazem    Tablet 30 milliGRAM(s) Oral four times a day  furosemide    Tablet 20 milliGRAM(s) Oral daily  isosorbide   mononitrate ER Tablet (IMDUR) 30 milliGRAM(s) Oral daily  morphine  - Injectable 2 milliGRAM(s) IV Push every 2 hours PRN  morphine  - Injectable 2 milliGRAM(s) IV Push every 4 hours  nitroglycerin     SubLingual 0.4 milliGRAM(s) SubLingual every 5 minutes PRN  nitroglycerin  Infusion 5 MICROgram(s)/Min IV Continuous <Continuous>  ondansetron Injectable 4 milliGRAM(s) IV Push every 8 hours PRN  pantoprazole  Injectable 40 milliGRAM(s) IV Push every 12 hours  pyridostigmine 180 milliGRAM(s) Oral Once  pyridostigmine  milliGRAM(s) Oral two times a day  sertraline 100 milliGRAM(s) Oral daily

## 2021-03-15 LAB
ANION GAP SERPL CALC-SCNC: 8 MMOL/L — SIGNIFICANT CHANGE UP (ref 7–14)
APTT BLD: 50.2 SEC — HIGH (ref 27–39.2)
BUN SERPL-MCNC: 23 MG/DL — HIGH (ref 10–20)
CALCIUM SERPL-MCNC: 9.1 MG/DL — SIGNIFICANT CHANGE UP (ref 8.5–10.1)
CHLORIDE SERPL-SCNC: 101 MMOL/L — SIGNIFICANT CHANGE UP (ref 98–110)
CO2 SERPL-SCNC: 32 MMOL/L — SIGNIFICANT CHANGE UP (ref 17–32)
CREAT SERPL-MCNC: 1.2 MG/DL — SIGNIFICANT CHANGE UP (ref 0.7–1.5)
GLUCOSE SERPL-MCNC: 112 MG/DL — HIGH (ref 70–99)
HCT VFR BLD CALC: 30.5 % — LOW (ref 37–47)
HGB BLD-MCNC: 9.6 G/DL — LOW (ref 12–16)
MCHC RBC-ENTMCNC: 27 PG — SIGNIFICANT CHANGE UP (ref 27–31)
MCHC RBC-ENTMCNC: 31.5 G/DL — LOW (ref 32–37)
MCV RBC AUTO: 85.9 FL — SIGNIFICANT CHANGE UP (ref 81–99)
NRBC # BLD: 0 /100 WBCS — SIGNIFICANT CHANGE UP (ref 0–0)
PLATELET # BLD AUTO: 387 K/UL — SIGNIFICANT CHANGE UP (ref 130–400)
POTASSIUM SERPL-MCNC: 4.5 MMOL/L — SIGNIFICANT CHANGE UP (ref 3.5–5)
POTASSIUM SERPL-SCNC: 4.5 MMOL/L — SIGNIFICANT CHANGE UP (ref 3.5–5)
RBC # BLD: 3.55 M/UL — LOW (ref 4.2–5.4)
RBC # FLD: 17.5 % — HIGH (ref 11.5–14.5)
SODIUM SERPL-SCNC: 141 MMOL/L — SIGNIFICANT CHANGE UP (ref 135–146)
WBC # BLD: 8.93 K/UL — SIGNIFICANT CHANGE UP (ref 4.8–10.8)
WBC # FLD AUTO: 8.93 K/UL — SIGNIFICANT CHANGE UP (ref 4.8–10.8)

## 2021-03-15 PROCEDURE — 99232 SBSQ HOSP IP/OBS MODERATE 35: CPT

## 2021-03-15 PROCEDURE — 99233 SBSQ HOSP IP/OBS HIGH 50: CPT

## 2021-03-15 PROCEDURE — 71045 X-RAY EXAM CHEST 1 VIEW: CPT | Mod: 26

## 2021-03-15 RX ORDER — NYSTATIN CREAM 100000 [USP'U]/G
1 CREAM TOPICAL EVERY 12 HOURS
Refills: 0 | Status: DISCONTINUED | OUTPATIENT
Start: 2021-03-15 | End: 2021-03-23

## 2021-03-15 RX ORDER — PYRIDOSTIGMINE BROMIDE 60 MG/5ML
60 SOLUTION ORAL ONCE
Refills: 0 | Status: COMPLETED | OUTPATIENT
Start: 2021-03-15 | End: 2021-03-15

## 2021-03-15 RX ORDER — HEPARIN SODIUM 5000 [USP'U]/ML
900 INJECTION INTRAVENOUS; SUBCUTANEOUS
Qty: 25000 | Refills: 0 | Status: DISCONTINUED | OUTPATIENT
Start: 2021-03-15 | End: 2021-03-18

## 2021-03-15 RX ADMIN — CHLORHEXIDINE GLUCONATE 1 APPLICATION(S): 213 SOLUTION TOPICAL at 05:56

## 2021-03-15 RX ADMIN — NYSTATIN CREAM 1 APPLICATION(S): 100000 CREAM TOPICAL at 22:00

## 2021-03-15 RX ADMIN — MORPHINE SULFATE 2 MILLIGRAM(S): 50 CAPSULE, EXTENDED RELEASE ORAL at 10:33

## 2021-03-15 RX ADMIN — MORPHINE SULFATE 2 MILLIGRAM(S): 50 CAPSULE, EXTENDED RELEASE ORAL at 22:46

## 2021-03-15 RX ADMIN — PYRIDOSTIGMINE BROMIDE 180 MILLIGRAM(S): 60 SOLUTION ORAL at 09:09

## 2021-03-15 RX ADMIN — MORPHINE SULFATE 2 MILLIGRAM(S): 50 CAPSULE, EXTENDED RELEASE ORAL at 23:05

## 2021-03-15 RX ADMIN — PYRIDOSTIGMINE BROMIDE 180 MILLIGRAM(S): 60 SOLUTION ORAL at 17:19

## 2021-03-15 RX ADMIN — Medication 20 MILLIGRAM(S): at 05:55

## 2021-03-15 RX ADMIN — Medication 81 MILLIGRAM(S): at 11:58

## 2021-03-15 RX ADMIN — PANTOPRAZOLE SODIUM 40 MILLIGRAM(S): 20 TABLET, DELAYED RELEASE ORAL at 09:09

## 2021-03-15 RX ADMIN — MORPHINE SULFATE 2 MILLIGRAM(S): 50 CAPSULE, EXTENDED RELEASE ORAL at 10:50

## 2021-03-15 RX ADMIN — Medication 0.25 MILLIGRAM(S): at 17:22

## 2021-03-15 RX ADMIN — ISOSORBIDE MONONITRATE 30 MILLIGRAM(S): 60 TABLET, EXTENDED RELEASE ORAL at 11:58

## 2021-03-15 RX ADMIN — Medication 60 MILLIGRAM(S): at 23:08

## 2021-03-15 RX ADMIN — SERTRALINE 100 MILLIGRAM(S): 25 TABLET, FILM COATED ORAL at 11:58

## 2021-03-15 RX ADMIN — ATORVASTATIN CALCIUM 80 MILLIGRAM(S): 80 TABLET, FILM COATED ORAL at 22:46

## 2021-03-15 RX ADMIN — Medication 60 MILLIGRAM(S): at 05:55

## 2021-03-15 RX ADMIN — Medication 60 MILLIGRAM(S): at 11:58

## 2021-03-15 RX ADMIN — Medication 60 MILLIGRAM(S): at 17:20

## 2021-03-15 RX ADMIN — MORPHINE SULFATE 2 MILLIGRAM(S): 50 CAPSULE, EXTENDED RELEASE ORAL at 19:04

## 2021-03-15 RX ADMIN — Medication 0.25 MILLIGRAM(S): at 05:55

## 2021-03-15 RX ADMIN — MORPHINE SULFATE 2 MILLIGRAM(S): 50 CAPSULE, EXTENDED RELEASE ORAL at 05:55

## 2021-03-15 RX ADMIN — HEPARIN SODIUM 9 UNIT(S)/HR: 5000 INJECTION INTRAVENOUS; SUBCUTANEOUS at 12:04

## 2021-03-15 RX ADMIN — PANTOPRAZOLE SODIUM 40 MILLIGRAM(S): 20 TABLET, DELAYED RELEASE ORAL at 17:21

## 2021-03-15 RX ADMIN — CEFTRIAXONE 100 MILLIGRAM(S): 500 INJECTION, POWDER, FOR SOLUTION INTRAMUSCULAR; INTRAVENOUS at 05:55

## 2021-03-15 RX ADMIN — MORPHINE SULFATE 2 MILLIGRAM(S): 50 CAPSULE, EXTENDED RELEASE ORAL at 07:29

## 2021-03-15 RX ADMIN — MORPHINE SULFATE 2 MILLIGRAM(S): 50 CAPSULE, EXTENDED RELEASE ORAL at 18:08

## 2021-03-15 RX ADMIN — PYRIDOSTIGMINE BROMIDE 60 MILLIGRAM(S): 60 SOLUTION ORAL at 22:46

## 2021-03-15 NOTE — PROGRESS NOTE ADULT - SUBJECTIVE AND OBJECTIVE BOX
IGNACIO PAL 76y Female  MRN#: [id number]   CODE STATUS:________      SUBJECTIVE  Patient is a 76y old Female who presents with a chief complaint of Melena (15 Mar 2021 10:42)  Currently admitted to medicine with the primary diagnosis of GI bleed    Hospital course has been complicated by _______.   Today is hospital day 7d, and this morning she is _________ and reports ________ overnight events.     Present Today:           Nuñez Catheter ()No/ ()Yes? Indication:          Central Line ()No/ ()Yes? Indication:          IV Fluids ()No/ ()Yes? Type:  Rate:  Indication:      OBJECTIVE  PAST MEDICAL & SURGICAL HISTORY  Anemia    High cholesterol    HTN (hypertension)    Myasthenia gravis    Hemangioma      ALLERGIES:  No Known Allergies    MEDICATIONS:  STANDING MEDICATIONS  ALPRAZolam 0.25 milliGRAM(s) Oral every 12 hours  aspirin enteric coated 81 milliGRAM(s) Oral daily  atorvastatin 80 milliGRAM(s) Oral at bedtime  cefTRIAXone   IVPB 1000 milliGRAM(s) IV Intermittent every 24 hours  chlorhexidine 4% Liquid 1 Application(s) Topical <User Schedule>  diltiazem    Tablet 60 milliGRAM(s) Oral four times a day  furosemide    Tablet 20 milliGRAM(s) Oral daily  heparin  Infusion 900 Unit(s)/Hr IV Continuous <Continuous>  isosorbide   mononitrate ER Tablet (IMDUR) 30 milliGRAM(s) Oral daily  morphine  - Injectable 2 milliGRAM(s) IV Push every 4 hours  pantoprazole  Injectable 40 milliGRAM(s) IV Push every 12 hours  pyridostigmine 180 milliGRAM(s) Oral Once  pyridostigmine  milliGRAM(s) Oral two times a day  sertraline 100 milliGRAM(s) Oral daily    PRN MEDICATIONS  acetaminophen  Suppository .. 650 milliGRAM(s) Rectal every 6 hours PRN  morphine  - Injectable 2 milliGRAM(s) IV Push every 2 hours PRN  nitroglycerin     SubLingual 0.4 milliGRAM(s) SubLingual every 5 minutes PRN  ondansetron Injectable 4 milliGRAM(s) IV Push every 8 hours PRN      VITAL SIGNS: Last 24 Hours  T(C): 36.6 (15 Mar 2021 11:10), Max: 37 (14 Mar 2021 23:18)  T(F): 97.9 (15 Mar 2021 11:10), Max: 98.6 (14 Mar 2021 23:18)  HR: 74 (15 Mar 2021 09:13) (69 - 92)  BP: 103/54 (15 Mar 2021 09:13) (92/47 - 126/97)  BP(mean): 77 (15 Mar 2021 09:13) (67 - 90)  RR: 17 (15 Mar 2021 11:10) (17 - 34)  SpO2: 99% (15 Mar 2021 09:13) (96% - 99%)    LABS:                        9.6    8.93  )-----------( 387      ( 15 Mar 2021 06:00 )             30.5     03-15    141  |  101  |  23<H>  ----------------------------<  112<H>  4.5   |  32  |  1.2    Ca    9.1      15 Mar 2021 06:00  Mg     2.1     03-14    TPro  5.6<L>  /  Alb  3.3<L>  /  TBili  0.5  /  DBili  x   /  AST  20  /  ALT  37  /  AlkPhos  205<H>  03-14      CARDIAC MARKERS ( 14 Mar 2021 06:45 )  x     / 3.45 ng/mL / x     / x     / x      CARDIAC MARKERS ( 13 Mar 2021 17:00 )  x     / 3.52 ng/mL / 47 U/L / x     / x          RADIOLOGY:    < from: Xray Chest 1 View- PORTABLE-Routine (Xray Chest 1 View- PORTABLE-Routine in AM.) (03.13.21 @ 06:10) >    Impression:    Bilateral opacities, unchanged.    < end of copied text >    PHYSICAL EXAM:    GENERAL: NAD, well-developed, AAOx3  HEENT:  Atraumatic, Normocephalic. EOMI, PERRLA, conjunctiva and sclera clear, No JVD  PULMONARY: Clear to auscultation bilaterally; No wheeze  CARDIOVASCULAR: Regular rate and rhythm; No murmurs, rubs, or gallops  GASTROINTESTINAL: Soft, Nontender, Nondistended; Bowel sounds present  MUSCULOSKELETAL:  2+ Peripheral Pulses, No clubbing, cyanosis, or edema  NEUROLOGY: non-focal  SKIN: No rashes or lesions      ADMISSION SUMMARY  Patient is a 76y old Female who presents with a chief complaint of Melena (15 Mar 2021 10:42)  Currently admitted to medicine with the primary diagnosis of GI bleed    ASSESSMENT & PLAN     IGNACIO PAL 76y Female  MRN#: [id number]     SUBJECTIVE  Patient is a 76y old Female who presents with a chief complaint of Melena (15 Mar 2021 10:42)  Currently admitted to medicine with the primary diagnosis of GI bleed  Hospital course has been complicated by STEMI.  Today is hospital day 7d, and this morning she reports no overnight events.     OBJECTIVE  PAST MEDICAL & SURGICAL HISTORY  Anemia    High cholesterol    HTN (hypertension)    Myasthenia gravis    Hemangioma      ALLERGIES:  No Known Allergies    MEDICATIONS:  STANDING MEDICATIONS  ALPRAZolam 0.25 milliGRAM(s) Oral every 12 hours  aspirin enteric coated 81 milliGRAM(s) Oral daily  atorvastatin 80 milliGRAM(s) Oral at bedtime  cefTRIAXone   IVPB 1000 milliGRAM(s) IV Intermittent every 24 hours  chlorhexidine 4% Liquid 1 Application(s) Topical <User Schedule>  diltiazem    Tablet 60 milliGRAM(s) Oral four times a day  furosemide    Tablet 20 milliGRAM(s) Oral daily  heparin  Infusion 900 Unit(s)/Hr IV Continuous <Continuous>  isosorbide   mononitrate ER Tablet (IMDUR) 30 milliGRAM(s) Oral daily  morphine  - Injectable 2 milliGRAM(s) IV Push every 4 hours  pantoprazole  Injectable 40 milliGRAM(s) IV Push every 12 hours  pyridostigmine 180 milliGRAM(s) Oral Once  pyridostigmine  milliGRAM(s) Oral two times a day  sertraline 100 milliGRAM(s) Oral daily    PRN MEDICATIONS  acetaminophen  Suppository .. 650 milliGRAM(s) Rectal every 6 hours PRN  morphine  - Injectable 2 milliGRAM(s) IV Push every 2 hours PRN  nitroglycerin     SubLingual 0.4 milliGRAM(s) SubLingual every 5 minutes PRN  ondansetron Injectable 4 milliGRAM(s) IV Push every 8 hours PRN      VITAL SIGNS: Last 24 Hours  T(C): 36.6 (15 Mar 2021 11:10), Max: 37 (14 Mar 2021 23:18)  T(F): 97.9 (15 Mar 2021 11:10), Max: 98.6 (14 Mar 2021 23:18)  HR: 74 (15 Mar 2021 09:13) (69 - 92)  BP: 103/54 (15 Mar 2021 09:13) (92/47 - 126/97)  BP(mean): 77 (15 Mar 2021 09:13) (67 - 90)  RR: 17 (15 Mar 2021 11:10) (17 - 34)  SpO2: 99% (15 Mar 2021 09:13) (96% - 99%)    LABS:                        9.6    8.93  )-----------( 387      ( 15 Mar 2021 06:00 )             30.5     03-15    141  |  101  |  23<H>  ----------------------------<  112<H>  4.5   |  32  |  1.2    Ca    9.1      15 Mar 2021 06:00  Mg     2.1     03-14    TPro  5.6<L>  /  Alb  3.3<L>  /  TBili  0.5  /  DBili  x   /  AST  20  /  ALT  37  /  AlkPhos  205<H>  03-14      CARDIAC MARKERS ( 14 Mar 2021 06:45 )  x     / 3.45 ng/mL / x     / x     / x      CARDIAC MARKERS ( 13 Mar 2021 17:00 )  x     / 3.52 ng/mL / 47 U/L / x     / x          RADIOLOGY:    < from: Xray Chest 1 View- PORTABLE-Routine (Xray Chest 1 View- PORTABLE-Routine in AM.) (03.13.21 @ 06:10) >    Impression:    Bilateral opacities, unchanged.    < end of copied text >    < from: Xray Chest 1 View-PORTABLE IMMEDIATE (Xray Chest 1 View-PORTABLE IMMEDIATE .) (03.09.21 @ 08:39) >  IMPRESSION:    No significant change in right greater than left airspace opacities.      < end of copied text >    < from: CT Angio Chest PE Protocol w/ IV Cont (03.02.21 @ 02:14) >    IMPRESSION:  1.  No pulmonary embolus.  2.  Right thyroid lobe 3.4 cm mass with resulting mild leftward tracheal deviation. Recommend ultrasound of the thyroid for further evaluation.  3.  Bilateral pleural effusions with adjacent compressive atelectasis.  4.  Left upper lobe 7 mm groundglass nodule. Recommend CT chest in 6-12 months for continued follow-up.  5.  3.5 x 1.8 cm ovoid mass abutting left T2 neural foramen. It is probably pleural-based. This may also be neural in origin. Further evaluation with MRI with and without contrast may be of benefit.    < end of copied text >    PHYSICAL EXAM:    GENERAL: NAD, well-developed, AAOx3  HEENT:  Atraumatic, Normocephalic. EOMI, PERRLA, conjunctiva and sclera clear, No JVD  PULMONARY: Clear to auscultation bilaterally; No wheeze  CARDIOVASCULAR: Regular rate and rhythm; No murmurs, rubs, or gallops  GASTROINTESTINAL: Soft, Nontender, Nondistended; Bowel sounds present  MUSCULOSKELETAL:  2+ Peripheral Pulses, No clubbing, cyanosis, or edema  NEUROLOGY: non-focal  SKIN: No rashes or lesions      ADMISSION SUMMARY  Patient is a 76y old Female who presents with a chief complaint of Melena (15 Mar 2021 10:42)  Currently admitted to medicine with the primary diagnosis of GI bleed    ASSESSMENT & PLAN    77 y/o Female with PMH of HTN, MG discharged from [facility] recently with suspected type 2 MI and chronic anemia patient had no gross GI bleeding at the time. Patient now presents with an episode of vomiting her food early in the morning with speckles of blood in it. Patient also reports two melenic stools earlier in the morning today. Patient with history of severe NSAID usage     # Acute blood loss anemia 2/2 Bleeding duodenal ulcer  -c/o melena and hematemesis, h/o NSAID abuse  -Hb 4 on admission. s/p 5U PRBC in total  -Patient intubated for EGD on 3/9 and extubated 3/10 due to history of MG and tracheal deviation, considered high risk as per anesthesia  -EGD on 3/9 showed actively spurting vessel in posterior wall of duodenal bulb. s/p epinephrine inj and clipping. Off pressors  -Hb stable. Repeat q24h  -Diet as tolerated, protonix IV q12h  -Active Type and Screen  -Transfuse PRN to hgb >9  -Two large bore IV lines   -GI following    #STEMI  #Volume overload  -Troponin trending down 2.67-->2.93-->3.83-->3.52   -Started on aspirin, morphine,statin, imdur, cardizem. Was on nitro drip over the weekend for chest pain. Currently chest pain improved  -Cleared by GI to be on anticoagulation and baby aspirin. Will start heparin drip  -Monitor PTT q6h, Goal 45-50.  -Cannot be on metoprolol due to MG. Continue cardizem 30mg q6h    #Thyroid mass  -Patient has 2 solid nodules in the thyroid abutting the trachea, paraspinal mass at T2 level likely pleural in origin  -Mass was never biopsied on last admission  -c/o severe shoulder pain, was using NSAIDs for intractable pain from the mass  -Might need biopsy before discharge    #Hypertension  -Continue cardizem  -Hold home amlodipine    #Myasthenia Gravis  -c/w pyridostigmine    #Diet: Soft diet  #DVT ppx: Heparin gtt  #GI ppx: protonix  #Dispo: Acute

## 2021-03-15 NOTE — PHARMACOTHERAPY INTERVENTION NOTE - COMMENTS
Drug info question brought up during rounds regarding if/why BB should be cautioned in myasthenia gravis patients. BB would be indicated in this pt for MI.     BB is not an absolute contraindication but can worsen s/s of myasthenia gravis and should be monitored closely. Rec to weigh risk vs. benefit. Drug info question brought up during rounds regarding if/why BB should be cautioned in myasthenia gravis patients. BB would be indicated in this pt for MI.     BB is not an absolute contraindication but can worsen s/s of myasthenia gravis and should be monitored closely. Rec to weigh risk vs. benefit. Short-term use of BB would be OK

## 2021-03-15 NOTE — PROGRESS NOTE ADULT - ASSESSMENT
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 switch to nasal cannula to keep pox > 92 % , cxr today    CARDIOVASCULAR: Goal MAP >65, I=O  lasix once a day and as needed    cardizem for rate control per cardio  and asa as per GI   cardiology follow up   DC nitro drip if ok with cardio    GI: PPI q12h, PO diet    RENAL:  F/u  lytes.  Correct as needed.  Accurate I/O    INFECTIOUS DISEASE: no abx    HEMATOLOGICAL:  SCDs,  seriel cbc keep Hb more then 8     ENDOCRINE:  Follow up FS.  Insulin protocol if needed.    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 switch to nasal cannula to keep pox > 92 % , cxr today    CARDIOVASCULAR: Goal MAP >65, I=O  lasix once a day and as needed    cardizem for rate control per cardio  and asa as per GI   cardiology follow up   DC nitro drip if ok with cardio    GI: PPI q12h, PO diet    RENAL:  F/u  lytes.  Correct as needed.  Accurate I/O    INFECTIOUS DISEASE: no abx    HEMATOLOGICAL:  SCDs,  seriel cbc keep Hb more then 8     ENDOCRINE:  Follow up FS.  Insulin protocol if needed.    MUSCULOSKELETAL: Bedrest    dnr/  dni

## 2021-03-15 NOTE — PROGRESS NOTE ADULT - SUBJECTIVE AND OBJECTIVE BOX
IGNACIO PAL             MRN-[id number]    HPI:  76yFemale pmh HTN, [facility] and chronic anemia patient had no gross GI bleeding at the time. Patient now presents with an episode of vomiting her food early in the morning with speckles of blood in it. Patient also reports two melenic stools earlier in the morning today. Patient also reports two melenic stools earlier in the morning today. Patient with history of severe NSAID usage. Hb in ED was 4.3, lactate 6.8.        (08 Mar 2021 14:19)      SUBJECTIVE: pt seen, resting comfortably , pain well controlled     ROS:  DYSPNEA: Y / N	  NAUS/VOM: Y / N	  SECRETIONS: Y / N	  AGITATION: Y / N  Pain (Y/N):     N  -Provocation/Palliation:  -Quality/Quantity:  -Radiating:  -Severity:  -Timing/Frequency:  -Impact on ADLs:    OTHER REVIEW OF SYSTEMS:  UNABLE TO OBTAIN  due to:    PEx:  76y              General: resting, NAD  HEENT:  NCAT PERRL EOMI Non icteric   Neck: Supple no masses  CVS: RR S1S2 No M/G/R  Resp: Unlabored Non tachypneic No increased WOB, clear to ascultation bilaterally  GI:  Soft NT ND BS+  :  Nuñez  Musc: No C/C/E    Neuro: Follows commands No focal deficits  Psych: Calm, a & o x 3  Skin: Non jaundiced, no rash   Lymph: Normal      Last BM:       ALLERGIES:     OPIATE NAÏVE (Y/N):    MEDICATIONS: REVIEWED  MEDICATIONS  (STANDING):  ALPRAZolam 0.25 milliGRAM(s) Oral every 12 hours  aspirin enteric coated 81 milliGRAM(s) Oral daily  atorvastatin 80 milliGRAM(s) Oral at bedtime  cefTRIAXone   IVPB 1000 milliGRAM(s) IV Intermittent every 24 hours  chlorhexidine 4% Liquid 1 Application(s) Topical <User Schedule>  diltiazem    Tablet 60 milliGRAM(s) Oral four times a day  furosemide    Tablet 20 milliGRAM(s) Oral daily  heparin  Infusion 900 Unit(s)/Hr (9 mL/Hr) IV Continuous <Continuous>  isosorbide   mononitrate ER Tablet (IMDUR) 30 milliGRAM(s) Oral daily  morphine  - Injectable 2 milliGRAM(s) IV Push every 4 hours  pantoprazole  Injectable 40 milliGRAM(s) IV Push every 12 hours  pyridostigmine 180 milliGRAM(s) Oral Once  pyridostigmine  milliGRAM(s) Oral two times a day  sertraline 100 milliGRAM(s) Oral daily    MEDICATIONS  (PRN):  acetaminophen  Suppository .. 650 milliGRAM(s) Rectal every 6 hours PRN Temp greater or equal to 38C (100.4F)  morphine  - Injectable 2 milliGRAM(s) IV Push every 2 hours PRN Severe Pain (7 - 10)  nitroglycerin     SubLingual 0.4 milliGRAM(s) SubLingual every 5 minutes PRN Chest Pain  ondansetron Injectable 4 milliGRAM(s) IV Push every 8 hours PRN Nausea and/or Vomiting      LABS: REVIEWED  CBC:                        9.6    8.93  )-----------( 387      ( 15 Mar 2021 06:00 )             30.5     CMP:    03-15    141  |  101  |  23<H>  ----------------------------<  112<H>  4.5   |  32  |  1.2    Ca    9.1      15 Mar 2021 06:00  Mg     2.1     03-14    TPro  5.6<L>  /  Alb  3.3<L>  /  TBili  0.5  /  DBili  x   /  AST  20  /  ALT  37  /  AlkPhos  205<H>  03-14  Albumin, Serum: 3.3 g/dL (03-14-21 @ 06:45)      IMAGING: REVIEWED    ADVANCED DIRECTIVES:                DNR DNI             DECISION MAKER: patient able to make medical decisions   LEGAL SURROGATE:    PSYCHOSOCIAL-SPIRITUAL ASSESSMENT:       Reviewed       Care plan unchanged    GOALS OF CARE DISCUSSION  	           See previous Palliative Medicine Note      CURRENT DISPO PLAN:     Home      REFERRALS	        Palliative Med

## 2021-03-15 NOTE — PROGRESS NOTE ADULT - SUBJECTIVE AND OBJECTIVE BOX
Patient seen & examined.  No acute events noted overnight. She denies abdominal pain.  No melena or vomiting. She is tolerating a soft diet.  H/H stable.       I&O's Detail    14 Mar 2021 07:01  -  15 Mar 2021 07:00  --------------------------------------------------------  IN:    IV PiggyBack: 50 mL    Nitroglycerin: 267 mL  Total IN: 317 mL    OUT:    Indwelling Catheter - Urethral (mL): 1605 mL  Total OUT: 1605 mL    Total NET: -1288 mL      15 Mar 2021 07:01  -  15 Mar 2021 11:57  --------------------------------------------------------  IN:    Nitroglycerin: 48 mL    Oral Fluid: 120 mL  Total IN: 168 mL    OUT:    Indwelling Catheter - Urethral (mL): 770 mL  Total OUT: 770 mL    Total NET: -602 mL        MEDICATIONS  (STANDING):  ALPRAZolam 0.25 milliGRAM(s) Oral every 12 hours  aspirin enteric coated 81 milliGRAM(s) Oral daily  atorvastatin 80 milliGRAM(s) Oral at bedtime  cefTRIAXone   IVPB 1000 milliGRAM(s) IV Intermittent every 24 hours  chlorhexidine 4% Liquid 1 Application(s) Topical <User Schedule>  diltiazem    Tablet 60 milliGRAM(s) Oral four times a day  furosemide    Tablet 20 milliGRAM(s) Oral daily  isosorbide   mononitrate ER Tablet (IMDUR) 30 milliGRAM(s) Oral daily  morphine  - Injectable 2 milliGRAM(s) IV Push every 4 hours  nitroglycerin  Infusion 5 MICROgram(s)/Min (1.5 mL/Hr) IV Continuous <Continuous>  pantoprazole  Injectable 40 milliGRAM(s) IV Push every 12 hours  pyridostigmine 180 milliGRAM(s) Oral Once  pyridostigmine  milliGRAM(s) Oral two times a day  sertraline 100 milliGRAM(s) Oral daily    MEDICATIONS  (PRN):  acetaminophen  Suppository .. 650 milliGRAM(s) Rectal every 6 hours PRN Temp greater or equal to 38C (100.4F)  morphine  - Injectable 2 milliGRAM(s) IV Push every 2 hours PRN Severe Pain (7 - 10)  nitroglycerin     SubLingual 0.4 milliGRAM(s) SubLingual every 5 minutes PRN Chest Pain  ondansetron Injectable 4 milliGRAM(s) IV Push every 8 hours PRN Nausea and/or Vomiting        Vital Signs Last 24 Hrs  T(C): 36.6 (15 Mar 2021 11:10), Max: 37 (14 Mar 2021 23:18)  T(F): 97.9 (15 Mar 2021 11:10), Max: 98.6 (14 Mar 2021 23:18)  HR: 74 (15 Mar 2021 09:13) (69 - 92)  BP: 103/54 (15 Mar 2021 09:13) (92/47 - 126/97)  BP(mean): 77 (15 Mar 2021 09:13) (67 - 90)  RR: 17 (15 Mar 2021 11:10) (17 - 34)  SpO2: 99% (15 Mar 2021 09:13) (96% - 99%)    Physical Exam:  General:  WD, WN, conversant in NAD.   Abdomen:  + Bowel sounds, soft, no distention, non-tender,  no rebound/guarding or peritoneal signs.          03-15    141  |  101  |  23<H>  ----------------------------<  112<H>  4.5   |  32  |  1.2    Ca    9.1      15 Mar 2021 06:00  Mg     2.1     03-14    TPro  5.6<L>  /  Alb  3.3<L>  /  TBili  0.5  /  DBili  x   /  AST  20  /  ALT  37  /  AlkPhos  205<H>  03-14                            9.6    8.93  )-----------( 387      ( 15 Mar 2021 06:00 )             30.5     CAPILLARY BLOOD GLUCOSE        CARDIAC MARKERS ( 14 Mar 2021 06:45 )  x     / 3.45 ng/mL / x     / x     / x      CARDIAC MARKERS ( 13 Mar 2021 17:00 )  x     / 3.52 ng/mL / 47 U/L / x     / x                    RADIOLOGY & ADDITIONAL STUDIES:

## 2021-03-15 NOTE — PROGRESS NOTE ADULT - SUBJECTIVE AND OBJECTIVE BOX
76yFemale  Being followed for duodenal ulcer    Interval history: Patient denies nausea, vomiting, hematemesis, melena, blood in stool, diarrhea, constipation, abdominal pain. Patient is comfortable and tolerating soft diet.       PAST MEDICAL & SURGICAL HISTORY:   Anemia    High cholesterol    HTN (hypertension)    Myasthenia gravis    Hemangioma            Social History: No smoking. No alcohol. No illegal drug use.            MEDICATIONS  (STANDING):  ALPRAZolam 0.25 milliGRAM(s) Oral every 12 hours  aspirin enteric coated 81 milliGRAM(s) Oral daily  atorvastatin 80 milliGRAM(s) Oral at bedtime  cefTRIAXone   IVPB 1000 milliGRAM(s) IV Intermittent every 24 hours  chlorhexidine 4% Liquid 1 Application(s) Topical <User Schedule>  diltiazem    Tablet 60 milliGRAM(s) Oral four times a day  furosemide    Tablet 20 milliGRAM(s) Oral daily  isosorbide   mononitrate ER Tablet (IMDUR) 30 milliGRAM(s) Oral daily  morphine  - Injectable 2 milliGRAM(s) IV Push every 4 hours  nitroglycerin  Infusion 5 MICROgram(s)/Min (1.5 mL/Hr) IV Continuous <Continuous>  pantoprazole  Injectable 40 milliGRAM(s) IV Push every 12 hours  pyridostigmine 180 milliGRAM(s) Oral Once  pyridostigmine  milliGRAM(s) Oral two times a day  sertraline 100 milliGRAM(s) Oral daily    MEDICATIONS  (PRN):  acetaminophen  Suppository .. 650 milliGRAM(s) Rectal every 6 hours PRN Temp greater or equal to 38C (100.4F)  morphine  - Injectable 2 milliGRAM(s) IV Push every 2 hours PRN Severe Pain (7 - 10)  nitroglycerin     SubLingual 0.4 milliGRAM(s) SubLingual every 5 minutes PRN Chest Pain  ondansetron Injectable 4 milliGRAM(s) IV Push every 8 hours PRN Nausea and/or Vomiting      Allergies:   No Known Allergies        REVIEW OF SYSTEMS:  General:  No weight loss, fevers, or chills.  Eyes:  No reported pain or visual changes  ENT:  No sore throat or runny nose.  NECK: No stiffness   CV:  No chest pain or palpitations.  Resp:  No shortness of breath, cough  GI:  No abdominal pain, nausea, vomiting, dysphagia, diarrhea or constipation. No rectal bleeding, melena, or hematemesis.  Neuro:  No tingling, numbness       VITAL SIGNS:   T(F): 98.1 (03-15-21 @ 07:05), Max: 98.6 (03-14-21 @ 23:18)  HR: 74 (03-15-21 @ 09:13) (69 - 92)  BP: 103/54 (03-15-21 @ 09:13) (92/47 - 126/97)  RR: 26 (03-15-21 @ 09:13) (18 - 34)  SpO2: 99% (03-15-21 @ 09:13) (96% - 99%)    PHYSICAL EXAM:  GENERAL: AAOx3, no acute distress.  HEAD:  Atraumatic, Normocephalic  EYES: conjunctiva and sclera clear  NECK: Supple, no JVD or thyromegaly  CHEST/LUNG: Clear to auscultation bilaterally; No wheeze, rhonchi, or rales  HEART: Regular rate and rhythm; normal S1, S2, No murmurs.  ABDOMEN: Soft, nontender, nondistended; Bowel sounds present  NEUROLOGY: No asterixis or tremor.   SKIN: Intact, no jaundice            LABS:                        9.6    8.93  )-----------( 387      ( 15 Mar 2021 06:00 )             30.5     03-15    141  |  101  |  23<H>  ----------------------------<  112<H>  4.5   |  32  |  1.2    Ca    9.1      15 Mar 2021 06:00  Mg     2.1     03-14    TPro  5.6<L>  /  Alb  3.3<L>  /  TBili  0.5  /  DBili  x   /  AST  20  /  ALT  37  /  AlkPhos  205<H>  03-14    LIVER FUNCTIONS - ( 14 Mar 2021 06:45 )  Alb: 3.3 g/dL / Pro: 5.6 g/dL / ALK PHOS: 205 U/L / ALT: 37 U/L / AST: 20 U/L / GGT: x               IMAGING:    < from: US Abdomen Limited (03.12.21 @ 13:02) >    EXAM:  US ABDOMEN LIMITED            PROCEDURE DATE:  03/12/2021            INTERPRETATION:  Clinical History / Reason for exam: Abnormal liver function tests    Ultrasound evaluation of the right upper quadrant was performed. No prior studies are available for comparison.    Evaluation the liver is limited due to bowel gas. The liver echogenicity is grossly unremarkable. Gallstones are present within the gallbladder. There is no pericholecystic fluid, gallbladder wall thickening or sonographicMurphy sign. The common bile duct measures 0.5 cm. There is no intrahepatic biliary duct dilatation.    The partially imaged pancreatic duct is mildly dilated measuring up to 5 mm.    The right kidney measures 9.3 cm. There is no hydronephrosis.    There is no right upper quadrant ascites. There is a small right pleural effusion.    IMPRESSION:    Mildly dilated pancreatic duct measuring up to 5 mm. Outpatient pancreatic MRI with gadolinium and MRCP imaging is recommended to exclude an obstructing process    Gallstones.    Small right pleural effusion              FLORIDA BORJAS MD; Attending Radiologist  This document has been electronically signed. Mar 12 2021  1:56PM    < end of copied text >

## 2021-03-15 NOTE — PROGRESS NOTE ADULT - ASSESSMENT
75 y/o female s/p UGI bleed from duodenal ulcer, clipped and bleeding has been controlled.    s/p MI    H/H stable  continue soft diet  Care per ICU team  outpt GI     no surgical intervention   recall prn

## 2021-03-15 NOTE — PROGRESS NOTE ADULT - ASSESSMENT
77 y/o Female with PMH of HTN, [facility] type 2 [facility] and chronic anemia patient  Patient with history of severe NSAID usage     # Acute blood loss anemia secondary to Bleeding duodenal ulcer  -c/o melena and hematemesis, h/o NSAID abuse  -Hb 4 on admission. s/p 5U PRBC in total  -Patient intubated for EGD on 3/9 and extubated  today due to history of MG and tracheal deviation, considered high risk as per anesthesia  -EGD on 3/9 showed actively spurting vessel in posterior wall of duodenal bulb. s/p epinephrine inj and clipping  - now tolerating  diet    -  Protonix  q12h   -Active Type and Screen  -Transfuse PRN to hgb >8  -Two large bore IV lines  -GI following    #NSTEMI    -now on IV nitro with improving symptoms   - meds as per Cardiology   - tele    #Thyroid mass  -Patient has 2 solid nodules in the thyroid abutting the trachea, paraspinal mass at T2 level likely pleural in origin  -c/o severe shoulder pain, was using NSAIDs for intractable pain from the mas    #Hypertension  -Hold amlodipine    #Myasthenia Gravis  -c/w pyridostigmine    #DVT ppx: SCD

## 2021-03-15 NOTE — PROGRESS NOTE ADULT - SUBJECTIVE AND OBJECTIVE BOX
Patient is a 76y old  Female who presents with a chief complaint of Melena (15 Mar 2021 10:18)      Over Night Events: none, no bleeding, eating breaklfast, on nitro drip    I&O's Detail    14 Mar 2021 07:01  -  15 Mar 2021 07:00  --------------------------------------------------------  IN:    IV PiggyBack: 50 mL    Nitroglycerin: 267 mL  Total IN: 317 mL    OUT:    Indwelling Catheter - Urethral (mL): 1605 mL  Total OUT: 1605 mL    Total NET: -1288 mL      15 Mar 2021 07:01  -  15 Mar 2021 10:43  --------------------------------------------------------  IN:    Nitroglycerin: 48 mL    Oral Fluid: 120 mL  Total IN: 168 mL    OUT:    Indwelling Catheter - Urethral (mL): 575 mL  Total OUT: 575 mL    Total NET: -407 mL          PHYSICAL EXAM    ICU Vital Signs Last 24 Hrs  T(C): 36.7 (15 Mar 2021 07:05), Max: 37 (14 Mar 2021 23:18)  T(F): 98.1 (15 Mar 2021 07:05), Max: 98.6 (14 Mar 2021 23:18)  HR: 74 (15 Mar 2021 09:13) (69 - 92)  BP: 103/54 (15 Mar 2021 09:13) (92/47 - 126/97)  BP(mean): 77 (15 Mar 2021 09:13) (67 - 90)  ABP: --  ABP(mean): --  RR: 26 (15 Mar 2021 09:13) (18 - 34)  SpO2: 99% (15 Mar 2021 09:13) (96% - 99%)      CONSTITUTIONAL:   Ill appearing.  Well nourished.  NAD    ENT:   Airway patent,   Mouth with normal mucosa.   No thrush    EYES:   Pupils equal,   Round and reactive to light.    CARDIAC:   Normal rate,   Regular rhythm.    No edema    Vascular:  Normal systolic impulse  No Carotid bruits    RESPIRATORY:   No wheezing  Bilateral BS  Normal chest expansion  Not tachypneic,  No use of accessory muscles    GASTROINTESTINAL:  Abdomen soft,   Non-tender,   No guarding,   + BS    GENITOURINARY  normal genitalia for sex  no edema    MUSCULOSKELETAL:   Range of motion is not limited,  No clubbing, cyanosis    NEUROLOGICAL:   Alert and oriented   No motor  deficits.  pertinent DTRs normal    SKIN:   Skin normal color for race,   Warm and dry  No evidence of rash.    PSYCHIATRIC:   Normal mood and affect.   No apparent risk to self or others.    HEMATOLOGICAL:  No cervical  lymphadenopathy.  no inguinal lymphadenopathy      LABS:                          9.6    8.93  )-----------( 387      ( 15 Mar 2021 06:00 )             30.5                                               03-15    141  |  101  |  23<H>  ----------------------------<  112<H>  4.5   |  32  |  1.2    Ca    9.1      15 Mar 2021 06:00  Mg     2.1     03-14    TPro  5.6<L>  /  Alb  3.3<L>  /  TBili  0.5  /  DBili  x   /  AST  20  /  ALT  37  /  AlkPhos  205<H>  03-14                                                 CARDIAC MARKERS ( 14 Mar 2021 06:45 )  x     / 3.45 ng/mL / x     / x     / x      CARDIAC MARKERS ( 13 Mar 2021 17:00 )  x     / 3.52 ng/mL / 47 U/L / x     / x                                                LIVER FUNCTIONS - ( 14 Mar 2021 06:45 )  Alb: 3.3 g/dL / Pro: 5.6 g/dL / ALK PHOS: 205 U/L / ALT: 37 U/L / AST: 20 U/L / GGT: x                                                                     Procalcitonin, Serum: 4.22 ng/mL (03-12-21 @ 05:12)                                                                                            MEDICATIONS  (STANDING):  ALPRAZolam 0.25 milliGRAM(s) Oral every 12 hours  aspirin enteric coated 81 milliGRAM(s) Oral daily  atorvastatin 80 milliGRAM(s) Oral at bedtime  cefTRIAXone   IVPB 1000 milliGRAM(s) IV Intermittent every 24 hours  chlorhexidine 4% Liquid 1 Application(s) Topical <User Schedule>  diltiazem    Tablet 60 milliGRAM(s) Oral four times a day  furosemide    Tablet 20 milliGRAM(s) Oral daily  isosorbide   mononitrate ER Tablet (IMDUR) 30 milliGRAM(s) Oral daily  morphine  - Injectable 2 milliGRAM(s) IV Push every 4 hours  nitroglycerin  Infusion 5 MICROgram(s)/Min (1.5 mL/Hr) IV Continuous <Continuous>  pantoprazole  Injectable 40 milliGRAM(s) IV Push every 12 hours  pyridostigmine 180 milliGRAM(s) Oral Once  pyridostigmine  milliGRAM(s) Oral two times a day  sertraline 100 milliGRAM(s) Oral daily    MEDICATIONS  (PRN):  acetaminophen  Suppository .. 650 milliGRAM(s) Rectal every 6 hours PRN Temp greater or equal to 38C (100.4F)  morphine  - Injectable 2 milliGRAM(s) IV Push every 2 hours PRN Severe Pain (7 - 10)  nitroglycerin     SubLingual 0.4 milliGRAM(s) SubLingual every 5 minutes PRN Chest Pain  ondansetron Injectable 4 milliGRAM(s) IV Push every 8 hours PRN Nausea and/or Vomiting      CXR interpreted by me:  none today

## 2021-03-15 NOTE — PROGRESS NOTE ADULT - ASSESSMENT
Patient had mi. Echo mid anteroseptal hypokinetic. Patient had angina. Now on iv ntg.   Not able use beta because myasthenia gravis. Keep hemoglobin 9 or better. Medical rx for now. Patient aware. If more pain may need give heparin. Pain better . Will Try d/c ntg iv. OOB to chair.  Prognosis guarded

## 2021-03-15 NOTE — PROGRESS NOTE ADULT - SUBJECTIVE AND OBJECTIVE BOX
Patient reports feeling improved this am, no chest discomfort, no bleeding       T(F): 98.1 (03-15-21 @ 07:05), Max: 98.6 (03-14-21 @ 23:18)  HR: 74 (03-15-21 @ 09:13)  BP: 103/54 (03-15-21 @ 09:13)  RR: 20  SpO2: 99% (03-15-21 @ 09:13) (96% - 99%)    PHYSICAL EXAM:  GENERAL: NAD  HEAD:  Atraumatic, Normocephalic  NERVOUS SYSTEM:  no focal deficits   CHEST/LUNG:  bilateral rhonchi  HEART: Regular rate and rhythm; No murmurs, rubs, or gallops  ABDOMEN: Soft, Nontender, Nondistended; Bowel sounds present  EXTREMITIES:  2+ Peripheral Pulses, No clubbing, cyanosis, or edema  LYMPH: No lymphadenopathy noted  SKIN: No rashes or lesions    LABS  03-15    141  |  101  |  23<H>  ----------------------------<  112<H>  4.5   |  32  |  1.2    Ca    9.1      15 Mar 2021 06:00  Mg     2.1     03-14    TPro  5.6<L>  /  Alb  3.3<L>  /  TBili  0.5  /  DBili  x   /  AST  20  /  ALT  37  /  AlkPhos  205<H>  03-14                          9.6    8.93  )-----------( 387      ( 15 Mar 2021 06:00 )             30.5         CARDIAC ENZYMES  Creatine Kinase, Serum: 47 (03-13 @ 17:00)    CKMB Units: 3.3 (03-13 @ 06:35)    Troponin T, Serum: 3.45 ng/mL (03-14-21 @ 06:45)  Troponin T, Serum: 3.52 ng/mL (03-13-21 @ 17:00)  Troponin T, Serum: 3.83 ng/mL (03-13-21 @ 06:35)  Troponin T, Serum: 3.19 ng/mL (03-12-21 @ 18:51)      Culture Results:   <10,000 CFU/mL Normal Urogenital Juanita (03-02-21)    RADIOLOGY  < from: Xray Chest 1 View- PORTABLE-Routine (Xray Chest 1 View- PORTABLE-Routine in AM.) (03.13.21 @ 06:10) >  Impression:    Bilateral opacities, unchanged.    < end of copied text >    MEDICATIONS  (STANDING):  ALPRAZolam 0.25 milliGRAM(s) Oral every 12 hours  aspirin enteric coated 81 milliGRAM(s) Oral daily  atorvastatin 80 milliGRAM(s) Oral at bedtime  cefTRIAXone   IVPB 1000 milliGRAM(s) IV Intermittent every 24 hours  chlorhexidine 4% Liquid 1 Application(s) Topical <User Schedule>  diltiazem    Tablet 60 milliGRAM(s) Oral four times a day  furosemide    Tablet 20 milliGRAM(s) Oral daily  isosorbide   mononitrate ER Tablet (IMDUR) 30 milliGRAM(s) Oral daily  morphine  - Injectable 2 milliGRAM(s) IV Push every 4 hours  nitroglycerin  Infusion 5 MICROgram(s)/Min (1.5 mL/Hr) IV Continuous <Continuous>  pantoprazole  Injectable 40 milliGRAM(s) IV Push every 12 hours  pyridostigmine 180 milliGRAM(s) Oral Once  pyridostigmine  milliGRAM(s) Oral two times a day  sertraline 100 milliGRAM(s) Oral daily    MEDICATIONS  (PRN):  acetaminophen  Suppository .. 650 milliGRAM(s) Rectal every 6 hours PRN Temp greater or equal to 38C (100.4F)  morphine  - Injectable 2 milliGRAM(s) IV Push every 2 hours PRN Severe Pain (7 - 10)  nitroglycerin     SubLingual 0.4 milliGRAM(s) SubLingual every 5 minutes PRN Chest Pain  ondansetron Injectable 4 milliGRAM(s) IV Push every 8 hours PRN Nausea and/or Vomiting

## 2021-03-15 NOTE — PROGRESS NOTE ADULT - ASSESSMENT
76yFemale pmh HTN, MG discharged from [facility] recently with suspected type 2 MI and chronic anemia patient had no gross GI bleeding at the time. Patient now presents with an episode of vomiting her food early in the morning with speckles of blood in it. Patient also reports two melenic stools earlier in the morning today. Patient with history of severe N-SAID usage     Problem 1-Microcytic anemia with intermittent melenic stools and one episode of hematemesis   never had EGD/Colonoscopy before  Rec  s/p EGD yesterday : hemoglobin is stable, no evidence of active bleeding.  Impressions:        Ulcer with visible vessel spurting, in the posterior wall of duodenal bulb.  (Injection, Endoclip). Excellent hemostacis obtained. . Bleeding controlled and stopped.   -Note that melenic stools can be  expected for up to 5 days after procedure   -IR  is aware and on board:  if further bleeding, transfer [place] for embolization   -okay to start aspirin with PPI   - PPI IV BID  Plan:   -Will discuss with Dr. Matamoros about heparin requested, likely to be cleared as patient needs heparin and benefits outweigh risks   - advance to soft diet   -pathology results negative for HP  -EGD in 2 months  -Maintain Hemodynamic Stability   -Monitor CBC  -CMP,Optimize Electrolytes  -Transfuse prn to hgb >8  -Two large bore IV lines  -Monitor Vital Signs  -Monitor Stool For blood, frequency, consistency, melena  -Active Type and Screen  - Follow up with our [facility] located at [address] [place] [address]. Phone Number: [phone]     Problem 2-Altered liver chemistries  likely cholestasis of sepsis   Rec  -Monitor LFTs, LFTs improving   -Treat cause for sepsis   -Maintain hemodynamic stability  -RUQ ultrasound appreciated patient with gallstones CBD 0.5cm    Problem 3-Mildly dilated pancreatic duct measuring up to 5 mm.  Rec  -MRI with gadolinium and MRCP imaging is recommended to exclude an obstructing process when medically feasible       Problem 4-Right thyroid lobe 3.4 cm mass with resulting mild leftward tracheal deviation.   Rec  - Care as per primary team     Problem 5- Bilateral pleural effusions with adjacent compressive atelectasis. Left upper lobe 7 mm groundglass nodule. 3.5 x 1.8 cm ovoid mass abutting left T2 neural foramen. It is probably pleural-based. This may also be neural in origin  Rec  - Care as per primary team  76yFemale pmh HTN, [place] [facility] type 2 MI and chronic anemia patient had no gross GI bleeding at the time. Patient now presents with an episode of vomiting her food early in the morning with speckles of blood in it. Patient also reports two melenic stools earlier in the morning today. Patient with history of severe N-SAID usage     Problem 1-Microcytic anemia with intermittent melenic stools and one episode of hematemesis   never had EGD/Colonoscopy before  Rec  s/p EGD yesterday : hemoglobin is stable, no evidence of active bleeding.  Impressions:    Ulcer with visible vessel spurting, in the posterior wall of duodenal bulb.  (Injection, Endoclip). Excellent hemostacis obtained. . Bleeding controlled and stopped.   -Note that melenic stools can be  expected for up to 5 days after procedure   -IR  is aware and on board:  if further bleeding, transfer [place] for embolization   -okay to start aspirin with PPI   - PPI IV BID  Plan:   -okay to start heparin no bolus   - advance to soft diet   -pathology results negative for HP  -EGD in 2 months  -Maintain Hemodynamic Stability   -Monitor CBC  -CMP,Optimize Electrolytes  -Transfuse prn to hgb >8  -Two large bore IV lines  -Monitor Vital Signs  -Monitor Stool For blood, frequency, consistency, melena  -Active Type and Screen  - Follow up with our [facility] located at [address] [place] [address]. Phone Number: [phone]     Problem 2-Altered liver chemistries  likely cholestasis of sepsis   Rec  -Monitor LFTs, LFTs improving   -Treat cause for sepsis   -Maintain hemodynamic stability  -RUQ ultrasound appreciated patient with gallstones CBD 0.5cm    Problem 3-Mildly dilated pancreatic duct measuring up to 5 mm.  Rec  -MRI with gadolinium and MRCP imaging is recommended to exclude an obstructing process when medically feasible       Problem 4-Right thyroid lobe 3.4 cm mass with resulting mild leftward tracheal deviation.   Rec  - Care as per primary team     Problem 5- Bilateral pleural effusions with adjacent compressive atelectasis. Left upper lobe 7 mm groundglass nodule. 3.5 x 1.8 cm ovoid mass abutting left T2 neural foramen. It is probably pleural-based. This may also be neural in origin  Rec  - Care as per primary team  76yFemale pmh HTN, MG [facility] type 2 MI and chronic anemia patient had no gross GI bleeding at the time. Patient now presents with an episode of vomiting her food early in the morning with speckles of blood in it. Patient also reports two melenic stools earlier in the morning today. Patient with history of severe N-SAID usage     Problem 1-Microcytic anemia with intermittent melenic stools and one episode of hematemesis   never had EGD/Colonoscopy before  Rec  s/p EGD yesterday : hemoglobin is stable, no evidence of active bleeding.  Impressions:    Ulcer with visible vessel spurting, in the posterior wall of duodenal bulb.  (Injection, Endoclip). Excellent hemostacis obtained. . Bleeding controlled and stopped.   -Note that melenic stools can be  expected for up to 5 days after procedure   -IR  is aware and on board:  if further bleeding, transfer [place] for embolization   -okay to start aspirin with PPI   - PPI IV BID  Plan:   -okay to start heparin no bolus   - advance to soft diet   -pathology results negative for HP  -EGD in 2 months  -Maintain Hemodynamic Stability   -Monitor CBC  -CMP,Optimize Electrolytes  -Transfuse prn to hgb >8  -Two large bore IV lines  -Monitor Vital Signs  -Monitor Stool For blood, frequency, consistency, melena  -Active Type and Screen  - Follow up with our [facility] located at [address] [place] [address]. Phone Number: [phone]     Problem 2-Altered liver chemistries  likely cholestasis of sepsis   Rec  -Monitor LFTs, LFTs improving   -Treat cause for sepsis   -Maintain hemodynamic stability  -RUQ ultrasound appreciated patient with gallstones CBD 0.5cm    Problem 3-Mildly dilated pancreatic duct measuring up to 5 mm.  Rec  -MRI with gadolinium and MRCP imaging is recommended to exclude an obstructing process when medically feasible     Problem 4-Right thyroid lobe 3.4 cm mass with resulting mild leftward tracheal deviation.   Rec  - Care as per primary team     Problem 5- Bilateral pleural effusions with adjacent compressive atelectasis. Left upper lobe 7 mm groundglass nodule. 3.5 x 1.8 cm ovoid mass abutting left T2 neural foramen. It is probably pleural-based. This may also be neural in origin  Rec  - Care as per primary team

## 2021-03-15 NOTE — PROGRESS NOTE ADULT - SUBJECTIVE AND OBJECTIVE BOX
Patient is a 76y old  Female who presents with a chief complaint of Melena (14 Mar 2021 20:09)      T(F): 97.7 (03-15-21 @ 05:13), Max: 98.6 (03-14-21 @ 23:18)  HR: 69 (03-15-21 @ 05:13)  BP: 99/51 (03-15-21 @ 05:13)  RR: 34 (03-15-21 @ 05:13)  SpO2: 97% (03-15-21 @ 05:13) (96% - 98%)    PHYSICAL EXAM:  GENERAL: NAD, well-groomed, well-developed  HEAD:  Atraumatic, Normocephalic  EYES: EOMI, PERRLA, conjunctiva and sclera clear  ENMT: No tonsillar erythema, exudates, or enlargement; Moist mucous membranes, Good dentition, No lesions  NECK: Supple, No JVD, Normal thyroid  NERVOUS SYSTEM:  Alert & Oriented X3,  Motor Strength 5/5 B/L upper and lower extremities  CHEST/LUNG: Clear to percussion bilaterally; No rales, rhonchi, wheezing, or rubs  HEART: Regular rate and rhythm; No murmurs, rubs, or gallops  ABDOMEN: Soft, Nontender, Nondistended; Bowel sounds present  EXTREMITIES:   No clubbing, cyanosis, or edema  LYMPH: No lymphadenopathy noted  SKIN: No rashes or lesions    labs  03-14    139  |  98  |  26<H>  ----------------------------<  126<H>  4.0   |  31  |  1.3    Ca    9.1      14 Mar 2021 06:45  Mg     2.1     03-14    TPro  5.6<L>  /  Alb  3.3<L>  /  TBili  0.5  /  DBili  x   /  AST  20  /  ALT  37  /  AlkPhos  205<H>  03-14                          9.6    8.93  )-----------( 387      ( 15 Mar 2021 06:00 )             30.5               acetaminophen  Suppository .. 650 milliGRAM(s) Rectal every 6 hours PRN  ALPRAZolam 0.25 milliGRAM(s) Oral every 12 hours  aspirin enteric coated 81 milliGRAM(s) Oral daily  atorvastatin 80 milliGRAM(s) Oral at bedtime  cefTRIAXone   IVPB 1000 milliGRAM(s) IV Intermittent every 24 hours  chlorhexidine 4% Liquid 1 Application(s) Topical <User Schedule>  diltiazem    Tablet 60 milliGRAM(s) Oral four times a day  furosemide    Tablet 20 milliGRAM(s) Oral daily  isosorbide   mononitrate ER Tablet (IMDUR) 30 milliGRAM(s) Oral daily  morphine  - Injectable 2 milliGRAM(s) IV Push every 2 hours PRN  morphine  - Injectable 2 milliGRAM(s) IV Push every 4 hours  nitroglycerin     SubLingual 0.4 milliGRAM(s) SubLingual every 5 minutes PRN  nitroglycerin  Infusion 5 MICROgram(s)/Min IV Continuous <Continuous>  ondansetron Injectable 4 milliGRAM(s) IV Push every 8 hours PRN  pantoprazole  Injectable 40 milliGRAM(s) IV Push every 12 hours  pyridostigmine 180 milliGRAM(s) Oral Once  pyridostigmine  milliGRAM(s) Oral two times a day  sertraline 100 milliGRAM(s) Oral daily

## 2021-03-15 NOTE — PROGRESS NOTE ADULT - ASSESSMENT
76yFemale being evaluated for goals of care and pain mmngnmt in setting of GIB from NSAID use, MG, STEMI      MEDD (morphine equivalent daily dose): 42mg/24hrs       See Recs below.    Please call [phone] with questions or concerns 24/7.   We will continue to follow.

## 2021-03-16 LAB
ANION GAP SERPL CALC-SCNC: 8 MMOL/L — SIGNIFICANT CHANGE UP (ref 7–14)
APTT BLD: 54.6 SEC — HIGH (ref 27–39.2)
APTT BLD: 59.6 SEC — HIGH (ref 27–39.2)
APTT BLD: 65.7 SEC — HIGH (ref 27–39.2)
BLD GP AB SCN SERPL QL: SIGNIFICANT CHANGE UP
BUN SERPL-MCNC: 20 MG/DL — SIGNIFICANT CHANGE UP (ref 10–20)
CALCIUM SERPL-MCNC: 9.3 MG/DL — SIGNIFICANT CHANGE UP (ref 8.5–10.1)
CHLORIDE SERPL-SCNC: 99 MMOL/L — SIGNIFICANT CHANGE UP (ref 98–110)
CO2 SERPL-SCNC: 32 MMOL/L — SIGNIFICANT CHANGE UP (ref 17–32)
CREAT SERPL-MCNC: 1.1 MG/DL — SIGNIFICANT CHANGE UP (ref 0.7–1.5)
GLUCOSE SERPL-MCNC: 106 MG/DL — HIGH (ref 70–99)
HCT VFR BLD CALC: 31.2 % — LOW (ref 37–47)
HGB BLD-MCNC: 9.8 G/DL — LOW (ref 12–16)
MAGNESIUM SERPL-MCNC: 1.6 MG/DL — LOW (ref 1.8–2.4)
MCHC RBC-ENTMCNC: 27.1 PG — SIGNIFICANT CHANGE UP (ref 27–31)
MCHC RBC-ENTMCNC: 31.4 G/DL — LOW (ref 32–37)
MCV RBC AUTO: 86.2 FL — SIGNIFICANT CHANGE UP (ref 81–99)
NRBC # BLD: 0 /100 WBCS — SIGNIFICANT CHANGE UP (ref 0–0)
PLATELET # BLD AUTO: 420 K/UL — HIGH (ref 130–400)
POTASSIUM SERPL-MCNC: 4 MMOL/L — SIGNIFICANT CHANGE UP (ref 3.5–5)
POTASSIUM SERPL-SCNC: 4 MMOL/L — SIGNIFICANT CHANGE UP (ref 3.5–5)
RBC # BLD: 3.62 M/UL — LOW (ref 4.2–5.4)
RBC # FLD: 17.3 % — HIGH (ref 11.5–14.5)
SODIUM SERPL-SCNC: 139 MMOL/L — SIGNIFICANT CHANGE UP (ref 135–146)
WBC # BLD: 10.64 K/UL — SIGNIFICANT CHANGE UP (ref 4.8–10.8)
WBC # FLD AUTO: 10.64 K/UL — SIGNIFICANT CHANGE UP (ref 4.8–10.8)

## 2021-03-16 PROCEDURE — 99232 SBSQ HOSP IP/OBS MODERATE 35: CPT

## 2021-03-16 PROCEDURE — 99233 SBSQ HOSP IP/OBS HIGH 50: CPT

## 2021-03-16 PROCEDURE — 71045 X-RAY EXAM CHEST 1 VIEW: CPT | Mod: 26

## 2021-03-16 RX ORDER — MORPHINE SULFATE 50 MG/1
2 CAPSULE, EXTENDED RELEASE ORAL EVERY 6 HOURS
Refills: 0 | Status: DISCONTINUED | OUTPATIENT
Start: 2021-03-16 | End: 2021-03-17

## 2021-03-16 RX ORDER — MAGNESIUM SULFATE 500 MG/ML
2 VIAL (ML) INJECTION ONCE
Refills: 0 | Status: COMPLETED | OUTPATIENT
Start: 2021-03-16 | End: 2021-03-16

## 2021-03-16 RX ORDER — ALPRAZOLAM 0.25 MG
0.25 TABLET ORAL DAILY
Refills: 0 | Status: DISCONTINUED | OUTPATIENT
Start: 2021-03-16 | End: 2021-03-22

## 2021-03-16 RX ORDER — FUROSEMIDE 40 MG
40 TABLET ORAL ONCE
Refills: 0 | Status: COMPLETED | OUTPATIENT
Start: 2021-03-16 | End: 2021-03-16

## 2021-03-16 RX ORDER — FUROSEMIDE 40 MG
40 TABLET ORAL DAILY
Refills: 0 | Status: DISCONTINUED | OUTPATIENT
Start: 2021-03-17 | End: 2021-03-23

## 2021-03-16 RX ORDER — PYRIDOSTIGMINE BROMIDE 60 MG/5ML
60 SOLUTION ORAL EVERY 8 HOURS
Refills: 0 | Status: DISCONTINUED | OUTPATIENT
Start: 2021-03-16 | End: 2021-03-23

## 2021-03-16 RX ADMIN — CHLORHEXIDINE GLUCONATE 1 APPLICATION(S): 213 SOLUTION TOPICAL at 06:04

## 2021-03-16 RX ADMIN — Medication 0.25 MILLIGRAM(S): at 06:04

## 2021-03-16 RX ADMIN — HEPARIN SODIUM 8 UNIT(S)/HR: 5000 INJECTION INTRAVENOUS; SUBCUTANEOUS at 09:08

## 2021-03-16 RX ADMIN — Medication 0.25 MILLIGRAM(S): at 23:26

## 2021-03-16 RX ADMIN — PANTOPRAZOLE SODIUM 40 MILLIGRAM(S): 20 TABLET, DELAYED RELEASE ORAL at 17:18

## 2021-03-16 RX ADMIN — NYSTATIN CREAM 1 APPLICATION(S): 100000 CREAM TOPICAL at 17:18

## 2021-03-16 RX ADMIN — PYRIDOSTIGMINE BROMIDE 60 MILLIGRAM(S): 60 SOLUTION ORAL at 16:26

## 2021-03-16 RX ADMIN — PANTOPRAZOLE SODIUM 40 MILLIGRAM(S): 20 TABLET, DELAYED RELEASE ORAL at 06:05

## 2021-03-16 RX ADMIN — SERTRALINE 100 MILLIGRAM(S): 25 TABLET, FILM COATED ORAL at 12:00

## 2021-03-16 RX ADMIN — ATORVASTATIN CALCIUM 80 MILLIGRAM(S): 80 TABLET, FILM COATED ORAL at 21:44

## 2021-03-16 RX ADMIN — MORPHINE SULFATE 2 MILLIGRAM(S): 50 CAPSULE, EXTENDED RELEASE ORAL at 23:26

## 2021-03-16 RX ADMIN — Medication 40 MILLIGRAM(S): at 10:57

## 2021-03-16 RX ADMIN — PYRIDOSTIGMINE BROMIDE 180 MILLIGRAM(S): 60 SOLUTION ORAL at 05:51

## 2021-03-16 RX ADMIN — Medication 60 MILLIGRAM(S): at 23:20

## 2021-03-16 RX ADMIN — Medication 60 MILLIGRAM(S): at 17:18

## 2021-03-16 RX ADMIN — MORPHINE SULFATE 2 MILLIGRAM(S): 50 CAPSULE, EXTENDED RELEASE ORAL at 06:25

## 2021-03-16 RX ADMIN — CEFTRIAXONE 100 MILLIGRAM(S): 500 INJECTION, POWDER, FOR SOLUTION INTRAMUSCULAR; INTRAVENOUS at 05:51

## 2021-03-16 RX ADMIN — PYRIDOSTIGMINE BROMIDE 180 MILLIGRAM(S): 60 SOLUTION ORAL at 21:43

## 2021-03-16 RX ADMIN — Medication 60 MILLIGRAM(S): at 05:51

## 2021-03-16 RX ADMIN — NYSTATIN CREAM 1 APPLICATION(S): 100000 CREAM TOPICAL at 06:06

## 2021-03-16 RX ADMIN — Medication 20 MILLIGRAM(S): at 05:51

## 2021-03-16 RX ADMIN — ISOSORBIDE MONONITRATE 30 MILLIGRAM(S): 60 TABLET, EXTENDED RELEASE ORAL at 12:00

## 2021-03-16 RX ADMIN — Medication 81 MILLIGRAM(S): at 12:00

## 2021-03-16 RX ADMIN — Medication 50 GRAM(S): at 10:57

## 2021-03-16 RX ADMIN — Medication 60 MILLIGRAM(S): at 12:00

## 2021-03-16 RX ADMIN — MORPHINE SULFATE 2 MILLIGRAM(S): 50 CAPSULE, EXTENDED RELEASE ORAL at 06:04

## 2021-03-16 NOTE — PROGRESS NOTE ADULT - SUBJECTIVE AND OBJECTIVE BOX
Patient is a 76y old  Female who presents with a chief complaint of Melena (16 Mar 2021 07:03)      Over Night Events: none, + BM no bleeding, heparin drip    I&O's Detail    15 Mar 2021 07:01  -  16 Mar 2021 07:00  --------------------------------------------------------  IN:    Heparin: 153 mL    IV PiggyBack: 50 mL    Nitroglycerin: 66 mL    Oral Fluid: 120 mL  Total IN: 389 mL    OUT:    Indwelling Catheter - Urethral (mL): 895 mL    Voided (mL): 900 mL  Total OUT: 1795 mL    Total NET: -1406 mL      16 Mar 2021 07:01  -  16 Mar 2021 10:31  --------------------------------------------------------  IN:    Heparin: 25 mL    Oral Fluid: 220 mL  Total IN: 245 mL    OUT:    Voided (mL): 240 mL  Total OUT: 240 mL    Total NET: 5 mL          PHYSICAL EXAM    ICU Vital Signs Last 24 Hrs  T(C): 36.5 (16 Mar 2021 07:05), Max: 36.8 (15 Mar 2021 23:05)  T(F): 97.7 (16 Mar 2021 07:05), Max: 98.3 (15 Mar 2021 23:05)  HR: 77 (16 Mar 2021 09:16) (70 - 85)  BP: 116/57 (16 Mar 2021 09:16) (91/51 - 142/63)  BP(mean): 82 (16 Mar 2021 09:16) (68 - 91)  ABP: --  ABP(mean): --  RR: 26 (16 Mar 2021 09:16) (16 - 30)  SpO2: 93% (16 Mar 2021 09:16) (93% - 100%)      CONSTITUTIONAL:   Ill appearing.  Well nourished.  NAD    ENT:   Airway patent,   Mouth with normal mucosa.   No thrush    EYES:   Pupils equal,   Round and reactive to light.    CARDIAC:   Normal rate,   Regular rhythm.    No edema    Vascular:  Normal systolic impulse  No Carotid bruits    RESPIRATORY:   No wheezing  Bilateral BS  Normal chest expansion  Not tachypneic,  No use of accessory muscles    GASTROINTESTINAL:  Abdomen soft,   Non-tender,   No guarding,   + BS    GENITOURINARY  normal genitalia for sex  no edema    MUSCULOSKELETAL:   Range of motion is not limited,  No clubbing, cyanosis    NEUROLOGICAL:   Alert and oriented   No motor  deficits.  pertinent DTRs normal    SKIN:   Skin normal color for race,   Warm and dry  No evidence of rash.    PSYCHIATRIC:   Normal mood and affect.   No apparent risk to self or others.    HEMATOLOGICAL:  No cervical  lymphadenopathy.  no inguinal lymphadenopathy      LABS:                          9.8    10.64 )-----------( 420      ( 16 Mar 2021 06:03 )             31.2                                               03-16    139  |  99  |  20  ----------------------------<  106<H>  4.0   |  32  |  1.1    Ca    9.3      16 Mar 2021 06:03  Mg     1.6     03-16        PTT - ( 16 Mar 2021 06:03 )  PTT:65.7 sec                                                                                                                                                 Procalcitonin, Serum: 4.22 ng/mL (03-12-21 @ 05:12)                                                                                            MEDICATIONS  (STANDING):  ALPRAZolam 0.25 milliGRAM(s) Oral every 12 hours  aspirin enteric coated 81 milliGRAM(s) Oral daily  atorvastatin 80 milliGRAM(s) Oral at bedtime  cefTRIAXone   IVPB 1000 milliGRAM(s) IV Intermittent every 24 hours  chlorhexidine 4% Liquid 1 Application(s) Topical <User Schedule>  diltiazem    Tablet 60 milliGRAM(s) Oral four times a day  furosemide    Tablet 20 milliGRAM(s) Oral daily  heparin  Infusion 900 Unit(s)/Hr (8 mL/Hr) IV Continuous <Continuous>  isosorbide   mononitrate ER Tablet (IMDUR) 30 milliGRAM(s) Oral daily  morphine  - Injectable 2 milliGRAM(s) IV Push every 4 hours  nystatin Powder 1 Application(s) Topical every 12 hours  pantoprazole  Injectable 40 milliGRAM(s) IV Push every 12 hours  pyridostigmine 180 milliGRAM(s) Oral Once  pyridostigmine  milliGRAM(s) Oral two times a day  sertraline 100 milliGRAM(s) Oral daily    MEDICATIONS  (PRN):  acetaminophen  Suppository .. 650 milliGRAM(s) Rectal every 6 hours PRN Temp greater or equal to 38C (100.4F)  morphine  - Injectable 2 milliGRAM(s) IV Push every 2 hours PRN Severe Pain (7 - 10)  nitroglycerin     SubLingual 0.4 milliGRAM(s) SubLingual every 5 minutes PRN Chest Pain  ondansetron Injectable 4 milliGRAM(s) IV Push every 8 hours PRN Nausea and/or Vomiting      CXR interpreted by me:  bilateral opacitties

## 2021-03-16 NOTE — PROGRESS NOTE ADULT - ASSESSMENT
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 switch to nasal cannula to keep pox > 92 %    CARDIOVASCULAR: Goal MAP >65, I=O, avoid volume overload, optimize cardiac therapies  lasix 40 iv once today  cardizem for rate control per cardio  and asa as per GI   cardiology follow up     GI: PPI q12h, PO diet    RENAL:  F/u  lytes.  Correct as needed.  Accurate I/O    INFECTIOUS DISEASE: no abx    HEMATOLOGICAL:  SCDs, heparin monitor ptt    ENDOCRINE:  Follow up FS.  Insulin protocol if needed.    MUSCULOSKELETAL: Bedrest    transfer to tele  dnr/  dni

## 2021-03-16 NOTE — PROGRESS NOTE ADULT - SUBJECTIVE AND OBJECTIVE BOX
Patient is a 76y old  Female who presents with a chief complaint of Melena (15 Mar 2021 15:49)      T(F): 98.3 (03-15-21 @ 23:05), Max: 98.3 (03-15-21 @ 23:05)  HR: 79 (03-16-21 @ 06:13)  BP: 113/62 (03-16-21 @ 06:13)  RR: 16 (03-16-21 @ 06:13)  SpO2: 98% (03-16-21 @ 05:13) (98% - 100%)    PHYSICAL EXAM:  GENERAL: NAD, well-groomed, well-developed  HEAD:  Atraumatic, Normocephalic  EYES: EOMI, PERRLA, conjunctiva and sclera clear  ENMT: No tonsillar erythema, exudates, or enlargement; Moist mucous membranes, Good dentition, No lesions  NECK: Supple, No JVD, Normal thyroid  NERVOUS SYSTEM:  Alert & Oriented X3,  Motor Strength 5/5 B/L upper and lower extremities  CHEST/LUNG: Clear to percussion bilaterally; No rales, rhonchi, wheezing, or rubs  HEART: Regular rate and rhythm; No murmurs, rubs, or gallops  ABDOMEN: Soft, Nontender, Nondistended; Bowel sounds present  EXTREMITIES:   No clubbing, cyanosis, or edema  LYMPH: No lymphadenopathy noted  SKIN: No rashes or lesions    labs  03-15    141  |  101  |  23<H>  ----------------------------<  112<H>  4.5   |  32  |  1.2    Ca    9.1      15 Mar 2021 06:00                            9.8    10.64 )-----------( 420      ( 16 Mar 2021 06:03 )             31.2       PTT - ( 16 Mar 2021 06:03 )  PTT:65.7 sec        acetaminophen  Suppository .. 650 milliGRAM(s) Rectal every 6 hours PRN  ALPRAZolam 0.25 milliGRAM(s) Oral every 12 hours  aspirin enteric coated 81 milliGRAM(s) Oral daily  atorvastatin 80 milliGRAM(s) Oral at bedtime  cefTRIAXone   IVPB 1000 milliGRAM(s) IV Intermittent every 24 hours  chlorhexidine 4% Liquid 1 Application(s) Topical <User Schedule>  diltiazem    Tablet 60 milliGRAM(s) Oral four times a day  furosemide    Tablet 20 milliGRAM(s) Oral daily  heparin  Infusion 900 Unit(s)/Hr IV Continuous <Continuous>  isosorbide   mononitrate ER Tablet (IMDUR) 30 milliGRAM(s) Oral daily  morphine  - Injectable 2 milliGRAM(s) IV Push every 4 hours  morphine  - Injectable 2 milliGRAM(s) IV Push every 2 hours PRN  nitroglycerin     SubLingual 0.4 milliGRAM(s) SubLingual every 5 minutes PRN  nystatin Powder 1 Application(s) Topical every 12 hours  ondansetron Injectable 4 milliGRAM(s) IV Push every 8 hours PRN  pantoprazole  Injectable 40 milliGRAM(s) IV Push every 12 hours  pyridostigmine 180 milliGRAM(s) Oral Once  pyridostigmine  milliGRAM(s) Oral two times a day  sertraline 100 milliGRAM(s) Oral daily

## 2021-03-16 NOTE — CHART NOTE - NSCHARTNOTEFT_GEN_A_CORE
Registered Dietitian Follow-Up     Patient Profile Reviewed                           Yes [x]   No []     Nutrition History Previously Obtained        Yes [x]  No []       Pertinent Subjective Information: Pt states that her appetite is improving, however she is not too fond of the hospital meals. No nausea/abdominal pain with meals. Interested in oral nutrition supplements for when she does not like the meals served. She reports that  staff have been taking her order + sending her the meals she is requesting otherwise. Per EMR, po intake varies 25-75% at this time.     Pertinent Medical Interventions: Pt tolerating soft diet at this time with no melena or vomiting; H/H stable sp clipping of duodenal ulcer per surgery team.     Diet order: Diet, DASH/TLC:   Sodium & Cholesterol Restricted (21 @ 13:53)    Anthropometrics:  Height (cm): 170.2 (21 @ 10:31)  Weight: dosing (3/9) 71.4 kg  BMI: 24.7  IBW: 61.4 kg    Daily Weight in k.4 (), Weight in k (), Weight in k.1 (03-10)  % Weight Change relatively stable wt trends; edema remains present at this time.    MEDICATIONS  (STANDING):  aspirin enteric coated 81 milliGRAM(s) Oral daily  atorvastatin 80 milliGRAM(s) Oral at bedtime  chlorhexidine 4% Liquid 1 Application(s) Topical <User Schedule>  diltiazem    Tablet 60 milliGRAM(s) Oral four times a day  heparin  Infusion 900 Unit(s)/Hr (8 mL/Hr) IV Continuous <Continuous>  isosorbide   mononitrate ER Tablet (IMDUR) 30 milliGRAM(s) Oral daily  nystatin Powder 1 Application(s) Topical every 12 hours  pantoprazole  Injectable 40 milliGRAM(s) IV Push every 12 hours  pyridostigmine 180 milliGRAM(s) Oral Once  pyridostigmine  milliGRAM(s) Oral two times a day  sertraline 100 milliGRAM(s) Oral daily    MEDICATIONS  (PRN):  acetaminophen  Suppository .. 650 milliGRAM(s) Rectal every 6 hours PRN Temp greater or equal to 38C (100.4F)  ALPRAZolam 0.25 milliGRAM(s) Oral daily PRN anxiety  morphine  - Injectable 2 milliGRAM(s) IV Push every 6 hours PRN Severe Pain (7 - 10)    Pertinent Labs:  RBC 3.62, H/H 9.8/31.2, glucose 106, Mg 1.6    Physical Findings:  - Appearance: well nourished; (3/12) +1 generalized edema - remains active per flow sheets  - GI function: last BM 3/15; pt denies any issues with diarrhea/constipation.  - Tubes: N/A  - Oral/Mouth cavity: no chewing/swallowing issues of note; preference for softer texture.  - Skin: (3/16) ecchymosis.     Nutrition Requirements:  Weight Used: dosing 71.4 kg (continue from assessment on 3/12)     Estimated Energy Needs    Continue [x] 6649-3018 kcal/day (MSJ 1.2-1.3 AF)     Estimated Protein Needs    Continue [x] 71-85 g/day ( 1.0-1.2 g/kg , will continue to monitor renal related labs and adjust prn)      Estimated Fluid Needs        Continue [x] 1 mL/kcal or per LIP     Nutrient Intake: 25-75%; tolerating current diet since advancement     [x] Previous Nutrition Diagnosis: Inadequate oral intake            [x] Ongoing              Nutrition Intervention meals and snacks, medical food supplements, coordination of care     Goal/Expected Outcome: pt to maintain po intake >50% within the next 3 days     Indicator/Monitoring: RD to monitor diet order, energy intake, body composition, NFPF, glucose/electrolyte profiles    Recommendation: continue current diet order as tolerated, order ensure enlive supplement BID, encourage po intake, provide assistance with meals PRN; continue to send meals based on pt preference as able. Registered Dietitian Follow-Up     Patient Profile Reviewed                           Yes [x]   No []     Nutrition History Previously Obtained        Yes [x]  No []       Pertinent Subjective Information: Pt states that her appetite is improving, however she is not too fond of the hospital meals. No nausea/abdominal pain with meals. Interested in oral nutrition supplements for when she does not like the meals served. She reports that  staff have been taking her order + sending her the meals she is requesting otherwise. Per EMR, po intake varies 25-75% at this time.     Pertinent Medical Interventions: Pt tolerating soft diet at this time with no melena or vomiting; H/H stable sp clipping of duodenal ulcer per surgery team.     Diet order: Diet, DASH/TLC:   Sodium & Cholesterol Restricted (21 @ 13:53)    Anthropometrics:  Height (cm): 170.2 (21 @ 10:31)  Weight: dosing (3/9) 71.4 kg  BMI: 24.7  IBW: 61.4 kg    Daily Weight in k.4 (), Weight in k (), Weight in k.1 (03-10)  % Weight Change relatively stable wt trends; edema remains present at this time.    MEDICATIONS  (STANDING):  aspirin enteric coated 81 milliGRAM(s) Oral daily  atorvastatin 80 milliGRAM(s) Oral at bedtime  chlorhexidine 4% Liquid 1 Application(s) Topical <User Schedule>  diltiazem    Tablet 60 milliGRAM(s) Oral four times a day  heparin  Infusion 900 Unit(s)/Hr (8 mL/Hr) IV Continuous <Continuous>  isosorbide   mononitrate ER Tablet (IMDUR) 30 milliGRAM(s) Oral daily  nystatin Powder 1 Application(s) Topical every 12 hours  pantoprazole  Injectable 40 milliGRAM(s) IV Push every 12 hours  pyridostigmine 180 milliGRAM(s) Oral Once  pyridostigmine  milliGRAM(s) Oral two times a day  sertraline 100 milliGRAM(s) Oral daily    MEDICATIONS  (PRN):  acetaminophen  Suppository .. 650 milliGRAM(s) Rectal every 6 hours PRN Temp greater or equal to 38C (100.4F)  ALPRAZolam 0.25 milliGRAM(s) Oral daily PRN anxiety  morphine  - Injectable 2 milliGRAM(s) IV Push every 6 hours PRN Severe Pain (7 - 10)    Pertinent Labs:  RBC 3.62, H/H 9.8/31.2, glucose 106, Mg 1.6    Physical Findings:  - Appearance: well nourished; (3/12) +1 generalized edema - remains active per flow sheets  - GI function: last BM 3/15; pt denies any issues with diarrhea/constipation.  - Tubes: N/A  - Oral/Mouth cavity: no chewing/swallowing issues of note; preference for softer texture.  - Skin: (3/16) ecchymosis.     Nutrition Requirements:  Weight Used: dosing 71.4 kg (continue from assessment on 3/12)     Estimated Energy Needs    Continue [x] 3297-0110 kcal/day (MSJ 1.2-1.3 AF)     Estimated Protein Needs    Continue [x] 71-85 g/day ( 1.0-1.2 g/kg , will continue to monitor renal related labs and adjust prn)      Estimated Fluid Needs        Continue [x] 1 mL/kcal or per LIP     Nutrient Intake: 25-75%; tolerating current diet since advancement     [x] Previous Nutrition Diagnosis: Inadequate oral intake            [x] Ongoing              Nutrition Intervention meals and snacks, medical food supplements, coordination of care     Goal/Expected Outcome: pt to maintain po intake >50% within the next 3 days     Indicator/Monitoring: RD to monitor diet order, energy intake, body composition, NFPF, glucose/electrolyte profiles    Recommendation: continue current diet order as tolerated, order ensure enlive supplement BID, encourage po intake, provide assistance with meals PRN; continue to send meals based on pt preference as able. Recs discussed with CCU resident [phone].

## 2021-03-16 NOTE — PHARMACOTHERAPY INTERVENTION NOTE - COMMENTS
MD was considering to put pt on fentanyl patch for more effective pain control. (cancer associated pain)  Pt is receiving morphine 2mg iv q4hr - (total requirement for last 24 hour was 18mg of IV morphine)    Emphasized that if transitioning to iv morphine to fentanyl patch completely the equivalent dose would be 25mcg [id number].     However, if intention is to keep morphine, would rec adding 12.5mcg fentanyl patch as background and need to lower standing morphine dose as it can oversedate patient. Also stressed that fentanyl patch will only help with chronic pain NOT acute pain and if keep ordering IV pushes of analgesics for acute pain control along with fentanyl patch, could be too much too much opioid administering

## 2021-03-16 NOTE — PROGRESS NOTE ADULT - ASSESSMENT
77 y/o Female with PMH of HTN, [place] [facility] with suspected type 2 MI and chronic anemia patient  Patient with history of severe NSAID usage     # Acute blood loss anemia secondary to Bleeding duodenal ulcer  -c/o melena and hematemesis, h/o NSAID abuse  -Hb 4 on admission. s/p 5U PRBC in total  -Patient intubated for EGD on 3/9 and extubated  today due to history of MG and tracheal deviation, considered high risk as per anesthesia  -EGD on 3/9 showed actively spurting vessel in posterior wall of duodenal bulb. s/p epinephrine inj and clipping  - now tolerating  diet    -  Protonix  q12h   -Active Type and Screen  -Transfuse PRN to hgb >8  -Two large bore IV lines  -GI following    #NSTEMI    -now on IV nitro and Heparin with improving symptoms   - meds as per Cardiology   - tele   - ? cardiac cath   - supplement hypomagnesemia   #Thyroid mass  -Patient has 2 solid nodules in the thyroid abutting the trachea, paraspinal mass at T2 level likely pleural in origin  -c/o severe shoulder pain, was using NSAIDs for intractable pain from the mas    #Hypertension  -Hold amlodipine    #Myasthenia Gravis  -c/w pyridostigmine    #DVT ppx: SCD

## 2021-03-16 NOTE — CONSULT NOTE ADULT - CONSULT REQUESTED DATE/TIME
09-Mar-2021 07:01
Patient calling to request talk to you regarding CT Scan result, she is leaving [place] on 02[place].  Please give her a call back asap  
09-Mar-2021 09:36
16-Mar-2021 15:55
08-Mar-2021 12:33
09-Mar-2021 11:47
10-Mar-2021

## 2021-03-16 NOTE — PROGRESS NOTE ADULT - SUBJECTIVE AND OBJECTIVE BOX
Patient is comfortable in bed, denies CP or bleeding      T(F): 98.1 (03-16-21 @ 15:00), Max: 99.6 (03-16-21 @ 11:00)  HR: 76 (03-16-21 @ 15:13)  BP: 97/51 (03-16-21 @ 15:13)  RR: 20  SpO2: 99% (03-16-21 @ 15:13) (92% - 100%)    PHYSICAL EXAM:  GENERAL: NAD  HEAD:  Atraumatic, Normocephalic  NERVOUS SYSTEM: no focal deficits   CHEST/LUNG:  bilateral rhonchi  HEART: Regular rate and rhythm; No murmurs, rubs, or gallops  ABDOMEN: Soft, Nontender, Nondistended; Bowel sounds present  EXTREMITIES:  2+ Peripheral Pulses, No clubbing, cyanosis, or edema  LYMPH: No lymphadenopathy noted  SKIN: No rashes or lesions    LABS  03-16    139  |  99  |  20  ----------------------------<  106<H>  4.0   |  32  |  1.1    Ca    9.3      16 Mar 2021 06:03  Mg     1.6     03-16                            9.8    10.64 )-----------( 420      ( 16 Mar 2021 06:03 )             31.2     PTT - ( 16 Mar 2021 06:03 )  PTT:65.7 sec    CARDIAC ENZYMES  Creatine Kinase, Serum: 47 (03-13 @ 17:00)      Troponin T, Serum: 3.45 ng/mL (03-14-21 @ 06:45)  Troponin T, Serum: 3.52 ng/mL (03-13-21 @ 17:00)      Culture Results:   <10,000 CFU/mL Normal Urogenital Juanita (03-02-21)    RADIOLOGY  < from: Xray Chest 1 View- PORTABLE-Routine (Xray Chest 1 View- PORTABLE-Routine in AM.) (03.16.21 @ 06:34) >  Impression:    Increasing interstitial edema and bilateral opacities/effusions.      < end of copied text >    MEDICATIONS  (STANDING):  aspirin enteric coated 81 milliGRAM(s) Oral daily  atorvastatin 80 milliGRAM(s) Oral at bedtime  chlorhexidine 4% Liquid 1 Application(s) Topical <User Schedule>  diltiazem    Tablet 60 milliGRAM(s) Oral four times a day  heparin  Infusion 900 Unit(s)/Hr (8 mL/Hr) IV Continuous <Continuous>  isosorbide   mononitrate ER Tablet (IMDUR) 30 milliGRAM(s) Oral daily  nystatin Powder 1 Application(s) Topical every 12 hours  pantoprazole  Injectable 40 milliGRAM(s) IV Push every 12 hours  pyridostigmine 180 milliGRAM(s) Oral Once  pyridostigmine  milliGRAM(s) Oral two times a day  sertraline 100 milliGRAM(s) Oral daily    MEDICATIONS  (PRN):  acetaminophen  Suppository .. 650 milliGRAM(s) Rectal every 6 hours PRN Temp greater or equal to 38C (100.4F)  ALPRAZolam 0.25 milliGRAM(s) Oral daily PRN anxiety  morphine  - Injectable 2 milliGRAM(s) IV Push every 6 hours PRN Severe Pain (7 - 10)

## 2021-03-16 NOTE — CONSULT NOTE ADULT - CONSULT REASON
hematemesis, intermittent melenic stools
anemia
Tissue biopsy
gi bleed anemia
GI bleed
Goals of care

## 2021-03-16 NOTE — CONSULT NOTE ADULT - SUBJECTIVE AND OBJECTIVE BOX
INTERVENTIONAL RADIOLOGY CONSULT:     Procedure Requested: Tissue biopsy    HPI:  76yFemale [place]h HTN, [place] 2 [place] and chronic anemia patient had no gross GI bleeding at the time. Patient now presents with an episode of vomiting her food early in the morning with speckles of blood in it. Patient also reports two melenic stools earlier in the morning today. Patient also reports two melenic stools earlier in the morning today. Patient with history of severe NSAID usage. Hb in ED was 4.3, lactate 6.8.        (08 Mar 2021 14:19)      PAST MEDICAL & SURGICAL HISTORY:  Anemia    High cholesterol    HTN (hypertension)    Myasthenia gravis    Hemangioma        MEDICATIONS  (STANDING):  aspirin enteric coated 81 milliGRAM(s) Oral daily  atorvastatin 80 milliGRAM(s) Oral at bedtime  chlorhexidine 4% Liquid 1 Application(s) Topical <User Schedule>  diltiazem    Tablet 60 milliGRAM(s) Oral four times a day  heparin  Infusion 900 Unit(s)/Hr (8 mL/Hr) IV Continuous <Continuous>  isosorbide   mononitrate ER Tablet (IMDUR) 30 milliGRAM(s) Oral daily  nystatin Powder 1 Application(s) Topical every 12 hours  pantoprazole  Injectable 40 milliGRAM(s) IV Push every 12 hours  pyridostigmine  milliGRAM(s) Oral two times a day  sertraline 100 milliGRAM(s) Oral daily    MEDICATIONS  (PRN):  acetaminophen  Suppository .. 650 milliGRAM(s) Rectal every 6 hours PRN Temp greater or equal to 38C (100.4F)  ALPRAZolam 0.25 milliGRAM(s) Oral daily PRN anxiety  morphine  - Injectable 2 milliGRAM(s) IV Push every 6 hours PRN Severe Pain (7 - 10)  pyridostigmine 60 milliGRAM(s) Oral every 8 hours PRN Weakness, Myasthenia symptoms      Allergies    No Known Allergies    Physical Exam:   Vital Signs Last 24 Hrs  T(C): 36.7 (16 Mar 2021 15:00), Max: 37.6 (16 Mar 2021 11:00)  T(F): 98.1 (16 Mar 2021 15:00), Max: 99.6 (16 Mar 2021 11:00)  HR: 76 (16 Mar 2021 15:13) (70 - 92)  BP: 97/51 (16 Mar 2021 15:13) (91/51 - 142/63)  BP(mean): 71 (16 Mar 2021 15:13) (68 - 91)  RR: 26 (16 Mar 2021 15:13) (16 - 30)  SpO2: 99% (16 Mar 2021 15:13) (92% - 100%)    Labs:                         9.8    10.64 )-----------( 420      ( 16 Mar 2021 06:03 )             31.2     03-16    139  |  99  |  20  ----------------------------<  106<H>  4.0   |  32  |  1.1    Ca    9.3      16 Mar 2021 06:03  Mg     1.6     03-16      PTT - ( 16 Mar 2021 06:03 )  PTT:65.7 sec    Pertinent labs:                      9.8    10.64 )-----------( 420      ( 16 Mar 2021 06:03 )             31.2       03-16    139  |  99  |  20  ----------------------------<  106<H>  4.0   |  32  |  1.1    Ca    9.3      16 Mar 2021 06:03  Mg     1.6     03-16        PTT - ( 16 Mar 2021 06:03 )  PTT:65.7 sec      ASSESSMENT/ PLAN:   76yFemale pmh HTN, MG [place] [facility] with suspected type 2 MI and chronic anemia patient had no gross GI bleeding at the time. Patient now presents with an episode of vomiting her food early in the morning with speckles of blood in it. Patient also reports two melenic stools earlier in the morning today. Patient also reports two melenic stools earlier in the morning today. Patient with history of severe NSAID usage. IR consulted for biopsy of thyroid/paraspinal mass.  - No urgent indication at this time for inpatient biopsy  - Biopsy can still be arranged on an outpatient basis  - Pending further workup of mass as described in previous consultation note     Thank you for the courtesy of this consult, please call [phone] with any further questions.

## 2021-03-16 NOTE — PROGRESS NOTE ADULT - SUBJECTIVE AND OBJECTIVE BOX
IGNACIO PAL 76y Female  MRN#: [id number]     SUBJECTIVE  Patient is a 76y old Female who presents with a chief complaint of Melena (16 Mar 2021 10:29)  Currently admitted to medicine with the primary diagnosis of GI bleed  Today is hospital day 8d, and this morning she reports no overnight events.     OBJECTIVE  PAST MEDICAL & SURGICAL HISTORY  Anemia    High cholesterol    HTN (hypertension)    Myasthenia gravis    Hemangioma      ALLERGIES:  No Known Allergies    MEDICATIONS:  STANDING MEDICATIONS  aspirin enteric coated 81 milliGRAM(s) Oral daily  atorvastatin 80 milliGRAM(s) Oral at bedtime  chlorhexidine 4% Liquid 1 Application(s) Topical <User Schedule>  diltiazem    Tablet 60 milliGRAM(s) Oral four times a day  heparin  Infusion 900 Unit(s)/Hr IV Continuous <Continuous>  isosorbide   mononitrate ER Tablet (IMDUR) 30 milliGRAM(s) Oral daily  nystatin Powder 1 Application(s) Topical every 12 hours  pantoprazole  Injectable 40 milliGRAM(s) IV Push every 12 hours  pyridostigmine 180 milliGRAM(s) Oral Once  pyridostigmine  milliGRAM(s) Oral two times a day  sertraline 100 milliGRAM(s) Oral daily    PRN MEDICATIONS  acetaminophen  Suppository .. 650 milliGRAM(s) Rectal every 6 hours PRN  ALPRAZolam 0.25 milliGRAM(s) Oral daily PRN  morphine  - Injectable 2 milliGRAM(s) IV Push every 6 hours PRN      VITAL SIGNS: Last 24 Hours  T(C): 37.6 (16 Mar 2021 11:00), Max: 37.6 (16 Mar 2021 11:00)  T(F): 99.6 (16 Mar 2021 11:00), Max: 99.6 (16 Mar 2021 11:00)  HR: 77 (16 Mar 2021 09:16) (70 - 85)  BP: 116/57 (16 Mar 2021 09:16) (91/51 - 142/63)  BP(mean): 82 (16 Mar 2021 09:16) (68 - 91)  RR: 25 (16 Mar 2021 11:00) (16 - 30)  SpO2: 93% (16 Mar 2021 09:16) (93% - 100%)    LABS:                        9.8    10.64 )-----------( 420      ( 16 Mar 2021 06:03 )             31.2     03-16    139  |  99  |  20  ----------------------------<  106<H>  4.0   |  32  |  1.1    Ca    9.3      16 Mar 2021 06:03  Mg     1.6     03-16      PTT - ( 16 Mar 2021 06:03 )  PTT:65.7 sec    RADIOLOGY:    < from: Xray Chest 1 View- PORTABLE-Routine (Xray Chest 1 View- PORTABLE-Routine in AM.) (03.13.21 @ 06:10) >    Impression:    Bilateral opacities, unchanged.    < end of copied text >      < from: CT Angio Chest PE Protocol w/ IV Cont (03.02.21 @ 02:14) >    IMPRESSION:  1.  No pulmonary embolus.  2.  Right thyroid lobe 3.4 cm mass with resulting mild leftward tracheal deviation. Recommend ultrasound of the thyroid for further evaluation.  3.  Bilateral pleural effusions with adjacent compressive atelectasis.  4.  Left upper lobe 7 mm groundglass nodule. Recommend CT chest in 6-12 months for continued follow-up.  5.  3.5 x 1.8 cm ovoid mass abutting left T2 neural foramen. It is probably pleural-based. This may also be neural in origin. Further evaluation with MRI with and without contrast may be of benefit.    < end of copied text >  PHYSICAL EXAM:    GENERAL: NAD, well-developed, AAOx3  HEENT:  Atraumatic, Normocephalic. EOMI, PERRLA, conjunctiva and sclera clear, No JVD  PULMONARY: Clear to auscultation bilaterally; No wheeze  CARDIOVASCULAR: Regular rate and rhythm; No murmurs, rubs, or gallops  GASTROINTESTINAL: Soft, Nontender, Nondistended; Bowel sounds present  MUSCULOSKELETAL:  2+ Peripheral Pulses, No clubbing, cyanosis, or edema  NEUROLOGY: non-focal  SKIN: No rashes or lesions      ADMISSION SUMMARY  Patient is a 76y old Female who presents with a chief complaint of Melena (16 Mar 2021 10:29)  Currently admitted to medicine with the primary diagnosis of GI bleed    ASSESSMENT & PLAN  77 y/o Female with PMH of HTN, MG [place] [facility] with suspected type 2 MI and chronic anemia patient had no gross GI bleeding at the time. Patient with history of severe NSAID usage admitted for GI bleed. Hospital course was complicated by STEMI.    # Acute blood loss anemia 2/2 Bleeding duodenal ulcer  -c/o melena and hematemesis, h/o NSAID abuse  -Hb 4 on admission. s/p 5U PRBC in total  -Patient intubated for EGD on 3/9 and extubated 3/10 due to history of MG and tracheal deviation, considered high risk as per anesthesia  -EGD on 3/9 showed actively spurting vessel in posterior wall of duodenal bulb. s/p epinephrine inj and clipping. Off pressors  -Hb stable. Repeat q24h  -Diet as tolerated, protonix IV q12h  -Active Type and Screen  -Transfuse PRN to hgb >9  -Two large bore IV lines   -GI following    #STEMI  #Volume overload  -Troponin trending down 2.67-->2.93-->3.83-->3.52   -Started on aspirin, morphine,statin, imdur, cardizem. Was on nitro drip over the weekend for chest pain. Currently chest pain improved  -Cleared by GI to be on anticoagulation and baby aspirin. Will start heparin drip  -Monitor PTT q6h, Goal 45-50.  -Cannot be on metoprolol due to MG. Continue cardizem 30mg q6h    #Thyroid mass  -Patient has 2 solid nodules in the thyroid abutting the trachea, paraspinal mass at T2 level likely pleural in origin  -Mass was never biopsied on last admission  -c/o severe shoulder pain, was using NSAIDs for intractable pain from the mass  -Might need biopsy before discharge    #Hypertension  -Continue cardizem  -Hold home amlodipine    #Myasthenia Gravis  -c/w pyridostigmine    #Diet: Soft diet  #DVT ppx: Heparin gtt  #GI ppx: protonix  #Dispo: Acute       IGNACIO PAL 76y Female  MRN#: [id number]     SUBJECTIVE  Patient is a 76y old Female who presents with a chief complaint of Melena (16 Mar 2021 10:29)  Currently admitted to medicine with the primary diagnosis of GI bleed  Today is hospital day 8d, and this morning she reports no overnight events.     OBJECTIVE  PAST MEDICAL & SURGICAL HISTORY  Anemia    High cholesterol    HTN (hypertension)    Myasthenia gravis    Hemangioma      ALLERGIES:  No Known Allergies    MEDICATIONS:  STANDING MEDICATIONS  aspirin enteric coated 81 milliGRAM(s) Oral daily  atorvastatin 80 milliGRAM(s) Oral at bedtime  chlorhexidine 4% Liquid 1 Application(s) Topical <User Schedule>  diltiazem    Tablet 60 milliGRAM(s) Oral four times a day  heparin  Infusion 900 Unit(s)/Hr IV Continuous <Continuous>  isosorbide   mononitrate ER Tablet (IMDUR) 30 milliGRAM(s) Oral daily  nystatin Powder 1 Application(s) Topical every 12 hours  pantoprazole  Injectable 40 milliGRAM(s) IV Push every 12 hours  pyridostigmine 180 milliGRAM(s) Oral Once  pyridostigmine  milliGRAM(s) Oral two times a day  sertraline 100 milliGRAM(s) Oral daily    PRN MEDICATIONS  acetaminophen  Suppository .. 650 milliGRAM(s) Rectal every 6 hours PRN  ALPRAZolam 0.25 milliGRAM(s) Oral daily PRN  morphine  - Injectable 2 milliGRAM(s) IV Push every 6 hours PRN      VITAL SIGNS: Last 24 Hours  T(C): 37.6 (16 Mar 2021 11:00), Max: 37.6 (16 Mar 2021 11:00)  T(F): 99.6 (16 Mar 2021 11:00), Max: 99.6 (16 Mar 2021 11:00)  HR: 77 (16 Mar 2021 09:16) (70 - 85)  BP: 116/57 (16 Mar 2021 09:16) (91/51 - 142/63)  BP(mean): 82 (16 Mar 2021 09:16) (68 - 91)  RR: 25 (16 Mar 2021 11:00) (16 - 30)  SpO2: 93% (16 Mar 2021 09:16) (93% - 100%)    LABS:                        9.8    10.64 )-----------( 420      ( 16 Mar 2021 06:03 )             31.2     03-16    139  |  99  |  20  ----------------------------<  106<H>  4.0   |  32  |  1.1    Ca    9.3      16 Mar 2021 06:03  Mg     1.6     03-16      PTT - ( 16 Mar 2021 06:03 )  PTT:65.7 sec    RADIOLOGY:    < from: Xray Chest 1 View- PORTABLE-Routine (Xray Chest 1 View- PORTABLE-Routine in AM.) (03.13.21 @ 06:10) >    Impression:    Bilateral opacities, unchanged.    < end of copied text >      < from: CT Angio Chest PE Protocol w/ IV Cont (03.02.21 @ 02:14) >    IMPRESSION:  1.  No pulmonary embolus.  2.  Right thyroid lobe 3.4 cm mass with resulting mild leftward tracheal deviation. Recommend ultrasound of the thyroid for further evaluation.  3.  Bilateral pleural effusions with adjacent compressive atelectasis.  4.  Left upper lobe 7 mm groundglass nodule. Recommend CT chest in 6-12 months for continued follow-up.  5.  3.5 x 1.8 cm ovoid mass abutting left T2 neural foramen. It is probably pleural-based. This may also be neural in origin. Further evaluation with MRI with and without contrast may be of benefit.    < end of copied text >  PHYSICAL EXAM:    GENERAL: NAD, well-developed, AAOx3  HEENT:  Atraumatic, Normocephalic. EOMI, PERRLA, conjunctiva and sclera clear, No JVD  PULMONARY: Clear to auscultation bilaterally; No wheeze  CARDIOVASCULAR: Regular rate and rhythm; No murmurs, rubs, or gallops  GASTROINTESTINAL: Soft, Nontender, Nondistended; Bowel sounds present  MUSCULOSKELETAL:  2+ Peripheral Pulses, No clubbing, cyanosis, or edema  NEUROLOGY: non-focal  SKIN: No rashes or lesions      ADMISSION SUMMARY  Patient is a 76y old Female who presents with a chief complaint of Melena (16 Mar 2021 10:29)  Currently admitted to medicine with the primary diagnosis of GI bleed    ASSESSMENT & PLAN  77 y/o Female with PMH of HTN, MG discharged from [place] [facility] with suspected type 2 MI and chronic anemia patient had no gross GI bleeding at the time. Patient with history of severe NSAID usage admitted for GI bleed. Hospital course was complicated by STEMI.    # Acute blood loss anemia 2/2 Bleeding duodenal ulcer  -c/o melena and hematemesis, h/o NSAID abuse  -Hb 4 on admission. s/p 5U PRBC in total  -Patient intubated for EGD on 3/9 and extubated 3/10 due to history of MG and tracheal deviation, considered high risk as per anesthesia  -EGD on 3/9 showed actively spurting vessel in posterior wall of duodenal bulb. s/p epinephrine inj and clipping. Off pressors  -Hb stable. Repeat q24h  -Diet as tolerated, protonix IV q12h  -Active Type and Screen  -Transfuse PRN to hgb >9  -GI following    #STEMI  #Volume overload  -Troponin trending down 2.67-->2.93-->3.83-->3.52   -Started on aspirin, morphine,statin, imdur, cardizem. Was on nitro drip over the weekend for chest pain. Currently chest pain improved  -Cleared by GI to be on anticoagulation and baby aspirin  -Monitor PTT q6h, Goal 45-50.  -Cannot be on metoprolol due to MG. Continue cardizem 30mg q6h  -ECHO showed EF 45-50%     #Thyroid mass  -Patient has 2 solid nodules in the thyroid abutting the trachea, paraspinal mass at T2 level likely pleural in origin  -Mass was never biopsied on last admission  -c/o severe shoulder pain, was using NSAIDs for intractable pain from the mass  -Might need biopsy before discharge  -IR consulted     #Hypertension  -Continue cardizem  -Hold home amlodipine    #Myasthenia Gravis  -c/w pyridostigmine    #Diet: DASH/TLC  #DVT ppx: Heparin gtt  #GI ppx: protonix  #Dispo: Acute

## 2021-03-17 LAB
ANION GAP SERPL CALC-SCNC: 10 MMOL/L — SIGNIFICANT CHANGE UP (ref 7–14)
APTT BLD: 57.6 SEC — HIGH (ref 27–39.2)
APTT BLD: 58.1 SEC — HIGH (ref 27–39.2)
APTT BLD: 63.4 SEC — HIGH (ref 27–39.2)
BUN SERPL-MCNC: 21 MG/DL — HIGH (ref 10–20)
CALCIUM SERPL-MCNC: 9.5 MG/DL — SIGNIFICANT CHANGE UP (ref 8.5–10.1)
CHLORIDE SERPL-SCNC: 93 MMOL/L — LOW (ref 98–110)
CO2 SERPL-SCNC: 32 MMOL/L — SIGNIFICANT CHANGE UP (ref 17–32)
CREAT SERPL-MCNC: 1.1 MG/DL — SIGNIFICANT CHANGE UP (ref 0.7–1.5)
GLUCOSE SERPL-MCNC: 103 MG/DL — HIGH (ref 70–99)
HCT VFR BLD CALC: 34.2 % — LOW (ref 37–47)
HGB BLD-MCNC: 10.7 G/DL — LOW (ref 12–16)
MAGNESIUM SERPL-MCNC: 2 MG/DL — SIGNIFICANT CHANGE UP (ref 1.8–2.4)
MCHC RBC-ENTMCNC: 26.7 PG — LOW (ref 27–31)
MCHC RBC-ENTMCNC: 31.3 G/DL — LOW (ref 32–37)
MCV RBC AUTO: 85.3 FL — SIGNIFICANT CHANGE UP (ref 81–99)
NRBC # BLD: 0 /100 WBCS — SIGNIFICANT CHANGE UP (ref 0–0)
PLATELET # BLD AUTO: 487 K/UL — HIGH (ref 130–400)
POTASSIUM SERPL-MCNC: 4 MMOL/L — SIGNIFICANT CHANGE UP (ref 3.5–5)
POTASSIUM SERPL-SCNC: 4 MMOL/L — SIGNIFICANT CHANGE UP (ref 3.5–5)
RBC # BLD: 4.01 M/UL — LOW (ref 4.2–5.4)
RBC # FLD: 17.3 % — HIGH (ref 11.5–14.5)
SODIUM SERPL-SCNC: 135 MMOL/L — SIGNIFICANT CHANGE UP (ref 135–146)
WBC # BLD: 9.41 K/UL — SIGNIFICANT CHANGE UP (ref 4.8–10.8)
WBC # FLD AUTO: 9.41 K/UL — SIGNIFICANT CHANGE UP (ref 4.8–10.8)

## 2021-03-17 PROCEDURE — 99233 SBSQ HOSP IP/OBS HIGH 50: CPT

## 2021-03-17 PROCEDURE — 71045 X-RAY EXAM CHEST 1 VIEW: CPT | Mod: 26

## 2021-03-17 PROCEDURE — 99232 SBSQ HOSP IP/OBS MODERATE 35: CPT

## 2021-03-17 RX ORDER — ACETAMINOPHEN 500 MG
650 TABLET ORAL EVERY 6 HOURS
Refills: 0 | Status: DISCONTINUED | OUTPATIENT
Start: 2021-03-17 | End: 2021-03-18

## 2021-03-17 RX ORDER — MORPHINE SULFATE 50 MG/1
4 CAPSULE, EXTENDED RELEASE ORAL ONCE
Refills: 0 | Status: DISCONTINUED | OUTPATIENT
Start: 2021-03-17 | End: 2021-03-17

## 2021-03-17 RX ADMIN — CHLORHEXIDINE GLUCONATE 1 APPLICATION(S): 213 SOLUTION TOPICAL at 12:39

## 2021-03-17 RX ADMIN — MORPHINE SULFATE 2 MILLIGRAM(S): 50 CAPSULE, EXTENDED RELEASE ORAL at 02:24

## 2021-03-17 RX ADMIN — PANTOPRAZOLE SODIUM 40 MILLIGRAM(S): 20 TABLET, DELAYED RELEASE ORAL at 05:36

## 2021-03-17 RX ADMIN — NYSTATIN CREAM 1 APPLICATION(S): 100000 CREAM TOPICAL at 05:38

## 2021-03-17 RX ADMIN — Medication 0.25 MILLIGRAM(S): at 08:47

## 2021-03-17 RX ADMIN — MORPHINE SULFATE 4 MILLIGRAM(S): 50 CAPSULE, EXTENDED RELEASE ORAL at 18:43

## 2021-03-17 RX ADMIN — PANTOPRAZOLE SODIUM 40 MILLIGRAM(S): 20 TABLET, DELAYED RELEASE ORAL at 17:17

## 2021-03-17 RX ADMIN — Medication 60 MILLIGRAM(S): at 23:05

## 2021-03-17 RX ADMIN — ISOSORBIDE MONONITRATE 30 MILLIGRAM(S): 60 TABLET, EXTENDED RELEASE ORAL at 12:39

## 2021-03-17 RX ADMIN — Medication 60 MILLIGRAM(S): at 17:17

## 2021-03-17 RX ADMIN — Medication 60 MILLIGRAM(S): at 12:38

## 2021-03-17 RX ADMIN — Medication 81 MILLIGRAM(S): at 12:39

## 2021-03-17 RX ADMIN — PYRIDOSTIGMINE BROMIDE 180 MILLIGRAM(S): 60 SOLUTION ORAL at 05:38

## 2021-03-17 RX ADMIN — ATORVASTATIN CALCIUM 80 MILLIGRAM(S): 80 TABLET, FILM COATED ORAL at 23:05

## 2021-03-17 RX ADMIN — SERTRALINE 100 MILLIGRAM(S): 25 TABLET, FILM COATED ORAL at 12:39

## 2021-03-17 RX ADMIN — MORPHINE SULFATE 4 MILLIGRAM(S): 50 CAPSULE, EXTENDED RELEASE ORAL at 18:28

## 2021-03-17 RX ADMIN — PYRIDOSTIGMINE BROMIDE 180 MILLIGRAM(S): 60 SOLUTION ORAL at 17:17

## 2021-03-17 RX ADMIN — HEPARIN SODIUM 7 UNIT(S)/HR: 5000 INJECTION INTRAVENOUS; SUBCUTANEOUS at 10:30

## 2021-03-17 RX ADMIN — Medication 60 MILLIGRAM(S): at 05:36

## 2021-03-17 RX ADMIN — Medication 40 MILLIGRAM(S): at 05:37

## 2021-03-17 RX ADMIN — NYSTATIN CREAM 1 APPLICATION(S): 100000 CREAM TOPICAL at 17:18

## 2021-03-17 NOTE — PROGRESS NOTE ADULT - SUBJECTIVE AND OBJECTIVE BOX
Patient reports some mid epigastric pain after eating      T(F): 97.7 (03-17-21 @ 07:01), Max: 99.2 (03-16-21 @ 23:13)  HR: 78 (03-17-21 @ 08:19)  BP: 115/59 (03-17-21 @ 07:11)  RR: 20  SpO2: 98% (03-17-21 @ 08:19) (97% - 99%)    PHYSICAL EXAM:  GENERAL: NAD  HEAD:  Atraumatic, Normocephalic  NERVOUS SYSTEM:  no focal deficits   CHEST/LUNG: Clear to percussion bilaterally; No rales, rhonchi, wheezing, or rubs  HEART: Regular rate and rhythm; No murmurs, rubs, or gallops  ABDOMEN: Soft, Nontender, Nondistended; Bowel sounds present  EXTREMITIES:  2+ Peripheral Pulses, No clubbing, cyanosis, or edema  LYMPH: No lymphadenopathy noted  SKIN: No rashes or lesions    LABS  03-17    135  |  93<L>  |  21<H>  ----------------------------<  103<H>  4.0   |  32  |  1.1    Ca    9.5      17 Mar 2021 06:16  Mg     2.0     03-17                            10.7   9.41  )-----------( 487      ( 17 Mar 2021 06:16 )             34.2     PTT - ( 17 Mar 2021 06:16 )  PTT:63.4 sec      Culture Results:   <10,000 CFU/mL Normal Urogenital Juanita (03-02-21)      MEDICATIONS  (STANDING):  aspirin enteric coated 81 milliGRAM(s) Oral daily  atorvastatin 80 milliGRAM(s) Oral at bedtime  chlorhexidine 4% Liquid 1 Application(s) Topical <User Schedule>  diltiazem    Tablet 60 milliGRAM(s) Oral four times a day  furosemide    Tablet 40 milliGRAM(s) Oral daily  heparin  Infusion 900 Unit(s)/Hr (7 mL/Hr) IV Continuous <Continuous>  isosorbide   mononitrate ER Tablet (IMDUR) 30 milliGRAM(s) Oral daily  nystatin Powder 1 Application(s) Topical every 12 hours  pantoprazole  Injectable 40 milliGRAM(s) IV Push every 12 hours  pyridostigmine  milliGRAM(s) Oral two times a day  sertraline 100 milliGRAM(s) Oral daily    MEDICATIONS  (PRN):  acetaminophen   Tablet .. 650 milliGRAM(s) Oral every 6 hours PRN Severe Pain (7 - 10)  ALPRAZolam 0.25 milliGRAM(s) Oral daily PRN anxiety  pyridostigmine 60 milliGRAM(s) Oral every 8 hours PRN Weakness, Myasthenia symptoms

## 2021-03-17 NOTE — PROGRESS NOTE ADULT - ASSESSMENT
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 switch to nasal cannula to keep pox > 92 %    CARDIOVASCULAR: Goal MAP >65, I=O, avoid volume overload, optimize cardiac therapies  lasix 40 po daily  cardizem for rate control per cardio, avoid BB in myasthenia  and asa as per GI   cardiology follow up     GI: PPI q12h, PO diet    RENAL:  F/u  lytes.  Correct as needed.  Accurate I/O    INFECTIOUS DISEASE: no abx    HEMATOLOGICAL:  SCDs, heparin monitor ptt    ENDOCRINE:  Follow up FS.  Insulin protocol if needed.    MUSCULOSKELETAL: Bedrest    downgrade to tele    dnr/  dni

## 2021-03-17 NOTE — PROGRESS NOTE ADULT - ASSESSMENT
Patient with abd pain eating. Probably not angina.. Patient had mi. Echo mid anteroseptal hypokinetic.   Not able use beta because myasthenia gravis. Keep hemoglobin 9 or better. Medical rx for now. Patient aware. Tolerating heparin. No overt bleeding. OOB chair. Ambulate when stable. May need Duragesic patch and wean MS. Would ambulate on tele today

## 2021-03-17 NOTE — CHART NOTE - NSCHARTNOTEFT_GEN_A_CORE
Palliative care LMSW and Attending Dr. Krishna met with the patient at bedside. Patient presents A&Ox4, able to direct self care. Patient relating she is feeling "better" and is looking forward to returning home. Reviewed symptom management, patient did not verbalize any needs. Reviewed medication conversion to PO as patient nears discharge; patient relating she is in agreement. She states she is nervous about her pain management at home. Patient educated on continuing pain management at home and contacting her PMD for further recommendations. Support provided. Palliative care team will remain available as appropriate.   Current plan is ongoing medical management. DNR/DNI.  [phone]

## 2021-03-17 NOTE — PROGRESS NOTE ADULT - ASSESSMENT
77 y/o Female with PMH of HTN, [place] type 2 MI and chronic anemia patient  Patient with history of severe NSAID usage     # Acute blood loss anemia secondary to Bleeding duodenal ulcer  -c/o melena and hematemesis, h/o NSAID abuse  -Hb 4 on admission. s/p 5U PRBC in total  -Patient intubated for EGD on 3/9 and extubated  today due to history of MG and tracheal deviation, considered high risk as per anesthesia  -EGD on 3/9 showed actively spurting vessel in posterior wall of duodenal bulb. s/p epinephrine inj and clipping  - now tolerating  diet    -  Protonix  q12h   -Active Type and Screen  -Transfuse PRN to hgb >8  -Two large bore IV lines  -GI following    #NSTEMI    -now on IV Heparin with improving symptoms   - meds as per Cardiology   - tele   - ? cardiac cath   - ambulate    - magnesium improved s/p supplementation      #Hypertension  -Hold amlodipine    #Myasthenia Gravis  -c/w pyridostigmine    # paraspinal mass / thyroid nodule x 2    IR evaluation done on last admission:     - Thyroid mass causing tracheal deviation, but not compressing the trachea. Unlikely to be source of SOB.  Imaging also showed ovoid mass abutting the left T2 neuro foramen    IR consulted for bx of left paraspinal mass.    No need for IR intervention as inpatient.  Pt needs outpatient work up with MRI and PET.     - I spoke with Dr Allred over phone today and updated on pts re-admission and current status    - he confirms plan remains the same as above    - pt aware

## 2021-03-17 NOTE — PROGRESS NOTE ADULT - ASSESSMENT
76yFemale being evaluated for goals of care and pain mmngnmt in setting of GIB from NSAID use, MG, STEMI      MEDD (morphine equivalent daily dose): 6mg/24hrs       See Recs below.    Please call [phone] with questions or concerns 24/7.   We will continue to follow.

## 2021-03-17 NOTE — PROGRESS NOTE ADULT - ATTENDING COMMENTS
Patient seen and examined independently. I agree with the resident's note, physical exam, and plan except as below.  ICU Vital Signs Last 24 Hrs  T(C): 36.9 (10 Mar 2021 11:00), Max: 39 (09 Mar 2021 23:31)  T(F): 98.4 (10 Mar 2021 11:00), Max: 102.2 (09 Mar 2021 23:31)  HR: 95 (10 Mar 2021 09:20) (75 - 108)  BP: 96/50 (10 Mar 2021 07:10) (85/53 - 143/77)  BP(mean): 71 (10 Mar 2021 07:10) (55 - 103)  ABP: 83/63 (10 Mar 2021 07:10) (80/72 - 146/66)  ABP(mean): 73 (10 Mar 2021 07:10) (59 - 97)  RR: 20 (10 Mar 2021 11:00) (15 - 38)  SpO2: 100% (10 Mar 2021 09:20) (94% - 100%)  PE  aaox3 post extubation   [id number]  ctabl  softntn+bs  no cce    no complaints of pain, only complains of dry mouth     #acute blood loss anemia due to NSAID induced ulcer   #sp EGD - < from: EGD (03.09.21 @ 10:30) >    Impressions:    Normal mucosa in the whole esophagus.    Erythema in the stomach compatible with non-erosive gastritis. (Biopsy).    Moderate amount of fresh blood suctioned from stomach. No ulcler in stomach-.    Ulcer with visible vessel spurting, in the posterior wall of duodenal bulb.  (Injection, Endoclip). Excellent hemostacis obtained. .     Plan: Protonix 8 mg/hr continuous drip    Await pathology results  EGD in 2 months    < end of copied text >      NPO for now - can try clears for dinner if stable   monitor cbc and vitals   protonix gtt      #pt is dnr/i - rescinded for procedure - now extubated -  wants to resume her own dnr/i - very clear on wishes - palliative care at bedside as well     #MG cont home meds    # thyroid mass/nodules/ pleural vs neural mass abuting T2 - will need workup /biopsy when stable - MRI Tspine and IR to biopsy       poor overall prognosis   discussed with partner ibis over phone yesterday and with pt and ccu team .
Attending Statement: I have personally performed a face to face diagnostic evaluation on this patient. The patient is suffering from CHF, MI and GI bleeding.  I have reviewed the above note and agree with the history, exam and suggestions for care, except as I have noted in the text.
Attending Statement: I have personally performed a face to face diagnostic evaluation on this patient. The patient is suffering from CHF. I have reviewed the above note and agree with the history, exam and suggestions for care, except as I have noted in the text.
Patient seen and examined independently. I agree with the resident's note, physical exam, and plan except as below.  ICU Vital Signs Last 24 Hrs  T(C): 37 (09 Mar 2021 14:56), Max: 37 (09 Mar 2021 14:56)  T(F): 98.6 (09 Mar 2021 14:56), Max: 98.6 (09 Mar 2021 14:56)  HR: 91 (09 Mar 2021 14:56) (78 - 106)  BP: 143/77 (09 Mar 2021 14:56) (83/45 - 143/77)  BP(mean): 103 (09 Mar 2021 14:56) (59 - 103)  ABP: 146/66 (09 Mar 2021 14:56) (107/57 - 146/66)  ABP(mean): 95 (09 Mar 2021 14:56) (77 - 97)  RR: 22 (09 Mar 2021 14:56) (18 - 55)  SpO2: 99% (09 Mar 2021 14:56) (90% - 100%)    seen in CCU after EGD  pt intubated and sedated  [id number]  ctabl  softntn+bs  no cce    #acute blood loss anemia due to NSAID induced ulcer   #sp EGD - < from: EGD (03.09.21 @ 10:30) >    Impressions:    Normal mucosa in the whole esophagus.    Erythema in the stomach compatible with non-erosive gastritis. (Biopsy).    Moderate amount of fresh blood suctioned from stomach. No ulcler in stomach-.    Ulcer with visible vessel spurting, in the posterior wall of duodenal bulb.  (Injection, Endoclip). Excellent hemostacis obtained. .     Plan: Protonix 8 mg/hr continuous drip  NPO  Await pathology results  EGD in 2 months    < end of copied text >    #pt is dnr/i - rescinded for procedure will try to extubate in AM  disucssed with ibis - partner - she would like to reinstate DNR now in case of cardiac arrest - as these were her wishes pre procedure     #MG cont home meds    # T2 mass/ thyroid mass - will need workup /biopsy when stable      poor overall prognosis   discussed with partner ibis over phone and ccu team
Patient seen and examined independently. I agree with the resident's note, physical exam, and plan except as below.  PE  aaox3 post extubation   [id number]  ctabl  softntn+bs  no cce    no complaints of pain, only complains of dry mouth     #acute blood loss anemia due to NSAID induced ulcer   #sp EGD - < from: EGD (03.09.21 @ 10:30) >    Impressions:    Normal mucosa in the whole esophagus.    Erythema in the stomach compatible with non-erosive gastritis. (Biopsy).    Moderate amount of fresh blood suctioned from stomach. No ulcler in stomach-.    Ulcer with visible vessel spurting, in the posterior wall of duodenal bulb.  (Injection, Endoclip). Excellent hemostacis obtained. .     Plan: Protonix 8 mg/hr continuous drip    Await pathology results  EGD in 2 months    < end of copied text >      NPO for now   monitor cbc and vitals   protonix gtt    #acute hypoxic resp failure due to fluid overload - dc ivfs, IV lasix  echo  suspected acute diastolic dysfunction     #pt is dnr/i - rescinded for procedure - now extubated -  wants to resume her own dnr/i - very clear on wishes - palliative care at bedside as well     #MG cont home meds    # thyroid mass/nodules/ pleural vs neural mass abuting T2 - will need workup /biopsy when stable - MRI Tspine and IR to biopsy       poor overall prognosis   discussed with partner ibis over phone yesterday and with pt and ccu team .
Patient is a 76F admitted with UGIB s/p EGD and clip of duodenal ulcer.  Currently HD stable with stable Hgb but in ICU due to NSTEMI    Patient seen and examined.  No acute events over night.  No additional bloody BM or hematemesis. Tolerating clear liquids.    Abdomen - soft, non tender non distended    Vitals labs and images reviewed    Plan  - trend Hgb  - transfuse as needed  - ICU care
Patient seen and examined with surgery team on rounds and discussed management plans. remains in unit secondary to cardiac monitoring Hgb stable now no further bleeding started on po diet. will follow prn. Recall if recurrent bleeding

## 2021-03-17 NOTE — PROGRESS NOTE ADULT - SUBJECTIVE AND OBJECTIVE BOX
IGNACIO PAL             MRN-[id number]    CC:    HPI:  76yFemale pmh HTN, [facility] 2 [facility] and chronic anemia patient had no gross GI bleeding at the time. Patient now presents with an episode of vomiting her food early in the morning with speckles of blood in it. Patient also reports two melenic stools earlier in the morning today. Patient also reports two melenic stools earlier in the morning today. Patient with history of severe NSAID usage. Hb in ED was 4.3, lactate 6.8.        (08 Mar 2021 14:19)      SUBJECTIVE: pt doing well, pain well controlled    ROS:  DYSPNEA:  N	  NAUS/VOM:  N	  SECRETIONS: N	  AGITATION:  N  Pain (Y/N):     N  -Provocation/Palliation:  -Quality/Quantity:  -Radiating:  -Severity:  -Timing/Frequency:  -Impact on ADLs:    OTHER REVIEW OF SYSTEMS:  UNABLE TO OBTAIN  due to:    PEx:  76y              General: awake. well nourished, lying in bed, NAD, conversant  HEENT:  NCAT PERRL EOMI Non icteric   Neck: Supple no masses  CVS: RR S1S2 No M/G/R  Resp: Unlabored Non tachypneic No increased WOB, clear to ascultation bilaterally  GI:  Soft NT ND BS+  :  Nuñez  Musc: No C/C/E    Neuro: Follows commands No focal deficits  Psych: Calm, a & o x 3  Skin: Non jaundiced, no rash   Lymph: Normal      Last BM:       ALLERGIES:     OPIATE NAÏVE (Y/N): Y    MEDICATIONS: REVIEWED  MEDICATIONS  (STANDING):  aspirin enteric coated 81 milliGRAM(s) Oral daily  atorvastatin 80 milliGRAM(s) Oral at bedtime  chlorhexidine 4% Liquid 1 Application(s) Topical <User Schedule>  diltiazem    Tablet 60 milliGRAM(s) Oral four times a day  furosemide    Tablet 40 milliGRAM(s) Oral daily  heparin  Infusion 900 Unit(s)/Hr (7 mL/Hr) IV Continuous <Continuous>  isosorbide   mononitrate ER Tablet (IMDUR) 30 milliGRAM(s) Oral daily  nystatin Powder 1 Application(s) Topical every 12 hours  pantoprazole  Injectable 40 milliGRAM(s) IV Push every 12 hours  pyridostigmine  milliGRAM(s) Oral two times a day  sertraline 100 milliGRAM(s) Oral daily    MEDICATIONS  (PRN):  acetaminophen   Tablet .. 650 milliGRAM(s) Oral every 6 hours PRN Severe Pain (7 - 10)  ALPRAZolam 0.25 milliGRAM(s) Oral daily PRN anxiety  pyridostigmine 60 milliGRAM(s) Oral every 8 hours PRN Weakness, Myasthenia symptoms      LABS: REVIEWED  CBC:                        10.7   9.41  )-----------( 487      ( 17 Mar 2021 06:16 )             34.2     CMP:    03-17    135  |  93<L>  |  21<H>  ----------------------------<  103<H>  4.0   |  32  |  1.1    Ca    9.5      17 Mar 2021 06:16  Mg     2.0     03-17    Albumin, Serum: 3.3 g/dL (03-14-21 @ 06:45)      IMAGING: REVIEWED    ADVANCED DIRECTIVES:                     DNR DNI            DECISION MAKER: pt able to make her own decisions   LEGAL SURROGATE:    PSYCHOSOCIAL-SPIRITUAL ASSESSMENT:       Reviewed       Care plan unchanged      GOALS OF CARE DISCUSSION       Palliative care info/counseling provided	          CURRENT DISPO PLAN:     Home following ??biopsy      REFERRALS	        Palliative Med

## 2021-03-17 NOTE — PROGRESS NOTE ADULT - SUBJECTIVE AND OBJECTIVE BOX
Patient is a 76y old  Female who presents with a chief complaint of Melena (16 Mar 2021 15:55)      T(F): 97.7 (03-17-21 @ 07:01), Max: 99.6 (03-16-21 @ 11:00)  HR: 77 (03-17-21 @ 06:13)  BP: 107/52 (03-17-21 @ 06:13)  RR: 21 (03-17-21 @ 07:01)  SpO2: 98% (03-17-21 @ 06:13) (92% - 99%)    PHYSICAL EXAM:  GENERAL: NAD, well-groomed, well-developed  HEAD:  Atraumatic, Normocephalic  EYES: EOMI, PERRLA, conjunctiva and sclera clear  ENMT: No tonsillar erythema, exudates, or enlargement; Moist mucous membranes, Good dentition, No lesions  NECK: Supple, No JVD, Normal thyroid  NERVOUS SYSTEM:  Alert & Oriented X3,  Motor Strength 5/5 B/L upper and lower extremities  CHEST/LUNG: Clear to percussion bilaterally; No rales, rhonchi, wheezing, or rubs  HEART: Regular rate and rhythm; No murmurs, rubs, or gallops  ABDOMEN: Soft, Nontender, Nondistended; Bowel sounds present  EXTREMITIES:   No clubbing, cyanosis, or edema  LYMPH: No lymphadenopathy noted  SKIN: No rashes or lesions    labs  03-16    139  |  99  |  20  ----------------------------<  106<H>  4.0   |  32  |  1.1    Ca    9.3      16 Mar 2021 06:03  Mg     1.6     03-16                            10.7   9.41  )-----------( 487      ( 17 Mar 2021 06:16 )             34.2       PTT - ( 17 Mar 2021 06:16 )  PTT:63.4 sec        acetaminophen  Suppository .. 650 milliGRAM(s) Rectal every 6 hours PRN  ALPRAZolam 0.25 milliGRAM(s) Oral daily PRN  aspirin enteric coated 81 milliGRAM(s) Oral daily  atorvastatin 80 milliGRAM(s) Oral at bedtime  chlorhexidine 4% Liquid 1 Application(s) Topical <User Schedule>  diltiazem    Tablet 60 milliGRAM(s) Oral four times a day  furosemide    Tablet 40 milliGRAM(s) Oral daily  heparin  Infusion 900 Unit(s)/Hr IV Continuous <Continuous>  isosorbide   mononitrate ER Tablet (IMDUR) 30 milliGRAM(s) Oral daily  morphine  - Injectable 2 milliGRAM(s) IV Push every 6 hours PRN  nystatin Powder 1 Application(s) Topical every 12 hours  pantoprazole  Injectable 40 milliGRAM(s) IV Push every 12 hours  pyridostigmine 60 milliGRAM(s) Oral every 8 hours PRN  pyridostigmine  milliGRAM(s) Oral two times a day  sertraline 100 milliGRAM(s) Oral daily

## 2021-03-17 NOTE — PROGRESS NOTE ADULT - SUBJECTIVE AND OBJECTIVE BOX
IGNACIO PAL 76y Female  MRN#: [id number]     SUBJECTIVE  Patient is a 76y old Female who presents with a chief complaint of Melena (17 Mar 2021 10:31)  Currently admitted to medicine with the primary diagnosis of GI bleed  Today is hospital day 9d, and this morning she reports no overnight events.       OBJECTIVE  PAST MEDICAL & SURGICAL HISTORY  Anemia    High cholesterol    HTN (hypertension)    Myasthenia gravis    Hemangioma      ALLERGIES:  No Known Allergies    MEDICATIONS:  STANDING MEDICATIONS  aspirin enteric coated 81 milliGRAM(s) Oral daily  atorvastatin 80 milliGRAM(s) Oral at bedtime  chlorhexidine 4% Liquid 1 Application(s) Topical <User Schedule>  diltiazem    Tablet 60 milliGRAM(s) Oral four times a day  furosemide    Tablet 40 milliGRAM(s) Oral daily  heparin  Infusion 900 Unit(s)/Hr IV Continuous <Continuous>  isosorbide   mononitrate ER Tablet (IMDUR) 30 milliGRAM(s) Oral daily  nystatin Powder 1 Application(s) Topical every 12 hours  pantoprazole  Injectable 40 milliGRAM(s) IV Push every 12 hours  pyridostigmine  milliGRAM(s) Oral two times a day  sertraline 100 milliGRAM(s) Oral daily    PRN MEDICATIONS  acetaminophen  Suppository .. 650 milliGRAM(s) Rectal every 6 hours PRN  ALPRAZolam 0.25 milliGRAM(s) Oral daily PRN  morphine  - Injectable 2 milliGRAM(s) IV Push every 6 hours PRN  pyridostigmine 60 milliGRAM(s) Oral every 8 hours PRN      VITAL SIGNS: Last 24 Hours  T(C): 36.5 (17 Mar 2021 07:01), Max: 37.3 (16 Mar 2021 23:13)  T(F): 97.7 (17 Mar 2021 07:01), Max: 99.2 (16 Mar 2021 23:13)  HR: 78 (17 Mar 2021 08:19) (72 - 92)  BP: 115/59 (17 Mar 2021 07:11) (95/51 - 118/59)  BP(mean): 81 (17 Mar 2021 07:11) (69 - 88)  RR: 30 (17 Mar 2021 07:11) (21 - 30)  SpO2: 98% (17 Mar 2021 08:19) (92% - 99%)    LABS:                        10.7   9.41  )-----------( 487      ( 17 Mar 2021 06:16 )             34.2     03-17    135  |  93<L>  |  21<H>  ----------------------------<  103<H>  4.0   |  32  |  1.1    Ca    9.5      17 Mar 2021 06:16  Mg     2.0     03-17      PTT - ( 17 Mar 2021 06:16 )  PTT:63.4 sec      RADIOLOGY:  < from: Xray Chest 1 View- PORTABLE-Routine (Xray Chest 1 View- PORTABLE-Routine in AM.) (03.17.21 @ 07:42) >  Impression:    Improvement in bilateral lung opacities with residual disease present    < end of copied text >      < from: CT Angio Chest PE Protocol w/ IV Cont (03.02.21 @ 02:14) >  IMPRESSION:  1.  No pulmonary embolus.  2.  Right thyroid lobe 3.4 cm mass with resulting mild leftward tracheal deviation. Recommend ultrasound of the thyroid for further evaluation.  3.  Bilateral pleural effusions with adjacent compressive atelectasis.  4.  Left upper lobe 7 mm groundglass nodule. Recommend CT chest in 6-12 months for continued follow-up.  5.  3.5 x 1.8 cm ovoid mass abutting left T2 neural foramen. It is probably pleural-based. This may also be neural in origin. Further evaluation with MRI with and without contrast may be of benefit.      < end of copied text >    PHYSICAL EXAM:    GENERAL: NAD, well-developed, AAOx3  HEENT:  Atraumatic, Normocephalic. EOMI, PERRLA, conjunctiva and sclera clear, No JVD  PULMONARY: Crackles bilaterally; No wheeze  CARDIOVASCULAR: Regular rate and rhythm; No murmurs, rubs, or gallops  GASTROINTESTINAL: Soft, Nontender, Nondistended; Bowel sounds present  MUSCULOSKELETAL:  2+ Peripheral Pulses, No clubbing, cyanosis, or edema  NEUROLOGY: non-focal  SKIN: No rashes or lesions      ADMISSION SUMMARY  Patient is a 76y old Female who presents with a chief complaint of Melena (17 Mar 2021 10:31)  Currently admitted to medicine with the primary diagnosis of GI bleed    ASSESSMENT & PLAN  75 y/o Female with PMH of HTN, MG discharged from [facility] with suspected type 2 MI and chronic anemia patient had no gross GI bleeding at the time. Patient with history of severe NSAID usage admitted for GI bleed. Hospital course was complicated by STEMI.    # Acute blood loss anemia 2/2 Bleeding duodenal ulcer  -c/o melena and hematemesis, h/o NSAID abuse  -Hb 4 on admission. s/p 5U PRBC in total  -Patient intubated for EGD on 3/9 and extubated 3/10 due to history of MG and tracheal deviation, considered high risk as per anesthesia  -EGD on 3/9 showed actively spurting vessel in posterior wall of duodenal bulb. s/p epinephrine inj and clipping. Off pressors  -Hb stable. Repeat q24h  -Diet as tolerated, protonix IV q12h  -Active Type and Screen  -Transfuse PRN to hgb >9  -GI following    #STEMI  #Volume overload  -Troponin trending down 2.67-->2.93-->3.83-->3.52   -Started on aspirin, morphine,statin, imdur, cardizem. Was on nitro drip over the weekend for chest pain. Currently chest pain improved  -Cleared by GI to be on anticoagulation and baby aspirin  -Monitor PTT q6h, Goal 45-50.  -Cannot be on metoprolol due to MG. Continue cardizem 30mg q6h  -ECHO showed EF 45-50%     #Thyroid mass  -Patient has 2 solid nodules in the thyroid abutting the trachea, paraspinal mass at T2 level likely pleural in origin  -Mass was never biopsied on last admission  -c/o severe shoulder pain, was using NSAIDs for intractable pain from the mass  -Needs biopsy before discharge  -Will speak to IR today    #Hypertension  -Continue cardizem  -Hold home amlodipine    #Myasthenia Gravis  -c/w pyridostigmine    #Diet: DASH/TLC  #DVT ppx: Heparin gtt  #GI ppx: protonix  #Dispo: Acute

## 2021-03-17 NOTE — PROGRESS NOTE ADULT - SUBJECTIVE AND OBJECTIVE BOX
Patient is a 76y old  Female who presents with a chief complaint of Melena (17 Mar 2021 07:54)      Over Night Events: none, feels better today, no bloody BM    I&O's Detail    16 Mar 2021 07:01  -  17 Mar 2021 07:00  --------------------------------------------------------  IN:    Heparin: 193 mL    IV PiggyBack: 50 mL    Oral Fluid: 750 mL  Total IN: 993 mL    OUT:    Voided (mL): 2260 mL  Total OUT: 2260 mL    Total NET: -1267 mL      17 Mar 2021 07:01  -  17 Mar 2021 10:31  --------------------------------------------------------  IN:    Heparin: 16 mL    Oral Fluid: 150 mL  Total IN: 166 mL    OUT:  Total OUT: 0 mL    Total NET: 166 mL          PHYSICAL EXAM    ICU Vital Signs Last 24 Hrs  T(C): 36.5 (17 Mar 2021 07:01), Max: 37.6 (16 Mar 2021 11:00)  T(F): 97.7 (17 Mar 2021 07:01), Max: 99.6 (16 Mar 2021 11:00)  HR: 78 (17 Mar 2021 08:19) (72 - 92)  BP: 115/59 (17 Mar 2021 07:11) (95/51 - 118/59)  BP(mean): 81 (17 Mar 2021 07:11) (69 - 88)  ABP: --  ABP(mean): --  RR: 30 (17 Mar 2021 07:11) (21 - 30)  SpO2: 98% (17 Mar 2021 08:19) (92% - 99%)      CONSTITUTIONAL:   Ill appearing.  Well nourished.  NAD    ENT:   Airway patent,   Mouth with normal mucosa.   No thrush    EYES:   Pupils equal,   Round and reactive to light.    CARDIAC:   Normal rate,   Regular rhythm.    No edema    Vascular:  Normal systolic impulse  No Carotid bruits    RESPIRATORY:   No wheezing  Bilateral BS  Normal chest expansion  Not tachypneic,  No use of accessory muscles    GASTROINTESTINAL:  Abdomen soft,   Non-tender,   No guarding,   + BS    GENITOURINARY  normal genitalia for sex  no edema    MUSCULOSKELETAL:   Range of motion is not limited,  No clubbing, cyanosis    NEUROLOGICAL:   Alert and oriented   No motor  deficits.  pertinent DTRs normal    SKIN:   Skin normal color for race,   Warm and dry  No evidence of rash.    PSYCHIATRIC:   Normal mood and affect.   No apparent risk to self or others.    HEMATOLOGICAL:  No cervical  lymphadenopathy.  no inguinal lymphadenopathy      LABS:                          10.7   9.41  )-----------( 487      ( 17 Mar 2021 06:16 )             34.2                                               03-17    135  |  93<L>  |  21<H>  ----------------------------<  103<H>  4.0   |  32  |  1.1    Ca    9.5      17 Mar 2021 06:16  Mg     2.0     03-17        PTT - ( 17 Mar 2021 06:16 )  PTT:63.4 sec                                                                                                                                                 Procalcitonin, Serum: 4.22 ng/mL (03-12-21 @ 05:12)                                                                                            MEDICATIONS  (STANDING):  aspirin enteric coated 81 milliGRAM(s) Oral daily  atorvastatin 80 milliGRAM(s) Oral at bedtime  chlorhexidine 4% Liquid 1 Application(s) Topical <User Schedule>  diltiazem    Tablet 60 milliGRAM(s) Oral four times a day  furosemide    Tablet 40 milliGRAM(s) Oral daily  heparin  Infusion 900 Unit(s)/Hr (7 mL/Hr) IV Continuous <Continuous>  isosorbide   mononitrate ER Tablet (IMDUR) 30 milliGRAM(s) Oral daily  nystatin Powder 1 Application(s) Topical every 12 hours  pantoprazole  Injectable 40 milliGRAM(s) IV Push every 12 hours  pyridostigmine  milliGRAM(s) Oral two times a day  sertraline 100 milliGRAM(s) Oral daily    MEDICATIONS  (PRN):  acetaminophen  Suppository .. 650 milliGRAM(s) Rectal every 6 hours PRN Temp greater or equal to 38C (100.4F)  ALPRAZolam 0.25 milliGRAM(s) Oral daily PRN anxiety  morphine  - Injectable 2 milliGRAM(s) IV Push every 6 hours PRN Severe Pain (7 - 10)  pyridostigmine 60 milliGRAM(s) Oral every 8 hours PRN Weakness, Myasthenia symptoms      CXR interpreted by me:  improved bilateral opacitites

## 2021-03-18 LAB
ALBUMIN SERPL ELPH-MCNC: 3.3 G/DL — LOW (ref 3.5–5.2)
ALP SERPL-CCNC: 264 U/L — HIGH (ref 30–115)
ALT FLD-CCNC: 26 U/L — SIGNIFICANT CHANGE UP (ref 0–41)
ANION GAP SERPL CALC-SCNC: 8 MMOL/L — SIGNIFICANT CHANGE UP (ref 7–14)
APTT BLD: 73.2 SEC — CRITICAL HIGH (ref 27–39.2)
AST SERPL-CCNC: 40 U/L — SIGNIFICANT CHANGE UP (ref 0–41)
BILIRUB SERPL-MCNC: 0.3 MG/DL — SIGNIFICANT CHANGE UP (ref 0.2–1.2)
BUN SERPL-MCNC: 18 MG/DL — SIGNIFICANT CHANGE UP (ref 10–20)
CALCIUM SERPL-MCNC: 10 MG/DL — SIGNIFICANT CHANGE UP (ref 8.5–10.1)
CHLORIDE SERPL-SCNC: 97 MMOL/L — LOW (ref 98–110)
CO2 SERPL-SCNC: 35 MMOL/L — HIGH (ref 17–32)
CREAT SERPL-MCNC: 1.1 MG/DL — SIGNIFICANT CHANGE UP (ref 0.7–1.5)
GLUCOSE SERPL-MCNC: 95 MG/DL — SIGNIFICANT CHANGE UP (ref 70–99)
HCT VFR BLD CALC: 37.1 % — SIGNIFICANT CHANGE UP (ref 37–47)
HGB BLD-MCNC: 11.5 G/DL — LOW (ref 12–16)
MAGNESIUM SERPL-MCNC: 1.7 MG/DL — LOW (ref 1.8–2.4)
MCHC RBC-ENTMCNC: 26.6 PG — LOW (ref 27–31)
MCHC RBC-ENTMCNC: 31 G/DL — LOW (ref 32–37)
MCV RBC AUTO: 85.9 FL — SIGNIFICANT CHANGE UP (ref 81–99)
NRBC # BLD: 0 /100 WBCS — SIGNIFICANT CHANGE UP (ref 0–0)
PLATELET # BLD AUTO: 549 K/UL — HIGH (ref 130–400)
POTASSIUM SERPL-MCNC: 4.2 MMOL/L — SIGNIFICANT CHANGE UP (ref 3.5–5)
POTASSIUM SERPL-SCNC: 4.2 MMOL/L — SIGNIFICANT CHANGE UP (ref 3.5–5)
PROT SERPL-MCNC: 6.3 G/DL — SIGNIFICANT CHANGE UP (ref 6–8)
RBC # BLD: 4.32 M/UL — SIGNIFICANT CHANGE UP (ref 4.2–5.4)
RBC # FLD: 17.2 % — HIGH (ref 11.5–14.5)
SODIUM SERPL-SCNC: 140 MMOL/L — SIGNIFICANT CHANGE UP (ref 135–146)
WBC # BLD: 12.26 K/UL — HIGH (ref 4.8–10.8)
WBC # FLD AUTO: 12.26 K/UL — HIGH (ref 4.8–10.8)

## 2021-03-18 PROCEDURE — 99233 SBSQ HOSP IP/OBS HIGH 50: CPT

## 2021-03-18 PROCEDURE — 71045 X-RAY EXAM CHEST 1 VIEW: CPT | Mod: 26

## 2021-03-18 RX ORDER — MORPHINE SULFATE 50 MG/1
4 CAPSULE, EXTENDED RELEASE ORAL ONCE
Refills: 0 | Status: DISCONTINUED | OUTPATIENT
Start: 2021-03-18 | End: 2021-03-18

## 2021-03-18 RX ORDER — ACETAMINOPHEN 500 MG
650 TABLET ORAL EVERY 6 HOURS
Refills: 0 | Status: DISCONTINUED | OUTPATIENT
Start: 2021-03-18 | End: 2021-03-23

## 2021-03-18 RX ORDER — HEPARIN SODIUM 5000 [USP'U]/ML
5000 INJECTION INTRAVENOUS; SUBCUTANEOUS EVERY 12 HOURS
Refills: 0 | Status: DISCONTINUED | OUTPATIENT
Start: 2021-03-18 | End: 2021-03-23

## 2021-03-18 RX ORDER — MORPHINE SULFATE 50 MG/1
2 CAPSULE, EXTENDED RELEASE ORAL EVERY 6 HOURS
Refills: 0 | Status: DISCONTINUED | OUTPATIENT
Start: 2021-03-18 | End: 2021-03-19

## 2021-03-18 RX ORDER — MAGNESIUM SULFATE 500 MG/ML
2 VIAL (ML) INJECTION ONCE
Refills: 0 | Status: COMPLETED | OUTPATIENT
Start: 2021-03-18 | End: 2021-03-18

## 2021-03-18 RX ORDER — SUCRALFATE 1 G
1 TABLET ORAL EVERY 6 HOURS
Refills: 0 | Status: DISCONTINUED | OUTPATIENT
Start: 2021-03-18 | End: 2021-03-23

## 2021-03-18 RX ORDER — OXYCODONE AND ACETAMINOPHEN 5; 325 MG/1; MG/1
1 TABLET ORAL EVERY 6 HOURS
Refills: 0 | Status: DISCONTINUED | OUTPATIENT
Start: 2021-03-18 | End: 2021-03-19

## 2021-03-18 RX ORDER — FENTANYL CITRATE 50 UG/ML
1 INJECTION INTRAVENOUS
Refills: 0 | Status: DISCONTINUED | OUTPATIENT
Start: 2021-03-18 | End: 2021-03-19

## 2021-03-18 RX ORDER — PANTOPRAZOLE SODIUM 20 MG/1
40 TABLET, DELAYED RELEASE ORAL EVERY 12 HOURS
Refills: 0 | Status: DISCONTINUED | OUTPATIENT
Start: 2021-03-18 | End: 2021-03-23

## 2021-03-18 RX ADMIN — PYRIDOSTIGMINE BROMIDE 180 MILLIGRAM(S): 60 SOLUTION ORAL at 05:20

## 2021-03-18 RX ADMIN — ISOSORBIDE MONONITRATE 30 MILLIGRAM(S): 60 TABLET, EXTENDED RELEASE ORAL at 12:04

## 2021-03-18 RX ADMIN — PYRIDOSTIGMINE BROMIDE 180 MILLIGRAM(S): 60 SOLUTION ORAL at 18:11

## 2021-03-18 RX ADMIN — OXYCODONE AND ACETAMINOPHEN 1 TABLET(S): 5; 325 TABLET ORAL at 22:02

## 2021-03-18 RX ADMIN — HEPARIN SODIUM 5000 UNIT(S): 5000 INJECTION INTRAVENOUS; SUBCUTANEOUS at 18:09

## 2021-03-18 RX ADMIN — MORPHINE SULFATE 4 MILLIGRAM(S): 50 CAPSULE, EXTENDED RELEASE ORAL at 01:02

## 2021-03-18 RX ADMIN — Medication 0.25 MILLIGRAM(S): at 13:52

## 2021-03-18 RX ADMIN — FENTANYL CITRATE 1 PATCH: 50 INJECTION INTRAVENOUS at 19:03

## 2021-03-18 RX ADMIN — FENTANYL CITRATE 1 PATCH: 50 INJECTION INTRAVENOUS at 08:56

## 2021-03-18 RX ADMIN — Medication 60 MILLIGRAM(S): at 12:04

## 2021-03-18 RX ADMIN — Medication 60 MILLIGRAM(S): at 05:20

## 2021-03-18 RX ADMIN — Medication 81 MILLIGRAM(S): at 12:04

## 2021-03-18 RX ADMIN — NYSTATIN CREAM 1 APPLICATION(S): 100000 CREAM TOPICAL at 05:21

## 2021-03-18 RX ADMIN — SERTRALINE 100 MILLIGRAM(S): 25 TABLET, FILM COATED ORAL at 12:04

## 2021-03-18 RX ADMIN — ATORVASTATIN CALCIUM 80 MILLIGRAM(S): 80 TABLET, FILM COATED ORAL at 22:01

## 2021-03-18 RX ADMIN — HEPARIN SODIUM 7 UNIT(S)/HR: 5000 INJECTION INTRAVENOUS; SUBCUTANEOUS at 05:21

## 2021-03-18 RX ADMIN — CHLORHEXIDINE GLUCONATE 1 APPLICATION(S): 213 SOLUTION TOPICAL at 12:05

## 2021-03-18 RX ADMIN — OXYCODONE AND ACETAMINOPHEN 1 TABLET(S): 5; 325 TABLET ORAL at 23:00

## 2021-03-18 RX ADMIN — Medication 40 MILLIGRAM(S): at 05:19

## 2021-03-18 RX ADMIN — Medication 1 GRAM(S): at 18:11

## 2021-03-18 RX ADMIN — MORPHINE SULFATE 4 MILLIGRAM(S): 50 CAPSULE, EXTENDED RELEASE ORAL at 01:57

## 2021-03-18 RX ADMIN — Medication 60 MILLIGRAM(S): at 18:09

## 2021-03-18 RX ADMIN — Medication 50 GRAM(S): at 13:52

## 2021-03-18 RX ADMIN — NYSTATIN CREAM 1 APPLICATION(S): 100000 CREAM TOPICAL at 18:11

## 2021-03-18 RX ADMIN — Medication 1 GRAM(S): at 13:52

## 2021-03-18 RX ADMIN — PANTOPRAZOLE SODIUM 40 MILLIGRAM(S): 20 TABLET, DELAYED RELEASE ORAL at 05:19

## 2021-03-18 RX ADMIN — PANTOPRAZOLE SODIUM 40 MILLIGRAM(S): 20 TABLET, DELAYED RELEASE ORAL at 18:10

## 2021-03-18 NOTE — PROGRESS NOTE ADULT - ASSESSMENT
Patient had mi. Echo mid anteroseptal hypokinetic.   Not able use beta because myasthenia gravis. Keep hemoglobin 9 or better. Medical rx for now. Patient aware. Tolerating heparin. No overt bleeding. OOB chair. Ambulate when stable. Duragesic patch.  Would ambulate on tele today. Outpatient PET scan . Prognosis guarded

## 2021-03-18 NOTE — PROGRESS NOTE ADULT - SUBJECTIVE AND OBJECTIVE BOX
Patient is reporting continued abdominal and chest pain, upset that her morphine was DC'd      T(F): 98.2 (03-18-21 @ 15:00), Max: 98.3 (03-18-21 @ 07:01)  HR: 70 (03-18-21 @ 15:00)  BP: 98/50 (03-18-21 @ 15:00)  RR: 20  SpO2: 100% (03-18-21 @ 07:16) (90% - 100%)    PHYSICAL EXAM:  GENERAL: NAD  HEAD:  Atraumatic, Normocephalic  NERVOUS SYSTEM:  no focal deficits  CHEST/LUNG: Clear to percussion bilaterally; No rales, rhonchi, wheezing, or rubs  HEART: Regular rate and rhythm; No murmurs, rubs, or gallops  ABDOMEN: Soft, Nontender, Nondistended; Bowel sounds present  EXTREMITIES:  2+ Peripheral Pulses, No clubbing, cyanosis, or edema  LYMPH: No lymphadenopathy noted  SKIN: No rashes or lesions    LABS  03-18    140  |  97<L>  |  18  ----------------------------<  95  4.2   |  35<H>  |  1.1    Ca    10.0      18 Mar 2021 06:04  Mg     1.7     03-18    TPro  6.3  /  Alb  3.3<L>  /  TBili  0.3  /  DBili  x   /  AST  40  /  ALT  26  /  AlkPhos  264<H>  03-18                          11.5   12.26 )-----------( 549      ( 18 Mar 2021 06:04 )             37.1     PTT - ( 18 Mar 2021 06:04 )  PTT:73.2 sec      Culture Results:   <10,000 CFU/mL Normal Urogenital Juanita (03-02-21)    RADIOLOGY  < from: Xray Chest 1 View- PORTABLE-Routine (Xray Chest 1 View- PORTABLE-Routine in AM.) (03.18.21 @ 05:52) >  Impression:    Mild pulmonary vascular congestion and bilateral pleural effusions unchanged. No air leak.    < end of copied text >    MEDICATIONS  (STANDING):  aspirin enteric coated 81 milliGRAM(s) Oral daily  atorvastatin 80 milliGRAM(s) Oral at bedtime  chlorhexidine 4% Liquid 1 Application(s) Topical <User Schedule>  diltiazem    Tablet 60 milliGRAM(s) Oral four times a day  fentaNYL   Patch  12 MICROgram(s)/Hr 1 Patch Transdermal every 72 hours  furosemide    Tablet 40 milliGRAM(s) Oral daily  heparin   Injectable 5000 Unit(s) SubCutaneous every 12 hours  isosorbide   mononitrate ER Tablet (IMDUR) 30 milliGRAM(s) Oral daily  nystatin Powder 1 Application(s) Topical every 12 hours  pantoprazole    Tablet 40 milliGRAM(s) Oral every 12 hours  pyridostigmine  milliGRAM(s) Oral two times a day  sertraline 100 milliGRAM(s) Oral daily  sucralfate suspension 1 Gram(s) Oral every 6 hours    MEDICATIONS  (PRN):  acetaminophen   Tablet .. 650 milliGRAM(s) Oral every 6 hours PRN Mild Pain (1 - 3)  ALPRAZolam 0.25 milliGRAM(s) Oral daily PRN anxiety  morphine  - Injectable 2 milliGRAM(s) IV Push every 6 hours PRN Severe Pain (7 - 10)  oxycodone    5 mG/acetaminophen 325 mG 1 Tablet(s) Oral every 6 hours PRN Moderate Pain (4 - 6)  pyridostigmine 60 milliGRAM(s) Oral every 8 hours PRN Weakness, Myasthenia symptoms

## 2021-03-18 NOTE — PROGRESS NOTE ADULT - ASSESSMENT
77 y/o Female with PMH of HTN, MG discharged from [facility] recently with suspected type 2 MI and chronic anemia patient  returned with   # Acute blood loss anemia secondary to Bleeding duodenal ulcer and NSTEMI    -c/o melena and hematemesis, h/o NSAID abuse  -Hb 4 on admission. s/p 5U PRBC in total  -Patient intubated for EGD on 3/9 and extubated  today due to history of MG and tracheal deviation, considered high risk as per anesthesia  -EGD on 3/9 showed actively spurting vessel in posterior wall of duodenal bulb. s/p epinephrine inj and clipping  - now tolerating  diet    -  Protonix  q12h   -Active Type and Screen   - meds as per Cardiology   - asking for narcotics for pain relief    - ? cardiac cath   - ambulate    - magnesium improved s/p supplementation      #Myasthenia Gravis  -c/w pyridostigmine    # paraspinal mass / thyroid nodule x 2    IR evaluation done on last admission:     - Thyroid mass causing tracheal deviation, but not compressing the trachea. Unlikely to be source of SOB.  Imaging also showed ovoid mass abutting the left T2 neuro foramen    IR consulted for bx of left paraspinal mass.    No need for IR intervention as inpatient.  Pt needs outpatient work up with MRI and PET.     - I spoke with Dr Allred over phone today and updated on pts re-admission and current status    - he confirms plan remains the same as above    - pt aware

## 2021-03-18 NOTE — PHARMACOTHERAPY INTERVENTION NOTE - COMMENTS
Pt was given 2 doses of morphine 4mg over last 24 hours. Pt has cancer associated pain; rec to consider adding PO percocet or oxycodone eddie pt was taking at home to avoid IV opioid use

## 2021-03-18 NOTE — PROGRESS NOTE ADULT - SUBJECTIVE AND OBJECTIVE BOX
IGNACIO PAL 76y Female  MRN#: [id number]   SUBJECTIVE  Patient is a 76y old Female who presents with a chief complaint of Melena (18 Mar 2021 07:44)  Currently admitted to medicine with the primary diagnosis of GI bleed  Today is hospital day 10d, and this morning she reports pain overnight.     OBJECTIVE  PAST MEDICAL & SURGICAL HISTORY  Anemia    High cholesterol    HTN (hypertension)    Myasthenia gravis    Hemangioma      ALLERGIES:  No Known Allergies    MEDICATIONS:  STANDING MEDICATIONS  aspirin enteric coated 81 milliGRAM(s) Oral daily  atorvastatin 80 milliGRAM(s) Oral at bedtime  chlorhexidine 4% Liquid 1 Application(s) Topical <User Schedule>  diltiazem    Tablet 60 milliGRAM(s) Oral four times a day  fentaNYL   Patch  12 MICROgram(s)/Hr 1 Patch Transdermal every 72 hours  furosemide    Tablet 40 milliGRAM(s) Oral daily  heparin   Injectable 5000 Unit(s) SubCutaneous every 12 hours  isosorbide   mononitrate ER Tablet (IMDUR) 30 milliGRAM(s) Oral daily  magnesium sulfate  IVPB 2 Gram(s) IV Intermittent once  nystatin Powder 1 Application(s) Topical every 12 hours  pantoprazole    Tablet 40 milliGRAM(s) Oral every 12 hours  pyridostigmine  milliGRAM(s) Oral two times a day  sertraline 100 milliGRAM(s) Oral daily  sucralfate suspension 1 Gram(s) Oral every 6 hours    PRN MEDICATIONS  acetaminophen   Tablet .. 650 milliGRAM(s) Oral every 6 hours PRN  ALPRAZolam 0.25 milliGRAM(s) Oral daily PRN  morphine  - Injectable 2 milliGRAM(s) IV Push every 6 hours PRN  oxycodone    5 mG/acetaminophen 325 mG 1 Tablet(s) Oral every 6 hours PRN  pyridostigmine 60 milliGRAM(s) Oral every 8 hours PRN      VITAL SIGNS: Last 24 Hours  T(C): 36.8 (18 Mar 2021 07:01), Max: 36.8 (17 Mar 2021 15:00)  T(F): 98.3 (18 Mar 2021 07:01), Max: 98.3 (18 Mar 2021 07:01)  HR: 67 (18 Mar 2021 07:16) (67 - 90)  BP: 107/61 (18 Mar 2021 07:16) (94/55 - 111/58)  BP(mean): 72 (18 Mar 2021 07:16) (69 - 80)  RR: 22 (18 Mar 2021 07:16) (20 - 22)  SpO2: 100% (18 Mar 2021 07:16) (90% - 100%)    LABS:                        11.5   12.26 )-----------( 549      ( 18 Mar 2021 06:04 )             37.1     03-18    140  |  97<L>  |  18  ----------------------------<  95  4.2   |  35<H>  |  1.1    Ca    10.0      18 Mar 2021 06:04  Mg     1.7     03-18    TPro  6.3  /  Alb  3.3<L>  /  TBili  0.3  /  DBili  x   /  AST  40  /  ALT  26  /  AlkPhos  264<H>  03-18    PTT - ( 18 Mar 2021 06:04 )  PTT:73.2 sec    RADIOLOGY:    < from: Xray Chest 1 View- PORTABLE-Routine (Xray Chest 1 View- PORTABLE-Routine in AM.) (03.17.21 @ 07:42) >  Impression:    Improvement in bilateral lung opacities with residual disease present    < end of copied text >      < from: CT Angio Chest PE Protocol w/ IV Cont (03.02.21 @ 02:14) >  IMPRESSION:  1.  No pulmonary embolus.  2.  Right thyroid lobe 3.4 cm mass with resulting mild leftward tracheal deviation. Recommend ultrasound of the thyroid for further evaluation.  3.  Bilateral pleural effusions with adjacent compressive atelectasis.  4.  Left upper lobe 7 mm groundglass nodule. Recommend CT chest in 6-12 months for continued follow-up.  5.  3.5 x 1.8 cm ovoid mass abutting left T2 neural foramen. It is probably pleural-based. This may also be neural in origin. Further evaluation with MRI with and without contrast may be of benefit.      < end of copied text >    PHYSICAL EXAM:    GENERAL: NAD, well-developed, AAOx3  HEENT:  Atraumatic, Normocephalic. EOMI, PERRLA, conjunctiva and sclera clear, No JVD  PULMONARY: Mild crackles on auscultation bilaterally; No wheeze  CARDIOVASCULAR: Regular rate and rhythm; No murmurs, rubs, or gallops  GASTROINTESTINAL: Soft, Nontender, Nondistended; Bowel sounds present  MUSCULOSKELETAL:  2+ Peripheral Pulses, No clubbing, cyanosis, or edema  NEUROLOGY: non-focal  SKIN: No rashes or lesions      ADMISSION SUMMARY  Patient is a 76y old Female who presents with a chief complaint of Melena (18 Mar 2021 07:44)  Currently admitted to medicine with the primary diagnosis of GI bleed    ASSESSMENT & PLAN  77 y/o Female with PMH of HTN, [place] 2 [place] and chronic anemia patient had no gross GI bleeding at the time. Patient with history of severe NSAID usage admitted for GI bleed. Hospital course was complicated by STEMI.    # Acute blood loss anemia 2/2 Bleeding duodenal ulcer  -c/o melena and hematemesis, h/o NSAID abuse  -Hb 4 on admission. s/p 5U PRBC in total  -Patient intubated for EGD on 3/9 and extubated 3/10 due to history of MG and tracheal deviation, considered high risk as per anesthesia  -EGD on 3/9 showed actively spurting vessel in posterior wall of duodenal bulb. s/p epinephrine inj and clipping. Off pressors  -Hb stable. Repeat q24h  -Diet as tolerated, protonix PO q12h. Will add carafate 1gm q6h due to c/o pain  -Transfuse PRN to hgb >9  -GI following    #STEMI  #Volume overload  -Troponin trending down 2.67-->2.93-->3.83-->3.52   -Started on aspirin, statin, imdur, cardizem  -Patient was treated with nitroglycerine and morphine for pain from MI  -Still reports to be in pain. Unclear cardiac vs GI in origin. Did not have pain on ambulation  -Will keep her on morphine and percocet for severe and moderate pain respectively  -Cleared by GI to be on anticoagulation and baby aspirin  -Monitor PTT q6h, Goal 45-50.  -Cannot be on metoprolol due to MG. Continue cardizem 30mg q6h  -ECHO showed EF 45-50%     #Thyroid mass  -Patient has 2 solid nodules in the thyroid abutting the trachea, paraspinal mass at T2 level likely pleural in origin  -Mass was never biopsied on last admission  -c/o severe shoulder pain, was using NSAIDs for intractable pain from the mass  -Needs biopsy before discharge  -Will speak to IR today    #Hypertension  -Continue cardizem  -Hold home amlodipine    #Myasthenia Gravis  -c/w pyridostigmine    #Diet: DASH/TLC  #DVT ppx: Heparin gtt  #GI ppx: protonix  #Dispo: Acute           IGNACIO PAL 76y Female  MRN#: [id number]   SUBJECTIVE  Patient is a 76y old Female who presents with a chief complaint of Melena (18 Mar 2021 07:44)  Currently admitted to medicine with the primary diagnosis of GI bleed  Today is hospital day 10d, and this morning she reports pain overnight.     OBJECTIVE  PAST MEDICAL & SURGICAL HISTORY  Anemia    High cholesterol    HTN (hypertension)    Myasthenia gravis    Hemangioma      ALLERGIES:  No Known Allergies    MEDICATIONS:  STANDING MEDICATIONS  aspirin enteric coated 81 milliGRAM(s) Oral daily  atorvastatin 80 milliGRAM(s) Oral at bedtime  chlorhexidine 4% Liquid 1 Application(s) Topical <User Schedule>  diltiazem    Tablet 60 milliGRAM(s) Oral four times a day  fentaNYL   Patch  12 MICROgram(s)/Hr 1 Patch Transdermal every 72 hours  furosemide    Tablet 40 milliGRAM(s) Oral daily  heparin   Injectable 5000 Unit(s) SubCutaneous every 12 hours  isosorbide   mononitrate ER Tablet (IMDUR) 30 milliGRAM(s) Oral daily  magnesium sulfate  IVPB 2 Gram(s) IV Intermittent once  nystatin Powder 1 Application(s) Topical every 12 hours  pantoprazole    Tablet 40 milliGRAM(s) Oral every 12 hours  pyridostigmine  milliGRAM(s) Oral two times a day  sertraline 100 milliGRAM(s) Oral daily  sucralfate suspension 1 Gram(s) Oral every 6 hours    PRN MEDICATIONS  acetaminophen   Tablet .. 650 milliGRAM(s) Oral every 6 hours PRN  ALPRAZolam 0.25 milliGRAM(s) Oral daily PRN  morphine  - Injectable 2 milliGRAM(s) IV Push every 6 hours PRN  oxycodone    5 mG/acetaminophen 325 mG 1 Tablet(s) Oral every 6 hours PRN  pyridostigmine 60 milliGRAM(s) Oral every 8 hours PRN      VITAL SIGNS: Last 24 Hours  T(C): 36.8 (18 Mar 2021 07:01), Max: 36.8 (17 Mar 2021 15:00)  T(F): 98.3 (18 Mar 2021 07:01), Max: 98.3 (18 Mar 2021 07:01)  HR: 67 (18 Mar 2021 07:16) (67 - 90)  BP: 107/61 (18 Mar 2021 07:16) (94/55 - 111/58)  BP(mean): 72 (18 Mar 2021 07:16) (69 - 80)  RR: 22 (18 Mar 2021 07:16) (20 - 22)  SpO2: 100% (18 Mar 2021 07:16) (90% - 100%)    LABS:                        11.5   12.26 )-----------( 549      ( 18 Mar 2021 06:04 )             37.1     03-18    140  |  97<L>  |  18  ----------------------------<  95  4.2   |  35<H>  |  1.1    Ca    10.0      18 Mar 2021 06:04  Mg     1.7     03-18    TPro  6.3  /  Alb  3.3<L>  /  TBili  0.3  /  DBili  x   /  AST  40  /  ALT  26  /  AlkPhos  264<H>  03-18    PTT - ( 18 Mar 2021 06:04 )  PTT:73.2 sec    RADIOLOGY:    < from: Xray Chest 1 View- PORTABLE-Routine (Xray Chest 1 View- PORTABLE-Routine in AM.) (03.17.21 @ 07:42) >  Impression:    Improvement in bilateral lung opacities with residual disease present    < end of copied text >      < from: CT Angio Chest PE Protocol w/ IV Cont (03.02.21 @ 02:14) >  IMPRESSION:  1.  No pulmonary embolus.  2.  Right thyroid lobe 3.4 cm mass with resulting mild leftward tracheal deviation. Recommend ultrasound of the thyroid for further evaluation.  3.  Bilateral pleural effusions with adjacent compressive atelectasis.  4.  Left upper lobe 7 mm groundglass nodule. Recommend CT chest in 6-12 months for continued follow-up.  5.  3.5 x 1.8 cm ovoid mass abutting left T2 neural foramen. It is probably pleural-based. This may also be neural in origin. Further evaluation with MRI with and without contrast may be of benefit.      < end of copied text >    PHYSICAL EXAM:    GENERAL: NAD, well-developed, AAOx3  HEENT:  Atraumatic, Normocephalic. EOMI, PERRLA, conjunctiva and sclera clear, No JVD  PULMONARY: Mild crackles on auscultation bilaterally; No wheeze  CARDIOVASCULAR: Regular rate and rhythm; No murmurs, rubs, or gallops  GASTROINTESTINAL: Soft, Nontender, Nondistended; Bowel sounds present  MUSCULOSKELETAL:  2+ Peripheral Pulses, No clubbing, cyanosis, or edema  NEUROLOGY: non-focal  SKIN: No rashes or lesions      ADMISSION SUMMARY  Patient is a 76y old Female who presents with a chief complaint of Melena (18 Mar 2021 07:44)  Currently admitted to medicine with the primary diagnosis of GI bleed    ASSESSMENT & PLAN  75 y/o Female with PMH of HTN, [place] 2 [place] and chronic anemia patient had no gross GI bleeding at the time. Patient with history of severe NSAID usage admitted for GI bleed. Hospital course was complicated by STEMI.    # Acute blood loss anemia 2/2 Bleeding duodenal ulcer  -c/o melena and hematemesis, h/o NSAID abuse  -Hb 4 on admission. s/p 5U PRBC in total  -Patient intubated for EGD on 3/9 and extubated 3/10 due to history of MG and tracheal deviation, considered high risk as per anesthesia  -EGD on 3/9 showed actively spurting vessel in posterior wall of duodenal bulb. s/p epinephrine inj and clipping. Off pressors  -Hb stable. Repeat q24h  -Diet as tolerated, protonix PO q12h. Will add carafate 1gm q6h due to c/o pain  -Transfuse PRN to hgb >9  -GI following    #STEMI  #Volume overload  -Troponin trending down 2.67-->2.93-->3.83-->3.52   -Started on aspirin, statin, imdur, cardizem  -Patient was treated with nitroglycerine and morphine for pain from MI  -Still reports to be in pain. Unclear cardiac vs GI in origin. Did not have pain on ambulation  -Will keep her on morphine and percocet for severe and moderate pain respectively  -Cleared by GI to be on anticoagulation and baby aspirin  -s/p heparin drip for 72hrs  -Cannot be on metoprolol due to MG. Continue cardizem 30mg q6h  -ECHO showed EF 45-50%   -Cardiology is following    #Thyroid mass  -Patient has 2 solid nodules in the thyroid abutting the trachea, paraspinal mass at T2 level likely pleural in origin  -Mass was never biopsied on last admission  -IR reconsulted. Needs PET scan before the biopsy  -Patient's PMD is aware and she will order the PET scan OP    #Hypertension  -Continue cardizem  -Hold home amlodipine    #Myasthenia Gravis  -c/w pyridostigmine    #Diet: DASH/TLC  #DVT ppx: Heparin   #GI ppx: protonix  #Dispo: Acute

## 2021-03-18 NOTE — PROGRESS NOTE ADULT - SUBJECTIVE AND OBJECTIVE BOX
Patient is a 76y old  Female who presents with a chief complaint of Melena (17 Mar 2021 13:40)      T(F): 98.3 (03-18-21 @ 07:01), Max: 98.3 (03-18-21 @ 07:01)  HR: 86 (03-18-21 @ 05:49)  BP: 111/58 (03-18-21 @ 05:49)  RR: 20 (03-18-21 @ 07:01)  SpO2: 99% (03-18-21 @ 05:49) (90% - 100%)    PHYSICAL EXAM:  GENERAL: NAD, well-groomed, well-developed  HEAD:  Atraumatic, Normocephalic  EYES: EOMI, PERRLA, conjunctiva and sclera clear  ENMT: No tonsillar erythema, exudates, or enlargement; Moist mucous membranes, Good dentition, No lesions  NECK: Supple, No JVD, Normal thyroid  NERVOUS SYSTEM:  Alert & Oriented X3,  Motor Strength 5/5 B/L upper and lower extremities  CHEST/LUNG: Clear to percussion bilaterally; No rales, rhonchi, wheezing, or rubs  HEART: Regular rate and rhythm; No murmurs, rubs, or gallops  ABDOMEN: Soft, Nontender, Nondistended; Bowel sounds present  EXTREMITIES:   No clubbing, cyanosis, or edema  LYMPH: No lymphadenopathy noted  SKIN: No rashes or lesions    labs  03-18    140  |  97<L>  |  18  ----------------------------<  95  4.2   |  35<H>  |  1.1    Ca    10.0      18 Mar 2021 06:04  Mg     1.7     03-18    TPro  6.3  /  Alb  3.3<L>  /  TBili  0.3  /  DBili  x   /  AST  40  /  ALT  26  /  AlkPhos  264<H>  03-18                          11.5   12.26 )-----------( 549      ( 18 Mar 2021 06:04 )             37.1       PTT - ( 18 Mar 2021 06:04 )  PTT:73.2 sec        acetaminophen   Tablet .. 650 milliGRAM(s) Oral every 6 hours PRN  ALPRAZolam 0.25 milliGRAM(s) Oral daily PRN  aspirin enteric coated 81 milliGRAM(s) Oral daily  atorvastatin 80 milliGRAM(s) Oral at bedtime  chlorhexidine 4% Liquid 1 Application(s) Topical <User Schedule>  diltiazem    Tablet 60 milliGRAM(s) Oral four times a day  furosemide    Tablet 40 milliGRAM(s) Oral daily  heparin  Infusion 900 Unit(s)/Hr IV Continuous <Continuous>  isosorbide   mononitrate ER Tablet (IMDUR) 30 milliGRAM(s) Oral daily  nystatin Powder 1 Application(s) Topical every 12 hours  pantoprazole  Injectable 40 milliGRAM(s) IV Push every 12 hours  pyridostigmine 60 milliGRAM(s) Oral every 8 hours PRN  pyridostigmine  milliGRAM(s) Oral two times a day  sertraline 100 milliGRAM(s) Oral daily

## 2021-03-19 LAB
ANION GAP SERPL CALC-SCNC: 9 MMOL/L — SIGNIFICANT CHANGE UP (ref 7–14)
BUN SERPL-MCNC: 19 MG/DL — SIGNIFICANT CHANGE UP (ref 10–20)
CALCIUM SERPL-MCNC: 9.7 MG/DL — SIGNIFICANT CHANGE UP (ref 8.5–10.1)
CHLORIDE SERPL-SCNC: 98 MMOL/L — SIGNIFICANT CHANGE UP (ref 98–110)
CO2 SERPL-SCNC: 32 MMOL/L — SIGNIFICANT CHANGE UP (ref 17–32)
CREAT SERPL-MCNC: 1.1 MG/DL — SIGNIFICANT CHANGE UP (ref 0.7–1.5)
GLUCOSE SERPL-MCNC: 85 MG/DL — SIGNIFICANT CHANGE UP (ref 70–99)
HCT VFR BLD CALC: 32.9 % — LOW (ref 37–47)
HGB BLD-MCNC: 10.3 G/DL — LOW (ref 12–16)
MAGNESIUM SERPL-MCNC: 2 MG/DL — SIGNIFICANT CHANGE UP (ref 1.8–2.4)
MCHC RBC-ENTMCNC: 26.7 PG — LOW (ref 27–31)
MCHC RBC-ENTMCNC: 31.3 G/DL — LOW (ref 32–37)
MCV RBC AUTO: 85.2 FL — SIGNIFICANT CHANGE UP (ref 81–99)
NRBC # BLD: 0 /100 WBCS — SIGNIFICANT CHANGE UP (ref 0–0)
PLATELET # BLD AUTO: 478 K/UL — HIGH (ref 130–400)
POTASSIUM SERPL-MCNC: 4.3 MMOL/L — SIGNIFICANT CHANGE UP (ref 3.5–5)
POTASSIUM SERPL-SCNC: 4.3 MMOL/L — SIGNIFICANT CHANGE UP (ref 3.5–5)
RBC # BLD: 3.86 M/UL — LOW (ref 4.2–5.4)
RBC # FLD: 17.2 % — HIGH (ref 11.5–14.5)
SODIUM SERPL-SCNC: 139 MMOL/L — SIGNIFICANT CHANGE UP (ref 135–146)
WBC # BLD: 8.58 K/UL — SIGNIFICANT CHANGE UP (ref 4.8–10.8)
WBC # FLD AUTO: 8.58 K/UL — SIGNIFICANT CHANGE UP (ref 4.8–10.8)

## 2021-03-19 PROCEDURE — 71045 X-RAY EXAM CHEST 1 VIEW: CPT | Mod: 26

## 2021-03-19 PROCEDURE — 99233 SBSQ HOSP IP/OBS HIGH 50: CPT

## 2021-03-19 RX ORDER — FENTANYL CITRATE 50 UG/ML
1 INJECTION INTRAVENOUS
Refills: 0 | Status: DISCONTINUED | OUTPATIENT
Start: 2021-03-19 | End: 2021-03-23

## 2021-03-19 RX ORDER — RANOLAZINE 500 MG/1
500 TABLET, FILM COATED, EXTENDED RELEASE ORAL
Refills: 0 | Status: DISCONTINUED | OUTPATIENT
Start: 2021-03-19 | End: 2021-03-23

## 2021-03-19 RX ORDER — OXYCODONE HYDROCHLORIDE 5 MG/1
10 TABLET ORAL EVERY 8 HOURS
Refills: 0 | Status: DISCONTINUED | OUTPATIENT
Start: 2021-03-19 | End: 2021-03-23

## 2021-03-19 RX ADMIN — Medication 1 GRAM(S): at 23:54

## 2021-03-19 RX ADMIN — NYSTATIN CREAM 1 APPLICATION(S): 100000 CREAM TOPICAL at 17:37

## 2021-03-19 RX ADMIN — ISOSORBIDE MONONITRATE 30 MILLIGRAM(S): 60 TABLET, EXTENDED RELEASE ORAL at 11:33

## 2021-03-19 RX ADMIN — ATORVASTATIN CALCIUM 80 MILLIGRAM(S): 80 TABLET, FILM COATED ORAL at 21:37

## 2021-03-19 RX ADMIN — Medication 1 GRAM(S): at 11:31

## 2021-03-19 RX ADMIN — HEPARIN SODIUM 5000 UNIT(S): 5000 INJECTION INTRAVENOUS; SUBCUTANEOUS at 17:35

## 2021-03-19 RX ADMIN — Medication 60 MILLIGRAM(S): at 05:53

## 2021-03-19 RX ADMIN — CHLORHEXIDINE GLUCONATE 1 APPLICATION(S): 213 SOLUTION TOPICAL at 05:52

## 2021-03-19 RX ADMIN — PYRIDOSTIGMINE BROMIDE 180 MILLIGRAM(S): 60 SOLUTION ORAL at 17:35

## 2021-03-19 RX ADMIN — Medication 81 MILLIGRAM(S): at 11:33

## 2021-03-19 RX ADMIN — Medication 60 MILLIGRAM(S): at 11:33

## 2021-03-19 RX ADMIN — PANTOPRAZOLE SODIUM 40 MILLIGRAM(S): 20 TABLET, DELAYED RELEASE ORAL at 05:54

## 2021-03-19 RX ADMIN — Medication 60 MILLIGRAM(S): at 17:36

## 2021-03-19 RX ADMIN — Medication 1 GRAM(S): at 17:35

## 2021-03-19 RX ADMIN — PANTOPRAZOLE SODIUM 40 MILLIGRAM(S): 20 TABLET, DELAYED RELEASE ORAL at 17:35

## 2021-03-19 RX ADMIN — OXYCODONE HYDROCHLORIDE 10 MILLIGRAM(S): 5 TABLET ORAL at 18:53

## 2021-03-19 RX ADMIN — OXYCODONE HYDROCHLORIDE 10 MILLIGRAM(S): 5 TABLET ORAL at 17:53

## 2021-03-19 RX ADMIN — Medication 60 MILLIGRAM(S): at 00:23

## 2021-03-19 RX ADMIN — Medication 40 MILLIGRAM(S): at 05:53

## 2021-03-19 RX ADMIN — Medication 1 GRAM(S): at 00:23

## 2021-03-19 RX ADMIN — PYRIDOSTIGMINE BROMIDE 180 MILLIGRAM(S): 60 SOLUTION ORAL at 05:53

## 2021-03-19 RX ADMIN — FENTANYL CITRATE 1 PATCH: 50 INJECTION INTRAVENOUS at 08:10

## 2021-03-19 RX ADMIN — HEPARIN SODIUM 5000 UNIT(S): 5000 INJECTION INTRAVENOUS; SUBCUTANEOUS at 05:52

## 2021-03-19 RX ADMIN — SERTRALINE 100 MILLIGRAM(S): 25 TABLET, FILM COATED ORAL at 11:33

## 2021-03-19 RX ADMIN — RANOLAZINE 500 MILLIGRAM(S): 500 TABLET, FILM COATED, EXTENDED RELEASE ORAL at 17:35

## 2021-03-19 RX ADMIN — RANOLAZINE 500 MILLIGRAM(S): 500 TABLET, FILM COATED, EXTENDED RELEASE ORAL at 11:36

## 2021-03-19 RX ADMIN — FENTANYL CITRATE 1 PATCH: 50 INJECTION INTRAVENOUS at 08:09

## 2021-03-19 RX ADMIN — Medication 60 MILLIGRAM(S): at 23:53

## 2021-03-19 RX ADMIN — NYSTATIN CREAM 1 APPLICATION(S): 100000 CREAM TOPICAL at 06:19

## 2021-03-19 RX ADMIN — Medication 0.25 MILLIGRAM(S): at 07:58

## 2021-03-19 NOTE — PROGRESS NOTE ADULT - PROBLEM SELECTOR PLAN 4
pain from cervical mass as well as cardiac events - currently well controlled  cont morphine 2mg IV q4h ss written  morphine 2mg IV q2h PRN pain   cont xanax as written   supportive care  DNR/DNI status
DNR/DNI status  ongoing supportive care
DNR/DNI   supportive care
pain related to cardiac event and masses  start morphine 2mg IV q6h ATC - plan to titrate as needed  morphine 2mg IV q2h PRN pain   **If JREMAINE worsens, will switch to dilaudid IV  Will convert to po dosing once acute issues resolved  cont xanax as written PRN anxiety  daily bowel regimen  DNR/DNI status    Pt understands and agrees w current plan

## 2021-03-19 NOTE — CHART NOTE - NSCHARTNOTEFT_GEN_A_CORE
Registered Dietitian Follow-Up     Patient Profile Reviewed                           Yes [x]   No []     Nutrition History Previously Obtained        Yes [x]  No []       Pertinent Subjective Information: Improved appetite noted per pt, however remains very unhappy with the hospital meals in general. She reports that family members have been bringing meals from home. She reports that she is eating small amounts of food at this time. Pt does not like the ensure enlive supplements at all; will to try prosource gelatein instead. Po intake varies 25-75% per EMR. No nausea/abdominal pain with meals.      Pertinent Medical Interventions: IR c/s placed for biopsy of paraspinal mass-- no need for IR intervention as inpatient. Remains on diuretics for volume overload.      Diet order: Diet, DASH/TLC:   Sodium & Cholesterol Restricted  Supplement Feeding Modality:  Oral  Ensure Enlive Cans or Servings Per Day:  1       Frequency:  Two Times a day (21 @ 14:52) [Active]    Anthropometrics:  Height: 67"  Weight: dosing (3/9) 71.4 kg  BMI: 24.7  IBW: 61.4 kg    Daily Weight in k.2 (-19), Weight in k.4 (-16)  % Weight Change relatively stable wt trends noted at this time.    MEDICATIONS  (STANDING):  aspirin enteric coated 81 milliGRAM(s) Oral daily  atorvastatin 80 milliGRAM(s) Oral at bedtime  chlorhexidine 4% Liquid 1 Application(s) Topical <User Schedule>  diltiazem    Tablet 60 milliGRAM(s) Oral four times a day  fentaNYL   Patch  25 MICROgram(s)/Hr 1 Patch Transdermal every 72 hours  furosemide    Tablet 40 milliGRAM(s) Oral daily  heparin   Injectable 5000 Unit(s) SubCutaneous every 12 hours  isosorbide   mononitrate ER Tablet (IMDUR) 30 milliGRAM(s) Oral daily  nystatin Powder 1 Application(s) Topical every 12 hours  pantoprazole    Tablet 40 milliGRAM(s) Oral every 12 hours  pyridostigmine  milliGRAM(s) Oral two times a day  ranolazine 500 milliGRAM(s) Oral two times a day  sertraline 100 milliGRAM(s) Oral daily  sucralfate suspension 1 Gram(s) Oral every 6 hours    MEDICATIONS  (PRN):  acetaminophen   Tablet .. 650 milliGRAM(s) Oral every 6 hours PRN Mild Pain (1 - 3)  ALPRAZolam 0.25 milliGRAM(s) Oral daily PRN anxiety  morphine  - Injectable 2 milliGRAM(s) IV Push every 6 hours PRN Severe Pain (7 - 10)  oxycodone    5 mG/acetaminophen 325 mG 1 Tablet(s) Oral every 6 hours PRN Moderate Pain (4 - 6)  pyridostigmine 60 milliGRAM(s) Oral every 8 hours PRN Weakness, Myasthenia symptoms    Pertinent Labs:  RBC 3.86, H/H 10.3/32.9    Physical Findings:  - Appearance: well nourished; (3/12) +1 generalized edema- remains active per flow sheets.  - GI function: last BM 3/17; pt denies any diarrhea or constipation at this time.   - Tubes: NA  - Oral/Mouth cavity: no chewing/swallowing issues reported.  - Skin: ecchymosis (3/18)     Nutrition Requirements:  Weight Used: dosing 71.4 kg (continue from assessment on 3/12)     Estimated Energy Needs    Continue [x] 3623-2919 kcal/day (MSJ 1.2-1.3 AF)     Estimated Protein Needs    Continue [x] 71-85 g/day ( 1.0-1.2 g/kg , will continue to monitor renal related labs and adjust prn)      Estimated Fluid Needs        Continue [x] 1 mL/kcal or per LIP     Nutrient Intake: 25-75 %     [x] Previous Nutrition Diagnosis: Inadequate oral intake            [x] Ongoing               Nutrition Intervention meals and snacks, medical food supplements, coordination of care     Goal/Expected Outcome: pt to maintain po intake >50% within the next 4 days     Indicator/Monitoring: RD to monitor diet order, energy intake, body composition, NFPF    Recommendation: continue current diet order as tolerated, change supplement from ensure enlive BID to prosource gelatein BID, encourage po intake, provide assistance with meals PRN; continue to send meals based on pt preference as able. Registered Dietitian Follow-Up     Patient Profile Reviewed                           Yes [x]   No []     Nutrition History Previously Obtained        Yes [x]  No []       Pertinent Subjective Information: Improved appetite noted per pt, however remains very unhappy with the hospital meals in general. She reports that family members have been bringing meals from home. She reports that she is eating small amounts of food at this time. Pt does not like the ensure enlive supplements at all; will to try prosource gelatein instead. Po intake varies 25-75% per EMR. No nausea/abdominal pain with meals.      Pertinent Medical Interventions: IR c/s placed for biopsy of paraspinal mass-- no need for IR intervention as inpatient. Remains on diuretics for volume overload.      Diet order: Diet, DASH/TLC:   Sodium & Cholesterol Restricted  Supplement Feeding Modality:  Oral  Ensure Enlive Cans or Servings Per Day:  1       Frequency:  Two Times a day (21 @ 14:52) [Active]    Anthropometrics:  Height: 67"  Weight: dosing (3/9) 71.4 kg  BMI: 24.7  IBW: 61.4 kg    Daily Weight in k.2 (-19), Weight in k.4 (-16)  % Weight Change relatively stable wt trends noted at this time.    MEDICATIONS  (STANDING):  aspirin enteric coated 81 milliGRAM(s) Oral daily  atorvastatin 80 milliGRAM(s) Oral at bedtime  chlorhexidine 4% Liquid 1 Application(s) Topical <User Schedule>  diltiazem    Tablet 60 milliGRAM(s) Oral four times a day  fentaNYL   Patch  25 MICROgram(s)/Hr 1 Patch Transdermal every 72 hours  furosemide    Tablet 40 milliGRAM(s) Oral daily  heparin   Injectable 5000 Unit(s) SubCutaneous every 12 hours  isosorbide   mononitrate ER Tablet (IMDUR) 30 milliGRAM(s) Oral daily  nystatin Powder 1 Application(s) Topical every 12 hours  pantoprazole    Tablet 40 milliGRAM(s) Oral every 12 hours  pyridostigmine  milliGRAM(s) Oral two times a day  ranolazine 500 milliGRAM(s) Oral two times a day  sertraline 100 milliGRAM(s) Oral daily  sucralfate suspension 1 Gram(s) Oral every 6 hours    MEDICATIONS  (PRN):  acetaminophen   Tablet .. 650 milliGRAM(s) Oral every 6 hours PRN Mild Pain (1 - 3)  ALPRAZolam 0.25 milliGRAM(s) Oral daily PRN anxiety  morphine  - Injectable 2 milliGRAM(s) IV Push every 6 hours PRN Severe Pain (7 - 10)  oxycodone    5 mG/acetaminophen 325 mG 1 Tablet(s) Oral every 6 hours PRN Moderate Pain (4 - 6)  pyridostigmine 60 milliGRAM(s) Oral every 8 hours PRN Weakness, Myasthenia symptoms    Pertinent Labs:  RBC 3.86, H/H 10.3/32.9    Physical Findings:  - Appearance: well nourished; (3/12) +1 generalized edema- remains active per flow sheets.  - GI function: last BM 3/17; pt denies any diarrhea or constipation at this time.   - Tubes: NA  - Oral/Mouth cavity: no chewing/swallowing issues reported.  - Skin: ecchymosis (3/18)     Nutrition Requirements:  Weight Used: dosing 71.4 kg (continue from assessment on 3/12)     Estimated Energy Needs    Continue [x] 0690-4101 kcal/day (MSJ 1.2-1.3 AF)     Estimated Protein Needs    Continue [x] 71-85 g/day ( 1.0-1.2 g/kg , will continue to monitor renal related labs and adjust prn)      Estimated Fluid Needs        Continue [x] 1 mL/kcal or per LIP     Nutrient Intake: 25-75 %     [x] Previous Nutrition Diagnosis: Inadequate oral intake            [x] Ongoing               Nutrition Intervention meals and snacks, medical food supplements, coordination of care     Goal/Expected Outcome: pt to maintain po intake >50% within the next 4 days     Indicator/Monitoring: RD to monitor diet order, energy intake, body composition, NFPF    Recommendation: continue current diet order as tolerated, change supplement from ensure enlive BID to prosource gelatein BID, encourage po intake, provide assistance with meals PRN; continue to send meals based on pt preference as able. recs discussed with CCU resident [phone].

## 2021-03-19 NOTE — PROGRESS NOTE ADULT - ASSESSMENT
75 y/o Female with PMH of HTN, [place] [facility] type 2 [facility] and chronic anemia patient  returned with   # Acute blood loss anemia secondary to Bleeding duodenal ulcer and NSTEMI    -c/o melena and hematemesis, h/o NSAID abuse  -Hb 4 on admission. s/p 5U PRBC in total  -Patient intubated for EGD on 3/9 and extubated  today due to history of MG and tracheal deviation, considered high risk as per anesthesia  -EGD on 3/9 showed actively spurting vessel in posterior wall of duodenal bulb. s/p epinephrine inj and clipping  - now tolerating  diet    -  Protonix  q12h   -Active Type and Screen   - meds as per Cardiology   - now on Fentanyl patch, received percocet overnight for pain    - ? cardiac cath   - ambulate    - magnesium improved s/p supplementation      #Myasthenia Gravis  -c/w pyridostigmine    # paraspinal mass / thyroid nodule x 2    IR evaluation done on last admission:     - Thyroid mass causing tracheal deviation, but not compressing the trachea. Unlikely to be source of SOB.  Imaging also showed ovoid mass abutting the left T2 neuro foramen    IR consulted for bx of left paraspinal mass.    No need for IR intervention as inpatient.  Pt needs outpatient work up with MRI and PET.     - I spoke with Dr Allred over phone today and updated on pts re-admission and current status    - he confirms plan remains the same as above    - pt aware

## 2021-03-19 NOTE — PHYSICAL THERAPY INITIAL EVALUATION ADULT - IMPAIRMENTS CONTRIBUTING TO GAIT DEVIATIONS, PT EVAL
decreased endurance, note dec in spo2 on room air from 97% at rest to 80% after ambulation, slow increase back to resting level once back sitting in chair, PATRICIA Zamora made aware/impaired balance/impaired postural control/decreased strength

## 2021-03-19 NOTE — PHYSICAL THERAPY INITIAL EVALUATION ADULT - GAIT DEVIATIONS NOTED, PT EVAL
stooped/guarded posture, dec heel strike/pushoff/decreased jenise/decreased step length/decreased weight-shifting ability

## 2021-03-19 NOTE — PROGRESS NOTE ADULT - ASSESSMENT
Patient had mi. Echo mid anteroseptal hypokinetic.   Not able use beta because myasthenia gravis. Keep hemoglobin 9 or better. Medical rx for now. Patient aware. off heparin No overt bleeding. But need follow cbc. H/H decreased. Ambulate.  Outpatient PET scan . Prognosis guarded

## 2021-03-19 NOTE — PROGRESS NOTE ADULT - ASSESSMENT
76yFemale being evaluated for goals of care and pain mmngnmt in setting of GIB from NSAID use, MG, STEMI.      MEDD (morphine equivalent daily dose): 50mg/24hrs      See Recs below.    Please call [phone] with questions or concerns 24/7.   We will continue to follow.

## 2021-03-19 NOTE — CHART NOTE - NSCHARTNOTEFT_GEN_A_CORE
Palliative care team met with the patient and her significant other at bedside. Symptom management reviewed in detail; recommendations made. No other concerns verbalized.  Plan is for patient to return home with her partner when medically stable. Palliative care team will remain available as appropriate.

## 2021-03-19 NOTE — PROGRESS NOTE ADULT - SUBJECTIVE AND OBJECTIVE BOX
Patient had midepigastric chest pain overnight which prevented her from sleeping, received one percocet which relieved pain       T(F): 97 (03-19-21 @ 07:01), Max: 98.2 (03-18-21 @ 15:00)  HR: 64 (03-19-21 @ 06:00)  BP: 109/55 (03-19-21 @ 06:00)  RR: 20 (03-19-21 @ 07:01)  SpO2: 100% (03-19-21 @ 06:00) (92% - 100%)    PHYSICAL EXAM:  GENERAL: NAD  HEAD:  Atraumatic, Normocephalic  NERVOUS SYSTEM:  Alert & Oriented X3, no focal deficits  CHEST/LUNG: Clear to percussion bilaterally; No rales, rhonchi, wheezing, or rubs  HEART: Regular rate and rhythm; No murmurs, rubs, or gallops  ABDOMEN: Soft, Nontender, Nondistended; Bowel sounds present  EXTREMITIES:  2+ Peripheral Pulses, No clubbing, cyanosis, or edema  LYMPH: No lymphadenopathy noted  SKIN: No rashes or lesions    LABS  03-19    139  |  98  |  19  ----------------------------<  85  4.3   |  32  |  1.1    Ca    9.7      19 Mar 2021 06:07  Mg     2.0     03-19    TPro  6.3  /  Alb  3.3<L>  /  TBili  0.3  /  DBili  x   /  AST  40  /  ALT  26  /  AlkPhos  264<H>  03-18                          10.3   8.58  )-----------( 478      ( 19 Mar 2021 06:07 )             32.9     PTT - ( 18 Mar 2021 06:04 )  PTT:73.2 sec      Culture Results:   <10,000 CFU/mL Normal Urogenital Juanita (03-02-21)    RADIOLOGY  < from: Xray Chest 1 View- PORTABLE-Routine (Xray Chest 1 View- PORTABLE-Routine in AM.) (03.18.21 @ 05:52) >  Impression:    Mild pulmonary vascular congestion and bilateral pleural effusions unchanged. No air leak.    < end of copied text >    MEDICATIONS  (STANDING):  aspirin enteric coated 81 milliGRAM(s) Oral daily  atorvastatin 80 milliGRAM(s) Oral at bedtime  chlorhexidine 4% Liquid 1 Application(s) Topical <User Schedule>  diltiazem    Tablet 60 milliGRAM(s) Oral four times a day  fentaNYL   Patch  12 MICROgram(s)/Hr 1 Patch Transdermal every 72 hours  furosemide    Tablet 40 milliGRAM(s) Oral daily  heparin   Injectable 5000 Unit(s) SubCutaneous every 12 hours  isosorbide   mononitrate ER Tablet (IMDUR) 30 milliGRAM(s) Oral daily  nystatin Powder 1 Application(s) Topical every 12 hours  pantoprazole    Tablet 40 milliGRAM(s) Oral every 12 hours  pyridostigmine  milliGRAM(s) Oral two times a day  sertraline 100 milliGRAM(s) Oral daily  sucralfate suspension 1 Gram(s) Oral every 6 hours    MEDICATIONS  (PRN):  acetaminophen   Tablet .. 650 milliGRAM(s) Oral every 6 hours PRN Mild Pain (1 - 3)  ALPRAZolam 0.25 milliGRAM(s) Oral daily PRN anxiety  morphine  - Injectable 2 milliGRAM(s) IV Push every 6 hours PRN Severe Pain (7 - 10)  oxycodone    5 mG/acetaminophen 325 mG 1 Tablet(s) Oral every 6 hours PRN Moderate Pain (4 - 6)  pyridostigmine 60 milliGRAM(s) Oral every 8 hours PRN Weakness, Myasthenia symptoms

## 2021-03-19 NOTE — PHYSICAL THERAPY INITIAL EVALUATION ADULT - GENERAL OBSERVATIONS, REHAB EVAL
13:50-14:20 Chart reviewed. Pt encountered sitting in chair,  may be seen by Physical Therapist as confirmed with Nurse. Patient c/o "7/10" midsternal pain , RN aware, but willing to get up and walk; +tele

## 2021-03-19 NOTE — PROGRESS NOTE ADULT - SUBJECTIVE AND OBJECTIVE BOX
Patient is a 76y old  Female who presents with a chief complaint of Melena (19 Mar 2021 07:24)      T(F): 97 (03-19-21 @ 07:01), Max: 98.2 (03-18-21 @ 15:00)  HR: 64 (03-19-21 @ 06:00)  BP: 109/55 (03-19-21 @ 06:00)  RR: 20 (03-19-21 @ 07:01)  SpO2: 100% (03-19-21 @ 06:00) (92% - 100%)    PHYSICAL EXAM:  GENERAL: NAD, well-groomed, well-developed  HEAD:  Atraumatic, Normocephalic  EYES: EOMI, PERRLA, conjunctiva and sclera clear  ENMT: No tonsillar erythema, exudates, or enlargement; Moist mucous membranes, Good dentition, No lesions  NECK: Supple, No JVD, Normal thyroid  NERVOUS SYSTEM:  Alert & Oriented X3,  Motor Strength 5/5 B/L upper and lower extremities  CHEST/LUNG: Clear to percussion bilaterally; No rales, rhonchi, wheezing, or rubs  HEART: Regular rate and rhythm; No murmurs, rubs, or gallops  ABDOMEN: Mild epigastric tenderness Nondistended; Bowel sounds present  EXTREMITIES:   No clubbing, cyanosis, or edema  LYMPH: No lymphadenopathy noted  SKIN: No rashes or lesions    labs  03-19    139  |  98  |  19  ----------------------------<  85  4.3   |  32  |  1.1    Ca    9.7      19 Mar 2021 06:07  Mg     2.0     03-19    TPro  6.3  /  Alb  3.3<L>  /  TBili  0.3  /  DBili  x   /  AST  40  /  ALT  26  /  AlkPhos  264<H>  03-18                          10.3   8.58  )-----------( 478      ( 19 Mar 2021 06:07 )             32.9       PTT - ( 18 Mar 2021 06:04 )  PTT:73.2 sec        acetaminophen   Tablet .. 650 milliGRAM(s) Oral every 6 hours PRN  ALPRAZolam 0.25 milliGRAM(s) Oral daily PRN  aspirin enteric coated 81 milliGRAM(s) Oral daily  atorvastatin 80 milliGRAM(s) Oral at bedtime  chlorhexidine 4% Liquid 1 Application(s) Topical <User Schedule>  diltiazem    Tablet 60 milliGRAM(s) Oral four times a day  fentaNYL   Patch  12 MICROgram(s)/Hr 1 Patch Transdermal every 72 hours  furosemide    Tablet 40 milliGRAM(s) Oral daily  heparin   Injectable 5000 Unit(s) SubCutaneous every 12 hours  isosorbide   mononitrate ER Tablet (IMDUR) 30 milliGRAM(s) Oral daily  morphine  - Injectable 2 milliGRAM(s) IV Push every 6 hours PRN  nystatin Powder 1 Application(s) Topical every 12 hours  oxycodone    5 mG/acetaminophen 325 mG 1 Tablet(s) Oral every 6 hours PRN  pantoprazole    Tablet 40 milliGRAM(s) Oral every 12 hours  pyridostigmine 60 milliGRAM(s) Oral every 8 hours PRN  pyridostigmine  milliGRAM(s) Oral two times a day  sertraline 100 milliGRAM(s) Oral daily  sucralfate suspension 1 Gram(s) Oral every 6 hours

## 2021-03-19 NOTE — PROGRESS NOTE ADULT - SUBJECTIVE AND OBJECTIVE BOX
IGNACIO PAL 76y Female  MRN#: [id number]     SUBJECTIVE  Patient is a 76y old Female who presents with a chief complaint of Melena (19 Mar 2021 07:28)  Currently admitted to medicine with the primary diagnosis of GI bleed  Today is hospital day 11d, and this morning she reports no overnight events.     OBJECTIVE  PAST MEDICAL & SURGICAL HISTORY  Anemia    High cholesterol    HTN (hypertension)    Myasthenia gravis    Hemangioma      ALLERGIES:  No Known Allergies    MEDICATIONS:  STANDING MEDICATIONS  aspirin enteric coated 81 milliGRAM(s) Oral daily  atorvastatin 80 milliGRAM(s) Oral at bedtime  chlorhexidine 4% Liquid 1 Application(s) Topical <User Schedule>  diltiazem    Tablet 60 milliGRAM(s) Oral four times a day  fentaNYL   Patch  25 MICROgram(s)/Hr 1 Patch Transdermal every 72 hours  furosemide    Tablet 40 milliGRAM(s) Oral daily  heparin   Injectable 5000 Unit(s) SubCutaneous every 12 hours  isosorbide   mononitrate ER Tablet (IMDUR) 30 milliGRAM(s) Oral daily  nystatin Powder 1 Application(s) Topical every 12 hours  pantoprazole    Tablet 40 milliGRAM(s) Oral every 12 hours  pyridostigmine  milliGRAM(s) Oral two times a day  ranolazine 500 milliGRAM(s) Oral two times a day  sertraline 100 milliGRAM(s) Oral daily  sucralfate suspension 1 Gram(s) Oral every 6 hours    PRN MEDICATIONS  acetaminophen   Tablet .. 650 milliGRAM(s) Oral every 6 hours PRN  ALPRAZolam 0.25 milliGRAM(s) Oral daily PRN  morphine  - Injectable 2 milliGRAM(s) IV Push every 6 hours PRN  oxycodone    5 mG/acetaminophen 325 mG 1 Tablet(s) Oral every 6 hours PRN  pyridostigmine 60 milliGRAM(s) Oral every 8 hours PRN      VITAL SIGNS: Last 24 Hours  T(C): 36.1 (19 Mar 2021 07:01), Max: 36.8 (18 Mar 2021 15:00)  T(F): 97 (19 Mar 2021 07:01), Max: 98.2 (18 Mar 2021 15:00)  HR: 61 (19 Mar 2021 08:02) (61 - 84)  BP: 104/55 (19 Mar 2021 08:02) (95/51 - 111/54)  BP(mean): 74 (19 Mar 2021 08:02) (70 - 79)  RR: 20 (19 Mar 2021 07:01) (18 - 22)  SpO2: 99% (19 Mar 2021 08:02) (92% - 100%)    LABS:                        10.3   8.58  )-----------( 478      ( 19 Mar 2021 06:07 )             32.9     03-19    139  |  98  |  19  ----------------------------<  85  4.3   |  32  |  1.1    Ca    9.7      19 Mar 2021 06:07  Mg     2.0     03-19    TPro  6.3  /  Alb  3.3<L>  /  TBili  0.3  /  DBili  x   /  AST  40  /  ALT  26  /  AlkPhos  264<H>  03-18    PTT - ( 18 Mar 2021 06:04 )  PTT:73.2 sec      RADIOLOGY:  < from: Xray Chest 1 View- PORTABLE-Routine (Xray Chest 1 View- PORTABLE-Routine in AM.) (03.17.21 @ 07:42) >  Impression:    Improvement in bilateral lung opacities with residual disease present    < end of copied text >      < from: CT Angio Chest PE Protocol w/ IV Cont (03.02.21 @ 02:14) >  IMPRESSION:  1.  No pulmonary embolus.  2.  Right thyroid lobe 3.4 cm mass with resulting mild leftward tracheal deviation. Recommend ultrasound of the thyroid for further evaluation.  3.  Bilateral pleural effusions with adjacent compressive atelectasis.  4.  Left upper lobe 7 mm groundglass nodule. Recommend CT chest in 6-12 months for continued follow-up.  5.  3.5 x 1.8 cm ovoid mass abutting left T2 neural foramen. It is probably pleural-based. This may also be neural in origin. Further evaluation with MRI with and without contrast may be of benefit.      < end of copied text >    PHYSICAL EXAM:    GENERAL: NAD, well-developed, AAOx3  HEENT:  Atraumatic, Normocephalic. EOMI, PERRLA, conjunctiva and sclera clear, No JVD  PULMONARY: Crackles bilaterally; No wheeze  CARDIOVASCULAR: Regular rate and rhythm; No murmurs, rubs, or gallops  GASTROINTESTINAL: Soft, Nontender, Nondistended; Bowel sounds present  MUSCULOSKELETAL:  2+ Peripheral Pulses, No clubbing, cyanosis, or edema  NEUROLOGY: non-focal  SKIN: No rashes or lesions    ADMISSION SUMMARY  Patient is a 76y old Female who presents with a chief complaint of Melena (19 Mar 2021 07:28)  Currently admitted to medicine with the primary diagnosis of GI bleed    ASSESSMENT & PLAN    75 y/o Female with PMH of HTN, MG discharged from [facility] with suspected type 2 MI and chronic anemia patient had no gross GI bleeding at the time. Patient with history of severe NSAID usage admitted for GI bleed. Hospital course was complicated by STEMI.    # Acute blood loss anemia 2/2 Bleeding duodenal ulcer  -c/o melena and hematemesis, h/o NSAID abuse  -Hb 4 on admission. s/p 5U PRBC in total  -Patient intubated for EGD on 3/9 and extubated 3/10 due to history of MG and tracheal deviation, considered high risk as per anesthesia  -EGD on 3/9 showed actively spurting vessel in posterior wall of duodenal bulb. s/p epinephrine inj and clipping. Off pressors  -Hb stable. Repeat q24h  -Diet as tolerated, protonix PO q12h. Will add carafate 1gm q6h due to c/o pain  -Transfuse PRN to hgb >9  -GI following    #STEMI  #Volume overload  -Troponin trending down 2.67-->2.93-->3.83-->3.52   -Medical management with aspirin, statin, imdur, cardizem, ranolazine  -Cannot be on metoprolol due to MG. Continue cardizem 30mg q6h  -Patient was treated with nitroglycerine and morphine for pain from MI  -Still reports to be in pain. Unclear cardiac vs GI in origin. Did not have pain on ambulation  -Will keep her on morphine and percocet for severe and moderate pain respectively. She received 1 dose of percocet in last 24hrs  -Started on fentanyl 25mcg patch as per cardio  -If pain doesn't improve by Monday or the opioid requirement increases, will consider cardiac cath  -s/p heparin drip for 72hrs  -ECHO showed EF 45-50%   -Cardiology is following    #Thyroid mass  -Patient has 2 solid nodules in the thyroid abutting the trachea, paraspinal mass at T2 level likely pleural in origin  -Mass was never biopsied on last admission  -IR reconsulted. Needs PET scan before the biopsy  -Patient's PMD is aware and she will order the PET scan OP    #Hypertension  -Continue cardizem  -Hold home amlodipine    #Myasthenia Gravis  -c/w pyridostigmine    #Diet: DASH/TLC  #DVT ppx: Heparin sq  #GI ppx: protonix  #Dispo: Pending decision on cardiac cath

## 2021-03-19 NOTE — PHYSICAL THERAPY INITIAL EVALUATION ADULT - PERTINENT HX OF CURRENT PROBLEM, REHAB EVAL
Admitted for Melena, received blood transfusion, intubated for EGD and clipping on 3/9/21, extubated on 3/10 /21

## 2021-03-19 NOTE — PHYSICAL THERAPY INITIAL EVALUATION ADULT - BED MOBILITY LIMITATIONS, REHAB EVAL
Patient encountered already out of bed in chair, No apparent distress. Per patient, required assistx1-2.

## 2021-03-19 NOTE — PROGRESS NOTE ADULT - SUBJECTIVE AND OBJECTIVE BOX
IGNACIO PAL             MRN-[id number]    CC: GIB    HPI:  76yFemale pmh HTN, [facility] and chronic anemia patient had no gross GI bleeding at the time. Patient now presents with an episode of vomiting her food early in the morning with speckles of blood in it. Patient also reports two melenic stools earlier in the morning today. Patient also reports two melenic stools earlier in the morning today. Patient with history of severe NSAID usage. Hb in ED was 4.3, lactate 6.8.        (08 Mar 2021 14:19)      SUBJECTIVE: pt feeling better, back pain moderately controlled     ROS:   DYSPNEA: N	  NAUS/VOM: N	  SECRETIONS: N	  AGITATION: N  Pain (Y/N):  Y     -Provocation/Palliation: worsens w movement  -Quality/Quantity: sharp  -Radiating: N  -Severity: 7/10  -Timing/Frequency: constant  -Impact on ADLs: Y    OTHER REVIEW OF SYSTEMS:  UNABLE TO OBTAIN  due to:     PEx:  76y              General: awake. well nourished, lying in bed, NAD, conversant  HEENT:  NCAT PERRL EOMI Non icteric   Neck: Supple no masses  CVS: RR S1S2 No M/G/R  Resp: Unlabored Non tachypneic No increased WOB,  GI:  Soft NT ND BS+  :  Voiding   Musc: No C/C/E    Neuro: Follows commands No focal deficits  Psych: Calm, a & o x 3  Skin: Non jaundiced, no rash   Lymph: Normal      Last BM:       ALLERGIES:     OPIATE NAÏVE (Y/N): N    MEDICATIONS: REVIEWED  MEDICATIONS  (STANDING):  aspirin enteric coated 81 milliGRAM(s) Oral daily  atorvastatin 80 milliGRAM(s) Oral at bedtime  chlorhexidine 4% Liquid 1 Application(s) Topical <User Schedule>  diltiazem    Tablet 60 milliGRAM(s) Oral four times a day  fentaNYL   Patch  25 MICROgram(s)/Hr 1 Patch Transdermal every 72 hours  furosemide    Tablet 40 milliGRAM(s) Oral daily  heparin   Injectable 5000 Unit(s) SubCutaneous every 12 hours  isosorbide   mononitrate ER Tablet (IMDUR) 30 milliGRAM(s) Oral daily  nystatin Powder 1 Application(s) Topical every 12 hours  pantoprazole    Tablet 40 milliGRAM(s) Oral every 12 hours  pyridostigmine  milliGRAM(s) Oral two times a day  ranolazine 500 milliGRAM(s) Oral two times a day  sertraline 100 milliGRAM(s) Oral daily  sucralfate suspension 1 Gram(s) Oral every 6 hours    MEDICATIONS  (PRN):  acetaminophen   Tablet .. 650 milliGRAM(s) Oral every 6 hours PRN Mild Pain (1 - 3)  ALPRAZolam 0.25 milliGRAM(s) Oral daily PRN anxiety  morphine  - Injectable 2 milliGRAM(s) IV Push every 6 hours PRN Severe Pain (7 - 10)  oxycodone    5 mG/acetaminophen 325 mG 1 Tablet(s) Oral every 6 hours PRN Moderate Pain (4 - 6)  pyridostigmine 60 milliGRAM(s) Oral every 8 hours PRN Weakness, Myasthenia symptoms      LABS: REVIEWED  CBC:                        10.3   8.58  )-----------( 478      ( 19 Mar 2021 06:07 )             32.9     CMP:    03-19    139  |  98  |  19  ----------------------------<  85  4.3   |  32  |  1.1    Ca    9.7      19 Mar 2021 06:07  Mg     2.0     03-19    TPro  6.3  /  Alb  3.3<L>  /  TBili  0.3  /  DBili  x   /  AST  40  /  ALT  26  /  AlkPhos  264<H>  03-18  Albumin, Serum: 3.3 g/dL (03-18-21 @ 06:04)      IMAGING: REVIEWED    ADVANCED DIRECTIVES:               DNR DNI           DECISION MAKER: patient   LEGAL SURROGATE:    PSYCHOSOCIAL-SPIRITUAL ASSESSMENT:       Reviewed       Care plan unchanged      GOALS OF CARE DISCUSSION       Palliative care info/counseling provided	           Family meeting      CURRENT DISPO PLAN:       Home      REFERRALS	        Palliative Med        Unit SW/Case Mgmt

## 2021-03-20 LAB
ANION GAP SERPL CALC-SCNC: 9 MMOL/L — SIGNIFICANT CHANGE UP (ref 7–14)
BUN SERPL-MCNC: 18 MG/DL — SIGNIFICANT CHANGE UP (ref 10–20)
CALCIUM SERPL-MCNC: 10.1 MG/DL — SIGNIFICANT CHANGE UP (ref 8.5–10.1)
CHLORIDE SERPL-SCNC: 98 MMOL/L — SIGNIFICANT CHANGE UP (ref 98–110)
CO2 SERPL-SCNC: 32 MMOL/L — SIGNIFICANT CHANGE UP (ref 17–32)
CREAT SERPL-MCNC: 1.1 MG/DL — SIGNIFICANT CHANGE UP (ref 0.7–1.5)
GLUCOSE SERPL-MCNC: 87 MG/DL — SIGNIFICANT CHANGE UP (ref 70–99)
HCT VFR BLD CALC: 34 % — LOW (ref 37–47)
HGB BLD-MCNC: 10.6 G/DL — LOW (ref 12–16)
MAGNESIUM SERPL-MCNC: 1.8 MG/DL — SIGNIFICANT CHANGE UP (ref 1.8–2.4)
MCHC RBC-ENTMCNC: 26.8 PG — LOW (ref 27–31)
MCHC RBC-ENTMCNC: 31.2 G/DL — LOW (ref 32–37)
MCV RBC AUTO: 85.9 FL — SIGNIFICANT CHANGE UP (ref 81–99)
NRBC # BLD: 0 /100 WBCS — SIGNIFICANT CHANGE UP (ref 0–0)
PLATELET # BLD AUTO: 484 K/UL — HIGH (ref 130–400)
POTASSIUM SERPL-MCNC: 4.5 MMOL/L — SIGNIFICANT CHANGE UP (ref 3.5–5)
POTASSIUM SERPL-SCNC: 4.5 MMOL/L — SIGNIFICANT CHANGE UP (ref 3.5–5)
RBC # BLD: 3.96 M/UL — LOW (ref 4.2–5.4)
RBC # FLD: 17.1 % — HIGH (ref 11.5–14.5)
SODIUM SERPL-SCNC: 139 MMOL/L — SIGNIFICANT CHANGE UP (ref 135–146)
WBC # BLD: 7.92 K/UL — SIGNIFICANT CHANGE UP (ref 4.8–10.8)
WBC # FLD AUTO: 7.92 K/UL — SIGNIFICANT CHANGE UP (ref 4.8–10.8)

## 2021-03-20 PROCEDURE — 99233 SBSQ HOSP IP/OBS HIGH 50: CPT

## 2021-03-20 PROCEDURE — 71045 X-RAY EXAM CHEST 1 VIEW: CPT | Mod: 26

## 2021-03-20 RX ADMIN — FENTANYL CITRATE 1 PATCH: 50 INJECTION INTRAVENOUS at 19:30

## 2021-03-20 RX ADMIN — Medication 60 MILLIGRAM(S): at 23:34

## 2021-03-20 RX ADMIN — CHLORHEXIDINE GLUCONATE 1 APPLICATION(S): 213 SOLUTION TOPICAL at 10:42

## 2021-03-20 RX ADMIN — Medication 60 MILLIGRAM(S): at 17:17

## 2021-03-20 RX ADMIN — ATORVASTATIN CALCIUM 80 MILLIGRAM(S): 80 TABLET, FILM COATED ORAL at 22:08

## 2021-03-20 RX ADMIN — HEPARIN SODIUM 5000 UNIT(S): 5000 INJECTION INTRAVENOUS; SUBCUTANEOUS at 17:17

## 2021-03-20 RX ADMIN — Medication 40 MILLIGRAM(S): at 06:00

## 2021-03-20 RX ADMIN — Medication 81 MILLIGRAM(S): at 11:03

## 2021-03-20 RX ADMIN — Medication 60 MILLIGRAM(S): at 06:00

## 2021-03-20 RX ADMIN — NYSTATIN CREAM 1 APPLICATION(S): 100000 CREAM TOPICAL at 17:19

## 2021-03-20 RX ADMIN — FENTANYL CITRATE 1 PATCH: 50 INJECTION INTRAVENOUS at 10:43

## 2021-03-20 RX ADMIN — Medication 1 GRAM(S): at 23:34

## 2021-03-20 RX ADMIN — PYRIDOSTIGMINE BROMIDE 180 MILLIGRAM(S): 60 SOLUTION ORAL at 06:00

## 2021-03-20 RX ADMIN — Medication 1 GRAM(S): at 06:00

## 2021-03-20 RX ADMIN — ISOSORBIDE MONONITRATE 30 MILLIGRAM(S): 60 TABLET, EXTENDED RELEASE ORAL at 11:03

## 2021-03-20 RX ADMIN — RANOLAZINE 500 MILLIGRAM(S): 500 TABLET, FILM COATED, EXTENDED RELEASE ORAL at 17:17

## 2021-03-20 RX ADMIN — PANTOPRAZOLE SODIUM 40 MILLIGRAM(S): 20 TABLET, DELAYED RELEASE ORAL at 17:17

## 2021-03-20 RX ADMIN — SERTRALINE 100 MILLIGRAM(S): 25 TABLET, FILM COATED ORAL at 11:03

## 2021-03-20 RX ADMIN — Medication 60 MILLIGRAM(S): at 11:03

## 2021-03-20 RX ADMIN — PANTOPRAZOLE SODIUM 40 MILLIGRAM(S): 20 TABLET, DELAYED RELEASE ORAL at 06:00

## 2021-03-20 RX ADMIN — HEPARIN SODIUM 5000 UNIT(S): 5000 INJECTION INTRAVENOUS; SUBCUTANEOUS at 06:00

## 2021-03-20 RX ADMIN — Medication 1 GRAM(S): at 11:03

## 2021-03-20 RX ADMIN — Medication 0.25 MILLIGRAM(S): at 00:02

## 2021-03-20 RX ADMIN — RANOLAZINE 500 MILLIGRAM(S): 500 TABLET, FILM COATED, EXTENDED RELEASE ORAL at 06:00

## 2021-03-20 RX ADMIN — NYSTATIN CREAM 1 APPLICATION(S): 100000 CREAM TOPICAL at 06:06

## 2021-03-20 RX ADMIN — PYRIDOSTIGMINE BROMIDE 180 MILLIGRAM(S): 60 SOLUTION ORAL at 17:18

## 2021-03-20 RX ADMIN — Medication 1 GRAM(S): at 17:18

## 2021-03-20 NOTE — PROGRESS NOTE ADULT - SUBJECTIVE AND OBJECTIVE BOX
Patient is a 76y old  Female who presents with a chief complaint of Melena (19 Mar 2021 15:22)      T(F): 96.8 (03-20-21 @ 07:01), Max: 98.1 (03-19-21 @ 16:27)  HR: 62 (03-20-21 @ 07:07)  BP: 112/54 (03-20-21 @ 07:07)  RR: 20 (03-20-21 @ 07:07)  SpO2: 99% (03-20-21 @ 07:07) (92% - 99%)    PHYSICAL EXAM:  GENERAL: NAD, well-groomed, well-developed  HEAD:  Atraumatic, Normocephalic  EYES: EOMI, PERRLA, conjunctiva and sclera clear  ENMT: No tonsillar erythema, exudates, or enlargement; Moist mucous membranes, Good dentition, No lesions  NECK: Supple, No JVD, Normal thyroid  NERVOUS SYSTEM:  Alert & Oriented X3,  Motor Strength 5/5 B/L upper and lower extremities  CHEST/LUNG: Clear to percussion bilaterally; No rales, rhonchi, wheezing, or rubs  HEART: Regular rate and rhythm; No murmurs, rubs, or gallops  ABDOMEN: Soft, Nontender, Nondistended; Bowel sounds present  EXTREMITIES:   No clubbing, cyanosis, or edema  LYMPH: No lymphadenopathy noted  SKIN: No rashes or lesions    labs  03-19    139  |  98  |  19  ----------------------------<  85  4.3   |  32  |  1.1    Ca    9.7      19 Mar 2021 06:07  Mg     2.0     03-19                            10.6   7.92  )-----------( 484      ( 20 Mar 2021 06:18 )             34.0               acetaminophen   Tablet .. 650 milliGRAM(s) Oral every 6 hours PRN  ALPRAZolam 0.25 milliGRAM(s) Oral daily PRN  aspirin enteric coated 81 milliGRAM(s) Oral daily  atorvastatin 80 milliGRAM(s) Oral at bedtime  chlorhexidine 4% Liquid 1 Application(s) Topical <User Schedule>  diltiazem    Tablet 60 milliGRAM(s) Oral four times a day  fentaNYL   Patch  25 MICROgram(s)/Hr 1 Patch Transdermal every 72 hours  furosemide    Tablet 40 milliGRAM(s) Oral daily  heparin   Injectable 5000 Unit(s) SubCutaneous every 12 hours  isosorbide   mononitrate ER Tablet (IMDUR) 30 milliGRAM(s) Oral daily  nystatin Powder 1 Application(s) Topical every 12 hours  oxyCODONE    IR 10 milliGRAM(s) Oral every 8 hours PRN  pantoprazole    Tablet 40 milliGRAM(s) Oral every 12 hours  pyridostigmine 60 milliGRAM(s) Oral every 8 hours PRN  pyridostigmine  milliGRAM(s) Oral two times a day  ranolazine 500 milliGRAM(s) Oral two times a day  sertraline 100 milliGRAM(s) Oral daily  sucralfate suspension 1 Gram(s) Oral every 6 hours

## 2021-03-20 NOTE — PROGRESS NOTE ADULT - ASSESSMENT
Patient had mi. Echo mid anteroseptal hypokinetic.   Not able use beta because myasthenia gravis. Keep hemoglobin 9 or better. Medical rx for now. Patient aware. off heparin . Patient feels better. No chest pain. Ambulating . No chest pain. Check RA pulse oximeter. She may need home o2.   Outpatient PET scan . Prognosis guarded

## 2021-03-20 NOTE — PROGRESS NOTE ADULT - SUBJECTIVE AND OBJECTIVE BOX
SUBJECTIVE:    Patient is a 76y old Female who presents with a chief complaint of Melena (20 Mar 2021 07:40)    Currently admitted to medicine with the primary diagnosis of GI bleed     Today is hospital day 12d. This morning she is resting comfortably in bed and reports no new issues or overnight events.     PAST MEDICAL & SURGICAL HISTORY  Anemia    High cholesterol    HTN (hypertension)    Myasthenia gravis    Hemangioma      SOCIAL HISTORY:  Negative for smoking/alcohol/drug use.     ALLERGIES:  No Known Allergies    MEDICATIONS:  STANDING MEDICATIONS  aspirin enteric coated 81 milliGRAM(s) Oral daily  atorvastatin 80 milliGRAM(s) Oral at bedtime  chlorhexidine 4% Liquid 1 Application(s) Topical <User Schedule>  diltiazem    Tablet 60 milliGRAM(s) Oral four times a day  fentaNYL   Patch  25 MICROgram(s)/Hr 1 Patch Transdermal every 72 hours  furosemide    Tablet 40 milliGRAM(s) Oral daily  heparin   Injectable 5000 Unit(s) SubCutaneous every 12 hours  isosorbide   mononitrate ER Tablet (IMDUR) 30 milliGRAM(s) Oral daily  nystatin Powder 1 Application(s) Topical every 12 hours  pantoprazole    Tablet 40 milliGRAM(s) Oral every 12 hours  pyridostigmine  milliGRAM(s) Oral two times a day  ranolazine 500 milliGRAM(s) Oral two times a day  sertraline 100 milliGRAM(s) Oral daily  sucralfate suspension 1 Gram(s) Oral every 6 hours    PRN MEDICATIONS  acetaminophen   Tablet .. 650 milliGRAM(s) Oral every 6 hours PRN  ALPRAZolam 0.25 milliGRAM(s) Oral daily PRN  oxyCODONE    IR 10 milliGRAM(s) Oral every 8 hours PRN  pyridostigmine 60 milliGRAM(s) Oral every 8 hours PRN    VITALS:   T(F): 96.8  HR: 62  BP: 112/54  RR: 20  SpO2: 99%    LABS:                        10.6   7.92  )-----------( 484      ( 20 Mar 2021 06:18 )             34.0     03-20    139  |  98  |  18  ----------------------------<  87  4.5   |  32  |  1.1    Ca    10.1      20 Mar 2021 06:18  Mg     1.8     03-20      RADIOLOGY:  Xray Chest 1 View- PORTABLE-Routine (Xray Chest 1 View- PORTABLE-Routine in AM.) (03.20.21 @ 06:03) >  Right basilar/pleural effusion, decreased. Left basilar opacity/pleural effusion, increased.. Stable cardiomegaly.    PHYSICAL EXAM:  GENERAL: NAD, well-developed, AAOx3  HEENT:  Atraumatic, Normocephalic. EOMI, PERRLA, conjunctiva and sclera clear, No JVD  PULMONARY: Crackles bilaterally; No wheeze  CARDIOVASCULAR: Regular rate and rhythm; No murmurs, rubs, or gallops  GASTROINTESTINAL: Soft, Nontender, Nondistended; Bowel sounds present  MUSCULOSKELETAL:  2+ Peripheral Pulses, No clubbing, cyanosis, or edema  NEUROLOGY: non-focal  SKIN: No rashes or lesions

## 2021-03-20 NOTE — PROGRESS NOTE ADULT - ASSESSMENT
75 y/o Female with PMH of HTN, MG discharged from [facility] with suspected type 2 MI and chronic anemia patient had no gross GI bleeding at the time. Patient with history of severe NSAID usage admitted for GI bleed. Hospital course was complicated by STEMI.    # STEMI  - hemodynamically stable   - chest pain resolved   - c/w aspirin, statin, Imdur, Cardizem, ranolazine  - cannot tolerate metoprolol due to MG. Continue Cardizem 30mg q6h  - Patient was treated with nitroglycerine and morphine for pain from MI  - ECHO showed EF 45-50%; mid anteroseptal hypokinetic  - Cardiology is following    # Acute blood loss anemia 2/2 Bleeding duodenal ulcer  - c/o melena and hematemesis, h/o NSAID abuse  - s/p 5U PRBC in total  - Hb stable: 10.6 <-- 10.5 <-- 11  - Patient intubated for EGD on 3/9 and extubated 3/10 due to history of MG and tracheal deviation, considered high risk as per anesthesia  - EGD on 3/9 showed actively spurting vessel in posterior wall of duodenal bulb. s/p epinephrine inj and clipping. Off pressors  - Diet as tolerated, protonix PO q12h. Will add carafate 1gm q6h due to c/o pain  - Transfuse PRN to hgb >9  - GI following    # Thyroid mass  - Patient has 2 solid nodules in the thyroid abutting the trachea, paraspinal mass at T2 level likely pleural in origin  - Mass was never biopsied on last admission  - Patient's PMD is aware and she will order the PET scan OP    # Hypertension  - Continue Cardizem  - Hold home amlodipine    # Myasthenia Gravis  - c/w pyridostigmine    # Diet: DASH/TLC  # DVT ppx: Heparin sq  # GI ppx: Protonix  # Dispo: Pending decision on cardiac cath   75 y/o Female with PMH of HTN, MG discharged from [facility] with suspected type 2 MI and chronic anemia patient had no gross GI bleeding at the time. Patient with history of severe NSAID usage admitted for GI bleed. Hospital course was complicated by STEMI.    # STEMI  - hemodynamically stable   - chest pain resolved   - c/w aspirin, statin, Imdur, Cardizem, ranolazine  - cannot tolerate metoprolol due to MG. Continue Cardizem 30mg q6h  - Patient was treated with nitroglycerine and morphine for pain from MI  - ECHO showed EF 45-50%; mid anteroseptal hypokinetic  - Cardiology is following    # Acute blood loss anemia 2/2 Bleeding duodenal ulcer  - c/o melena and hematemesis, h/o NSAID abuse  - s/p 5U PRBC in total  - Hb stable: 10.6 <-- 10.5 <-- 11  - Patient intubated for EGD on 3/9 and extubated 3/10 due to history of MG and tracheal deviation, considered high risk as per anesthesia  - EGD on 3/9 showed actively spurting vessel in posterior wall of duodenal bulb. s/p epinephrine inj and clipping. Off pressors  - Diet as tolerated, protonix PO q12h. Will add carafate 1gm q6h due to c/o pain  - Transfuse PRN to hgb >9  - GI following    # Thyroid mass  - Patient has 2 solid nodules in the thyroid abutting the trachea, paraspinal mass at T2 level likely pleural in origin  - Mass was never biopsied on last admission  - Patient's PMD is aware and she will order the PET scan OP    # Hypertension  - Continue Cardizem  - Hold home amlodipine    # Myasthenia Gravis  - c/w pyridostigmine    # Diet: DASH/TLC  # DVT ppx: Heparin sq  # GI ppx: Protonix  # Dispo: Pending decision on cardiac cath    # DNR / DNI 75 y/o Female with PMH of HTN, MG discharged from [facility] with suspected type 2 MI and chronic anemia patient had no gross GI bleeding at the time. Patient with history of severe NSAID usage admitted for GI bleed. Hospital course was complicated by STEMI.    # STEMI  - hemodynamically stable   - pt on 3L O2 --> wean off NC  - chest pain resolved   - c/w aspirin, statin, Imdur, Cardizem, ranolazine  - cannot tolerate metoprolol due to MG. Continue Cardizem 30mg q6h  - Patient was treated with nitroglycerine and morphine for pain from MI  - ECHO showed EF 45-50%; mid anteroseptal hypokinetic  - Cardiology is following    # Acute blood loss anemia 2/2 Bleeding duodenal ulcer  - c/o melena and hematemesis, h/o NSAID abuse  - s/p 5U PRBC in total  - Hb stable: 10.6 <-- 10.5 <-- 11  - Patient intubated for EGD on 3/9 and extubated 3/10 due to history of MG and tracheal deviation, considered high risk as per anesthesia  - EGD on 3/9 showed actively spurting vessel in posterior wall of duodenal bulb. s/p epinephrine inj and clipping. Off pressors  - Transfuse PRN to hgb >9  - GI following    # Thyroid mass  - Patient has 2 solid nodules in the thyroid abutting the trachea, paraspinal mass at T2 level likely pleural in origin  - Mass was never biopsied on last admission  - Patient's PMD is aware and she will order the PET scan OP    # Hypertension  - Continue Cardizem  - Hold home amlodipine    # Myasthenia Gravis  - c/w pyridostigmine    # Diet: DASH/TLC  # DVT ppx: Heparin sq  # GI ppx: Protonix  # Dispo: Pending decision on cardiac cath    # DNR / DNI

## 2021-03-21 LAB
ALBUMIN SERPL ELPH-MCNC: 2.9 G/DL — LOW (ref 3.5–5.2)
ALP SERPL-CCNC: 225 U/L — HIGH (ref 30–115)
ALT FLD-CCNC: 31 U/L — SIGNIFICANT CHANGE UP (ref 0–41)
ANION GAP SERPL CALC-SCNC: 10 MMOL/L — SIGNIFICANT CHANGE UP (ref 7–14)
AST SERPL-CCNC: 30 U/L — SIGNIFICANT CHANGE UP (ref 0–41)
BASOPHILS # BLD AUTO: 0.05 K/UL — SIGNIFICANT CHANGE UP (ref 0–0.2)
BASOPHILS NFR BLD AUTO: 0.7 % — SIGNIFICANT CHANGE UP (ref 0–1)
BILIRUB SERPL-MCNC: 0.2 MG/DL — SIGNIFICANT CHANGE UP (ref 0.2–1.2)
BUN SERPL-MCNC: 23 MG/DL — HIGH (ref 10–20)
CALCIUM SERPL-MCNC: 9.9 MG/DL — SIGNIFICANT CHANGE UP (ref 8.5–10.1)
CHLORIDE SERPL-SCNC: 97 MMOL/L — LOW (ref 98–110)
CO2 SERPL-SCNC: 31 MMOL/L — SIGNIFICANT CHANGE UP (ref 17–32)
CREAT SERPL-MCNC: 1.2 MG/DL — SIGNIFICANT CHANGE UP (ref 0.7–1.5)
EOSINOPHIL # BLD AUTO: 0.35 K/UL — SIGNIFICANT CHANGE UP (ref 0–0.7)
EOSINOPHIL NFR BLD AUTO: 4.7 % — SIGNIFICANT CHANGE UP (ref 0–8)
GLUCOSE SERPL-MCNC: 87 MG/DL — SIGNIFICANT CHANGE UP (ref 70–99)
HCT VFR BLD CALC: 31.9 % — LOW (ref 37–47)
HGB BLD-MCNC: 10.1 G/DL — LOW (ref 12–16)
IMM GRANULOCYTES NFR BLD AUTO: 2.1 % — HIGH (ref 0.1–0.3)
LYMPHOCYTES # BLD AUTO: 1.36 K/UL — SIGNIFICANT CHANGE UP (ref 1.2–3.4)
LYMPHOCYTES # BLD AUTO: 18.3 % — LOW (ref 20.5–51.1)
MCHC RBC-ENTMCNC: 27.2 PG — SIGNIFICANT CHANGE UP (ref 27–31)
MCHC RBC-ENTMCNC: 31.7 G/DL — LOW (ref 32–37)
MCV RBC AUTO: 85.8 FL — SIGNIFICANT CHANGE UP (ref 81–99)
MONOCYTES # BLD AUTO: 0.63 K/UL — HIGH (ref 0.1–0.6)
MONOCYTES NFR BLD AUTO: 8.5 % — SIGNIFICANT CHANGE UP (ref 1.7–9.3)
NEUTROPHILS # BLD AUTO: 4.9 K/UL — SIGNIFICANT CHANGE UP (ref 1.4–6.5)
NEUTROPHILS NFR BLD AUTO: 65.7 % — SIGNIFICANT CHANGE UP (ref 42.2–75.2)
NRBC # BLD: 0 /100 WBCS — SIGNIFICANT CHANGE UP (ref 0–0)
PLATELET # BLD AUTO: 477 K/UL — HIGH (ref 130–400)
POTASSIUM SERPL-MCNC: 4.6 MMOL/L — SIGNIFICANT CHANGE UP (ref 3.5–5)
POTASSIUM SERPL-SCNC: 4.6 MMOL/L — SIGNIFICANT CHANGE UP (ref 3.5–5)
PROT SERPL-MCNC: 5.7 G/DL — LOW (ref 6–8)
RBC # BLD: 3.72 M/UL — LOW (ref 4.2–5.4)
RBC # FLD: 17.2 % — HIGH (ref 11.5–14.5)
SODIUM SERPL-SCNC: 138 MMOL/L — SIGNIFICANT CHANGE UP (ref 135–146)
WBC # BLD: 7.45 K/UL — SIGNIFICANT CHANGE UP (ref 4.8–10.8)
WBC # FLD AUTO: 7.45 K/UL — SIGNIFICANT CHANGE UP (ref 4.8–10.8)

## 2021-03-21 PROCEDURE — 99233 SBSQ HOSP IP/OBS HIGH 50: CPT

## 2021-03-21 RX ADMIN — Medication 1 GRAM(S): at 17:01

## 2021-03-21 RX ADMIN — PYRIDOSTIGMINE BROMIDE 180 MILLIGRAM(S): 60 SOLUTION ORAL at 17:41

## 2021-03-21 RX ADMIN — NYSTATIN CREAM 1 APPLICATION(S): 100000 CREAM TOPICAL at 06:34

## 2021-03-21 RX ADMIN — Medication 1 GRAM(S): at 23:26

## 2021-03-21 RX ADMIN — CHLORHEXIDINE GLUCONATE 1 APPLICATION(S): 213 SOLUTION TOPICAL at 08:15

## 2021-03-21 RX ADMIN — HEPARIN SODIUM 5000 UNIT(S): 5000 INJECTION INTRAVENOUS; SUBCUTANEOUS at 06:22

## 2021-03-21 RX ADMIN — Medication 60 MILLIGRAM(S): at 11:11

## 2021-03-21 RX ADMIN — Medication 60 MILLIGRAM(S): at 17:00

## 2021-03-21 RX ADMIN — Medication 81 MILLIGRAM(S): at 11:11

## 2021-03-21 RX ADMIN — SERTRALINE 100 MILLIGRAM(S): 25 TABLET, FILM COATED ORAL at 11:11

## 2021-03-21 RX ADMIN — ATORVASTATIN CALCIUM 80 MILLIGRAM(S): 80 TABLET, FILM COATED ORAL at 23:25

## 2021-03-21 RX ADMIN — Medication 40 MILLIGRAM(S): at 06:21

## 2021-03-21 RX ADMIN — RANOLAZINE 500 MILLIGRAM(S): 500 TABLET, FILM COATED, EXTENDED RELEASE ORAL at 17:01

## 2021-03-21 RX ADMIN — HEPARIN SODIUM 5000 UNIT(S): 5000 INJECTION INTRAVENOUS; SUBCUTANEOUS at 17:01

## 2021-03-21 RX ADMIN — OXYCODONE HYDROCHLORIDE 10 MILLIGRAM(S): 5 TABLET ORAL at 13:48

## 2021-03-21 RX ADMIN — Medication 60 MILLIGRAM(S): at 06:21

## 2021-03-21 RX ADMIN — ISOSORBIDE MONONITRATE 30 MILLIGRAM(S): 60 TABLET, EXTENDED RELEASE ORAL at 11:11

## 2021-03-21 RX ADMIN — Medication 60 MILLIGRAM(S): at 23:26

## 2021-03-21 RX ADMIN — Medication 0.25 MILLIGRAM(S): at 00:01

## 2021-03-21 RX ADMIN — PYRIDOSTIGMINE BROMIDE 180 MILLIGRAM(S): 60 SOLUTION ORAL at 06:21

## 2021-03-21 RX ADMIN — PANTOPRAZOLE SODIUM 40 MILLIGRAM(S): 20 TABLET, DELAYED RELEASE ORAL at 17:01

## 2021-03-21 RX ADMIN — NYSTATIN CREAM 1 APPLICATION(S): 100000 CREAM TOPICAL at 17:01

## 2021-03-21 RX ADMIN — Medication 1 GRAM(S): at 11:11

## 2021-03-21 RX ADMIN — FENTANYL CITRATE 1 PATCH: 50 INJECTION INTRAVENOUS at 08:16

## 2021-03-21 RX ADMIN — PANTOPRAZOLE SODIUM 40 MILLIGRAM(S): 20 TABLET, DELAYED RELEASE ORAL at 06:21

## 2021-03-21 RX ADMIN — RANOLAZINE 500 MILLIGRAM(S): 500 TABLET, FILM COATED, EXTENDED RELEASE ORAL at 06:21

## 2021-03-21 RX ADMIN — OXYCODONE HYDROCHLORIDE 10 MILLIGRAM(S): 5 TABLET ORAL at 13:49

## 2021-03-21 RX ADMIN — FENTANYL CITRATE 1 PATCH: 50 INJECTION INTRAVENOUS at 19:45

## 2021-03-21 RX ADMIN — Medication 1 GRAM(S): at 06:21

## 2021-03-21 NOTE — PROGRESS NOTE ADULT - ASSESSMENT
77 y/o Female with PMH of HTN, MG discharged from [facility] with suspected type 2 MI and chronic anemia patient had no gross GI bleeding at the time. Patient with history of severe NSAID usage admitted for GI bleed. Hospital course was complicated by STEMI.    # STEMI  - hemodynamically stable   - pt on 3L O2 --> wean off NC  - chest pain resolved   - c/w aspirin, statin, Imdur, Cardizem, ranolazine  - cannot tolerate metoprolol due to MG. Continue Cardizem 30mg q6h  - Patient was treated with nitroglycerine and morphine for pain from MI  - ECHO showed EF 45-50%; mid anteroseptal hypokinetic  - Cardiology is following    # Acute blood loss anemia 2/2 Bleeding duodenal ulcer  - c/o melena and hematemesis, h/o NSAID abuse  - s/p 5U PRBC in total  - Hb stable: 10.6 <-- 10.5 <-- 11  - Patient intubated for EGD on 3/9 and extubated 3/10 due to history of MG and tracheal deviation, considered high risk as per anesthesia  - EGD on 3/9 showed actively spurting vessel in posterior wall of duodenal bulb. s/p epinephrine inj and clipping. Off pressors  - Transfuse PRN to hgb >9  - GI following    # Thyroid mass  - Patient has 2 solid nodules in the thyroid abutting the trachea, paraspinal mass at T2 level likely pleural in origin  - Mass was never biopsied on last admission  - Patient's PMD is aware and she will order the PET scan OP    # Hypertension  - Continue Cardizem  - Hold home amlodipine    # Myasthenia Gravis  - c/w pyridostigmine    # Diet: DASH/TLC  # DVT ppx: Heparin sq  # GI ppx: Protonix    # DNR / DNI

## 2021-03-21 NOTE — PROGRESS NOTE ADULT - ASSESSMENT
Patient had mi. Echo mid anteroseptal hypokinetic.   Not able use beta because myasthenia gravis. Keep hemoglobin 9 or better. Medical rx for now. Patient aware. off heparin . No further pain . Ambulating. Pulse oximeter decreased walking. She may need home o2.  Outpatient PET scan . Prognosis guarded

## 2021-03-21 NOTE — PROGRESS NOTE ADULT - SUBJECTIVE AND OBJECTIVE BOX
Patient is a 76y old  Female who presents with a chief complaint of Melena (20 Mar 2021 08:16)      T(F): 98.3 (03-21-21 @ 00:26), Max: 98.3 (03-21-21 @ 00:26)  HR: 65 (03-21-21 @ 06:20)  BP: 133/58 (03-21-21 @ 06:20)  RR: 19 (03-21-21 @ 00:26)  SpO2: 94% (03-21-21 @ 06:20) (83% - 99%)    PHYSICAL EXAM:  GENERAL: NAD, well-groomed, well-developed  HEAD:  Atraumatic, Normocephalic  EYES: EOMI, PERRLA, conjunctiva and sclera clear  ENMT: No tonsillar erythema, exudates, or enlargement; Moist mucous membranes, Good dentition, No lesions  NECK: Supple, No JVD, Normal thyroid  NERVOUS SYSTEM:  Alert & Oriented X3,  Motor Strength 5/5 B/L upper and lower extremities  CHEST/LUNG: Clear to percussion bilaterally; No rales, rhonchi, wheezing, or rubs  HEART: Regular rate and rhythm; No murmurs, rubs, or gallops  ABDOMEN: Soft, Nontender, Nondistended; Bowel sounds present  EXTREMITIES:   No clubbing, cyanosis, or edema  LYMPH: No lymphadenopathy noted  SKIN: No rashes or lesions    labs  03-20    139  |  98  |  18  ----------------------------<  87  4.5   |  32  |  1.1    Ca    10.1      20 Mar 2021 06:18  Mg     1.8     03-20                            10.6   7.92  )-----------( 484      ( 20 Mar 2021 06:18 )             34.0               acetaminophen   Tablet .. 650 milliGRAM(s) Oral every 6 hours PRN  ALPRAZolam 0.25 milliGRAM(s) Oral daily PRN  aspirin enteric coated 81 milliGRAM(s) Oral daily  atorvastatin 80 milliGRAM(s) Oral at bedtime  chlorhexidine 4% Liquid 1 Application(s) Topical <User Schedule>  diltiazem    Tablet 60 milliGRAM(s) Oral four times a day  fentaNYL   Patch  25 MICROgram(s)/Hr 1 Patch Transdermal every 72 hours  furosemide    Tablet 40 milliGRAM(s) Oral daily  heparin   Injectable 5000 Unit(s) SubCutaneous every 12 hours  isosorbide   mononitrate ER Tablet (IMDUR) 30 milliGRAM(s) Oral daily  nystatin Powder 1 Application(s) Topical every 12 hours  oxyCODONE    IR 10 milliGRAM(s) Oral every 8 hours PRN  pantoprazole    Tablet 40 milliGRAM(s) Oral every 12 hours  pyridostigmine 60 milliGRAM(s) Oral every 8 hours PRN  pyridostigmine  milliGRAM(s) Oral two times a day  ranolazine 500 milliGRAM(s) Oral two times a day  sertraline 100 milliGRAM(s) Oral daily  sucralfate suspension 1 Gram(s) Oral every 6 hours

## 2021-03-21 NOTE — PROGRESS NOTE ADULT - SUBJECTIVE AND OBJECTIVE BOX
SUBJECTIVE:    Patient is a 76y old Female who presents with a chief complaint of Melena (21 Mar 2021 06:56)    Currently admitted to medicine with the primary diagnosis of GI bleed     Today is hospital day 13d. This morning she is resting comfortably in bed and reports no new issues or overnight events.     PAST MEDICAL & SURGICAL HISTORY  Anemia    High cholesterol    HTN (hypertension)    Myasthenia gravis    Hemangioma      SOCIAL HISTORY:  Negative for smoking/alcohol/drug use.     ALLERGIES:  No Known Allergies    MEDICATIONS:  STANDING MEDICATIONS  aspirin enteric coated 81 milliGRAM(s) Oral daily  atorvastatin 80 milliGRAM(s) Oral at bedtime  chlorhexidine 4% Liquid 1 Application(s) Topical <User Schedule>  diltiazem    Tablet 60 milliGRAM(s) Oral four times a day  fentaNYL   Patch  25 MICROgram(s)/Hr 1 Patch Transdermal every 72 hours  furosemide    Tablet 40 milliGRAM(s) Oral daily  heparin   Injectable 5000 Unit(s) SubCutaneous every 12 hours  isosorbide   mononitrate ER Tablet (IMDUR) 30 milliGRAM(s) Oral daily  nystatin Powder 1 Application(s) Topical every 12 hours  pantoprazole    Tablet 40 milliGRAM(s) Oral every 12 hours  pyridostigmine  milliGRAM(s) Oral two times a day  ranolazine 500 milliGRAM(s) Oral two times a day  sertraline 100 milliGRAM(s) Oral daily  sucralfate suspension 1 Gram(s) Oral every 6 hours    PRN MEDICATIONS  acetaminophen   Tablet .. 650 milliGRAM(s) Oral every 6 hours PRN  ALPRAZolam 0.25 milliGRAM(s) Oral daily PRN  oxyCODONE    IR 10 milliGRAM(s) Oral every 8 hours PRN  pyridostigmine 60 milliGRAM(s) Oral every 8 hours PRN    VITALS:   T(F): 97.2  HR: 63  BP: 87/51  RR: 18  SpO2: 97%    LABS:                        10.1   7.45  )-----------( 477      ( 21 Mar 2021 05:56 )             31.9     03-21    138  |  97<L>  |  23<H>  ----------------------------<  87  4.6   |  31  |  1.2    Ca    9.9      21 Mar 2021 05:56  Mg     1.8     03-20    TPro  5.7<L>  /  Alb  2.9<L>  /  TBili  0.2  /  DBili  x   /  AST  30  /  ALT  31  /  AlkPhos  225<H>  03-21      RADIOLOGY:  Xray Chest 1 View- PORTABLE-Routine (Xray Chest 1 View- PORTABLE-Routine in AM.) (03.20.21 @ 06:03) >  Right basilar/pleural effusion, decreased. Left basilar opacity/pleural effusion, increased.. Stable cardiomegaly.    PHYSICAL EXAM:  GEN: No acute distress  LUNGS: Clear to auscultation bilaterally   HEART: S1/S2 present. RRR.   ABD: Soft, non-tender, non-distended. Bowel sounds present  EXT: NC/NC/NE/2+PP/CALDWELL  NEURO: AAOX3  SKIN: intact

## 2021-03-22 PROCEDURE — 99233 SBSQ HOSP IP/OBS HIGH 50: CPT

## 2021-03-22 RX ORDER — ALPRAZOLAM 0.25 MG
0.25 TABLET ORAL DAILY
Refills: 0 | Status: DISCONTINUED | OUTPATIENT
Start: 2021-03-22 | End: 2021-03-23

## 2021-03-22 RX ORDER — SENNA PLUS 8.6 MG/1
2 TABLET ORAL AT BEDTIME
Refills: 0 | Status: DISCONTINUED | OUTPATIENT
Start: 2021-03-22 | End: 2021-03-23

## 2021-03-22 RX ORDER — DILTIAZEM HCL 120 MG
120 CAPSULE, EXT RELEASE 24 HR ORAL DAILY
Refills: 0 | Status: DISCONTINUED | OUTPATIENT
Start: 2021-03-23 | End: 2021-03-23

## 2021-03-22 RX ADMIN — Medication 0.25 MILLIGRAM(S): at 00:05

## 2021-03-22 RX ADMIN — SERTRALINE 100 MILLIGRAM(S): 25 TABLET, FILM COATED ORAL at 11:35

## 2021-03-22 RX ADMIN — Medication 1 GRAM(S): at 05:43

## 2021-03-22 RX ADMIN — FENTANYL CITRATE 1 PATCH: 50 INJECTION INTRAVENOUS at 09:06

## 2021-03-22 RX ADMIN — RANOLAZINE 500 MILLIGRAM(S): 500 TABLET, FILM COATED, EXTENDED RELEASE ORAL at 05:43

## 2021-03-22 RX ADMIN — HEPARIN SODIUM 5000 UNIT(S): 5000 INJECTION INTRAVENOUS; SUBCUTANEOUS at 05:43

## 2021-03-22 RX ADMIN — PANTOPRAZOLE SODIUM 40 MILLIGRAM(S): 20 TABLET, DELAYED RELEASE ORAL at 17:52

## 2021-03-22 RX ADMIN — Medication 60 MILLIGRAM(S): at 05:43

## 2021-03-22 RX ADMIN — Medication 1 GRAM(S): at 11:35

## 2021-03-22 RX ADMIN — FENTANYL CITRATE 1 PATCH: 50 INJECTION INTRAVENOUS at 09:47

## 2021-03-22 RX ADMIN — SENNA PLUS 2 TABLET(S): 8.6 TABLET ORAL at 22:22

## 2021-03-22 RX ADMIN — Medication 40 MILLIGRAM(S): at 05:43

## 2021-03-22 RX ADMIN — Medication 81 MILLIGRAM(S): at 13:30

## 2021-03-22 RX ADMIN — PYRIDOSTIGMINE BROMIDE 180 MILLIGRAM(S): 60 SOLUTION ORAL at 17:51

## 2021-03-22 RX ADMIN — ATORVASTATIN CALCIUM 80 MILLIGRAM(S): 80 TABLET, FILM COATED ORAL at 22:22

## 2021-03-22 RX ADMIN — Medication 1 GRAM(S): at 17:52

## 2021-03-22 RX ADMIN — PYRIDOSTIGMINE BROMIDE 180 MILLIGRAM(S): 60 SOLUTION ORAL at 05:43

## 2021-03-22 RX ADMIN — CHLORHEXIDINE GLUCONATE 1 APPLICATION(S): 213 SOLUTION TOPICAL at 11:36

## 2021-03-22 RX ADMIN — RANOLAZINE 500 MILLIGRAM(S): 500 TABLET, FILM COATED, EXTENDED RELEASE ORAL at 17:52

## 2021-03-22 RX ADMIN — NYSTATIN CREAM 1 APPLICATION(S): 100000 CREAM TOPICAL at 17:53

## 2021-03-22 RX ADMIN — FENTANYL CITRATE 1 PATCH: 50 INJECTION INTRAVENOUS at 19:20

## 2021-03-22 RX ADMIN — HEPARIN SODIUM 5000 UNIT(S): 5000 INJECTION INTRAVENOUS; SUBCUTANEOUS at 17:52

## 2021-03-22 RX ADMIN — FENTANYL CITRATE 1 PATCH: 50 INJECTION INTRAVENOUS at 09:48

## 2021-03-22 RX ADMIN — ISOSORBIDE MONONITRATE 30 MILLIGRAM(S): 60 TABLET, EXTENDED RELEASE ORAL at 11:35

## 2021-03-22 RX ADMIN — PANTOPRAZOLE SODIUM 40 MILLIGRAM(S): 20 TABLET, DELAYED RELEASE ORAL at 05:43

## 2021-03-22 RX ADMIN — NYSTATIN CREAM 1 APPLICATION(S): 100000 CREAM TOPICAL at 05:44

## 2021-03-22 NOTE — PROGRESS NOTE ADULT - ASSESSMENT
Patient had mi. Echo mid anteroseptal hypokinetic.   Not able use beta because myasthenia gravis. Keep hemoglobin 9 or better. Medical rx for now. Patient aware. off heparin . Mild epigastric to chest pain. Not exertional or worse with exertion. Seems gi.  Check RA pulse oximeter. She may need home o2.   Outpatient PET scan . Prognosis guarded

## 2021-03-22 NOTE — PROGRESS NOTE ADULT - PROBLEM SELECTOR PLAN 1
related to NSAID use (pt was taking excessive amounts of NSAIDS for progressive neck/back pain)  resolved s/p EGD w clipping
NSAID related  resolved  s/p EGD w clipping
NSAID related  resolved s/p GI intervention
resolved s/p GI clipping
related to NSAID use  resolved s/p GI clipping

## 2021-03-22 NOTE — PROGRESS NOTE ADULT - SUBJECTIVE AND OBJECTIVE BOX
Patient is a 76y old  Female who presents with a chief complaint of Melena (21 Mar 2021 08:45)      T(F): 98.2 (03-21-21 @ 23:27), Max: 98.2 (03-21-21 @ 23:27)  HR: 62 (03-22-21 @ 05:40)  BP: 112/56 (03-22-21 @ 05:40)  RR: 18 (03-21-21 @ 23:27)  SpO2: 95% (03-22-21 @ 05:40) (86% - 98%)    PHYSICAL EXAM:  GENERAL: NAD, well-groomed, well-developed  HEAD:  Atraumatic, Normocephalic  EYES: EOMI, PERRLA, conjunctiva and sclera clear  ENMT: No tonsillar erythema, exudates, or enlargement; Moist mucous membranes, Good dentition, No lesions  NECK: Supple, No JVD, Normal thyroid  NERVOUS SYSTEM:  Alert & Oriented X3,  Motor Strength 5/5 B/L upper and lower extremities  CHEST/LUNG: Clear to percussion bilaterally; No rales, rhonchi, wheezing, or rubs  HEART: Regular rate and rhythm; No murmurs, rubs, or gallops  ABDOMEN: Soft, Nontender, Nondistended; Bowel sounds present  EXTREMITIES:   No clubbing, cyanosis, or edema  LYMPH: No lymphadenopathy noted  SKIN: No rashes or lesions    labs  03-21    138  |  97<L>  |  23<H>  ----------------------------<  87  4.6   |  31  |  1.2    Ca    9.9      21 Mar 2021 05:56    TPro  5.7<L>  /  Alb  2.9<L>  /  TBili  0.2  /  DBili  x   /  AST  30  /  ALT  31  /  AlkPhos  225<H>  03-21                          10.1   7.45  )-----------( 477      ( 21 Mar 2021 05:56 )             31.9               acetaminophen   Tablet .. 650 milliGRAM(s) Oral every 6 hours PRN  ALPRAZolam 0.25 milliGRAM(s) Oral daily PRN  aspirin enteric coated 81 milliGRAM(s) Oral daily  atorvastatin 80 milliGRAM(s) Oral at bedtime  chlorhexidine 4% Liquid 1 Application(s) Topical <User Schedule>  diltiazem    Tablet 60 milliGRAM(s) Oral four times a day  fentaNYL   Patch  25 MICROgram(s)/Hr 1 Patch Transdermal every 72 hours  furosemide    Tablet 40 milliGRAM(s) Oral daily  heparin   Injectable 5000 Unit(s) SubCutaneous every 12 hours  isosorbide   mononitrate ER Tablet (IMDUR) 30 milliGRAM(s) Oral daily  nystatin Powder 1 Application(s) Topical every 12 hours  oxyCODONE    IR 10 milliGRAM(s) Oral every 8 hours PRN  pantoprazole    Tablet 40 milliGRAM(s) Oral every 12 hours  pyridostigmine 60 milliGRAM(s) Oral every 8 hours PRN  pyridostigmine  milliGRAM(s) Oral two times a day  ranolazine 500 milliGRAM(s) Oral two times a day  sertraline 100 milliGRAM(s) Oral daily  sucralfate suspension 1 Gram(s) Oral every 6 hours

## 2021-03-22 NOTE — PROGRESS NOTE ADULT - ASSESSMENT
76yFemale being evaluated for pain mngmnt w hospital course as stated.      MEDD (morphine equivalent daily dose): 65mg/24hrs      See Recs below.    Please call [phone] with questions or concerns 24/7.   We will continue to follow.

## 2021-03-22 NOTE — PROGRESS NOTE ADULT - SUBJECTIVE AND OBJECTIVE BOX
IGNACIO PAL 76y Female  MRN#: [id number]     SUBJECTIVE  Patient is a 76y old Female who presents with a chief complaint of Melena (22 Mar 2021 10:38)  Today is hospital day 14d, and this morning she is lying in bed without distress. No complaints.   No acute overnight events.     OBJECTIVE  PAST MEDICAL & SURGICAL HISTORY  Anemia    High cholesterol    HTN (hypertension)    Myasthenia gravis    Hemangioma      ALLERGIES:  No Known Allergies    MEDICATIONS:  STANDING MEDICATIONS  aspirin enteric coated 81 milliGRAM(s) Oral daily  atorvastatin 80 milliGRAM(s) Oral at bedtime  chlorhexidine 4% Liquid 1 Application(s) Topical <User Schedule>  fentaNYL   Patch  25 MICROgram(s)/Hr 1 Patch Transdermal every 72 hours  furosemide    Tablet 40 milliGRAM(s) Oral daily  heparin   Injectable 5000 Unit(s) SubCutaneous every 12 hours  isosorbide   mononitrate ER Tablet (IMDUR) 30 milliGRAM(s) Oral daily  nystatin Powder 1 Application(s) Topical every 12 hours  pantoprazole    Tablet 40 milliGRAM(s) Oral every 12 hours  pyridostigmine  milliGRAM(s) Oral two times a day  ranolazine 500 milliGRAM(s) Oral two times a day  sertraline 100 milliGRAM(s) Oral daily  sucralfate suspension 1 Gram(s) Oral every 6 hours    PRN MEDICATIONS  acetaminophen   Tablet .. 650 milliGRAM(s) Oral every 6 hours PRN  ALPRAZolam 0.25 milliGRAM(s) Oral daily PRN  oxyCODONE    IR 10 milliGRAM(s) Oral every 8 hours PRN  pyridostigmine 60 milliGRAM(s) Oral every 8 hours PRN    HOME MEDICATIONS  Home Medications:  pyridostigmine 180 mg oral tablet, extended release: 1 tab(s) orally 2 times a day (08 Mar 2021 14:24)  pyridostigmine 60 mg oral tablet: 1 tab(s) orally 3 to 4 times a day, As Needed (08 Mar 2021 14:24)  sertraline 100 mg oral tablet: 1 tab(s) orally once a day (08 Mar 2021 14:24)      VITAL SIGNS: Last 24 Hours  T(C): 36.5 (22 Mar 2021 07:10), Max: 36.8 (21 Mar 2021 23:27)  T(F): 97.7 (22 Mar 2021 07:10), Max: 98.2 (21 Mar 2021 23:27)  HR: 59 (22 Mar 2021 07:20) (59 - 69)  BP: 94/45 (22 Mar 2021 07:20) (94/45 - 116/55)  BP(mean): 65 (22 Mar 2021 07:20) (65 - 80)  RR: 18 (22 Mar 2021 07:10) (18 - 18)  SpO2: 95% (22 Mar 2021 07:20) (86% - 97%)    03-21-21 @ 07:01  -  03-22-21 @ 07:00  --------------------------------------------------------  IN: 0 mL / OUT: 700 mL / NET: -700 mL    03-22-21 @ 07:01  -  03-22-21 @ 11:28  --------------------------------------------------------  IN: 240 mL / OUT: 0 mL / NET: 240 mL        LABS:                        10.1   7.45  )-----------( 477      ( 21 Mar 2021 05:56 )             31.9     03-21    138  |  97<L>  |  23<H>  ----------------------------<  87  4.6   |  31  |  1.2    Ca    9.9      21 Mar 2021 05:56    TPro  5.7<L>  /  Alb  2.9<L>  /  TBili  0.2  /  DBili  x   /  AST  30  /  ALT  31  /  AlkPhos  225<H>  03-21    LIVER FUNCTIONS - ( 21 Mar 2021 05:56 )  Alb: 2.9 g/dL / Pro: 5.7 g/dL / ALK PHOS: 225 U/L / ALT: 31 U/L / AST: 30 U/L / GGT: x             CAPILLARY BLOOD GLUCOSE      RADIOLOGY:  < from: Xray Chest 1 View- PORTABLE-Routine (Xray Chest 1 View- PORTABLE-Routine in AM.) (03.20.21 @ 06:03) >  Impression:  Right basilar/pleural effusion, decreased. Left basilar opacity/pleural effusion, increased.. Stable cardiomegaly.  < end of copied text >    PHYSICAL EXAM:  GENERAL: NAD, AAO x 4, 76y F  HEAD:  Atraumatic, Normocephalic  EYES: EOMI, conjunctiva clear and sclera white  NECK: Supple, No JVD  CHEST/LUNG: Clear to auscultation bilaterally; No wheeze; No crackles; No accessory muscles used  HEART: Regular rate and rhythm; No murmurs;   ABDOMEN: Soft, Nontender, Nondistended; Bowel sounds present; No guarding  EXTREMITIES:  2+ Peripheral Pulses, No cyanosis or edema  NEUROLOGY: non-focal    ADMISSION SUMMARY  Patient is a 76y old Female who presents with a chief complaint of Melena (22 Mar 2021 10:38)    IGNACIO PAL 76y Female  MRN#: [id number]     SUBJECTIVE  Patient is a 76y old Female who presents with a chief complaint of Melena (22 Mar 2021 10:38)  Today is hospital day 14d, and this morning she is lying in bed without distress. No complaints.   No acute overnight events.     OBJECTIVE  PAST MEDICAL & SURGICAL HISTORY  Anemia    High cholesterol    HTN (hypertension)    Myasthenia gravis    Hemangioma      ALLERGIES:  No Known Allergies    MEDICATIONS:  STANDING MEDICATIONS  aspirin enteric coated 81 milliGRAM(s) Oral daily  atorvastatin 80 milliGRAM(s) Oral at bedtime  chlorhexidine 4% Liquid 1 Application(s) Topical <User Schedule>  fentaNYL   Patch  25 MICROgram(s)/Hr 1 Patch Transdermal every 72 hours  furosemide    Tablet 40 milliGRAM(s) Oral daily  heparin   Injectable 5000 Unit(s) SubCutaneous every 12 hours  isosorbide   mononitrate ER Tablet (IMDUR) 30 milliGRAM(s) Oral daily  nystatin Powder 1 Application(s) Topical every 12 hours  pantoprazole    Tablet 40 milliGRAM(s) Oral every 12 hours  pyridostigmine  milliGRAM(s) Oral two times a day  ranolazine 500 milliGRAM(s) Oral two times a day  sertraline 100 milliGRAM(s) Oral daily  sucralfate suspension 1 Gram(s) Oral every 6 hours    PRN MEDICATIONS  acetaminophen   Tablet .. 650 milliGRAM(s) Oral every 6 hours PRN  ALPRAZolam 0.25 milliGRAM(s) Oral daily PRN  oxyCODONE    IR 10 milliGRAM(s) Oral every 8 hours PRN  pyridostigmine 60 milliGRAM(s) Oral every 8 hours PRN    HOME MEDICATIONS  Home Medications:  pyridostigmine 180 mg oral tablet, extended release: 1 tab(s) orally 2 times a day (08 Mar 2021 14:24)  pyridostigmine 60 mg oral tablet: 1 tab(s) orally 3 to 4 times a day, As Needed (08 Mar 2021 14:24)  sertraline 100 mg oral tablet: 1 tab(s) orally once a day (08 Mar 2021 14:24)      VITAL SIGNS: Last 24 Hours  T(C): 36.5 (22 Mar 2021 07:10), Max: 36.8 (21 Mar 2021 23:27)  T(F): 97.7 (22 Mar 2021 07:10), Max: 98.2 (21 Mar 2021 23:27)  HR: 59 (22 Mar 2021 07:20) (59 - 69)  BP: 94/45 (22 Mar 2021 07:20) (94/45 - 116/55)  BP(mean): 65 (22 Mar 2021 07:20) (65 - 80)  RR: 18 (22 Mar 2021 07:10) (18 - 18)  SpO2: 95% (22 Mar 2021 07:20) (86% - 97%)    03-21-21 @ 07:01  -  03-22-21 @ 07:00  --------------------------------------------------------  IN: 0 mL / OUT: 700 mL / NET: -700 mL    03-22-21 @ 07:01  -  03-22-21 @ 11:28  --------------------------------------------------------  IN: 240 mL / OUT: 0 mL / NET: 240 mL        LABS:                        10.1   7.45  )-----------( 477      ( 21 Mar 2021 05:56 )             31.9     03-21    138  |  97<L>  |  23<H>  ----------------------------<  87  4.6   |  31  |  1.2    Ca    9.9      21 Mar 2021 05:56    TPro  5.7<L>  /  Alb  2.9<L>  /  TBili  0.2  /  DBili  x   /  AST  30  /  ALT  31  /  AlkPhos  225<H>  03-21    LIVER FUNCTIONS - ( 21 Mar 2021 05:56 )  Alb: 2.9 g/dL / Pro: 5.7 g/dL / ALK PHOS: 225 U/L / ALT: 31 U/L / AST: 30 U/L / GGT: x             CAPILLARY BLOOD GLUCOSE      RADIOLOGY:  < from: Xray Chest 1 View- PORTABLE-Routine (Xray Chest 1 View- PORTABLE-Routine in AM.) (03.20.21 @ 06:03) >  Impression:  Right basilar/pleural effusion, decreased. Left basilar opacity/pleural effusion, increased.. Stable cardiomegaly.  < end of copied text >    PHYSICAL EXAM:  GENERAL: NAD, AAO x 4, 76y F  HEAD:  Atraumatic, Normocephalic  EYES: EOMI, conjunctiva clear and sclera white  NECK: Supple, No JVD  CHEST/LUNG: Decreased lung sounds b/l bases; No wheeze; No crackles; No accessory muscles used  HEART: Regular rate and rhythm; No murmurs;   ABDOMEN: Soft, Nontender, Nondistended; Bowel sounds present; No guarding  EXTREMITIES:  2+ Peripheral Pulses, No cyanosis or edema  NEUROLOGY: non-focal    ADMISSION SUMMARY  Patient is a 76y old Female who presents with a chief complaint of Melena (22 Mar 2021 10:38)

## 2021-03-22 NOTE — PROGRESS NOTE ADULT - PROBLEM SELECTOR PLAN 2
awaiting biopsy
w resulting cervical/back pain  cont tylenol PRN  morphine 2mg IV q3h PRN pain refractory to tylenol  will consider what (if any) po opioids pt may need upon dc
with lung nodules, suspicious for malignant process   awaiting w/u
no further angina
awaiting further work up

## 2021-03-22 NOTE — PROGRESS NOTE ADULT - SUBJECTIVE AND OBJECTIVE BOX
IGNACIO PAL             MRN-[id number]    CC: neck pain     HPI:  76yFemale pmh HTN, [facility] [place] [facility] and chronic anemia patient had no gross GI bleeding at the time. Patient now presents with an episode of vomiting her food early in the morning with speckles of blood in it. Patient also reports two melenic stools earlier in the morning today. Patient also reports two melenic stools earlier in the morning today. Patient with history of severe NSAID usage. Hb in ED was 4.3, lactate 6.8.        (08 Mar 2021 14:19)      SUBJECTIVE: pt resting comfortably, pain well controlled.     ROS: negative unless otherwise stated   DYSPNEA: N	  NAUS/VOM: N	  SECRETIONS: N	  AGITATION: N  Pain (Y/N):       -Provocation/Palliation:  -Quality/Quantity:  -Radiating:  -Severity:  -Timing/Frequency:  -Impact on ADLs:    OTHER REVIEW OF SYSTEMS:  UNABLE TO OBTAIN  due to:    PEx:  76y              General: awake. well nourished, lying in bed, NAD, conversant  HEENT:  NCAT PERRL EOMI Non icteric   Neck: Supple no masses  CVS: RR S1S2 No M/G/R  Resp: Unlabored Non tachypneic No increased WOB  GI:  Soft NT ND BS+  :  Voiding  Musc: No C/C/E    Neuro: Follows commands No focal deficits  Psych: Calm, a & o x 3  Skin: Non jaundiced, no rash   Lymph: Normal      Last BM:       ALLERGIES:     OPIATE NAÏVE (Y/N): prior to hospitalization Y    MEDICATIONS: REVIEWED  MEDICATIONS  (STANDING):  aspirin enteric coated 81 milliGRAM(s) Oral daily  atorvastatin 80 milliGRAM(s) Oral at bedtime  chlorhexidine 4% Liquid 1 Application(s) Topical <User Schedule>  fentaNYL   Patch  25 MICROgram(s)/Hr 1 Patch Transdermal every 72 hours  furosemide    Tablet 40 milliGRAM(s) Oral daily  heparin   Injectable 5000 Unit(s) SubCutaneous every 12 hours  isosorbide   mononitrate ER Tablet (IMDUR) 30 milliGRAM(s) Oral daily  nystatin Powder 1 Application(s) Topical every 12 hours  pantoprazole    Tablet 40 milliGRAM(s) Oral every 12 hours  pyridostigmine  milliGRAM(s) Oral two times a day  ranolazine 500 milliGRAM(s) Oral two times a day  sertraline 100 milliGRAM(s) Oral daily  sucralfate suspension 1 Gram(s) Oral every 6 hours    MEDICATIONS  (PRN):  acetaminophen   Tablet .. 650 milliGRAM(s) Oral every 6 hours PRN Mild Pain (1 - 3)  ALPRAZolam 0.25 milliGRAM(s) Oral daily PRN anxiety  oxyCODONE    IR 10 milliGRAM(s) Oral every 8 hours PRN Severe Pain (7 - 10)  pyridostigmine 60 milliGRAM(s) Oral every 8 hours PRN Weakness, Myasthenia symptoms      LABS: REVIEWED  CBC:                        10.1   7.45  )-----------( 477      ( 21 Mar 2021 05:56 )             31.9     CMP:    03-21    138  |  97<L>  |  23<H>  ----------------------------<  87  4.6   |  31  |  1.2    Ca    9.9      21 Mar 2021 05:56    TPro  5.7<L>  /  Alb  2.9<L>  /  TBili  0.2  /  DBili  x   /  AST  30  /  ALT  31  /  AlkPhos  225<H>  03-21  Albumin, Serum: 2.9 g/dL (03-21-21 @ 05:56)      IMAGING: REVIEWED    ADVANCED DIRECTIVES:                DNR DNI          DECISION MAKER: patient is able to make medical decisions   LEGAL SURROGATE:    PSYCHOSOCIAL-SPIRITUAL ASSESSMENT:       Reviewed       Care plan unchanged       GOALS OF CARE DISCUSSION       Palliative care info/counseling provided	          CURRENT DISPO PLAN:       Home      REFERRALS	        Palliative Med

## 2021-03-22 NOTE — PROGRESS NOTE ADULT - SUBJECTIVE AND OBJECTIVE BOX
CC.  Melena   HPI.  Patient reports that she feels ok.  Offers no new complaints              Constitutional: No fever, fatigue or weight loss.  Skin: No rash.  Eyes: No recent vision problems or eye pain.  ENT: No congestion, ear pain, or sore throat.  Endocrine: No thyroid problems.  Cardiovascular: No chest pain or palpation.  Respiratory: No cough, shortness of breath, congestion, or wheezing.  Gastrointestinal: No abdominal pain, nausea, vomiting, or diarrhea.  Genitourinary: No dysuria.  Musculoskeletal: No joint swelling.  Neurologic: No headache.      Vital Signs Last 24 Hrs  T(C): 36.5 (03-22-21 @ 07:10), Max: 36.8 (03-21-21 @ 23:27)  T(F): 97.7 (03-22-21 @ 07:10), Max: 98.2 (03-21-21 @ 23:27)  HR: 59 (03-22-21 @ 07:20) (59 - 79)  BP: 94/45 (03-22-21 @ 07:20) (94/45 - 116/55)  BP(mean): 65 (03-22-21 @ 07:20) (65 - 80)  RR: 18 (03-22-21 @ 07:10) (18 - 18)  SpO2: 95% (03-22-21 @ 07:20) (86% - 98%)        PHYSICAL EXAM-  GENERAL: NAD, chronic ill appearing female  HEAD:  Atraumatic, Normocephalic  EYES: EOMI, PERRLA, conjunctiva and sclera clear  NECK: Supple, No JVD, Normal thyroid  NERVOUS SYSTEM:  Alert & Oriented X3, Moving all extremities  CHEST/LUNG: Clear to percussion bilaterally; No rales, rhonchi, wheezing, or rubs  HEART: Regular rate and rhythm; No murmurs, rubs, or gallops  ABDOMEN: Soft, Nontender, Nondistended; Bowel sounds present  EXTREMITIES:    No clubbing, cyanosis, or edema  SKIN: No rashes or lesions                                  10.1   7.45  )-----------( 477      ( 21 Mar 2021 05:56 )             31.9     03-21    138  |  97<L>  |  23<H>  ----------------------------<  87  4.6   |  31  |  1.2    Ca    9.9      21 Mar 2021 05:56    TPro  5.7<L>  /  Alb  2.9<L>  /  TBili  0.2  /  DBili  x   /  AST  30  /  ALT  31  /  AlkPhos  225<H>  03-21                    MEDICATIONS  (STANDING):  aspirin enteric coated 81 milliGRAM(s) Oral daily  atorvastatin 80 milliGRAM(s) Oral at bedtime  chlorhexidine 4% Liquid 1 Application(s) Topical <User Schedule>  diltiazem    Tablet 60 milliGRAM(s) Oral four times a day  fentaNYL   Patch  25 MICROgram(s)/Hr 1 Patch Transdermal every 72 hours  furosemide    Tablet 40 milliGRAM(s) Oral daily  heparin   Injectable 5000 Unit(s) SubCutaneous every 12 hours  isosorbide   mononitrate ER Tablet (IMDUR) 30 milliGRAM(s) Oral daily  nystatin Powder 1 Application(s) Topical every 12 hours  pantoprazole    Tablet 40 milliGRAM(s) Oral every 12 hours  pyridostigmine  milliGRAM(s) Oral two times a day  ranolazine 500 milliGRAM(s) Oral two times a day  sertraline 100 milliGRAM(s) Oral daily  sucralfate suspension 1 Gram(s) Oral every 6 hours    MEDICATIONS  (PRN):  acetaminophen   Tablet .. 650 milliGRAM(s) Oral every 6 hours PRN Mild Pain (1 - 3)  ALPRAZolam 0.25 milliGRAM(s) Oral daily PRN anxiety  oxyCODONE    IR 10 milliGRAM(s) Oral every 8 hours PRN Severe Pain (7 - 10)  pyridostigmine 60 milliGRAM(s) Oral every 8 hours PRN Weakness, Myasthenia symptoms          Imaging Personally Reviewed:     [x ] YES  [ ] NO    Consultant(s) Notes Reviewed:  [x ] YES  [ ] NO    Care Discussed with Consultants/Other Providers [x ] YES  [ ] NO

## 2021-03-22 NOTE — PROGRESS NOTE ADULT - ASSESSMENT
77 yo F w/ PMH of HTN, MG, NSTEMI Type 2, severe NSAID usage and chronic KAROLYN presented with two melenic stools in ED. Pt admitted for UGI bleed underwent EGD which showed bleeding duodenal ulcer. Hospital course was complicated by STEMI.    # STEMI, stable  - chest pain resolved   - c/w aspirin, statin, Imdur, Cardizem, ranolazine  - cannot tolerate metoprolol due to MG  - Patient was treated with nitroglycerine and morphine for pain from MI  - ECHO showed EF 45-50%; mid anteroseptal hypokinetic  - maintain Hgb > 9   - Cardiology is following    # Acute blood loss anemia 2/2 Bleeding duodenal ulcer likely due to NSAID use, stable  - s/p 5U PRBC in total  - Hb stable  - Patient intubated for EGD on 3/9 and extubated 3/10 due to history of MG and tracheal deviation, considered high risk as per anesthesia  - EGD on 3/9 showed actively spurting vessel in posterior wall of duodenal bulb. s/p epinephrine inj and clipping. Off pressors  - keep active T & S and Transfuse PRN to hgb >9  - GI following    # Thyroid mass  - 2 solid nodules in the thyroid abutting the trachea, paraspinal mass at T2 level likely pleural in origin  - Mass was never biopsied on last admission  - Patient's PMD is aware and she will order the PET scan OP  - IR recommended outpatient follow up for biopsy    # Hypertension, stable  - c/w antihypertensive meds   - DASH diet     # Myasthenia Gravis  - c/w pyridostigmine    Diet: DASH diet   GI ppx: Protonix 40 mg BID  DVT ppx: Heparin 5000 q12h   Activity: Increase as tolerated, will need home O2 on d/c   Code status: Full Code (  ) / DNR ( X ) / DNI ( X )  Disposition: from home

## 2021-03-22 NOTE — PROGRESS NOTE ADULT - ASSESSMENT
77 y/o Female with PMH of HTN, MG discharged from [facility] recently with suspected type 2 MI and chronic anemia patient had no gross GI bleeding at the time. Patient with history of severe NSAID usage admitted for GI bleed. Hospital course was complicated by STEMI.    # STEMI  - hemodynamically stable   - on RA now  - chest pain resolved   - c/w aspirin, statin, Imdur, Cardizem, ranolazine  - cannot tolerate metoprolol due to MG. Will switch to po Cardizem ER  - Patient was treated with nitroglycerine and morphine for pain from MI  - ECHO showed EF 45-50%; mid anteroseptal hypokinetic  - Cardiology is following    # Acute blood loss anemia 2/2 Bleeding duodenal ulcer  - c/o melena and hematemesis, h/o NSAID abuse  - s/p 5U PRBC in total  - Hb stable: 10.6 <-- 10.5 <-- 11  - Patient intubated for EGD on 3/9 and extubated 3/10 due to history of MG and tracheal deviation, considered high risk as per anesthesia  - EGD on 3/9 showed actively spurting vessel in posterior wall of duodenal bulb. s/p epinephrine inj and clipping. Off pressors  - Transfuse PRN to hgb >9  - GI following    # Thyroid mass  - Patient has 2 solid nodules in the thyroid abutting the trachea, paraspinal mass at T2 level likely pleural in origin  - Mass was never biopsied on last admission  - Patient's PMD is aware and she will order the PET scan OP  -IR recommended outpatient follow up for biopsy  -Oncology consult     # Hypertension  - Continue Cardizem       # Myasthenia Gravis  - c/w pyridostigmine    # Diet: DASH/TLC  # DVT ppx: Heparin sq  # GI ppx: Protonix    # DNR / DNI 77 y/o Female with PMH of HTN, MG discharged from [facility] recently with suspected type 2 MI and chronic anemia patient had no gross GI bleeding at the time. Patient with history of severe NSAID usage admitted for GI bleed. Hospital course was complicated by STEMI.    # STEMI  - hemodynamically stable   - on RA now  - chest pain resolved   - c/w aspirin, statin, Imdur, Cardizem, ranolazine  - cannot tolerate metoprolol due to MG. Will switch to po Cardizem ER  - Patient was treated with nitroglycerine and morphine for pain from MI  - ECHO showed EF 45-50%; mid anteroseptal hypokinetic  - Cardiology is following    # Acute blood loss anemia 2/2 Bleeding duodenal ulcer  - c/o melena and hematemesis, h/o NSAID abuse  - s/p 5U PRBC in total  - Hb stable: 10.6 <-- 10.5 <-- 11  - Patient intubated for EGD on 3/9 and extubated 3/10 due to history of MG and tracheal deviation, considered high risk as per anesthesia  - EGD on 3/9 showed actively spurting vessel in posterior wall of duodenal bulb. s/p epinephrine inj and clipping. Off pressors  - Transfuse PRN to hgb >9  - GI following    # Thyroid mass  - Patient has 2 solid nodules in the thyroid abutting the trachea, paraspinal mass at T2 level likely pleural in origin  - Mass was never biopsied on last admission  - Patient's PMD is aware and she will order the PET scan OP  -IR recommended outpatient follow up for biopsy. re-discussed case with IR for re-consideration for biopsy prior to discharge  -Oncology consult     # Hypertension  - Continue Cardizem       # Myasthenia Gravis  - c/w pyridostigmine    # Diet: DASH/TLC  # DVT ppx: Heparin sq  # GI ppx: Protonix    # DNR / DNI

## 2021-03-22 NOTE — PROGRESS NOTE ADULT - PROBLEM SELECTOR PLAN 3
NSTEMI w vol overload yesterday  Cardio input appreciated
symptoms improved on nitro gtt  cardio input appreciated
c/o angina resolved  Cardio input appreciated
cervical/back pain related to mass  cont fentanyl 25mcg patch as written  oxycodone 10mg po q3h PRN pain  daily bowel regimen  supportive care
causing her her continued pain  plan for bx as outpatient   agree to cont fentanyl 25mcg/24hrs  start oxycodone 10mg po q3h PRN pain   dc percocet and IV morphine to avoid polypharmacy  daily bowel regimen senna/colace  pt will need outpatient pain mngmnt follow up

## 2021-03-23 ENCOUNTER — TRANSCRIPTION ENCOUNTER (OUTPATIENT)
Age: 76
End: 2021-03-23

## 2021-03-23 VITALS — HEART RATE: 69 BPM | DIASTOLIC BLOOD PRESSURE: 53 MMHG | OXYGEN SATURATION: 96 % | SYSTOLIC BLOOD PRESSURE: 104 MMHG

## 2021-03-23 LAB
BLD GP AB SCN SERPL QL: SIGNIFICANT CHANGE UP
HCT VFR BLD CALC: 33.2 % — LOW (ref 37–47)
HGB BLD-MCNC: 10.3 G/DL — LOW (ref 12–16)
MCHC RBC-ENTMCNC: 26.5 PG — LOW (ref 27–31)
MCHC RBC-ENTMCNC: 31 G/DL — LOW (ref 32–37)
MCV RBC AUTO: 85.3 FL — SIGNIFICANT CHANGE UP (ref 81–99)
NRBC # BLD: 0 /100 WBCS — SIGNIFICANT CHANGE UP (ref 0–0)
PLATELET # BLD AUTO: 509 K/UL — HIGH (ref 130–400)
RBC # BLD: 3.89 M/UL — LOW (ref 4.2–5.4)
RBC # FLD: 17.2 % — HIGH (ref 11.5–14.5)
WBC # BLD: 9.03 K/UL — SIGNIFICANT CHANGE UP (ref 4.8–10.8)
WBC # FLD AUTO: 9.03 K/UL — SIGNIFICANT CHANGE UP (ref 4.8–10.8)

## 2021-03-23 PROCEDURE — 99239 HOSP IP/OBS DSCHRG MGMT >30: CPT

## 2021-03-23 RX ORDER — DILTIAZEM HCL 120 MG
1 CAPSULE, EXT RELEASE 24 HR ORAL
Qty: 30 | Refills: 0
Start: 2021-03-23 | End: 2021-04-21

## 2021-03-23 RX ORDER — FENTANYL CITRATE 50 UG/ML
1 INJECTION INTRAVENOUS
Qty: 10 | Refills: 0
Start: 2021-03-23 | End: 2021-04-21

## 2021-03-23 RX ORDER — SUCRALFATE 1 G
10 TABLET ORAL
Qty: 1200 | Refills: 0
Start: 2021-03-23 | End: 2021-04-21

## 2021-03-23 RX ORDER — RANOLAZINE 500 MG/1
1 TABLET, FILM COATED, EXTENDED RELEASE ORAL
Qty: 60 | Refills: 0
Start: 2021-03-23 | End: 2021-04-21

## 2021-03-23 RX ORDER — OXYCODONE HYDROCHLORIDE 5 MG/1
1 TABLET ORAL
Qty: 21 | Refills: 0
Start: 2021-03-23 | End: 2021-03-29

## 2021-03-23 RX ORDER — ISOSORBIDE MONONITRATE 60 MG/1
1 TABLET, EXTENDED RELEASE ORAL
Qty: 30 | Refills: 0
Start: 2021-03-23 | End: 2021-04-21

## 2021-03-23 RX ORDER — ATORVASTATIN CALCIUM 80 MG/1
1 TABLET, FILM COATED ORAL
Qty: 30 | Refills: 0
Start: 2021-03-23 | End: 2021-04-21

## 2021-03-23 RX ORDER — FUROSEMIDE 40 MG
1 TABLET ORAL
Qty: 30 | Refills: 0
Start: 2021-03-23 | End: 2021-04-21

## 2021-03-23 RX ORDER — SENNA PLUS 8.6 MG/1
2 TABLET ORAL
Qty: 60 | Refills: 0
Start: 2021-03-23 | End: 2021-04-21

## 2021-03-23 RX ORDER — ASPIRIN/CALCIUM CARB/MAGNESIUM 324 MG
1 TABLET ORAL
Qty: 30 | Refills: 0
Start: 2021-03-23 | End: 2021-04-21

## 2021-03-23 RX ADMIN — Medication 81 MILLIGRAM(S): at 11:20

## 2021-03-23 RX ADMIN — Medication 1 GRAM(S): at 05:57

## 2021-03-23 RX ADMIN — Medication 40 MILLIGRAM(S): at 05:56

## 2021-03-23 RX ADMIN — Medication 1 GRAM(S): at 11:19

## 2021-03-23 RX ADMIN — Medication 0.25 MILLIGRAM(S): at 00:00

## 2021-03-23 RX ADMIN — SERTRALINE 100 MILLIGRAM(S): 25 TABLET, FILM COATED ORAL at 11:20

## 2021-03-23 RX ADMIN — ISOSORBIDE MONONITRATE 30 MILLIGRAM(S): 60 TABLET, EXTENDED RELEASE ORAL at 11:20

## 2021-03-23 RX ADMIN — PYRIDOSTIGMINE BROMIDE 180 MILLIGRAM(S): 60 SOLUTION ORAL at 05:56

## 2021-03-23 RX ADMIN — NYSTATIN CREAM 1 APPLICATION(S): 100000 CREAM TOPICAL at 05:57

## 2021-03-23 RX ADMIN — RANOLAZINE 500 MILLIGRAM(S): 500 TABLET, FILM COATED, EXTENDED RELEASE ORAL at 05:57

## 2021-03-23 RX ADMIN — Medication 120 MILLIGRAM(S): at 05:57

## 2021-03-23 RX ADMIN — HEPARIN SODIUM 5000 UNIT(S): 5000 INJECTION INTRAVENOUS; SUBCUTANEOUS at 05:57

## 2021-03-23 RX ADMIN — PANTOPRAZOLE SODIUM 40 MILLIGRAM(S): 20 TABLET, DELAYED RELEASE ORAL at 05:57

## 2021-03-23 RX ADMIN — Medication 1 GRAM(S): at 00:00

## 2021-03-23 RX ADMIN — FENTANYL CITRATE 1 PATCH: 50 INJECTION INTRAVENOUS at 10:03

## 2021-03-23 NOTE — PROGRESS NOTE ADULT - ASSESSMENT
Patient had mi. Echo mid anteroseptal hypokinetic.   Not able use beta because myasthenia gravis. Keep hemoglobin 9 or better. Medical rx for now. Patient aware. off heparin . No further chest pain .  SAT reportedly good  Outpatient PET scan .  Then biopsy. Possible  cardiac cath or outpatient dobutamine SE.. Prognosis guarded

## 2021-03-23 NOTE — DISCHARGE NOTE PROVIDER - NSDCCPCAREPLAN_GEN_ALL_CORE_FT
PRINCIPAL DISCHARGE DIAGNOSIS  Diagnosis: ST elevation MI (STEMI)  Assessment and Plan of Treatment: Stable. Medical management. A heart attack happens when the blood vessels that supply blood to your heart are blocked. This can damage your heart or lead to an abnormal heart rhythm or heart failure. A heart attack is also called a myocardial infarction.  DISCHARGE INSTRUCTIONS:  Call your local emergency number (911 in the [place]) for any of the following:   •You have any of the following signs of a heart attack: ?Squeezing, pressure, or pain in your chest  ?You may also have any of the following: ?Discomfort or pain in your back, neck, jaw, stomach, or arm  ?Shortness of breath  ?Nausea or vomiting  ?Lightheadedness or a sudden cold sweat  Seek care immediately if:   •You are tired and cannot think clearly.  •Your heart is beating faster than usual.  •You are bleeding from your gums or nose.  •You see blood in your urine or bowel movements.  •You urinate less than usual or not at all.  •You have new or increased swelling in your feet or ankles.  Call your doctor or cardiologist if:   •You have trouble taking your heart medicine.  •You have questions or concerns about your condition or care.  Please follow up with your cardiologist in 1 - 2 weeks. We did not start you on an ACE inhibitor for your heart health due to your low blood pressure. Please follow up with your cardiologist on when you can be started on an ACE inhibitor.         SECONDARY DISCHARGE DIAGNOSES  Diagnosis: Thyroid mass  Assessment and Plan of Treatment: Stable. Medical management. You were found to have 2 solid nodules in the thyroid abutting the trachea, paraspinal mass at T2 level likely pleural in origin. The mass was never biopsied on last admission. Please follow up with your PMD to order the PET scan as outpatient. You will also need a biopsy with IR once you have a PET scan.    Diagnosis: GI bleed  Assessment and Plan of Treatment: Stable. Medical management. You underwent EGD on 3/9 which  showed actively spurting vessel in posterior wall of duodenal bulb. s/p epinephrine inj and clipping. Your hemoglobin has been stable since, please follow up with your PMD in 1 week to check your hemoglobin.   Eating and drinking   •Follow instructions from your health care provider about eating or drinking restrictions.  •Try to eat foods that have a lot of iron. These may include red meat, shellfish, chicken, eggs, and vegetables such as spinach or broccoli.  •Drink enough fluid to keep your urine pale yellow  • Do not drink alcohol.  General instructions   • Do not use any products that contain nicotine or tobacco, such as cigarettes, e-cigarettes, and chewing tobacco. If you need help quitting, ask your health care provider.  •Take over-the-counter and prescription medicines only as told by your health care provider. You may need to avoid aspirin, NSAIDs, or other medicines that increase bleeding.  •Return to your normal activities as told by your health care provider. Ask your health care provider what activities are safe for you.  •Keep all follow-up visits as told by your health care provider. This is important.    Diagnosis: Myasthenia gravis  Assessment and Plan of Treatment: Stable. Medical management. Please continue your medications as prescribed. Please follow up with your PMD in 1-2 weeks.

## 2021-03-23 NOTE — PROGRESS NOTE ADULT - REASON FOR ADMISSION
Sofi

## 2021-03-23 NOTE — DISCHARGE NOTE PROVIDER - CARE PROVIDER_API CALL
Eris Guan)  Cardiovascular Disease; Internal Medicine  [address] [place] [address]  Phone: [phone]  Fax: [phone]  Follow Up Time: 1 week    Christiano Mir)  Internal Medicine; Medical Oncology  [address] [place] [id number]  Phone: [phone]  Fax: [phone]  Follow Up Time: 2 weeks    Modesta John (NP; RN)  NP in [facility]  [address] [place] [address]  Phone: [phone]  Fax: [phone]  Follow Up Time: 1 week

## 2021-03-23 NOTE — PHARMACOTHERAPY INTERVENTION NOTE - INTERVENTION TYPE RECOOMEND
Therapy Recommended - Additional therapy
Therapy Recommended - Alternative treatment
Therapy Recommended - Drug indicated but not ordered
Therapy Recommended - Alternative treatment

## 2021-03-23 NOTE — PROGRESS NOTE ADULT - SUBJECTIVE AND OBJECTIVE BOX
Patient is a 76y old  Female who presents with a chief complaint of Melena (22 Mar 2021 15:19)      T(F): 97.6 (03-23-21 @ 07:02), Max: 98.2 (03-22-21 @ 23:13)  HR: 73 (03-22-21 @ 23:13)  BP: 126/58 (03-22-21 @ 23:13)  RR: 18 (03-23-21 @ 07:02)  SpO2: 94% (03-22-21 @ 23:13) (93% - 98%)    PHYSICAL EXAM:  GENERAL: NAD, well-groomed, well-developed  HEAD:  Atraumatic, Normocephalic  EYES: EOMI, PERRLA, conjunctiva and sclera clear  ENMT: No tonsillar erythema, exudates, or enlargement; Moist mucous membranes, Good dentition, No lesions  NECK: Supple, No JVD, Normal thyroid  NERVOUS SYSTEM:  Alert & Oriented X3,  Motor Strength 5/5 B/L upper and lower extremities  CHEST/LUNG: Clear to percussion bilaterally; No rales, rhonchi, wheezing, or rubs  HEART: Regular rate and rhythm; No murmurs, rubs, or gallops  ABDOMEN: Soft, Nontender, Nondistended; Bowel sounds present  EXTREMITIES:   No clubbing, cyanosis, or edema  LYMPH: No lymphadenopathy noted  SKIN: No rashes or lesions    labs                              10.3   9.03  )-----------( 509      ( 23 Mar 2021 06:11 )             33.2               acetaminophen   Tablet .. 650 milliGRAM(s) Oral every 6 hours PRN  ALPRAZolam 0.25 milliGRAM(s) Oral daily PRN  aspirin enteric coated 81 milliGRAM(s) Oral daily  atorvastatin 80 milliGRAM(s) Oral at bedtime  chlorhexidine 4% Liquid 1 Application(s) Topical <User Schedule>  diltiazem    milliGRAM(s) Oral daily  fentaNYL   Patch  25 MICROgram(s)/Hr 1 Patch Transdermal every 72 hours  furosemide    Tablet 40 milliGRAM(s) Oral daily  heparin   Injectable 5000 Unit(s) SubCutaneous every 12 hours  isosorbide   mononitrate ER Tablet (IMDUR) 30 milliGRAM(s) Oral daily  nystatin Powder 1 Application(s) Topical every 12 hours  oxyCODONE    IR 10 milliGRAM(s) Oral every 8 hours PRN  pantoprazole    Tablet 40 milliGRAM(s) Oral every 12 hours  pyridostigmine 60 milliGRAM(s) Oral every 8 hours PRN  pyridostigmine  milliGRAM(s) Oral two times a day  ranolazine 500 milliGRAM(s) Oral two times a day  senna 2 Tablet(s) Oral at bedtime  sertraline 100 milliGRAM(s) Oral daily  sucralfate suspension 1 Gram(s) Oral every 6 hours

## 2021-03-23 NOTE — DISCHARGE NOTE PROVIDER - PROVIDER TOKENS
PROVIDER:[TOKEN:[[id number]],FOLLOWUP:[1 week]],PROVIDER:[TOKEN:[[id number]],FOLLOWUP:[2 weeks]],PROVIDER:[TOKEN:[[id number]],FOLLOWUP:[1 week]]

## 2021-03-23 NOTE — DISCHARGE NOTE PROVIDER - HOSPITAL COURSE
HPI:  76yFemale pmh HTN, [facility] type 2 [facility] and chronic anemia patient had no gross GI bleeding at the time. Patient now presents with an episode of vomiting her food early in the morning with speckles of blood in it. Patient also reports two melenic stools earlier in the morning today. Patient also reports two melenic stools earlier in the morning today. Patient with history of severe NSAID usage. Hb in ED was 4.3, lactate 6.8.    (08 Mar 2021 14:19)     Pt admitted for UGI bleed underwent EGD which showed bleeding duodenal ulcer. Hospital course was complicated by STEMI.    # STEMI, stable  - chest pain resolved   - c/w aspirin, statin, Imdur, Cardizem, ranolazine  - cannot tolerate metoprolol due to MG  - Patient was treated with nitroglycerine and morphine for pain from MI  - ECHO showed EF 45-50%; mid anteroseptal hypokinetic  - maintain Hgb > 9     # Acute blood loss anemia 2/2 Bleeding duodenal ulcer likely due to NSAID use, stable  - s/p 5U PRBC in total  - Hb stable  - Patient intubated for EGD on 3/9 and extubated 3/10 due to history of MG and tracheal deviation, considered high risk as per anesthesia  - EGD on 3/9 showed actively spurting vessel in posterior wall of duodenal bulb. s/p epinephrine inj and clipping. Off pressors  - keep active T & S and Transfuse PRN to hgb >9    # Thyroid mass  - 2 solid nodules in the thyroid abutting the trachea, paraspinal mass at T2 level likely pleural in origin  - Mass was never biopsied on last admission  - Patient's PMD is aware and she will order the PET scan OP  - IR recommended outpatient follow up for biopsy    # Hypertension, stable  - c/w antihypertensive meds   - DASH diet     # Myasthenia Gravis  - c/w pyridostigmine

## 2021-03-23 NOTE — CHART NOTE - NSCHARTNOTEFT_GEN_A_CORE
Rx Written	Rx Dispensed	Drug	Quantity	Days Supply	Prescriber Name	Payment Method	Dispenser  03/01/2021	03/05/2021	alprazolam 0.25 mg tablet	30	30	Modesta John	Medicare	[id number]  03/05/2021	03/05/2021	oxycodone-acetaminophen 5-325 mg tab	10	3	Joe Estrada	Medicare	[id number]

## 2021-03-23 NOTE — PROGRESS NOTE ADULT - ASSESSMENT
77 yo F w/ PMH of HTN, MG, NSTEMI Type 2, severe NSAID usage and chronic KAROLYN presented with two melenic stools in ED. Pt admitted for UGI bleed underwent EGD which showed bleeding duodenal ulcer. Hospital course was complicated by STEMI.    # STEMI, stable  - chest pain resolved - cont medical tx  - c/w aspirin, statin, Imdur, Cardizem, ranolazine  - cannot tolerate metoprolol due to MG  - Patient was treated with nitroglycerine and morphine for pain from MI  - ECHO showed EF 45-50%; mid anteroseptal hypokinetic  - maintain Hgb > 9   - Cardiology is following - outtpt stress when stable, hold ACEI due to borderline bp    # Acute blood loss anemia 2/2 Bleeding duodenal ulcer likely due to NSAID use, stable  - s/p 5U PRBC in total  - Hb stable  - Patient intubated for EGD on 3/9 and extubated 3/10 due to history of MG and tracheal deviation, considered high risk as per anesthesia  - EGD on 3/9 showed actively spurting vessel in posterior wall of duodenal bulb. s/p epinephrine inj and clipping. Off pressors  - keep active T & S and Transfuse PRN to hgb >9  - counselled to dc nsaids    # Thyroid mass  - 2 solid nodules in the thyroid abutting the trachea, paraspinal mass at T2 level likely pleural in origin  - Mass was never biopsied on last admission  - Patient's PMD is aware and she will order the PET scan OP  - IR recommended outpatient follow up for biopsy    # Hypertension, stable  - c/w antihypertensive meds   - DASH diet     # Myasthenia Gravis  - c/w pyridostigmine    Diet: DASH diet   GI ppx: Protonix 40 mg BID  Code status: Full Code (  ) / DNR ( X ) / DNI ( X )  Disposition: from home  - stable to dc home today - wants to follow up with onc as outpt - would like to go home today  CAse mngmt aware- walker set up  pharm - [facility], med rec reviewed with resident and patient   agreeable with plan   time spent 35 mins

## 2021-03-23 NOTE — CHART NOTE - NSCHARTNOTEFT_GEN_A_CORE
<<<RESIDENT DISCHARGE NOTE>>>     IGNACIO PAL  MRN-[id number]    VITAL SIGNS:  T(F): 97.6 (03-23-21 @ 07:02), Max: 98.2 (03-22-21 @ 23:13)  HR: 69 (03-23-21 @ 07:10)  BP: 104/53 (03-23-21 @ 07:10)  SpO2: 96% (03-23-21 @ 07:10)    PHYSICAL EXAMINATION:  GENERAL: NAD, AAO x 4, 76y F, thin appearing   HEAD:  Atraumatic, Normocephalic  EYES: EOMI, conjunctiva clear and sclera white  NECK: Supple, No JVD  CHEST/LUNG: Clear to auscultation bilaterally; No wheeze; No crackles; No accessory muscles used  HEART: Regular rate and rhythm; No murmurs;   ABDOMEN: Soft, Nontender, Nondistended; Bowel sounds present; No guarding  EXTREMITIES:  2+ Peripheral Pulses, No cyanosis or edema  NEUROLOGY: non-focal    TEST RESULTS:                        10.3   9.03  )-----------( 509      ( 23 Mar 2021 06:11 )             33.2               FINAL DISCHARGE INTERVIEW:  Resident(s) Present: (Name: Kait Bustamante)    DISCHARGE MEDICATION RECONCILIATION  reviewed with Attending (Name: Dr. Duffy)     DISPOSITION:   [ X ] Home w/ walker,    [  ] Home with Visiting Nursing Services,   [    ]  SNF/ NH,    [   ] Acute Rehab (4A),   [   ] Other (Specify:_________)

## 2021-03-23 NOTE — PROGRESS NOTE ADULT - SUBJECTIVE AND OBJECTIVE BOX
HOSPITALIST ATTENDING NOTE    IGNACIO PAL  76y Female  [id number]    INTERVAL HPI/OVERNIGHT EVENTS: no complaints of pain, only some constipation , pox acceptable without o2 on ambulation     T(C): 36.4 (03-23-21 @ 07:02), Max: 36.8 (03-22-21 @ 23:13)  HR: 69 (03-23-21 @ 07:10) (68 - 76)  BP: 104/53 (03-23-21 @ 07:10) (104/53 - 126/58)  RR: 18 (03-23-21 @ 07:02) (18 - 18)  SpO2: 96% (03-23-21 @ 07:10) (93% - 98%)  Wt(kg): --    03-22-21 @ 07:01  -  03-23-21 @ 07:00  --------------------------------------------------------  IN: 240 mL / OUT: 1050 mL / NET: -810 mL        PHYSICAL EXAM:  GENERAL: NAD  HEAD:  Atraumatic, Normocephalic  EYES: EOMI, PERRLA, conjunctiva and sclera clear  ENMT: No tonsillar erythema, exudates, or enlargement  NECK: Supple, No JVD, Normal thyroid  NERVOUS SYSTEM:  Alert & Oriented X3, Good concentration; Non-focal- Motor Strength 5/5 B/L upper and lower extremities;  CHEST/LUNG: Clear to ascultation bilaterally; No rales, rhonchi, wheezing,   HEART: Regular rate and rhythm; No murmurs, rubs, or gallops  ABDOMEN: Soft, Nontender, Nondistended; Bowel sounds present  EXTREMITIES: No clubbing, cyanosis, or edema      Consultant(s) Notes Reviewed:  [x ] YES  [ ] NO  Care Discussed with Consultants/Other Providers/ Housestaff [ x] YES  [ ] NO    LABS:                        10.3   9.03  )-----------( 509      ( 23 Mar 2021 06:11 )             33.2                 RADIOLOGY & ADDITIONAL TESTS:    Imaging or report Personally Reviewed:  [ ] YES  [ ] NO    Case discussed with resident    Care discussed with pt/family      HEALTH ISSUES - PROBLEM Dx:  Elevated troponin  Elevated troponin    GI bleed  GI bleed    Palliative care by specialist  Palliative care by specialist    Thyroid mass  Thyroid mass    Myasthenia gravis  Myasthenia gravis    Anemia  Anemia

## 2021-03-23 NOTE — DISCHARGE NOTE PROVIDER - CARE PROVIDERS DIRECT ADDRESSES
[email or web address]

## 2021-03-23 NOTE — DISCHARGE NOTE PROVIDER - NSDCMRMEDTOKEN_GEN_ALL_CORE_FT
aspirin 81 mg oral delayed release tablet: 1 tab(s) orally once a day  atorvastatin 80 mg oral tablet: 1 tab(s) orally once a day (at bedtime)  dilTIAZem 120 mg/24 hours oral capsule, extended release: 1 cap(s) orally once a day  fentaNYL 25 mcg/hr transdermal film, extended release: 1 patch transdermal every 72 hours MDD:1 patch every 72 hour max  ferrous sulfate 325 mg (65 mg elemental iron) oral tablet: 1 tab(s) orally once a day  furosemide 40 mg oral tablet: 1 tab(s) orally once a day  isosorbide mononitrate 30 mg oral tablet, extended release: 1 tab(s) orally once a day  oxyCODONE 10 mg oral tablet: 1 tab(s) orally every 8 hours, As needed, Severe Pain (7 - 10) MDD:30 mg  pantoprazole 40 mg oral delayed release tablet: 1 tab(s) orally every 12 hours  pyridostigmine 180 mg oral tablet, extended release: 1 tab(s) orally 2 times a day  pyridostigmine 60 mg oral tablet: 1 tab(s) orally 3 to 4 times a day, As Needed  ranolazine 500 mg oral tablet, extended release: 1 tab(s) orally 2 times a day  senna oral tablet: 2 tab(s) orally once a day (at bedtime)  sertraline 100 mg oral tablet: 1 tab(s) orally once a day  sucralfate 1 g/10 mL oral suspension: 10 milliliter(s) orally every 6 hours

## 2021-03-23 NOTE — PROGRESS NOTE ADULT - PROVIDER SPECIALTY LIST ADULT
Cardiology
Cardiology
Internal Medicine
Pulmonology
Cardiology
Critical Care
Hospitalist
Internal Medicine
Cardiology
Critical Care
Critical Care
Gastroenterology
Hospitalist
Internal Medicine
Pulmonology
Surgery
Cardiology
Gastroenterology
Hospitalist
Hospitalist
Pulmonology
Palliative Care

## 2021-03-30 DIAGNOSIS — E87.5 HYPERKALEMIA: ICD-10-CM

## 2021-03-30 DIAGNOSIS — E87.71 TRANSFUSION ASSOCIATED CIRCULATORY OVERLOAD: ICD-10-CM

## 2021-03-30 DIAGNOSIS — M25.519 PAIN IN UNSPECIFIED SHOULDER: ICD-10-CM

## 2021-03-30 DIAGNOSIS — I95.9 HYPOTENSION, UNSPECIFIED: ICD-10-CM

## 2021-03-30 DIAGNOSIS — G70.00 MYASTHENIA GRAVIS WITHOUT (ACUTE) EXACERBATION: ICD-10-CM

## 2021-03-30 DIAGNOSIS — I50.9 HEART FAILURE, UNSPECIFIED: ICD-10-CM

## 2021-03-30 DIAGNOSIS — Z66 DO NOT RESUSCITATE: ICD-10-CM

## 2021-03-30 DIAGNOSIS — Z51.5 ENCOUNTER FOR PALLIATIVE CARE: ICD-10-CM

## 2021-03-30 DIAGNOSIS — I11.0 HYPERTENSIVE HEART DISEASE WITH HEART FAILURE: ICD-10-CM

## 2021-03-30 DIAGNOSIS — J98.11 ATELECTASIS: ICD-10-CM

## 2021-03-30 DIAGNOSIS — K59.00 CONSTIPATION, UNSPECIFIED: ICD-10-CM

## 2021-03-30 DIAGNOSIS — K92.2 GASTROINTESTINAL HEMORRHAGE, UNSPECIFIED: ICD-10-CM

## 2021-03-30 DIAGNOSIS — D49.7 NEOPLASM OF UNSPECIFIED BEHAVIOR OF ENDOCRINE GLANDS AND OTHER PARTS OF NERVOUS SYSTEM: ICD-10-CM

## 2021-03-30 DIAGNOSIS — R91.1 SOLITARY PULMONARY NODULE: ICD-10-CM

## 2021-03-30 DIAGNOSIS — J96.01 ACUTE RESPIRATORY FAILURE WITH HYPOXIA: ICD-10-CM

## 2021-03-30 DIAGNOSIS — I21.A1 MYOCARDIAL INFARCTION TYPE 2: ICD-10-CM

## 2021-03-30 DIAGNOSIS — K26.4 CHRONIC OR UNSPECIFIED DUODENAL ULCER WITH HEMORRHAGE: ICD-10-CM

## 2021-03-30 DIAGNOSIS — E04.1 NONTOXIC SINGLE THYROID NODULE: ICD-10-CM

## 2021-03-30 DIAGNOSIS — T39.395A ADVERSE EFFECT OF OTHER NONSTEROIDAL ANTI-INFLAMMATORY DRUGS [NSAID], INITIAL ENCOUNTER: ICD-10-CM

## 2021-03-30 DIAGNOSIS — K83.1 OBSTRUCTION OF BILE DUCT: ICD-10-CM

## 2021-03-30 DIAGNOSIS — E78.00 PURE HYPERCHOLESTEROLEMIA, UNSPECIFIED: ICD-10-CM

## 2021-03-30 DIAGNOSIS — N17.9 ACUTE KIDNEY FAILURE, UNSPECIFIED: ICD-10-CM
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
## 2021-03-30 DIAGNOSIS — K29.70 GASTRITIS, UNSPECIFIED, WITHOUT BLEEDING: ICD-10-CM

## 2021-03-30 DIAGNOSIS — C78.2 SECONDARY MALIGNANT NEOPLASM OF PLEURA: ICD-10-CM

## 2021-03-30 DIAGNOSIS — D62 ACUTE POSTHEMORRHAGIC ANEMIA: ICD-10-CM

## 2021-03-31 ENCOUNTER — OUTPATIENT (OUTPATIENT)
Dept: OUTPATIENT SERVICES | Facility: HOSPITAL | Age: 76
LOS: 1 days | Discharge: HOME | End: 2021-03-31
Payer: MEDICARE

## 2021-03-31 DIAGNOSIS — D18.00 HEMANGIOMA UNSPECIFIED SITE: Chronic | ICD-10-CM

## 2021-03-31 LAB — GLUCOSE BLDC GLUCOMTR-MCNC: 116 MG/DL — HIGH (ref 70–99)

## 2021-03-31 PROCEDURE — 78815 PET IMAGE W/CT SKULL-THIGH: CPT | Mod: 26,PI,MH

## 2021-04-08 DIAGNOSIS — R91.1 SOLITARY PULMONARY NODULE: ICD-10-CM

## 2021-05-24 PROBLEM — Z87.891 FORMER SMOKER: Status: ACTIVE | Noted: 2021-05-24

## 2021-05-24 PROBLEM — Z82.49 FAMILY HISTORY OF CORONARY ARTERY DISEASE: Status: ACTIVE | Noted: 2021-05-24

## 2021-05-26 ENCOUNTER — APPOINTMENT (OUTPATIENT)
Dept: CARDIOLOGY | Facility: CLINIC | Age: 76
End: 2021-05-26

## 2021-05-26 DIAGNOSIS — Z82.49 FAMILY HISTORY OF ISCHEMIC HEART DISEASE AND OTHER DISEASES OF THE CIRCULATORY SYSTEM: ICD-10-CM

## 2021-05-26 DIAGNOSIS — Z87.891 PERSONAL HISTORY OF NICOTINE DEPENDENCE: ICD-10-CM

## 2021-06-17 ENCOUNTER — APPOINTMENT (OUTPATIENT)
Dept: CARDIOLOGY | Facility: CLINIC | Age: 76
End: 2021-06-17
Payer: MEDICARE

## 2021-06-17 VITALS
DIASTOLIC BLOOD PRESSURE: 70 MMHG | BODY MASS INDEX: 22.82 KG/M2 | WEIGHT: 142 LBS | SYSTOLIC BLOOD PRESSURE: 120 MMHG | HEIGHT: 66 IN

## 2021-06-17 PROCEDURE — 99213 OFFICE O/P EST LOW 20 MIN: CPT

## 2021-06-17 NOTE — DISCUSSION/SUMMARY
[FreeTextEntry1] : \par \par She presented 3/21 anemic. She had MI. CXR mass. She had PET. Awaiting results She had Bx to get results. She was on lipitor 80 . She has muscle pain Now off statin. Will try crestor 10 mI 3/8/21/ will do lexiscan

## 2021-06-17 NOTE — REVIEW OF SYSTEMS
[Feeling Fatigued] : feeling fatigued [Fever] : no fever [Blurry Vision] : no blurred vision [Sore Throat] : no sore throat [SOB] : no shortness of breath [Chest Discomfort] : no chest discomfort [Palpitations] : no palpitations [Cough] : no cough [Wheezing] : no wheezing [Abdominal Pain] : no abdominal pain [Constipation] : no constipation [Diarrhea] : diarrhea [Dysuria] : no dysuria [Joint Pain] : no joint pain [Rash] : no rash [Dizziness] : no dizziness

## 2021-06-17 NOTE — PHYSICAL EXAM
[5th Left ICS - MCL] : palpated at the 5th LICS in the midclavicular line [No Precordial Heave] : no precordial heave was noted [Normal Rate] : normal [Rhythm Regular] : regular [Normal S1] : normal S1 [Normal S2] : normal S2 [I] : a grade 1 [No Pitting Edema] : no pitting edema present [2+] : left 2+ [No Abnormalities] : the abdominal aorta was not enlarged and no bruit was heard [Normal] : alert and oriented, normal memory [S3] : no S3 [S4] : no S4 [Right Carotid Bruit] : no bruit heard over the right carotid [Left Carotid Bruit] : no bruit heard over the left carotid

## 2021-08-30 ENCOUNTER — OUTPATIENT (OUTPATIENT)
Dept: OUTPATIENT SERVICES | Facility: HOSPITAL | Age: 76
LOS: 1 days | Discharge: HOME | End: 2021-08-30
Payer: MEDICARE

## 2021-08-30 ENCOUNTER — RESULT REVIEW (OUTPATIENT)
Age: 76
End: 2021-08-30

## 2021-08-30 DIAGNOSIS — R91.8 OTHER NONSPECIFIC ABNORMAL FINDING OF LUNG FIELD: ICD-10-CM

## 2021-08-30 DIAGNOSIS — I25.10 ATHEROSCLEROTIC HEART DISEASE OF NATIVE CORONARY ARTERY WITHOUT ANGINA PECTORIS: ICD-10-CM

## 2021-08-30 DIAGNOSIS — D18.00 HEMANGIOMA UNSPECIFIED SITE: Chronic | ICD-10-CM

## 2021-08-30 DIAGNOSIS — I25.82 CHRONIC TOTAL OCCLUSION OF CORONARY ARTERY: ICD-10-CM

## 2021-08-30 PROCEDURE — 78452 HT MUSCLE IMAGE SPECT MULT: CPT | Mod: 26,MH

## 2021-09-20 ENCOUNTER — APPOINTMENT (OUTPATIENT)
Dept: CARDIOLOGY | Facility: CLINIC | Age: 76
End: 2021-09-20
Payer: MEDICARE

## 2021-09-20 VITALS
DIASTOLIC BLOOD PRESSURE: 80 MMHG | WEIGHT: 149 LBS | BODY MASS INDEX: 23.95 KG/M2 | HEIGHT: 66 IN | SYSTOLIC BLOOD PRESSURE: 130 MMHG

## 2021-09-20 PROCEDURE — 99214 OFFICE O/P EST MOD 30 MIN: CPT

## 2021-09-20 NOTE — REVIEW OF SYSTEMS
[Fever] : no fever [Chills] : no chills [Hearing Loss] : no hearing loss [SOB] : no shortness of breath [Dyspnea on exertion] : not dyspnea during exertion [Chest Discomfort] : no chest discomfort [Cough] : no cough [Wheezing] : no wheezing [Abdominal Pain] : no abdominal pain [Diarrhea] : diarrhea [Constipation] : no constipation [Dysuria] : no dysuria [Joint Pain] : no joint pain [Myalgia] : no myalgia [Rash] : no rash [Skin Lesions] : no skin lesions [Dizziness] : no dizziness [Weakness] : no weakness [Confusion] : no confusion was observed [Negative] : Heme/Lymph

## 2021-09-20 NOTE — PHYSICAL EXAM
[Well Developed] : well developed [Normal Conjunctiva] : normal conjunctiva [Normal Appearance] : was normal in appearance [JVP Elevated ___cm] : the JVP was not elevated [5th Left ICS - MCL] : palpated at the 5th LICS in the midclavicular line [Normal] : normal [Normal Rate] : normal [Rhythm Regular] : regular [Normal S1] : normal S1 [Normal S2] : normal S2 [S4] : no S4 [I] : a grade 1 [No Pitting Edema] : no pitting edema present [2+] : right 2+ [Right Carotid Bruit] : no bruit heard over the right carotid [Left Carotid Bruit] : no bruit heard over the left carotid [No Abnormalities] : the abdominal aorta was not enlarged and no bruit was heard [Clear Lung Fields] : clear lung fields [Good Air Entry] : good air entry [No Respiratory Distress] : no respiratory distress  [Soft] : abdomen soft [Non Tender] : non-tender [No Masses/organomegaly] : no masses/organomegaly [Normal Gait] : normal gait [No Edema] : no edema [No Clubbing] : no clubbing [No Cyanosis] : no cyanosis [No Rash] : no rash [No Skin Lesions] : no skin lesions [Moves all extremities] : moves all extremities [Alert and Oriented] : alert and oriented [de-identified] : measurement of BP in two  or more extremities WNL

## 2021-09-20 NOTE — DISCUSSION/SUMMARY
[FreeTextEntry1] : She presented 3/21 anemic . She had MI  CXR mass.She had PET. PET neg.  She had biopsy. Negative.  She was on liptor 80. She had muscle pain. Crestor severe pain legs. Now off crestor . No leg pain. Mi 3/8/21.She had lexiscan 8/21. Scan positive ischemia.  Told schwannoma. Told no surgery. Seeing [facility].  Getting still r upper chest . See on SI pain management. Dru. Patient needs Rapatha She wants to wait. Thyroid enlarged suspected thyroiditis. Patient offered cardiac cath. She for now does not want cardiac cath . Aware risk benefit. Including death. Patient needs cataract surgery. Risk intermediate

## 2021-12-20 ENCOUNTER — APPOINTMENT (OUTPATIENT)
Dept: CARDIOLOGY | Facility: CLINIC | Age: 76
End: 2021-12-20
Payer: MEDICARE

## 2021-12-20 VITALS
DIASTOLIC BLOOD PRESSURE: 80 MMHG | BODY MASS INDEX: 24.11 KG/M2 | WEIGHT: 150 LBS | HEIGHT: 66 IN | SYSTOLIC BLOOD PRESSURE: 144 MMHG

## 2021-12-20 PROCEDURE — 99214 OFFICE O/P EST MOD 30 MIN: CPT

## 2021-12-20 NOTE — DISCUSSION/SUMMARY
[FreeTextEntry1] : She presented 3/21 anemic . She had MI  CXR mass.She had PET. PET neg.  She had biopsy. Negative.  She was on liptor 80. She had muscle pain. Crestor severe pain legs. Now off crestor . No leg pain. Mi 3/8/21.She had lexiscan 8/21. Scan positive ischemia.  Told schwannoma. Told no surgery. Seeing [facility].  Getting still r upper chest . See on SI pain management. Dru. Patient needs Rapatha . Will check lipid profile. .  Patient offered cardiac cath. She for now does not want cardiac cath . Aware risk benefit. Including death. Patient needs cataract surgery. Risk intermediate. Not doing cataract now. Told get covid booster

## 2021-12-20 NOTE — HISTORY OF PRESENT ILLNESS
[FreeTextEntry1] : Symptoms: denies chest pain and denies intermittent leg claudication. No sob .No palpitations. Getting pt neck

## 2021-12-20 NOTE — PHYSICAL EXAM
[Well Developed] : well developed [Normal Conjunctiva] : normal conjunctiva [Normal Appearance] : was normal in appearance [JVP Elevated ___cm] : the JVP was not elevated [5th Left ICS - MCL] : palpated at the 5th LICS in the midclavicular line [Normal] : normal [Normal Rate] : normal [Rhythm Regular] : regular [Normal S1] : normal S1 [Normal S2] : normal S2 [S4] : no S4 [I] : a grade 1 [No Pitting Edema] : no pitting edema present [2+] : left 2+ [Right Carotid Bruit] : no bruit heard over the right carotid [Left Carotid Bruit] : no bruit heard over the left carotid [No Abnormalities] : the abdominal aorta was not enlarged and no bruit was heard [Clear Lung Fields] : clear lung fields [Good Air Entry] : good air entry [No Respiratory Distress] : no respiratory distress  [Soft] : abdomen soft [Non Tender] : non-tender [No Masses/organomegaly] : no masses/organomegaly [Normal Gait] : normal gait [No Edema] : no edema [No Cyanosis] : no cyanosis [No Clubbing] : no clubbing [No Rash] : no rash [No Skin Lesions] : no skin lesions [Moves all extremities] : moves all extremities [Alert and Oriented] : alert and oriented [de-identified] : measurement of BP in two  or more extremities WNL

## 2022-01-21 ENCOUNTER — RX RENEWAL (OUTPATIENT)
Age: 77
End: 2022-01-21

## 2022-02-22 NOTE — CHART NOTE - NSCHARTNOTEFT_GEN_A_CORE
Echo completed in the echo lab Patient examined at 9:15 by Dr. Matamoros, patient  unstable at current for EGD, patient needs urgent resuscitation prior to EGD  case discussed with critical care team and anesthesia team Patient examined at 9:15 by Dr. Matamoros, patient  unstable at current for EGD, patient needs urgent resuscitation prior to EGD  case discussed with critical care team and anesthesia team    [place]

## 2022-03-29 ENCOUNTER — APPOINTMENT (OUTPATIENT)
Dept: CARDIOLOGY | Facility: CLINIC | Age: 77
End: 2022-03-29

## 2022-04-06 NOTE — PATIENT PROFILE ADULT - HOME ACCESSIBILITY CONCERNS
none

## 2022-04-21 ENCOUNTER — RX RENEWAL (OUTPATIENT)
Age: 77
End: 2022-04-21

## 2022-04-25 ENCOUNTER — APPOINTMENT (OUTPATIENT)
Dept: CARDIOLOGY | Facility: CLINIC | Age: 77
End: 2022-04-25
Payer: MEDICARE

## 2022-04-25 VITALS
WEIGHT: 146 LBS | BODY MASS INDEX: 23.46 KG/M2 | DIASTOLIC BLOOD PRESSURE: 80 MMHG | SYSTOLIC BLOOD PRESSURE: 122 MMHG | HEIGHT: 66 IN

## 2022-04-25 PROCEDURE — 99214 OFFICE O/P EST MOD 30 MIN: CPT

## 2022-04-25 NOTE — DISCUSSION/SUMMARY
[FreeTextEntry1] : She presented 3/21 anemic . She had MI  CXR mass.She had PET. PET neg.  She had biopsy. Negative.  She was on liptor 80. She had muscle pain. Crestor severe pain legs. Now on crestor 5 . No leg pain. Mi 3/8/21.She had lexiscan 8/21. Scan positive ischemia.  Told schwannoma. Told no surgery. Seeing [facility].  Getting still r upper chest . See on SI pain management. Dru. Patient  may need Rapatha .  Patient offered cardiac cath. She for now does not want cardiac cath . Aware risk benefit. Including death. Patient needs cataract surgery. Risk intermediate. Not doing cataract now. She got 2 covid vaccines. Told calcium high. Seeing [facility] . May need parathyroid removed. She needs lipids check

## 2022-04-25 NOTE — REASON FOR VISIT
Brought in by EMS from home for generalized weakness.   Appears comfortable and in no apparent distress.  Patient experienced 2 episode of near syncopal episodes.  Complaining of generalized weakness.  History of stomach CA and current chemo tx every 3 weeks.  Denies chest pain, SOB or dizziness.  Right hand 20g IV placed by EMS.
[Coronary Artery Disease] : coronary artery disease

## 2022-04-25 NOTE — HISTORY OF PRESENT ILLNESS
[FreeTextEntry1] : Symptoms: denies chest pain and denies intermittent leg claudication. No sob .No palpitations. Still getting pt for neck. Told calcium high. Seeing raj . May need parathyroid out.

## 2022-04-25 NOTE — PHYSICAL EXAM
[Well Developed] : well developed [Normal Conjunctiva] : normal conjunctiva [Normal Appearance] : was normal in appearance [JVP Elevated ___cm] : the JVP was not elevated [5th Left ICS - MCL] : palpated at the 5th LICS in the midclavicular line [Normal] : normal [Normal Rate] : normal [Rhythm Regular] : regular [Normal S1] : normal S1 [Normal S2] : normal S2 [S4] : no S4 [I] : a grade 1 [No Pitting Edema] : no pitting edema present [2+] : left 2+ [Right Carotid Bruit] : no bruit heard over the right carotid [Left Carotid Bruit] : no bruit heard over the left carotid [No Abnormalities] : the abdominal aorta was not enlarged and no bruit was heard [Clear Lung Fields] : clear lung fields [Good Air Entry] : good air entry [No Respiratory Distress] : no respiratory distress  [Soft] : abdomen soft [Non Tender] : non-tender [No Masses/organomegaly] : no masses/organomegaly [Normal Gait] : normal gait [No Edema] : no edema [No Cyanosis] : no cyanosis [No Clubbing] : no clubbing [No Rash] : no rash [No Skin Lesions] : no skin lesions [Moves all extremities] : moves all extremities [Alert and Oriented] : alert and oriented [de-identified] : measurement of BP in two  or more extremities WNL

## 2022-05-06 ENCOUNTER — NON-APPOINTMENT (OUTPATIENT)
Age: 77
End: 2022-05-06

## 2022-05-16 ENCOUNTER — APPOINTMENT (OUTPATIENT)
Dept: NEUROSURGERY | Facility: CLINIC | Age: 77
End: 2022-05-16

## 2022-05-26 ENCOUNTER — EMERGENCY (EMERGENCY)
Facility: HOSPITAL | Age: 77
LOS: 0 days | Discharge: HOME | End: 2022-05-26
Attending: STUDENT IN AN ORGANIZED HEALTH CARE EDUCATION/TRAINING PROGRAM | Admitting: STUDENT IN AN ORGANIZED HEALTH CARE EDUCATION/TRAINING PROGRAM
Payer: MEDICARE

## 2022-05-26 VITALS
TEMPERATURE: 97 F | WEIGHT: 149.91 LBS | HEART RATE: 89 BPM | RESPIRATION RATE: 18 BRPM | HEIGHT: 67 IN | OXYGEN SATURATION: 98 % | DIASTOLIC BLOOD PRESSURE: 68 MMHG | SYSTOLIC BLOOD PRESSURE: 152 MMHG

## 2022-05-26 DIAGNOSIS — G70.00 MYASTHENIA GRAVIS WITHOUT (ACUTE) EXACERBATION: ICD-10-CM

## 2022-05-26 DIAGNOSIS — D64.9 ANEMIA, UNSPECIFIED: ICD-10-CM

## 2022-05-26 DIAGNOSIS — E78.5 HYPERLIPIDEMIA, UNSPECIFIED: ICD-10-CM

## 2022-05-26 DIAGNOSIS — I10 ESSENTIAL (PRIMARY) HYPERTENSION: ICD-10-CM

## 2022-05-26 DIAGNOSIS — R53.1 WEAKNESS: ICD-10-CM

## 2022-05-26 DIAGNOSIS — R06.09 OTHER FORMS OF DYSPNEA: ICD-10-CM

## 2022-05-26 DIAGNOSIS — D18.00 HEMANGIOMA UNSPECIFIED SITE: Chronic | ICD-10-CM

## 2022-05-26 DIAGNOSIS — Z79.82 LONG TERM (CURRENT) USE OF ASPIRIN: ICD-10-CM

## 2022-05-26 LAB
ALBUMIN SERPL ELPH-MCNC: 4.5 G/DL — SIGNIFICANT CHANGE UP (ref 3.5–5.2)
ALP SERPL-CCNC: 200 U/L — HIGH (ref 30–115)
ALT FLD-CCNC: 13 U/L — SIGNIFICANT CHANGE UP (ref 0–41)
ANION GAP SERPL CALC-SCNC: 11 MMOL/L — SIGNIFICANT CHANGE UP (ref 7–14)
ANISOCYTOSIS BLD QL: SIGNIFICANT CHANGE UP
AST SERPL-CCNC: 20 U/L — SIGNIFICANT CHANGE UP (ref 0–41)
BASOPHILS # BLD AUTO: 0.08 K/UL — SIGNIFICANT CHANGE UP (ref 0–0.2)
BASOPHILS NFR BLD AUTO: 0.9 % — SIGNIFICANT CHANGE UP (ref 0–1)
BILIRUB SERPL-MCNC: <0.2 MG/DL — SIGNIFICANT CHANGE UP (ref 0.2–1.2)
BLD GP AB SCN SERPL QL: SIGNIFICANT CHANGE UP
BUN SERPL-MCNC: 16 MG/DL — SIGNIFICANT CHANGE UP (ref 10–20)
CALCIUM SERPL-MCNC: 10.2 MG/DL — HIGH (ref 8.5–10.1)
CHLORIDE SERPL-SCNC: 101 MMOL/L — SIGNIFICANT CHANGE UP (ref 98–110)
CO2 SERPL-SCNC: 25 MMOL/L — SIGNIFICANT CHANGE UP (ref 17–32)
CREAT SERPL-MCNC: 1.1 MG/DL — SIGNIFICANT CHANGE UP (ref 0.7–1.5)
EGFR: 52 ML/MIN/1.73M2 — LOW
EOSINOPHIL # BLD AUTO: 0.94 K/UL — HIGH (ref 0–0.7)
EOSINOPHIL NFR BLD AUTO: 11.2 % — HIGH (ref 0–8)
GLUCOSE SERPL-MCNC: 110 MG/DL — HIGH (ref 70–99)
HCT VFR BLD CALC: 24.1 % — LOW (ref 37–47)
HGB BLD-MCNC: 6.9 G/DL — CRITICAL LOW (ref 12–16)
HYPOCHROMIA BLD QL: SIGNIFICANT CHANGE UP
IMM GRANULOCYTES NFR BLD AUTO: 0.2 % — SIGNIFICANT CHANGE UP (ref 0.1–0.3)
LYMPHOCYTES # BLD AUTO: 1.31 K/UL — SIGNIFICANT CHANGE UP (ref 1.2–3.4)
LYMPHOCYTES # BLD AUTO: 15.5 % — LOW (ref 20.5–51.1)
MCHC RBC-ENTMCNC: 19.5 PG — LOW (ref 27–31)
MCHC RBC-ENTMCNC: 28.6 G/DL — LOW (ref 32–37)
MCV RBC AUTO: 68.3 FL — LOW (ref 81–99)
MICROCYTES BLD QL: SIGNIFICANT CHANGE UP
MONOCYTES # BLD AUTO: 0.62 K/UL — HIGH (ref 0.1–0.6)
MONOCYTES NFR BLD AUTO: 7.4 % — SIGNIFICANT CHANGE UP (ref 1.7–9.3)
NEUTROPHILS # BLD AUTO: 5.46 K/UL — SIGNIFICANT CHANGE UP (ref 1.4–6.5)
NEUTROPHILS NFR BLD AUTO: 64.8 % — SIGNIFICANT CHANGE UP (ref 42.2–75.2)
NRBC # BLD: 0 /100 WBCS — SIGNIFICANT CHANGE UP (ref 0–0)
OVALOCYTES BLD QL SMEAR: SIGNIFICANT CHANGE UP
PLAT MORPH BLD: NORMAL — SIGNIFICANT CHANGE UP
PLATELET # BLD AUTO: 392 K/UL — SIGNIFICANT CHANGE UP (ref 130–400)
PLATELET CLUMP BLD QL SMEAR: SLIGHT
POIKILOCYTOSIS BLD QL AUTO: SIGNIFICANT CHANGE UP
POLYCHROMASIA BLD QL SMEAR: SLIGHT — SIGNIFICANT CHANGE UP
POTASSIUM SERPL-MCNC: 4.2 MMOL/L — SIGNIFICANT CHANGE UP (ref 3.5–5)
POTASSIUM SERPL-SCNC: 4.2 MMOL/L — SIGNIFICANT CHANGE UP (ref 3.5–5)
PROT SERPL-MCNC: 7.5 G/DL — SIGNIFICANT CHANGE UP (ref 6–8)
RBC # BLD: 3.53 M/UL — LOW (ref 4.2–5.4)
RBC # FLD: 19.9 % — HIGH (ref 11.5–14.5)
RBC BLD AUTO: ABNORMAL
SCHISTOCYTES BLD QL AUTO: SLIGHT — SIGNIFICANT CHANGE UP
SODIUM SERPL-SCNC: 137 MMOL/L — SIGNIFICANT CHANGE UP (ref 135–146)
TARGETS BLD QL SMEAR: SLIGHT — SIGNIFICANT CHANGE UP
WBC # BLD: 8.43 K/UL — SIGNIFICANT CHANGE UP (ref 4.8–10.8)
WBC # FLD AUTO: 8.43 K/UL — SIGNIFICANT CHANGE UP (ref 4.8–10.8)

## 2022-05-26 PROCEDURE — 99284 EMERGENCY DEPT VISIT MOD MDM: CPT | Mod: FS

## 2022-05-26 NOTE — ED PROVIDER NOTE - ATTENDING APP SHARED VISIT CONTRIBUTION OF CARE
77-year-old female with a past medical history of myasthenia gravis hypertension hyperlipidemia anemia known parathyroid that she will be having surgery for sent in by her cardiologist Dr. Guan for a transfusion.  Patient had blood work done about 2 weeks ago hemoglobin is found to be 7 was sent in here for transfusion as patient is scheduled to have a cardiac catheterization June 2.  Patient denies any black or bloody stools no chest pain palpitations or shortness of breath.  Patient follows with Dr. Iván Chávez as her gastroenterologist.    CONSTITUTIONAL: WA / WN / NAD  HEAD: NCAT  EYES: PERRL; EOMI;   ENT: Normal pharynx; mucous membranes pink/moist, no erythema.  NECK: Supple; no meningeal signs  CARD: RRR; nl S1/S2; no M/R/G. Pulses equal bilaterally.  RESP: Respiratory rate and effort are normal; breath sounds clear and equal bilaterally.  MSK/EXT: No gross deformities; full range of motion.  SKIN: Warm and dry;   NEURO: AAOx3  PSYCH: Memory Intact, Normal Affect

## 2022-05-26 NOTE — ED PROVIDER NOTE - NS ED ATTENDING STATEMENT MOD
This was a shared visit with the CHEIKH. I reviewed and verified the documentation and independently performed the documented:

## 2022-05-26 NOTE — ED PROVIDER NOTE - OBJECTIVE STATEMENT
78 y/o female with hx of anemia, myasthenia gravis presents to the Ed with low HB for transfusion from Dr. Guan office. patient with hx of anemia for which she usually runs ~10. patient with prior hx of GI bleeding but denies any black or bloody stools . patient also denies any abdominal pain. patient is scheduled for cardiac cath next week. patient c/o exertional dyspnea and weakness. no chest pain or palpitations. no dizziness, visual changes or syncope

## 2022-05-26 NOTE — ED PROVIDER NOTE - PHYSICAL EXAMINATION
Vital Signs: I have reviewed the initial vital signs.  Constitutional: well-nourished, no acute distress, normocephalic  Eyes: PERRLA, EOMI, pallor conjunctiva  ENT: MMM, TM b/l clear , no nasal congestion  Cardiovascular: regular rate, regular rhythm, no murmur appreciated  Respiratory: unlabored respiratory effort, clear to auscultation bilaterally  Gastrointestinal: soft, non-tender, non-distended  abdomen, no pulsatile mass  Musculoskeletal: supple neck, no lower extremity edema, no bony tenderness  Integumentary: warm, dry, no rash  Neurologic: awake, alert, cranial nerves II-XII grossly intact, extremities’ motor and sensory functions grossly intact, no focal deficits

## 2022-05-26 NOTE — ED PROVIDER NOTE - PATIENT PORTAL LINK FT
You can access the FollowMyHealth Patient Portal offered by [facility] by registering at the following website: [email or web address]. By joining [employer]’s FollowMyHealth portal, you will also be able to view your health information using other applications (apps) compatible with our system.

## 2022-05-26 NOTE — ED PROVIDER NOTE - NSFOLLOWUPINSTRUCTIONS_ED_ALL_ED_FT
Please follow up with PMD 1-3 days.    Anemia    Anemia is a condition in which the concentration of red blood cells or hemoglobin in the blood is below normal. Hemoglobin is a substance in red blood cells that carries oxygen to the tissues of the body. Anemia results in not enough oxygen reaching these tissues which can cause symptoms such as weakness, dizziness/lightheadedness, shortness of breath, chest pain, paleness, or nausea. The cause of your anemia may or may not be determined immediately. If your hemoglobin was dangerously low, you may have received a blood transfusion. Usually reactions to transfusions occur immediately but monitor yourself for any fevers, rash, or shortness of breath.    SEEK IMMEDIATE MEDICAL CARE IF YOU HAVE ANY OF THE FOLLOWING SYMPTOMS: extreme weakness/chest pain/shortness of breath, black or bloody stools, vomiting blood, fainting, fever, or any signs of dehydration.

## 2022-05-26 NOTE — ED ADULT TRIAGE NOTE - CHIEF COMPLAINT QUOTE
pt sent in to the ER by MD Gallego for a blood transfusion so that she can proceed with her surgery on Weds, having a cardiac cath

## 2022-05-26 NOTE — ED PROVIDER NOTE - CLINICAL SUMMARY MEDICAL DECISION MAKING FREE TEXT BOX
77-year-old female with a past medical history of myasthenia gravis hypertension hyperlipidemia anemia known parathyroid that she will be having surgery for sent in by her cardiologist Dr. Guan for a transfusion.  Patient had blood work done about 2 weeks ago hemoglobin is found to be 7 was sent in here for transfusion as patient is scheduled to have a cardiac catheterization June 2.  Patient denies any black or bloody stools no chest pain palpitations or shortness of breath.  Patient follows with Dr. Iván Chávez as her gastroenterologist.   VS reviewed labs obtained and reviewed, transfusion. Patient a spoken to in detail about results  All questions addressed.  Results of ED work up discussed and patient given a copy of the results. Patient has proper follow up. Return precautions given.

## 2022-05-31 ENCOUNTER — LABORATORY RESULT (OUTPATIENT)
Age: 77
End: 2022-05-31

## 2022-05-31 LAB
BASOPHILS # BLD AUTO: 0.06 K/UL
BASOPHILS NFR BLD AUTO: 1 %
EOSINOPHIL # BLD AUTO: 1.08 K/UL
EOSINOPHIL NFR BLD AUTO: 17.7 %
HCT VFR BLD CALC: 27.8 %
HGB BLD-MCNC: 8 G/DL
IMM GRANULOCYTES NFR BLD AUTO: 0.2 %
INR PPP: 0.95 RATIO
LYMPHOCYTES # BLD AUTO: 1.32 K/UL
LYMPHOCYTES NFR BLD AUTO: 21.6 %
MAN DIFF?: NORMAL
MCHC RBC-ENTMCNC: 20.3 PG
MCHC RBC-ENTMCNC: 28.8 G/DL
MCV RBC AUTO: 70.6 FL
MONOCYTES # BLD AUTO: 0.57 K/UL
MONOCYTES NFR BLD AUTO: 9.3 %
NEUTROPHILS # BLD AUTO: 3.06 K/UL
NEUTROPHILS NFR BLD AUTO: 50.2 %
PLATELET # BLD AUTO: 423 K/UL
PT BLD: 10.9 SEC
RBC # BLD: 3.94 M/UL
RBC # FLD: 20.3 %
WBC # FLD AUTO: 6.1 K/UL

## 2022-06-01 ENCOUNTER — NON-APPOINTMENT (OUTPATIENT)
Age: 77
End: 2022-06-01

## 2022-07-28 ENCOUNTER — APPOINTMENT (OUTPATIENT)
Dept: CARDIOLOGY | Facility: CLINIC | Age: 77
End: 2022-07-28

## 2022-07-28 VITALS
DIASTOLIC BLOOD PRESSURE: 90 MMHG | SYSTOLIC BLOOD PRESSURE: 150 MMHG | HEIGHT: 66 IN | WEIGHT: 148 LBS | BODY MASS INDEX: 23.78 KG/M2

## 2022-07-28 PROCEDURE — 99214 OFFICE O/P EST MOD 30 MIN: CPT

## 2022-07-28 NOTE — DISCUSSION/SUMMARY
[FreeTextEntry1] : She presented 3/21 anemic . She had MI  CXR mass.She had PET. PET neg.  She had biopsy. Negative.  She was on liptor 80. She had muscle pain. Crestor severe pain legs. Now on crestor 5 . No leg pain. Mi 3/8/21.She had lexiscan 8/21. Scan positive ischemia.  Told schwannoma. Told no surgery. Seeing [facility].  Getting still r upper chest . See on SI pain management. Dru. Patient  may need Rapatha .  When stable will do cardiac cath. . Aware risk benefit. Including death. Patient needs cataract surgery. Risk intermediate. Not doing cataract now. She got 2 covid vaccines. Told calcium high. Seeing [facility] . May need parathyroid removed.HGb 10. Will check in 2 weeks . If the same or better. Will do cardiac cath and if needed stent

## 2022-07-28 NOTE — PHYSICAL EXAM
[Well Developed] : well developed [Normal Conjunctiva] : normal conjunctiva [Normal Appearance] : was normal in appearance [JVP Elevated ___cm] : the JVP was not elevated [5th Left ICS - MCL] : palpated at the 5th LICS in the midclavicular line [Normal] : normal [Normal Rate] : normal [Rhythm Regular] : regular [Normal S1] : normal S1 [Normal S2] : normal S2 [S4] : no S4 [I] : a grade 1 [No Pitting Edema] : no pitting edema present [2+] : left 2+ [Right Carotid Bruit] : no bruit heard over the right carotid [Left Carotid Bruit] : no bruit heard over the left carotid [No Abnormalities] : the abdominal aorta was not enlarged and no bruit was heard [Clear Lung Fields] : clear lung fields [Good Air Entry] : good air entry [No Respiratory Distress] : no respiratory distress  [Soft] : abdomen soft [Non Tender] : non-tender [No Masses/organomegaly] : no masses/organomegaly [Normal Gait] : normal gait [No Edema] : no edema [No Cyanosis] : no cyanosis [No Clubbing] : no clubbing [No Rash] : no rash [No Skin Lesions] : no skin lesions [Moves all extremities] : moves all extremities [Alert and Oriented] : alert and oriented [de-identified] : measurement of BP in two  or more extremities WNL

## 2022-07-28 NOTE — HISTORY OF PRESENT ILLNESS
[FreeTextEntry1] : . Seeing raj  and [facility] . May need parathyroid out.. She got 2 iron infusions. She denies chest pain. No sob. No longer getting pt

## 2022-07-29 ENCOUNTER — APPOINTMENT (OUTPATIENT)
Dept: GASTROENTEROLOGY | Facility: CLINIC | Age: 77
End: 2022-07-29

## 2022-08-11 ENCOUNTER — TRANSCRIPTION ENCOUNTER (OUTPATIENT)
Age: 77
End: 2022-08-11

## 2022-09-13 ENCOUNTER — APPOINTMENT (OUTPATIENT)
Dept: CARDIOLOGY | Facility: CLINIC | Age: 77
End: 2022-09-13

## 2022-10-04 ENCOUNTER — RX RENEWAL (OUTPATIENT)
Age: 77
End: 2022-10-04

## 2022-11-08 ENCOUNTER — OUTPATIENT (OUTPATIENT)
Dept: OUTPATIENT SERVICES | Facility: HOSPITAL | Age: 77
LOS: 1 days | Discharge: HOME | End: 2022-11-08

## 2022-11-08 DIAGNOSIS — M54.59 OTHER LOW BACK PAIN: ICD-10-CM

## 2022-11-08 DIAGNOSIS — D18.00 HEMANGIOMA UNSPECIFIED SITE: Chronic | ICD-10-CM

## 2022-11-08 PROCEDURE — 72110 X-RAY EXAM L-2 SPINE 4/>VWS: CPT | Mod: 26

## 2023-01-03 ENCOUNTER — APPOINTMENT (OUTPATIENT)
Dept: CARDIOLOGY | Facility: CLINIC | Age: 78
End: 2023-01-03
Payer: MEDICARE

## 2023-01-03 ENCOUNTER — OUTPATIENT (OUTPATIENT)
Dept: OUTPATIENT SERVICES | Facility: HOSPITAL | Age: 78
LOS: 1 days | Discharge: HOME | End: 2023-01-03
Payer: MEDICARE

## 2023-01-03 ENCOUNTER — RESULT REVIEW (OUTPATIENT)
Age: 78
End: 2023-01-03

## 2023-01-03 VITALS
BODY MASS INDEX: 23.95 KG/M2 | WEIGHT: 149 LBS | SYSTOLIC BLOOD PRESSURE: 130 MMHG | DIASTOLIC BLOOD PRESSURE: 80 MMHG | HEIGHT: 66 IN

## 2023-01-03 DIAGNOSIS — D18.00 HEMANGIOMA UNSPECIFIED SITE: Chronic | ICD-10-CM

## 2023-01-03 DIAGNOSIS — I25.10 ATHEROSCLEROTIC HEART DISEASE OF NATIVE CORONARY ARTERY WITHOUT ANGINA PECTORIS: ICD-10-CM

## 2023-01-03 DIAGNOSIS — Z00.00 ENCOUNTER FOR GENERAL ADULT MEDICAL EXAMINATION WITHOUT ABNORMAL FINDINGS: ICD-10-CM

## 2023-01-03 DIAGNOSIS — E78.5 HYPERLIPIDEMIA, UNSPECIFIED: ICD-10-CM

## 2023-01-03 PROCEDURE — 71046 X-RAY EXAM CHEST 2 VIEWS: CPT | Mod: 26

## 2023-01-03 PROCEDURE — 99214 OFFICE O/P EST MOD 30 MIN: CPT

## 2023-01-03 RX ORDER — ROSUVASTATIN CALCIUM 5 MG/1
5 TABLET, FILM COATED ORAL
Qty: 90 | Refills: 3 | Status: DISCONTINUED | COMMUNITY
Start: 2022-01-20 | End: 2023-01-03

## 2023-01-03 NOTE — DISCUSSION/SUMMARY
[FreeTextEntry1] : She presented 3/21 anemic . She had MI  CXR mass.She had PET. PET neg.  She had biopsy. Negative.  She was on liptor 80. She had muscle pain. Crestor severe pain legs. Now on crestor 5 . No leg pain. Mi 3/8/21.She had lexiscan 8/21. Scan positive ischemia.  Told schwannoma. Told no surgery. Seeing [facility].  Getting still r upper chest . See on SI pain management. Dru. Patient  may need Rapatha .  When stable will do cardiac cath. . Aware risk benefit. Including death. Patient needs cataract surgery. Risk intermediate. Not doing cataract now. She got 2 covid vaccines. Told calcium high. Seeing [facility] . May need parathyroid removed.HGb 10.  Will do cardiac cath and if needed stent. Cleared by pepe for cat and stent . Reviewed risk benefits. Patient has myasthenia gravis

## 2023-01-03 NOTE — HISTORY OF PRESENT ILLNESS
[FreeTextEntry1] : . Seeing raj  and [facility] . May need parathyroid out.. She got 2 iron infusions. She denies chest pain. No sob. Getting PT again. Sciatic nerve pain

## 2023-01-04 LAB
ALBUMIN SERPL ELPH-MCNC: 4.3 G/DL
ALP BLD-CCNC: 289 U/L
ALT SERPL-CCNC: 18 U/L
ANION GAP SERPL CALC-SCNC: 10 MMOL/L
AST SERPL-CCNC: 25 U/L
BASOPHILS # BLD AUTO: 0.08 K/UL
BASOPHILS NFR BLD AUTO: 1 %
BILIRUB SERPL-MCNC: 0.2 MG/DL
BUN SERPL-MCNC: 16 MG/DL
CALCIUM SERPL-MCNC: 10.9 MG/DL
CHLORIDE SERPL-SCNC: 107 MMOL/L
CO2 SERPL-SCNC: 24 MMOL/L
CREAT SERPL-MCNC: 1.1 MG/DL
EGFR: 52 ML/MIN/1.73M2
EOSINOPHIL # BLD AUTO: 0.83 K/UL
EOSINOPHIL NFR BLD AUTO: 10.2 %
GLUCOSE SERPL-MCNC: 110 MG/DL
HCT VFR BLD CALC: 31.6 %
HGB BLD-MCNC: 10 G/DL
IMM GRANULOCYTES NFR BLD AUTO: 0.6 %
INR PPP: 0.91 RATIO
LYMPHOCYTES # BLD AUTO: 0.92 K/UL
LYMPHOCYTES NFR BLD AUTO: 11.4 %
MAN DIFF?: NORMAL
MCHC RBC-ENTMCNC: 27.9 PG
MCHC RBC-ENTMCNC: 31.6 G/DL
MCV RBC AUTO: 88 FL
MONOCYTES # BLD AUTO: 0.44 K/UL
MONOCYTES NFR BLD AUTO: 5.4 %
NEUTROPHILS # BLD AUTO: 5.78 K/UL
NEUTROPHILS NFR BLD AUTO: 71.4 %
PLATELET # BLD AUTO: 336 K/UL
POTASSIUM SERPL-SCNC: 4.7 MMOL/L
PROT SERPL-MCNC: 7.4 G/DL
PT BLD: 10.3 SEC
RBC # BLD: 3.59 M/UL
RBC # FLD: 14.4 %
SODIUM SERPL-SCNC: 141 MMOL/L
WBC # FLD AUTO: 8.1 K/UL

## 2023-01-08 ENCOUNTER — OUTPATIENT (OUTPATIENT)
Dept: OUTPATIENT SERVICES | Facility: HOSPITAL | Age: 78
LOS: 1 days | Discharge: HOME | End: 2023-01-08
Payer: MEDICARE

## 2023-01-08 DIAGNOSIS — R07.9 CHEST PAIN, UNSPECIFIED: ICD-10-CM

## 2023-01-08 DIAGNOSIS — D18.00 HEMANGIOMA UNSPECIFIED SITE: Chronic | ICD-10-CM

## 2023-01-08 PROCEDURE — 71250 CT THORAX DX C-: CPT | Mod: 26,MH

## 2023-01-10 ENCOUNTER — RX RENEWAL (OUTPATIENT)
Age: 78
End: 2023-01-10

## 2023-01-13 ENCOUNTER — APPOINTMENT (OUTPATIENT)
Age: 78
End: 2023-01-13
Payer: MEDICARE

## 2023-01-13 DIAGNOSIS — D64.9 ANEMIA, UNSPECIFIED: ICD-10-CM

## 2023-01-13 DIAGNOSIS — I21.A1 ANEMIA, UNSPECIFIED: ICD-10-CM

## 2023-01-13 DIAGNOSIS — I10 ESSENTIAL (PRIMARY) HYPERTENSION: ICD-10-CM

## 2023-01-13 DIAGNOSIS — K26.9 DUODENAL ULCER, UNSPECIFIED AS ACUTE OR CHRONIC, W/OUT HEMORRHAGE OR PERFORATION: ICD-10-CM

## 2023-01-13 PROCEDURE — 99205 OFFICE O/P NEW HI 60 MIN: CPT

## 2023-01-17 ENCOUNTER — RX RENEWAL (OUTPATIENT)
Age: 78
End: 2023-01-17

## 2023-01-17 RX ORDER — PRAVASTATIN SODIUM 20 MG/1
20 TABLET ORAL
Qty: 30 | Refills: 5 | Status: ACTIVE | COMMUNITY
Start: 2023-01-09 | End: 1900-01-01

## 2023-01-30 ENCOUNTER — RESULT REVIEW (OUTPATIENT)
Age: 78
End: 2023-01-30

## 2023-01-30 ENCOUNTER — OUTPATIENT (OUTPATIENT)
Dept: OUTPATIENT SERVICES | Facility: HOSPITAL | Age: 78
LOS: 1 days | Discharge: HOME | End: 2023-01-30
Payer: MEDICARE

## 2023-01-30 DIAGNOSIS — R07.9 CHEST PAIN, UNSPECIFIED: ICD-10-CM

## 2023-01-30 DIAGNOSIS — D18.00 HEMANGIOMA UNSPECIFIED SITE: Chronic | ICD-10-CM

## 2023-01-30 LAB — GLUCOSE BLDC GLUCOMTR-MCNC: 95 MG/DL — SIGNIFICANT CHANGE UP (ref 70–99)

## 2023-01-30 PROCEDURE — 78815 PET IMAGE W/CT SKULL-THIGH: CPT | Mod: 26,PS,MH

## 2023-02-07 ENCOUNTER — RX RENEWAL (OUTPATIENT)
Age: 78
End: 2023-02-07

## 2023-02-07 ENCOUNTER — APPOINTMENT (OUTPATIENT)
Dept: CARDIOTHORACIC SURGERY | Facility: CLINIC | Age: 78
End: 2023-02-07
Payer: MEDICARE

## 2023-02-07 VITALS
HEART RATE: 90 BPM | RESPIRATION RATE: 12 BRPM | DIASTOLIC BLOOD PRESSURE: 85 MMHG | HEIGHT: 66 IN | WEIGHT: 149 LBS | BODY MASS INDEX: 23.95 KG/M2 | SYSTOLIC BLOOD PRESSURE: 149 MMHG | OXYGEN SATURATION: 99 % | TEMPERATURE: 98 F

## 2023-02-07 PROCEDURE — 99204 OFFICE O/P NEW MOD 45 MIN: CPT

## 2023-02-07 RX ORDER — OXYCODONE AND ACETAMINOPHEN 10; 325 MG/1; MG/1
10-325 TABLET ORAL
Refills: 0 | Status: ACTIVE | COMMUNITY

## 2023-02-07 RX ORDER — RANOLAZINE 500 MG/1
500 TABLET, EXTENDED RELEASE ORAL
Qty: 180 | Refills: 3 | Status: ACTIVE | COMMUNITY
Start: 2021-09-21 | End: 1900-01-01

## 2023-02-09 NOTE — HISTORY OF PRESENT ILLNESS
[FreeTextEntry1] : Ms. IGNACIO PAL is a 77 year F, former smoker, that arrives today for a consultation multiple bilateral pulmonary nodules, most specifically an FDG Avid Cavitary RLL lesion.  Patient was seen by their Cardiologist for follow up and incidentally found to have this lesion.  PMH CAD- Not Stented, Bleeding Ulcer, Cervical and Thoracic disc herniations.  Here today for surgical discussion.  \par \par ECOG 1, Ambulates with walker d/t MSK Disorders\par Lives with Partner \par Former Smoker- 1 PPD X 40 years- Quit 2021\par Denies major psychiatric history\par Presents with Partner Dalila\par SIster- Thyroid Cancer in teens\par \par Their Healthcare team is as follows:\par \par PMD: Modesta Collier\par Cardiologist: Dr. Guan\par Pulmonologist: Dr. CHERELLE Crook\par Hematologist: Dr. Mir \par Neurologist: Dr. Caraballo\par Pain Management: Dr. Gonsales\par

## 2023-02-09 NOTE — ASSESSMENT
[FreeTextEntry1] : Ms. IGNACIO PAL is a 77 year F, former smoker, that arrives today for a consultation multiple bilateral pulmonary nodules, most specifically an FDG Avid Cavitary RLL lesion.  Patient was seen by their Cardiologist for follow up and incidentally found to have this lesion.  PMH CAD- Not Stented, Bleeding Ulcer, Cervical and Thoracic disc herniations.  Here today for surgical discussion.  \par \par Plan:\par CT And PET Imaging reviewed with patient and spouse in great detail\par Discussed options of IR biopsy and surgical biopsy\par Will discuss patient in Thoracic Multimodality Tumor board this week\par Patient wants to know consensus prior to making her decision \par Wants a new pain management specialist, referred to Dr. Urbano\par \par I, Kathleen Loo [facility], am acting as scribe for Dr.Villa Palomino\par I, Jorge Palomino saw, examined and reviewed the diagnostic images on patient:  IGNACIO PAL on 02/07/2023 and agreed with my Nurse Practitioner's clinical note, physical exam findings and treatment plan.\par Ms. Pal presented to the office referred by pulmonary (Dr. Crook) with diagnosis of pulmonary nodules.  She is a 77-year-old female, former smoker, pulmonary nodules identified incidentally during cardiac work-up.  I reviewed the CT scan images and the PET/CT images with the patient, there are 2 left pulmonary nodules and 1 right lower lobe cavitary lesion with a solid component all these lesions are PET active.  There is also a periportal lymph node slightly enlarged and with metabolic activity.  Patient is known to have left schwannoma in the posterior mediastinum which has been stable in size.  Also enlarged thyroid was identified.  I reviewed the CT scan images from 2021, the right lower lobe and left lower lobe nodules were GGO's at that time.  Very likely this lesions may correspond to multiple primary lung cancers.  I recommended to proceed with percutaneous biopsy CT-guided.  I plan to present the patient in the tumor board for consensus opinion on the diagnostic approach.  I explained this to the patient and she agreed with the plan.

## 2023-02-09 NOTE — PHYSICAL EXAM
[General Appearance - Alert] : alert [General Appearance - In No Acute Distress] : in no acute distress [General Appearance - Well Nourished] : well nourished [Sclera] : the sclera and conjunctiva were normal [Outer Ear] : the ears and nose were normal in appearance [Hearing Threshold Finger Rub Not [place]] : hearing was normal [Both Tympanic Membranes Were Examined] : both tympanic membranes were normal [Neck Appearance] : the appearance of the neck was normal [Neck Cervical Mass (___cm)] : no neck mass was observed [] : no respiratory distress [Respiration, Rhythm And Depth] : normal respiratory rhythm and effort [Exaggerated Use Of Accessory Muscles For Inspiration] : no accessory muscle use [Apical Impulse] : the apical impulse was normal [Heart Rate And Rhythm] : heart rate was normal and rhythm regular [Heart Sounds] : normal S1 and S2 [Examination Of The Chest] : the chest was normal in appearance [Bowel Sounds] : normal bowel sounds [Abdomen Soft] : soft [Skin Color & Pigmentation] : normal skin color and pigmentation [Cranial Nerves] : cranial nerves 2-12 were intact [Oriented To Time, Place, And Person] : oriented to person, place, and time [FreeTextEntry1] : Ambulatory with walker

## 2023-02-09 NOTE — REASON FOR VISIT
[Consultation] : a consultation visit [Spouse] : spouse [FreeTextEntry1] : Bilateral FDG Avid Lung Nodules

## 2023-02-09 NOTE — REVIEW OF SYSTEMS
[Negative] : Heme/Lymph [Fever] : no fever [Chills] : no chills [Feeling Poorly] : not feeling poorly [Feeling Tired] : not feeling tired [Heart Rate Is Slow] : the heart rate was not slow [Heart Rate Is Fast] : the heart rate was not fast [Chest Pain] : no chest pain [Palpitations] : no palpitations [Shortness Of Breath] : no shortness of breath [Wheezing] : no wheezing [Cough] : no cough [SOB on Exertion] : no shortness of breath during exertion

## 2023-03-02 ENCOUNTER — APPOINTMENT (OUTPATIENT)
Dept: INTERVENTIONAL RADIOLOGY/VASCULAR | Facility: CLINIC | Age: 78
End: 2023-03-02

## 2023-03-08 ENCOUNTER — OUTPATIENT (OUTPATIENT)
Dept: OUTPATIENT SERVICES | Facility: HOSPITAL | Age: 78
LOS: 1 days | End: 2023-03-08
Payer: MEDICARE

## 2023-03-08 ENCOUNTER — LABORATORY RESULT (OUTPATIENT)
Age: 78
End: 2023-03-08

## 2023-03-08 VITALS
HEART RATE: 80 BPM | SYSTOLIC BLOOD PRESSURE: 180 MMHG | HEIGHT: 67 IN | OXYGEN SATURATION: 100 % | WEIGHT: 149.91 LBS | DIASTOLIC BLOOD PRESSURE: 74 MMHG | RESPIRATION RATE: 18 BRPM | TEMPERATURE: 98 F

## 2023-03-08 DIAGNOSIS — Z90.89 ACQUIRED ABSENCE OF OTHER ORGANS: Chronic | ICD-10-CM

## 2023-03-08 DIAGNOSIS — D18.00 HEMANGIOMA UNSPECIFIED SITE: Chronic | ICD-10-CM

## 2023-03-08 DIAGNOSIS — R91.1 SOLITARY PULMONARY NODULE: ICD-10-CM

## 2023-03-08 LAB
ALBUMIN SERPL ELPH-MCNC: 4.4 G/DL — SIGNIFICANT CHANGE UP (ref 3.5–5.2)
ALP SERPL-CCNC: 218 U/L — HIGH (ref 30–115)
ALT FLD-CCNC: 17 U/L — SIGNIFICANT CHANGE UP (ref 0–41)
ANION GAP SERPL CALC-SCNC: 12 MMOL/L — SIGNIFICANT CHANGE UP (ref 7–14)
APTT BLD: 32.2 SEC — SIGNIFICANT CHANGE UP (ref 27–39.2)
AST SERPL-CCNC: 32 U/L — SIGNIFICANT CHANGE UP (ref 0–41)
BASOPHILS # BLD AUTO: 0.05 K/UL — SIGNIFICANT CHANGE UP (ref 0–0.2)
BASOPHILS NFR BLD AUTO: 0.6 % — SIGNIFICANT CHANGE UP (ref 0–1)
BILIRUB SERPL-MCNC: 0.2 MG/DL — SIGNIFICANT CHANGE UP (ref 0.2–1.2)
BUN SERPL-MCNC: 21 MG/DL — HIGH (ref 10–20)
CALCIUM SERPL-MCNC: 10.1 MG/DL — SIGNIFICANT CHANGE UP (ref 8.4–10.5)
CHLORIDE SERPL-SCNC: 103 MMOL/L — SIGNIFICANT CHANGE UP (ref 98–110)
CO2 SERPL-SCNC: 22 MMOL/L — SIGNIFICANT CHANGE UP (ref 17–32)
CREAT SERPL-MCNC: 1.2 MG/DL — SIGNIFICANT CHANGE UP (ref 0.7–1.5)
EGFR: 47 ML/MIN/1.73M2 — LOW
EOSINOPHIL # BLD AUTO: 0.26 K/UL — SIGNIFICANT CHANGE UP (ref 0–0.7)
EOSINOPHIL NFR BLD AUTO: 3.4 % — SIGNIFICANT CHANGE UP (ref 0–8)
GLUCOSE SERPL-MCNC: 113 MG/DL — HIGH (ref 70–99)
HCT VFR BLD CALC: 23.9 % — LOW (ref 37–47)
HGB BLD-MCNC: 7.1 G/DL — LOW (ref 12–16)
IMM GRANULOCYTES NFR BLD AUTO: 0.3 % — SIGNIFICANT CHANGE UP (ref 0.1–0.3)
INR BLD: 0.93 RATIO — SIGNIFICANT CHANGE UP (ref 0.65–1.3)
LYMPHOCYTES # BLD AUTO: 0.86 K/UL — LOW (ref 1.2–3.4)
LYMPHOCYTES # BLD AUTO: 11.1 % — LOW (ref 20.5–51.1)
MCHC RBC-ENTMCNC: 23.3 PG — LOW (ref 27–31)
MCHC RBC-ENTMCNC: 29.7 G/DL — LOW (ref 32–37)
MCV RBC AUTO: 78.4 FL — LOW (ref 81–99)
MONOCYTES # BLD AUTO: 0.34 K/UL — SIGNIFICANT CHANGE UP (ref 0.1–0.6)
MONOCYTES NFR BLD AUTO: 4.4 % — SIGNIFICANT CHANGE UP (ref 1.7–9.3)
NEUTROPHILS # BLD AUTO: 6.23 K/UL — SIGNIFICANT CHANGE UP (ref 1.4–6.5)
NEUTROPHILS NFR BLD AUTO: 80.2 % — HIGH (ref 42.2–75.2)
NRBC # BLD: 0 /100 WBCS — SIGNIFICANT CHANGE UP (ref 0–0)
PLATELET # BLD AUTO: 355 K/UL — SIGNIFICANT CHANGE UP (ref 130–400)
POTASSIUM SERPL-MCNC: 4.6 MMOL/L — SIGNIFICANT CHANGE UP (ref 3.5–5)
POTASSIUM SERPL-SCNC: 4.6 MMOL/L — SIGNIFICANT CHANGE UP (ref 3.5–5)
PROT SERPL-MCNC: 7.5 G/DL — SIGNIFICANT CHANGE UP (ref 6–8)
PROTHROM AB SERPL-ACNC: 10.6 SEC — SIGNIFICANT CHANGE UP (ref 9.95–12.87)
RBC # BLD: 3.05 M/UL — LOW (ref 4.2–5.4)
RBC # FLD: 15.3 % — HIGH (ref 11.5–14.5)
SODIUM SERPL-SCNC: 137 MMOL/L — SIGNIFICANT CHANGE UP (ref 135–146)
WBC # BLD: 7.76 K/UL — SIGNIFICANT CHANGE UP (ref 4.8–10.8)
WBC # FLD AUTO: 7.76 K/UL — SIGNIFICANT CHANGE UP (ref 4.8–10.8)

## 2023-03-08 PROCEDURE — 85610 PROTHROMBIN TIME: CPT

## 2023-03-08 PROCEDURE — 36415 COLL VENOUS BLD VENIPUNCTURE: CPT

## 2023-03-08 PROCEDURE — 93005 ELECTROCARDIOGRAM TRACING: CPT

## 2023-03-08 PROCEDURE — 99214 OFFICE O/P EST MOD 30 MIN: CPT | Mod: 25

## 2023-03-08 PROCEDURE — 85025 COMPLETE CBC W/AUTO DIFF WBC: CPT

## 2023-03-08 PROCEDURE — 93010 ELECTROCARDIOGRAM REPORT: CPT

## 2023-03-08 PROCEDURE — 85730 THROMBOPLASTIN TIME PARTIAL: CPT

## 2023-03-08 PROCEDURE — 80053 COMPREHEN METABOLIC PANEL: CPT

## 2023-03-08 RX ORDER — ROSUVASTATIN CALCIUM 5 MG/1
0 TABLET ORAL
Qty: 0 | Refills: 0 | DISCHARGE

## 2023-03-08 NOTE — H&P PST ADULT - NSICDXPASTMEDICALHX_GEN_ALL_CORE_FT
PAST MEDICAL HISTORY:  Anemia     Anemia     Former smoker     High cholesterol     HTN (hypertension)     Myasthenia gravis

## 2023-03-08 NOTE — H&P PST ADULT - NSICDXFAMILYHX_GEN_ALL_CORE_FT
FAMILY HISTORY:  Father  Still living? No  FH: leukemia, Age at diagnosis: Age Unknown    Mother  Still living? No  FH: CAD (coronary artery disease), Age at diagnosis: Age Unknown

## 2023-03-08 NOTE — H&P PST ADULT - HISTORY OF PRESENT ILLNESS
Patient is a  year old female presenting to PAST in preparation for lung bx on 3/13 under lsb anesthesia by   Reports h/o schwannoma dx in 2021, had f/u7 pet scan which now show a lesionon her lung . Has been advised to have above  PATIENT CURRENTLY DENIES CHEST PAIN  SHORTNESS OF BREATH  PALPITATIONS,  DYSURIA, OR UPPER RESPIRATORY INFECTION IN PAST 2 WEEKS  EXERCISE  TOLERANCE  1/2 FLIGHT OF STAIRS  WITHOUT SHORTNESS OF BREATH    Anesthesia Alert  NO--Difficult Airway  NO--History of neck surgery or radiation  NO--Limited ROM of neck  NO--History of Malignant hyperthermia  NO--Personal or family history of Pseudocholinesterase deficiency  NO--Prior Anesthesia Complication  NO--Latex Allergy  NO--Loose teeth  NO--History of Rheumatoid Arthritis  NO--MARY  NO-- BLEEDING RISK  NO--Other_____    Patient verbalized understanding of instructions and was given the opportunity to ask questions and have them answered.  As per patient, this is their complete medical and surgical history, including medications both prescribed or over the counter.  Patient denies any signs or symptoms of COVID 19 and denies contact with known positive individuals.  They have an appointment for  COVID testing pre-procedure and acknowledge its time and place.  They were instructed to quarantine pre-procedure, practice exposure control measures, continue to self-monitor and report any concerns to their proceduralist.

## 2023-03-09 ENCOUNTER — EMERGENCY (EMERGENCY)
Facility: HOSPITAL | Age: 78
LOS: 0 days | Discharge: HOME | End: 2023-03-09
Attending: STUDENT IN AN ORGANIZED HEALTH CARE EDUCATION/TRAINING PROGRAM
Payer: MEDICARE

## 2023-03-09 VITALS
SYSTOLIC BLOOD PRESSURE: 170 MMHG | RESPIRATION RATE: 20 BRPM | TEMPERATURE: 99 F | WEIGHT: 149.03 LBS | HEIGHT: 67 IN | OXYGEN SATURATION: 99 % | HEART RATE: 94 BPM | DIASTOLIC BLOOD PRESSURE: 70 MMHG

## 2023-03-09 VITALS
DIASTOLIC BLOOD PRESSURE: 79 MMHG | OXYGEN SATURATION: 98 % | SYSTOLIC BLOOD PRESSURE: 139 MMHG | TEMPERATURE: 99 F | RESPIRATION RATE: 16 BRPM | HEART RATE: 81 BPM

## 2023-03-09 DIAGNOSIS — I10 ESSENTIAL (PRIMARY) HYPERTENSION: ICD-10-CM

## 2023-03-09 DIAGNOSIS — Z79.82 LONG TERM (CURRENT) USE OF ASPIRIN: ICD-10-CM

## 2023-03-09 DIAGNOSIS — D64.9 ANEMIA, UNSPECIFIED: ICD-10-CM

## 2023-03-09 DIAGNOSIS — Z87.891 PERSONAL HISTORY OF NICOTINE DEPENDENCE: ICD-10-CM

## 2023-03-09 DIAGNOSIS — E21.3 HYPERPARATHYROIDISM, UNSPECIFIED: ICD-10-CM

## 2023-03-09 DIAGNOSIS — Z90.89 ACQUIRED ABSENCE OF OTHER ORGANS: Chronic | ICD-10-CM

## 2023-03-09 DIAGNOSIS — R91.1 SOLITARY PULMONARY NODULE: ICD-10-CM

## 2023-03-09 DIAGNOSIS — D18.00 HEMANGIOMA UNSPECIFIED SITE: Chronic | ICD-10-CM

## 2023-03-09 DIAGNOSIS — E78.5 HYPERLIPIDEMIA, UNSPECIFIED: ICD-10-CM

## 2023-03-09 DIAGNOSIS — G70.00 MYASTHENIA GRAVIS WITHOUT (ACUTE) EXACERBATION: ICD-10-CM

## 2023-03-09 DIAGNOSIS — Z20.822 CONTACT WITH AND (SUSPECTED) EXPOSURE TO COVID-19: ICD-10-CM

## 2023-03-09 DIAGNOSIS — Z88.5 ALLERGY STATUS TO NARCOTIC AGENT: ICD-10-CM

## 2023-03-09 LAB
ALBUMIN SERPL ELPH-MCNC: 4.2 G/DL — SIGNIFICANT CHANGE UP (ref 3.5–5.2)
ALP SERPL-CCNC: 213 U/L — HIGH (ref 30–115)
ALT FLD-CCNC: 18 U/L — SIGNIFICANT CHANGE UP (ref 0–41)
ANION GAP SERPL CALC-SCNC: 7 MMOL/L — SIGNIFICANT CHANGE UP (ref 7–14)
APTT BLD: 34.3 SEC — SIGNIFICANT CHANGE UP (ref 27–39.2)
AST SERPL-CCNC: 24 U/L — SIGNIFICANT CHANGE UP (ref 0–41)
BASOPHILS # BLD AUTO: 0.05 K/UL — SIGNIFICANT CHANGE UP (ref 0–0.2)
BASOPHILS NFR BLD AUTO: 0.9 % — SIGNIFICANT CHANGE UP (ref 0–1)
BILIRUB SERPL-MCNC: 0.2 MG/DL — SIGNIFICANT CHANGE UP (ref 0.2–1.2)
BLD GP AB SCN SERPL QL: SIGNIFICANT CHANGE UP
BUN SERPL-MCNC: 19 MG/DL — SIGNIFICANT CHANGE UP (ref 10–20)
CALCIUM SERPL-MCNC: 9.6 MG/DL — SIGNIFICANT CHANGE UP (ref 8.4–10.5)
CHLORIDE SERPL-SCNC: 107 MMOL/L — SIGNIFICANT CHANGE UP (ref 98–110)
CO2 SERPL-SCNC: 23 MMOL/L — SIGNIFICANT CHANGE UP (ref 17–32)
CREAT SERPL-MCNC: 1.1 MG/DL — SIGNIFICANT CHANGE UP (ref 0.7–1.5)
EGFR: 52 ML/MIN/1.73M2 — LOW
EOSINOPHIL # BLD AUTO: 1.09 K/UL — HIGH (ref 0–0.7)
EOSINOPHIL NFR BLD AUTO: 19.8 % — HIGH (ref 0–8)
GLUCOSE SERPL-MCNC: 109 MG/DL — HIGH (ref 70–99)
HCT VFR BLD CALC: 23 % — LOW (ref 37–47)
HGB BLD-MCNC: 7 G/DL — LOW (ref 12–16)
IMM GRANULOCYTES NFR BLD AUTO: 0.4 % — HIGH (ref 0.1–0.3)
INR BLD: 0.91 RATIO — SIGNIFICANT CHANGE UP (ref 0.65–1.3)
LYMPHOCYTES # BLD AUTO: 1 K/UL — LOW (ref 1.2–3.4)
LYMPHOCYTES # BLD AUTO: 18.2 % — LOW (ref 20.5–51.1)
MCHC RBC-ENTMCNC: 23.6 PG — LOW (ref 27–31)
MCHC RBC-ENTMCNC: 30.4 G/DL — LOW (ref 32–37)
MCV RBC AUTO: 77.7 FL — LOW (ref 81–99)
MONOCYTES # BLD AUTO: 0.36 K/UL — SIGNIFICANT CHANGE UP (ref 0.1–0.6)
MONOCYTES NFR BLD AUTO: 6.5 % — SIGNIFICANT CHANGE UP (ref 1.7–9.3)
NEUTROPHILS # BLD AUTO: 2.98 K/UL — SIGNIFICANT CHANGE UP (ref 1.4–6.5)
NEUTROPHILS NFR BLD AUTO: 54.2 % — SIGNIFICANT CHANGE UP (ref 42.2–75.2)
NRBC # BLD: 0 /100 WBCS — SIGNIFICANT CHANGE UP (ref 0–0)
PLATELET # BLD AUTO: 361 K/UL — SIGNIFICANT CHANGE UP (ref 130–400)
POTASSIUM SERPL-MCNC: 4.6 MMOL/L — SIGNIFICANT CHANGE UP (ref 3.5–5)
POTASSIUM SERPL-SCNC: 4.6 MMOL/L — SIGNIFICANT CHANGE UP (ref 3.5–5)
PROT SERPL-MCNC: 7.3 G/DL — SIGNIFICANT CHANGE UP (ref 6–8)
PROTHROM AB SERPL-ACNC: 10.3 SEC — SIGNIFICANT CHANGE UP (ref 9.95–12.87)
RBC # BLD: 2.96 M/UL — LOW (ref 4.2–5.4)
RBC # FLD: 15.5 % — HIGH (ref 11.5–14.5)
SODIUM SERPL-SCNC: 137 MMOL/L — SIGNIFICANT CHANGE UP (ref 135–146)
WBC # BLD: 5.5 K/UL — SIGNIFICANT CHANGE UP (ref 4.8–10.8)
WBC # FLD AUTO: 5.5 K/UL — SIGNIFICANT CHANGE UP (ref 4.8–10.8)

## 2023-03-09 PROCEDURE — 36430 TRANSFUSION BLD/BLD COMPNT: CPT

## 2023-03-09 PROCEDURE — 86900 BLOOD TYPING SEROLOGIC ABO: CPT

## 2023-03-09 PROCEDURE — 86850 RBC ANTIBODY SCREEN: CPT

## 2023-03-09 PROCEDURE — 85025 COMPLETE CBC W/AUTO DIFF WBC: CPT

## 2023-03-09 PROCEDURE — 99236 HOSP IP/OBS SAME DATE HI 85: CPT | Mod: FS

## 2023-03-09 PROCEDURE — 80053 COMPREHEN METABOLIC PANEL: CPT

## 2023-03-09 PROCEDURE — 86923 COMPATIBILITY TEST ELECTRIC: CPT

## 2023-03-09 PROCEDURE — G0378: CPT

## 2023-03-09 PROCEDURE — 85610 PROTHROMBIN TIME: CPT

## 2023-03-09 PROCEDURE — 86901 BLOOD TYPING SEROLOGIC RH(D): CPT

## 2023-03-09 PROCEDURE — P9040: CPT

## 2023-03-09 PROCEDURE — 36415 COLL VENOUS BLD VENIPUNCTURE: CPT

## 2023-03-09 PROCEDURE — 99283 EMERGENCY DEPT VISIT LOW MDM: CPT | Mod: 25

## 2023-03-09 PROCEDURE — 85730 THROMBOPLASTIN TIME PARTIAL: CPT

## 2023-03-09 RX ORDER — OXYCODONE HYDROCHLORIDE 5 MG/1
10 TABLET ORAL ONCE
Refills: 0 | Status: DISCONTINUED | OUTPATIENT
Start: 2023-03-09 | End: 2023-03-09

## 2023-03-09 RX ADMIN — OXYCODONE HYDROCHLORIDE 10 MILLIGRAM(S): 5 TABLET ORAL at 17:13

## 2023-03-09 NOTE — ED PROVIDER NOTE - CLINICAL SUMMARY MEDICAL DECISION MAKING FREE TEXT BOX
Pt here with symptomatic anemia. Here hgb 7. Has known anemia with prior transfusions and workup in past. Will transfuse 2u given that she is will undergo surgery on 3/13.

## 2023-03-09 NOTE — ED CDU PROVIDER DISPOSITION NOTE - NSFOLLOWUPINSTRUCTIONS_ED_ALL_ED_FT
Please follow up with your Primary care provider outpatient within 1 week for repeat lab work.     Anemia    Anemia is a condition in which the concentration of red blood cells or hemoglobin in the blood is below normal. Hemoglobin is a substance in red blood cells that carries oxygen to the tissues of the body. Anemia results in not enough oxygen reaching these tissues which can cause symptoms such as weakness, dizziness/lightheadedness, shortness of breath, chest pain, paleness, or nausea.    SEEK IMMEDIATE MEDICAL CARE IF YOU HAVE THE FOLLOWING SYMPTOMS: extreme weakness/chest pain/shortness of breath, black or bloody stools, vomiting blood, fainting, fever, or any signs of dehydration.

## 2023-03-09 NOTE — ED PROVIDER NOTE - ATTENDING CONTRIBUTION TO CARE
77 yo F with hx of anemia, HTN, HLD, myasthenia gravis, schwannoma who presents with fatigue and malaise. Went for preop testing yesterday and found to have hgb 7.1 (previously 10 in 1/2023) so sent to ED. Has been transfused multiple times in past and received IV iron as well. Has been worked up in past but no clear source of anemia. No hematochezia, melena, hematuria.    PMD Dr. John    CONSTITUTIONAL: well developed, nontoxic appearing, in no acute distress, speaking in full sentences  SKIN: warm, dry, no rash, cap refill < 2 seconds  HEENT: normocephalic, atraumatic, no conjunctival erythema, moist mucous membranes, patent airway  NECK: supple  CV:  regular rate, regular rhythm, 2+ radial pulses bilaterally  RESP: no wheezes, no rales, no rhonchi, normal work of breathing  ABD: soft, no tenderness, nondistended, no rebound, no guarding  MSK: normal ROM, no cyanosis, no edema  NEURO: alert, oriented, grossly unremarkable  PSYCH: cooperative, appropriate    Pt here with symptomatic anemia. No e/o GIB and pt refused ROMA however is a reliable historian when she denies blood in stool. Will check labs r/o anemia, end organ damage, ACS.

## 2023-03-09 NOTE — ED CDU PROVIDER INITIAL DAY NOTE - OBJECTIVE STATEMENT
78F w/ pmhx of myasthenia gravis, HTN, HLD, chronic anemia, hyperparathyroidism, present for symptomatic anemia, had outpatient hemoglobin of 7.1 PTA. Patient has had multiple blood transfusions in the past and iron transfusion as well. Declining rectal exam but denies bloody stool, blood in stool or urine,  abdominal pain, nausea, vomiting or chest pain . Patient states she hasn't been eating good foods. She has active Schwannoma as well.

## 2023-03-09 NOTE — ED CDU PROVIDER INITIAL DAY NOTE - ATTENDING CONTRIBUTION TO CARE
I have personally seen and examined this patient. I have fully participated in the care of this patient. I have made amendments to the documentation where appropriate and otherwise agree with the history, physical exam, and plan as documented by the CHEIKH.

## 2023-03-09 NOTE — ED CDU PROVIDER DISPOSITION NOTE - PATIENT PORTAL LINK FT
You can access the FollowMyHealth Patient Portal offered by [facility] by registering at the following website: [email or web address]. By joining [employer]’s FollowMyHealth portal, you will also be able to view your health information using other applications (apps) compatible with our system.

## 2023-03-09 NOTE — ED CDU PROVIDER INITIAL DAY NOTE - CLINICAL SUMMARY MEDICAL DECISION MAKING FREE TEXT BOX
Pt here with symptomatic anemia. Here hgb 7. Has known anemia with prior transfusions and workup in past. Will transfuse 2u given that she is will undergo surgery on 3/13

## 2023-03-09 NOTE — ED CDU PROVIDER INITIAL DAY NOTE - NS ED ROS FT
Constitutional: No fever  +fatigue  Eyes:  No visual changes  Ears:  No hearing changes  Neck: No neck pain  Cardiac:  No chest pain  Respiratory:  No SOB   GI:  No abdominal pain, nausea, or vomiting  :  No dysuria  MS:  No back pain  Neuro:  No headache or weakness.  No LOC  Skin:  No skin rash

## 2023-03-09 NOTE — ED PROVIDER NOTE - PHYSICAL EXAMINATION
Vital Signs: I have reviewed the initial vital signs.  Constitutional: chronically ill appearing  HEENT: Airway patent, moist MM,   CV: regular rate, regular rhythm, well-perfused extremities,   Lungs: Clear to ascultation bilaterally, no crackles, no wheezes,   ABD: Non-tender, Non-distended, Pt refused ROMA  MSK: Neck supple, nontender, normal range of motion,   INTEG: Skin warm, dry, no rash.  NEURO: A&Ox3, moving all extremities, normal speech  PSYCH: Calm, cooperative, normal affect and interaction.

## 2023-03-09 NOTE — ED PROVIDER NOTE - NSICDXPASTMEDICALHX_GEN_ALL_CORE_FT
PAST MEDICAL HISTORY:  Anemia     Anemia     Former smoker     High cholesterol     HTN (hypertension)     Myasthenia gravis     Schwannoma

## 2023-03-09 NOTE — ED CDU PROVIDER DISPOSITION NOTE - CLINICAL COURSE
Patient found to be anemic in the ED transfused 2 units now symptoms improved. No reactions noted. No active bleeding noted.

## 2023-03-13 ENCOUNTER — RESULT REVIEW (OUTPATIENT)
Age: 78
End: 2023-03-13

## 2023-03-13 ENCOUNTER — OUTPATIENT (OUTPATIENT)
Dept: INPATIENT UNIT | Facility: HOSPITAL | Age: 78
LOS: 1 days | Discharge: HOME | End: 2023-03-13
Payer: MEDICARE

## 2023-03-13 ENCOUNTER — TRANSCRIPTION ENCOUNTER (OUTPATIENT)
Age: 78
End: 2023-03-13

## 2023-03-13 VITALS
SYSTOLIC BLOOD PRESSURE: 121 MMHG | HEART RATE: 70 BPM | OXYGEN SATURATION: 100 % | TEMPERATURE: 97 F | DIASTOLIC BLOOD PRESSURE: 58 MMHG | RESPIRATION RATE: 20 BRPM

## 2023-03-13 VITALS
HEART RATE: 56 BPM | TEMPERATURE: 98 F | DIASTOLIC BLOOD PRESSURE: 81 MMHG | WEIGHT: 147.71 LBS | RESPIRATION RATE: 20 BRPM | OXYGEN SATURATION: 90 % | SYSTOLIC BLOOD PRESSURE: 182 MMHG | HEIGHT: 66 IN

## 2023-03-13 DIAGNOSIS — Z90.89 ACQUIRED ABSENCE OF OTHER ORGANS: Chronic | ICD-10-CM

## 2023-03-13 DIAGNOSIS — D18.00 HEMANGIOMA UNSPECIFIED SITE: Chronic | ICD-10-CM

## 2023-03-13 DIAGNOSIS — R91.8 OTHER NONSPECIFIC ABNORMAL FINDING OF LUNG FIELD: ICD-10-CM

## 2023-03-13 DIAGNOSIS — R91.1 SOLITARY PULMONARY NODULE: ICD-10-CM

## 2023-03-13 LAB
HCT VFR BLD CALC: 33.2 % — LOW (ref 37–47)
HGB BLD-MCNC: 10.5 G/DL — LOW (ref 12–16)
MCHC RBC-ENTMCNC: 25.1 PG — LOW (ref 27–31)
MCHC RBC-ENTMCNC: 31.6 G/DL — LOW (ref 32–37)
MCV RBC AUTO: 79.2 FL — LOW (ref 81–99)
NRBC # BLD: 0 /100 WBCS — SIGNIFICANT CHANGE UP (ref 0–0)
PLATELET # BLD AUTO: 305 K/UL — SIGNIFICANT CHANGE UP (ref 130–400)
RBC # BLD: 4.19 M/UL — LOW (ref 4.2–5.4)
RBC # FLD: 16.1 % — HIGH (ref 11.5–14.5)
WBC # BLD: 6.77 K/UL — SIGNIFICANT CHANGE UP (ref 4.8–10.8)
WBC # FLD AUTO: 6.77 K/UL — SIGNIFICANT CHANGE UP (ref 4.8–10.8)

## 2023-03-13 PROCEDURE — 88173 CYTOPATH EVAL FNA REPORT: CPT

## 2023-03-13 PROCEDURE — 71045 X-RAY EXAM CHEST 1 VIEW: CPT

## 2023-03-13 PROCEDURE — 71045 X-RAY EXAM CHEST 1 VIEW: CPT | Mod: 26

## 2023-03-13 PROCEDURE — 88342 IMHCHEM/IMCYTCHM 1ST ANTB: CPT | Mod: 26

## 2023-03-13 PROCEDURE — 88333 PATH CONSLTJ SURG CYTO XM 1: CPT | Mod: XU

## 2023-03-13 PROCEDURE — 88341 IMHCHEM/IMCYTCHM EA ADD ANTB: CPT

## 2023-03-13 PROCEDURE — 85027 COMPLETE CBC AUTOMATED: CPT

## 2023-03-13 PROCEDURE — 88173 CYTOPATH EVAL FNA REPORT: CPT | Mod: 26

## 2023-03-13 PROCEDURE — 36415 COLL VENOUS BLD VENIPUNCTURE: CPT

## 2023-03-13 PROCEDURE — 88342 IMHCHEM/IMCYTCHM 1ST ANTB: CPT | Mod: XU

## 2023-03-13 PROCEDURE — 88305 TISSUE EXAM BY PATHOLOGIST: CPT

## 2023-03-13 PROCEDURE — 88344 IMHCHEM/IMCYTCHM EA MLT ANTB: CPT

## 2023-03-13 PROCEDURE — 71045 X-RAY EXAM CHEST 1 VIEW: CPT | Mod: 26,77

## 2023-03-13 PROCEDURE — 88305 TISSUE EXAM BY PATHOLOGIST: CPT | Mod: 26

## 2023-03-13 PROCEDURE — 88341 IMHCHEM/IMCYTCHM EA ADD ANTB: CPT | Mod: 26

## 2023-03-13 PROCEDURE — 88333 PATH CONSLTJ SURG CYTO XM 1: CPT | Mod: 26,59

## 2023-03-13 PROCEDURE — 32408 CORE NDL BX LNG/MED PERQ: CPT

## 2023-03-13 NOTE — PROGRESS NOTE ADULT - SUBJECTIVE AND OBJECTIVE BOX
Interventional Radiology Outpatient Documentation    PREOPERATIVE DAY OF PROCEDURE EVALUATION:     I have personally seen and examined this patient. I agree with the history and physical which I have reviewed and noted any changes below:     Plan is for CT guided biopsy of RLL cavitary lesion  (Signed electronically Quinten Lion MD) 03-13-23 @ 08:00    Procedure/ risks/ benefits/ goals/ alternatives were explained. All questions answered. Informed content obtained from patient. Consent placed in chart.     POSTOPERATIVE PROCEDURAL EVALUATION:     Procedure: CT guided RLL cavitary lesion biopsy    Pre-Op Diagnosis: H/o schwannoma, new multiple FDG avid pulmonary nodules    Post-Op Diagnosis: Same    Attending: Ayad  Resident: Lydia    Anesthesia (type):  [ ] General Anesthesia  [ x] Sedation provided by anesthesia  [ ] Spinal Anesthesia  [ x] Local/Regional    Contrast: None    Estimated Blood Loss: Minimal, < 5 cc    Condition:   [ ] Critical  [ ] Serious  [ ] Fair   [ x] Good    Findings/Follow up Plan of Care: 5 x 20 gauge core needle biopsies were given to pathology at bedside. Trace pneumothorax on post procedure CT. Repeat XR in 1 hour, 2 hours. Patient to lay on back and remain NPO until repeat XRays performed.    See full report in pacs    Specimens Removed: None    Implants: None    Complications: None immediate    Disposition: Recovery, then home if no need for chest tube

## 2023-03-13 NOTE — ASU DISCHARGE PLAN (ADULT/PEDIATRIC) - NS MD DC FALL RISK RISK
For information on Fall & Injury Prevention, visit: [email or web address] OR  [email or web address] OR  https://www.cdc.gov/steadi/patient.html

## 2023-03-13 NOTE — PRE-ANESTHESIA EVALUATION ADULT - NSANTHNECKRD_ENT_A_CORE
132 Ventricular Rate BPM  150 Atrial Rate BPM  136 P-R Interval ms  135 QRS Duration ms  328 Q-T Interval ms  486 QTC Calculation(Bazett) ms  0 P Axis degrees  -76 R Axis degrees  30 T Axis degrees  Tachycardia with irregular rate  RBBB and LAFB  Confirmed by Vishnu Massey ([id number]) on 10/30/2019 10:58:29 PM   No

## 2023-03-13 NOTE — ASU PATIENT PROFILE, ADULT - FALL HARM RISK - RISK INTERVENTIONS

## 2023-03-13 NOTE — PRE-ANESTHESIA EVALUATION ADULT - NSANTHOSAYNRD_GEN_A_CORE
denies/No. MARY screening performed.  STOP BANG Legend: 0-2 = LOW Risk; 3-4 = INTERMEDIATE Risk; 5-8 = HIGH Risk

## 2023-03-13 NOTE — PRE-ANESTHESIA EVALUATION ADULT - NSRADCARDRESULTSFT_GEN_ALL_CORE
EKG:    Ventricular Rate 78 BPM    Atrial Rate 78 BPM    P-R Interval 138 ms    QRS Duration 92 ms    Q-T Interval 394 ms    QTC Calculation(Bazett) 449 ms    P Axis 67 degrees    R Axis 13 degrees    T Axis 74 degrees    Diagnosis Line Normal sinus rhythm  Possible Left atrial enlargement  Possible Inferior infarct , age undetermined  Abnormal ECG    Confirmed by JESSICA TA MD ([id number]) on 3/8/2023 1:47:49 PM    CXR:    ACC: [id number] EXAM:  XR CHEST PA LAT 2V                          *** ADDENDUM***    NP Claritza Knox was made aware of the above findings on 1/4/2023 9:12 AM   with read back    --- End of Report ---    *** END OF ADDENDUM***      PROCEDURE DATE:  01/03/2023          INTERPRETATION:  Clinical History / Reason for exam: Coronary artery   disease    Comparison : Chest radiograph 3/20/2021.    Technique/Positioning: Frontal, lateral.    Findings:    Support devices: None.    Cardiac/mediastinum/hilum: Stable cardiac mediastinal silhouette    Lung parenchyma/Pleura: Nodular density overlying the left anterior first   rib measuring 8 mm. Further evaluation with noncontrast chest CT is   recommended    Skeleton/soft tissues: Degenerative change    Impression:    Nodular density overlying the left anterior first rib measuring 8 mm.    Further evaluation with noncontrast chest CT is recommended    --- End of Report ---    ***Please see the addendum at the top of this report. It may contain   additional important information or changes.****        FLORIDA BORJAS MD; Attending Radiologist  This document has been electronically signed. Jan 4 2023  8:29AM  Addend:FLORIDA BORJAS MD; Attending Radiologist  This addendum was electronically signed on: Jan 4 2023  9:12AM.

## 2023-03-13 NOTE — ASU PREOP CHECKLIST - HEART RATE (BEATS/MIN)
EXAM:  CHEST, TWO VIEWS



HISTORY:  COUGH, MALAISE.  



TECHNIQUE:  Two views of the chest were obtained.  



Comparison:  Chest radiograph 3/12/17.  



FINDINGS:  The cardiac silhouette is within normal size limits.  The thoracic 
aorta is calcified.  No focal consolidation, pleural effusion or pneumothorax.  
Degenerative changes are noted in both shoulders.  



IMPRESSION:  

NO ACUTE LUNG FINDINGS.  



JOB NUMBER:  [id number]
MTDD
56

## 2023-03-14 LAB — NON-GYNECOLOGICAL CYTOLOGY STUDY: SIGNIFICANT CHANGE UP

## 2023-03-16 PROBLEM — D36.10 BENIGN NEOPLASM OF PERIPHERAL NERVES AND AUTONOMIC NERVOUS SYSTEM, UNSPECIFIED: Chronic | Status: ACTIVE | Noted: 2023-03-09

## 2023-03-20 PROBLEM — C34.90 LUNG CANCER: Status: ACTIVE | Noted: 2023-03-20

## 2023-03-21 ENCOUNTER — APPOINTMENT (OUTPATIENT)
Dept: CARDIOTHORACIC SURGERY | Facility: CLINIC | Age: 78
End: 2023-03-21
Payer: MEDICARE

## 2023-03-21 VITALS
HEART RATE: 82 BPM | HEIGHT: 66 IN | BODY MASS INDEX: 23.95 KG/M2 | SYSTOLIC BLOOD PRESSURE: 171 MMHG | RESPIRATION RATE: 12 BRPM | TEMPERATURE: 97.9 F | OXYGEN SATURATION: 99 % | DIASTOLIC BLOOD PRESSURE: 87 MMHG | WEIGHT: 149 LBS

## 2023-03-21 DIAGNOSIS — R91.8 OTHER NONSPECIFIC ABNORMAL FINDING OF LUNG FIELD: ICD-10-CM

## 2023-03-21 DIAGNOSIS — C34.90 MALIGNANT NEOPLASM OF UNSPECIFIED PART OF UNSPECIFIED BRONCHUS OR LUNG: ICD-10-CM

## 2023-03-21 PROCEDURE — 99213 OFFICE O/P EST LOW 20 MIN: CPT

## 2023-03-24 ENCOUNTER — NON-APPOINTMENT (OUTPATIENT)
Age: 78
End: 2023-03-24

## 2023-03-24 DIAGNOSIS — Z00.00 ENCOUNTER FOR GENERAL ADULT MEDICAL EXAMINATION W/OUT ABNORMAL FINDINGS: ICD-10-CM

## 2023-03-25 NOTE — ASSESSMENT
[FreeTextEntry1] : Ms. IGNACIO PAL is a 77 year F, former smoker, that arrives today for a follow up multiple bilateral pulmonary nodules, most specifically an FDG Avid Cavitary RLL lesion. Patient was seen by their Cardiologist for follow up and incidentally found to have this lesion. PMH CAD- Not Stented, Bleeding Ulcer, Cervical and Thoracic disc herniations. She had an IR biopsy which showed RLL nodule to be NSCLC- here for possible surgical planning.\par \par \par Plan:\par Patient does not do much activity\par Limited due to pain\par Present Tumor Board\par Possible SBRT\par Refer to Oncology\par \par I Ligia Garcia, [facility] am acting as the scribe for Dr. Toño Palomino\par I, Jorge Palomino saw, examined and reviewed the diagnostic images on patient:  IGNACIO PAL on 03/21/2023 and agreed with my Nurse Practitioner's clinical note, physical exam findings and treatment plan.\par Patient presented to the office for follow up, she has a diagnosis of bilateral pulmonary nodules. Largest one is a cavitary lesion in the RLL, CT-FNA performed, pathology: adenocarcinoma. Patient with many medical problems and poor functional condition. She is not a surgical candidate. I reviewed the pathology report, the PET and CT images and recommended SBRT. With no mediastinal adenopathies I would considered different primaries in both lungs. I will present her in tumor board to discuss need for additional biopsy in the left side. Patient understood and agreed with the plan.

## 2023-03-25 NOTE — HISTORY OF PRESENT ILLNESS
[FreeTextEntry1] : Ms. IGNACIO PAL is a 77 year F, former smoker, that arrives today for a follow up multiple bilateral pulmonary nodules, most specifically an FDG Avid Cavitary RLL lesion. Patient was seen by their Cardiologist for follow up and incidentally found to have this lesion. PMH CAD- Not Stented, Bleeding Ulcer, Cervical and Thoracic disc herniations. She had an IR biopsy which showed RLL nodule to be NSCLC- here for possible surgical planning.\par \par ECOG 3, Ambulates with walker d/t MSK Disorders and wheelchair\par Lives with Partner \par Former Smoker- 1 PPD X 40 years- Quit 2021\par Denies major psychiatric history\par Presents with Partner Dalila\par Sister- Thyroid Cancer in teens\par \par Their Healthcare team is as follows:\par \par PMD: Modesta Collier\par Cardiologist: Dr. Guan\par Pulmonologist: Dr. CHERELLE Crook\par Hematologist: Dr. Mir \par Neurologist: Dr. Caraballo\par Pain Management: Dr. Gonsales\par \par \par \par

## 2023-03-28 ENCOUNTER — RX RENEWAL (OUTPATIENT)
Age: 78
End: 2023-03-28

## 2023-03-28 RX ORDER — DILTIAZEM HYDROCHLORIDE 120 MG/1
120 CAPSULE, EXTENDED RELEASE ORAL DAILY
Qty: 90 | Refills: 3 | Status: ACTIVE | COMMUNITY
Start: 2022-07-18 | End: 1900-01-01

## 2023-04-03 ENCOUNTER — NON-APPOINTMENT (OUTPATIENT)
Age: 78
End: 2023-04-03

## 2023-04-05 ENCOUNTER — NON-APPOINTMENT (OUTPATIENT)
Age: 78
End: 2023-04-05

## 2023-04-25 ENCOUNTER — OUTPATIENT (OUTPATIENT)
Dept: OUTPATIENT SERVICES | Facility: HOSPITAL | Age: 78
LOS: 1 days | End: 2023-04-25
Payer: MEDICARE

## 2023-04-25 VITALS
RESPIRATION RATE: 16 BRPM | OXYGEN SATURATION: 99 % | DIASTOLIC BLOOD PRESSURE: 72 MMHG | SYSTOLIC BLOOD PRESSURE: 160 MMHG | WEIGHT: 151.9 LBS | HEIGHT: 66 IN | TEMPERATURE: 98 F | HEART RATE: 82 BPM

## 2023-04-25 DIAGNOSIS — Z98.890 OTHER SPECIFIED POSTPROCEDURAL STATES: Chronic | ICD-10-CM

## 2023-04-25 DIAGNOSIS — R91.1 SOLITARY PULMONARY NODULE: ICD-10-CM

## 2023-04-25 DIAGNOSIS — Z98.49 CATARACT EXTRACTION STATUS, UNSPECIFIED EYE: Chronic | ICD-10-CM

## 2023-04-25 DIAGNOSIS — Z90.89 ACQUIRED ABSENCE OF OTHER ORGANS: Chronic | ICD-10-CM

## 2023-04-25 DIAGNOSIS — D18.00 HEMANGIOMA UNSPECIFIED SITE: Chronic | ICD-10-CM

## 2023-04-25 DIAGNOSIS — Z01.818 ENCOUNTER FOR OTHER PREPROCEDURAL EXAMINATION: ICD-10-CM

## 2023-04-25 PROCEDURE — 99214 OFFICE O/P EST MOD 30 MIN: CPT | Mod: 25

## 2023-04-25 NOTE — H&P PST ADULT - NSICDXPASTMEDICALHX_GEN_ALL_CORE_FT
PAST MEDICAL HISTORY:  Anemia     Anemia     CAD (coronary artery disease)     Former smoker     High cholesterol     HTN (hypertension)     Myasthenia gravis     Schwannoma

## 2023-04-25 NOTE — H&P PST ADULT - NSICDXPASTSURGICALHX_GEN_ALL_CORE_FT
PAST SURGICAL HISTORY:  Hemangioma     History of lung biopsy     History of tonsillectomy     S/P cataract surgery

## 2023-04-25 NOTE — H&P PST ADULT - HISTORY OF PRESENT ILLNESS
78yr old female 25pk-yr-hx of cigarette smoking was diagnosed with biopsy proven -RT lung adenocarcinoma -3/2023, was also found to have LT lung nodule- today presents to PAST in prep for LT lung biopsy. s/p cataract surgery 4/2023 -uneventful course lung biopsy and cataract surgery. Otherwise denies change in health status. Stopped ASA 81MG for  5days 3/2023 for the previous lung biopsy -was advised to  follow the same instructions this time as well. Denies COVID S/S. Recd 3 doses vaccine. Verbalized understanding oF all the instructions. Exercise tom very poor LTD by pain d/t Schwannoma -neck. Walks slowly with walker to BR.  Anesthesia Alert  YES--Difficult Airway-class 4  NO--History of neck surgery or radiation  NO--Limited ROM of neck  NO--History of Malignant hyperthermia  NO--Personal or family history of Pseudocholinesterase deficiency  NO--Prior Anesthesia Complication  NO--Latex Allergy  NO--Loose teeth  NO--History of Rheumatoid Arthritis  NO--MARY  No Bleeding risk  yes H/O Myasthenia Gravis-  YES DAILY OPIATES-Other_____

## 2023-04-26 DIAGNOSIS — R91.1 SOLITARY PULMONARY NODULE: ICD-10-CM

## 2023-04-26 DIAGNOSIS — Z01.818 ENCOUNTER FOR OTHER PREPROCEDURAL EXAMINATION: ICD-10-CM

## 2023-05-09 ENCOUNTER — TRANSCRIPTION ENCOUNTER (OUTPATIENT)
Age: 78
End: 2023-05-09

## 2023-05-09 ENCOUNTER — RESULT REVIEW (OUTPATIENT)
Age: 78
End: 2023-05-09

## 2023-05-09 ENCOUNTER — OUTPATIENT (OUTPATIENT)
Dept: OUTPATIENT SERVICES | Facility: HOSPITAL | Age: 78
LOS: 1 days | Discharge: HOME | End: 2023-05-09
Payer: MEDICARE

## 2023-05-09 VITALS
HEART RATE: 64 BPM | DIASTOLIC BLOOD PRESSURE: 66 MMHG | RESPIRATION RATE: 16 BRPM | SYSTOLIC BLOOD PRESSURE: 148 MMHG | OXYGEN SATURATION: 99 %

## 2023-05-09 VITALS
DIASTOLIC BLOOD PRESSURE: 76 MMHG | HEART RATE: 70 BPM | TEMPERATURE: 97 F | RESPIRATION RATE: 18 BRPM | OXYGEN SATURATION: 98 % | HEIGHT: 66 IN | SYSTOLIC BLOOD PRESSURE: 157 MMHG | WEIGHT: 151.9 LBS

## 2023-05-09 DIAGNOSIS — D18.00 HEMANGIOMA UNSPECIFIED SITE: Chronic | ICD-10-CM

## 2023-05-09 DIAGNOSIS — Z98.890 OTHER SPECIFIED POSTPROCEDURAL STATES: Chronic | ICD-10-CM

## 2023-05-09 DIAGNOSIS — R91.1 SOLITARY PULMONARY NODULE: ICD-10-CM

## 2023-05-09 DIAGNOSIS — R91.8 OTHER NONSPECIFIC ABNORMAL FINDING OF LUNG FIELD: ICD-10-CM

## 2023-05-09 DIAGNOSIS — Z90.89 ACQUIRED ABSENCE OF OTHER ORGANS: Chronic | ICD-10-CM

## 2023-05-09 DIAGNOSIS — Z98.49 CATARACT EXTRACTION STATUS, UNSPECIFIED EYE: Chronic | ICD-10-CM

## 2023-05-09 PROCEDURE — 71250 CT THORAX DX C-: CPT

## 2023-05-09 PROCEDURE — 71250 CT THORAX DX C-: CPT | Mod: 26,MH

## 2023-05-09 NOTE — PROGRESS NOTE ADULT - SUBJECTIVE AND OBJECTIVE BOX
Interventional Radiology Outpatient Documentation    PREOPERATIVE DAY OF PROCEDURE EVALUATION:     I have personally seen and examined this patient. I agree with the history and physical which I have reviewed and noted any changes below:     Plan is for left lung biopsy (Signed electronically Quinten Lion MD) 05-09-23 @ 0737    Procedure/ risks/ benefits/ goals/ alternatives were explained. All questions answered. Informed content obtained from patient. Consent placed in chart.     POSTOPERATIVE PROCEDURAL EVALUATION:     Procedure: Patient brought onto CT scanner and placed prone. CT revealed decreased size of left lung nodules. Decision was made to abort procedure and nodules are now less than 1 cm.    Disposition: Home

## 2023-05-09 NOTE — PRE-ANESTHESIA EVALUATION ADULT - ANESTHESIA, PREVIOUS REACTION, PROFILE
Ventricular Rate : 64  Atrial Rate : 64  P-R Interval : 171  QRS Duration : 116  Q-T Interval : 425  QTC Calculation(Bezet) : 438  P Axis : 32  R Axis : -29  T Axis : 5  Diagnosis : Sinus rhythm  Nonspecific intraventricular conduction delay    Confirmed by Sandee Pardo ([id number]) on 2/26/2019 4:18:51 PM  
none

## 2023-05-09 NOTE — ASU DISCHARGE PLAN (ADULT/PEDIATRIC) - NS MD DC FALL RISK RISK
For information on Fall & Injury Prevention, visit: [email or web address] OR  [email or web address] OR  https://www.cdc.gov/steadi/patient.html

## 2023-05-09 NOTE — ASU PATIENT PROFILE, ADULT - HISTORY OF COVID-19 VACCINATION
Called to see if we want to come in for the melanoma in situ excision on his neck.  At this time, who has a chronic the cough and a possible sinusitis but his wife has other comorbidities.  He was afraid to bring home any infection to his wife.  I told him that this is not an urgency and can wait but when the overall health of the 2 knees better, we can prior times him and the resident joule melanoma out of his neck.  He was okay with this.  Otherwise all questions answered.   Yes

## 2023-05-10 PROBLEM — I25.10 ATHEROSCLEROTIC HEART DISEASE OF NATIVE CORONARY ARTERY WITHOUT ANGINA PECTORIS: Chronic | Status: ACTIVE | Noted: 2023-04-25

## 2023-05-31 ENCOUNTER — OUTPATIENT (OUTPATIENT)
Dept: OUTPATIENT SERVICES | Facility: HOSPITAL | Age: 78
LOS: 1 days | End: 2023-05-31
Payer: MEDICARE

## 2023-05-31 ENCOUNTER — APPOINTMENT (OUTPATIENT)
Dept: RADIATION ONCOLOGY | Facility: HOSPITAL | Age: 78
End: 2023-05-31
Payer: MEDICARE

## 2023-05-31 VITALS
BODY MASS INDEX: 23.54 KG/M2 | HEIGHT: 67 IN | TEMPERATURE: 97 F | RESPIRATION RATE: 16 BRPM | WEIGHT: 150 LBS | HEART RATE: 76 BPM | SYSTOLIC BLOOD PRESSURE: 150 MMHG | OXYGEN SATURATION: 97 % | DIASTOLIC BLOOD PRESSURE: 60 MMHG

## 2023-05-31 DIAGNOSIS — Z80.8 FAMILY HISTORY OF MALIGNANT NEOPLASM OF OTHER ORGANS OR SYSTEMS: ICD-10-CM

## 2023-05-31 DIAGNOSIS — D18.00 HEMANGIOMA UNSPECIFIED SITE: Chronic | ICD-10-CM

## 2023-05-31 DIAGNOSIS — C34.90 MALIGNANT NEOPLASM OF UNSPECIFIED PART OF UNSPECIFIED BRONCHUS OR LUNG: ICD-10-CM

## 2023-05-31 DIAGNOSIS — R91.1 SOLITARY PULMONARY NODULE: ICD-10-CM

## 2023-05-31 DIAGNOSIS — Z90.89 ACQUIRED ABSENCE OF OTHER ORGANS: Chronic | ICD-10-CM

## 2023-05-31 DIAGNOSIS — I10 ESSENTIAL (PRIMARY) HYPERTENSION: ICD-10-CM

## 2023-05-31 DIAGNOSIS — Z98.890 OTHER SPECIFIED POSTPROCEDURAL STATES: Chronic | ICD-10-CM

## 2023-05-31 DIAGNOSIS — Z80.6 FAMILY HISTORY OF LEUKEMIA: ICD-10-CM

## 2023-05-31 DIAGNOSIS — Z98.49 CATARACT EXTRACTION STATUS, UNSPECIFIED EYE: Chronic | ICD-10-CM

## 2023-05-31 PROCEDURE — 99205 OFFICE O/P NEW HI 60 MIN: CPT

## 2023-05-31 PROCEDURE — 99205 OFFICE O/P NEW HI 60 MIN: CPT | Mod: TC

## 2023-05-31 RX ORDER — FERROUS SULFATE 325(65) MG
324 TABLET ORAL
Refills: 0 | Status: ACTIVE | COMMUNITY

## 2023-05-31 RX ORDER — ERGOCALCIFEROL (VITAMIN D2) 1250 MCG
50000 CAPSULE ORAL
Refills: 0 | Status: ACTIVE | COMMUNITY

## 2023-05-31 RX ORDER — ASPIRIN 81 MG
81 TABLET, DELAYED RELEASE (ENTERIC COATED) ORAL
Refills: 0 | Status: ACTIVE | COMMUNITY

## 2023-05-31 RX ORDER — OXYCODONE HYDROCHLORIDE AND ACETAMINOPHEN 10; 325 MG/1; MG/1
10-325 TABLET ORAL
Refills: 0 | Status: ACTIVE | COMMUNITY

## 2023-05-31 RX ORDER — FAMOTIDINE 40 MG/1
40 TABLET, FILM COATED ORAL
Refills: 0 | Status: ACTIVE | COMMUNITY

## 2023-05-31 RX ORDER — ALPRAZOLAM 0.25 MG/1
0.25 TABLET ORAL
Refills: 0 | Status: ACTIVE | COMMUNITY

## 2023-05-31 RX ORDER — SERTRALINE HYDROCHLORIDE 100 MG/1
100 TABLET, FILM COATED ORAL
Refills: 0 | Status: ACTIVE | COMMUNITY

## 2023-05-31 RX ORDER — PYRIDOSTIGMINE BROMIDE 60 MG/1
60 TABLET ORAL
Refills: 0 | Status: ACTIVE | COMMUNITY

## 2023-05-31 NOTE — VITALS
[Maximal Pain Intensity: 10/10] : 10/10 [Least Pain Intensity: 7/10] : 7/10 [Pain Location: ___] : Pain Location: [unfilled] [Opioid] : opioid [60: Requires occasional assistance, but is able to care for most of his/her needs] : 60: Requires occasional assistance, but is able to care for most of his/her needs [Date: ____________] : Patient's last distress assessment performed on [unfilled]. [Referred Patient  to social work for follow-up] : Patient was referred to social work for follow-up [Patient given social work contact information and resource sheet] : Patient was given social work contact information and resource sheet [10 - Extreme Distress] : Distress Level: 10

## 2023-05-31 NOTE — REASON FOR VISIT
[Other: _________] : [unfilled] [Lung Cancer] : lung cancer [Spouse] : spouse [Family Member] : family member

## 2023-06-01 ENCOUNTER — LABORATORY RESULT (OUTPATIENT)
Age: 78
End: 2023-06-01

## 2023-06-01 ENCOUNTER — NON-APPOINTMENT (OUTPATIENT)
Age: 78
End: 2023-06-01

## 2023-06-03 NOTE — REVIEW OF SYSTEMS
[Fatigue] : fatigue [Muscle Pain] : muscle pain [Negative] : Psychiatric [Patient Intake Form Reviewed] : Patient intake form was reviewed [Fever] : no fever [Chills] : no chills [Chest Pain] : no chest pain [Palpitations] : no palpitations [Wheezing] : no wheezing [Shortness Of Breath] : no shortness of breath [Cough] : no cough [Confused] : no confusion [Dizziness] : no dizziness [FreeTextEntry9] : walker

## 2023-06-03 NOTE — HISTORY OF PRESENT ILLNESS
[FreeTextEntry1] : Patient is a 78 year old woman who presented to pulmonary (1/2023) for lung nodules found on imaging during her cardiac work-up.  Of note is that she has a long-standing diagnosis of a thoracic schwannoma (Dx 4/2021 [facility]), which causes her significant pain and negatively impacts her quality of life.  \par \par 1/8/2023 CT Chest IMPRESSION: Since March 2, 2021\par Multiple new bilateral pulmonary nodules are present including suspicious approximate 2.2 x 1.4 cm cavitary nodule medial right lower lobe, superior segment.   Additional new nodules include an approximate 1.5 cm part solid nodule left upper lobe, 1 cm solid nodule left lower lobe.   There are additional new subcentimeter nodules within left apex accounting for findings on chest x-ray.\par Stable approximate 3.5 x 1.5 cm homogeneous ovoid mass abutting left T2 neural foramen/pleural space. \par \par 1/30/2023 PET CT IMPRESSION:  Since: PET scan of 3/31/2021.\par FDG avid approximate 2.2 cm cavitary nodule right lower lobe, superior segment max SUV 15.1, in the vicinity of 2 previous subcentimeter nodules with max SUV 3.8.\par Stable FDG avid 3.5 x 1.5 cm homogeneous ovoid mass abutting left T2 neural foramen/pleural space-max SUV 9.5, previously 7.5, 27% increase, consistent with neurogenic tumor\par Minimal FDG activity within new groundglass opacity peripheral left upper lobe, max SUV 1.7.\par Approximate 1 cm left upper lobe nodule with surrounding halo (max SUV 3.9), new finding from previous PET scan.\par New FDG avid 6 mm nodule left apex, max SUV 3.4.   Consider tissue sampling of right lower lobe node.\par \par She was evaluated by [facility] 2/2023 for the right lung lesion and left lung  lesions.  She was not a surgical candidate 2/2 poor performance status and medical comorbidities. \par \par On 3/13/2023, she had a biopsy of a right lower lobe, lung nodule and pathology showed,  non-small cell lung cancer, adenocarcinoma , PD-L1 positive.   \par \par Patient's case was presented at [facility], consensus for biopsy of at least a left lung nodule with IR to evaluate for second primary or mets. Planned biopsy on 5/9/23 was cancelled as the lesion was deemed too small to biopsy by IR.  \par \par Patient is here to discuss radiation.  She reports significant pain from the thoracic schwannoma and wishes to discuss radiation to the schwannoma, in addition to the lung cancer.  She is accompanied by her spouse, Dalila, and her sister.  \par

## 2023-06-03 NOTE — DISEASE MANAGEMENT
[Clinical] : TNM Stage: c [IA] : IA [FreeTextEntry4] : non-small cell carcinoma , right lung adenocarcinoma  [TTNM] : 1c [MTNM] : 0 [NTNM] : 0 [de-identified] : right lung

## 2023-06-03 NOTE — PHYSICAL EXAM
[Hearing Threshold Finger Rub Not [place]] : hearing was normal [Heart Rate And Rhythm] : heart rate and rhythm were normal [Heart Sounds] : normal S1 and S2 [Murmurs] : no murmurs present [Edema] : no peripheral edema present [Abdomen Soft] : soft [Nondistended] : nondistended [Abdomen Tenderness] : non-tender [Cervical Lymph Nodes Enlarged Posterior Bilaterally] : posterior cervical [Cervical Lymph Nodes Enlarged Anterior Bilaterally] : anterior cervical [Supraclavicular Lymph Nodes Enlarged Bilaterally] : supraclavicular [Axillary Lymph Nodes Enlarged Bilaterally] : axillary [Nail Clubbing] : no clubbing  or cyanosis of the fingernails [No Focal Deficits] : no focal deficits [Motor Exam] : the motor exam was normal [Anxious] : anxious [Normal] : no palpable adenopathy [de-identified] : Hunched over in pain for most of the encounter

## 2023-06-03 NOTE — LETTER CLOSING
[Consult Closing:] : Thank you for allowing me to participate in the care of this patient.  If you have any questions, please do not hesitate to contact me. [Sincerely yours,] : Sincerely yours, [FreeTextEntry3] : Coreen Carrasquillo M.D. \par \par Electronically proofread and signed by:  Coreen Carrasquillo MD\par Attending, Department of Radiation Medicine\par [facility]\par \par

## 2023-06-06 ENCOUNTER — NON-APPOINTMENT (OUTPATIENT)
Age: 78
End: 2023-06-06

## 2023-06-07 DIAGNOSIS — C34.91 MALIGNANT NEOPLASM OF UNSPECIFIED PART OF RIGHT BRONCHUS OR LUNG: ICD-10-CM

## 2023-06-15 ENCOUNTER — RESULT REVIEW (OUTPATIENT)
Age: 78
End: 2023-06-15

## 2023-06-15 ENCOUNTER — OUTPATIENT (OUTPATIENT)
Dept: OUTPATIENT SERVICES | Facility: HOSPITAL | Age: 78
LOS: 1 days | End: 2023-06-15
Payer: MEDICARE

## 2023-06-15 DIAGNOSIS — R91.8 OTHER NONSPECIFIC ABNORMAL FINDING OF LUNG FIELD: ICD-10-CM

## 2023-06-15 DIAGNOSIS — Z90.89 ACQUIRED ABSENCE OF OTHER ORGANS: Chronic | ICD-10-CM

## 2023-06-15 DIAGNOSIS — D18.00 HEMANGIOMA UNSPECIFIED SITE: Chronic | ICD-10-CM

## 2023-06-15 DIAGNOSIS — Z98.49 CATARACT EXTRACTION STATUS, UNSPECIFIED EYE: Chronic | ICD-10-CM

## 2023-06-15 DIAGNOSIS — C34.90 MALIGNANT NEOPLASM OF UNSPECIFIED PART OF UNSPECIFIED BRONCHUS OR LUNG: ICD-10-CM

## 2023-06-15 DIAGNOSIS — Z98.890 OTHER SPECIFIED POSTPROCEDURAL STATES: Chronic | ICD-10-CM

## 2023-06-15 LAB — GLUCOSE BLDC GLUCOMTR-MCNC: 89 MG/DL — SIGNIFICANT CHANGE UP (ref 70–99)

## 2023-06-15 PROCEDURE — 78815 PET IMAGE W/CT SKULL-THIGH: CPT | Mod: PS

## 2023-06-15 PROCEDURE — A9552: CPT

## 2023-06-15 PROCEDURE — 78815 PET IMAGE W/CT SKULL-THIGH: CPT | Mod: 26,PS,MH

## 2023-06-15 PROCEDURE — 82962 GLUCOSE BLOOD TEST: CPT

## 2023-06-16 DIAGNOSIS — R91.8 OTHER NONSPECIFIC ABNORMAL FINDING OF LUNG FIELD: ICD-10-CM

## 2023-06-16 DIAGNOSIS — C34.90 MALIGNANT NEOPLASM OF UNSPECIFIED PART OF UNSPECIFIED BRONCHUS OR LUNG: ICD-10-CM

## 2023-07-05 ENCOUNTER — OUTPATIENT (OUTPATIENT)
Dept: OUTPATIENT SERVICES | Facility: HOSPITAL | Age: 78
LOS: 1 days | End: 2023-07-05
Payer: MEDICARE

## 2023-07-05 VITALS
SYSTOLIC BLOOD PRESSURE: 152 MMHG | RESPIRATION RATE: 22 BRPM | OXYGEN SATURATION: 92 % | HEART RATE: 71 BPM | DIASTOLIC BLOOD PRESSURE: 65 MMHG | WEIGHT: 154.1 LBS | HEIGHT: 64 IN | TEMPERATURE: 97 F

## 2023-07-05 DIAGNOSIS — Z90.89 ACQUIRED ABSENCE OF OTHER ORGANS: Chronic | ICD-10-CM

## 2023-07-05 DIAGNOSIS — Z98.49 CATARACT EXTRACTION STATUS, UNSPECIFIED EYE: Chronic | ICD-10-CM

## 2023-07-05 DIAGNOSIS — R91.8 OTHER NONSPECIFIC ABNORMAL FINDING OF LUNG FIELD: ICD-10-CM

## 2023-07-05 DIAGNOSIS — Z01.818 ENCOUNTER FOR OTHER PREPROCEDURAL EXAMINATION: ICD-10-CM

## 2023-07-05 DIAGNOSIS — D18.00 HEMANGIOMA UNSPECIFIED SITE: Chronic | ICD-10-CM

## 2023-07-05 DIAGNOSIS — Z98.890 OTHER SPECIFIED POSTPROCEDURAL STATES: Chronic | ICD-10-CM

## 2023-07-05 LAB
ALBUMIN SERPL ELPH-MCNC: 4.2 G/DL — SIGNIFICANT CHANGE UP (ref 3.5–5.2)
ALP SERPL-CCNC: 161 U/L — HIGH (ref 30–115)
ALT FLD-CCNC: 13 U/L — SIGNIFICANT CHANGE UP (ref 0–41)
ANION GAP SERPL CALC-SCNC: 11 MMOL/L — SIGNIFICANT CHANGE UP (ref 7–14)
APTT BLD: 30.8 SEC — SIGNIFICANT CHANGE UP (ref 27–39.2)
AST SERPL-CCNC: 22 U/L — SIGNIFICANT CHANGE UP (ref 0–41)
BASOPHILS # BLD AUTO: 0.05 K/UL — SIGNIFICANT CHANGE UP (ref 0–0.2)
BASOPHILS NFR BLD AUTO: 1.1 % — HIGH (ref 0–1)
BILIRUB SERPL-MCNC: <0.2 MG/DL — SIGNIFICANT CHANGE UP (ref 0.2–1.2)
BUN SERPL-MCNC: 12 MG/DL — SIGNIFICANT CHANGE UP (ref 10–20)
CALCIUM SERPL-MCNC: 10.1 MG/DL — SIGNIFICANT CHANGE UP (ref 8.4–10.5)
CHLORIDE SERPL-SCNC: 106 MMOL/L — SIGNIFICANT CHANGE UP (ref 98–110)
CO2 SERPL-SCNC: 22 MMOL/L — SIGNIFICANT CHANGE UP (ref 17–32)
CREAT SERPL-MCNC: 1.1 MG/DL — SIGNIFICANT CHANGE UP (ref 0.7–1.5)
EGFR: 52 ML/MIN/1.73M2 — LOW
EOSINOPHIL # BLD AUTO: 0.18 K/UL — SIGNIFICANT CHANGE UP (ref 0–0.7)
EOSINOPHIL NFR BLD AUTO: 4.1 % — SIGNIFICANT CHANGE UP (ref 0–8)
GLUCOSE SERPL-MCNC: 88 MG/DL — SIGNIFICANT CHANGE UP (ref 70–99)
HCT VFR BLD CALC: 22 % — LOW (ref 37–47)
HGB BLD-MCNC: 6.4 G/DL — CRITICAL LOW (ref 12–16)
IMM GRANULOCYTES NFR BLD AUTO: 0.5 % — HIGH (ref 0.1–0.3)
INR BLD: 0.86 RATIO — SIGNIFICANT CHANGE UP (ref 0.65–1.3)
LYMPHOCYTES # BLD AUTO: 0.79 K/UL — LOW (ref 1.2–3.4)
LYMPHOCYTES # BLD AUTO: 18.1 % — LOW (ref 20.5–51.1)
MCHC RBC-ENTMCNC: 21.8 PG — LOW (ref 27–31)
MCHC RBC-ENTMCNC: 29.1 G/DL — LOW (ref 32–37)
MCV RBC AUTO: 75.1 FL — LOW (ref 81–99)
MONOCYTES # BLD AUTO: 0.27 K/UL — SIGNIFICANT CHANGE UP (ref 0.1–0.6)
MONOCYTES NFR BLD AUTO: 6.2 % — SIGNIFICANT CHANGE UP (ref 1.7–9.3)
NEUTROPHILS # BLD AUTO: 3.05 K/UL — SIGNIFICANT CHANGE UP (ref 1.4–6.5)
NEUTROPHILS NFR BLD AUTO: 70 % — SIGNIFICANT CHANGE UP (ref 42.2–75.2)
NRBC # BLD: 0 /100 WBCS — SIGNIFICANT CHANGE UP (ref 0–0)
PLATELET # BLD AUTO: 312 K/UL — SIGNIFICANT CHANGE UP (ref 130–400)
PMV BLD: 10.2 FL — SIGNIFICANT CHANGE UP (ref 7.4–10.4)
POTASSIUM SERPL-MCNC: 4.9 MMOL/L — SIGNIFICANT CHANGE UP (ref 3.5–5)
POTASSIUM SERPL-SCNC: 4.9 MMOL/L — SIGNIFICANT CHANGE UP (ref 3.5–5)
PROT SERPL-MCNC: 7 G/DL — SIGNIFICANT CHANGE UP (ref 6–8)
PROTHROM AB SERPL-ACNC: 9.7 SEC — LOW (ref 9.95–12.87)
RBC # BLD: 2.93 M/UL — LOW (ref 4.2–5.4)
RBC # FLD: 18.4 % — HIGH (ref 11.5–14.5)
SODIUM SERPL-SCNC: 139 MMOL/L — SIGNIFICANT CHANGE UP (ref 135–146)
WBC # BLD: 4.36 K/UL — LOW (ref 4.8–10.8)
WBC # FLD AUTO: 4.36 K/UL — LOW (ref 4.8–10.8)

## 2023-07-05 PROCEDURE — 93005 ELECTROCARDIOGRAM TRACING: CPT

## 2023-07-05 PROCEDURE — 80053 COMPREHEN METABOLIC PANEL: CPT

## 2023-07-05 PROCEDURE — 93010 ELECTROCARDIOGRAM REPORT: CPT

## 2023-07-05 PROCEDURE — 99214 OFFICE O/P EST MOD 30 MIN: CPT | Mod: 25

## 2023-07-05 PROCEDURE — 85730 THROMBOPLASTIN TIME PARTIAL: CPT

## 2023-07-05 PROCEDURE — 85610 PROTHROMBIN TIME: CPT

## 2023-07-05 PROCEDURE — 36415 COLL VENOUS BLD VENIPUNCTURE: CPT

## 2023-07-05 PROCEDURE — 85025 COMPLETE CBC W/AUTO DIFF WBC: CPT

## 2023-07-05 RX ORDER — PYRIDOSTIGMINE BROMIDE 60 MG/5ML
1 SOLUTION ORAL
Qty: 0 | Refills: 0 | DISCHARGE

## 2023-07-05 RX ORDER — SERTRALINE 25 MG/1
1 TABLET, FILM COATED ORAL
Qty: 0 | Refills: 0 | DISCHARGE

## 2023-07-05 RX ORDER — ALPRAZOLAM 0.25 MG
1 TABLET ORAL
Qty: 0 | Refills: 0 | DISCHARGE

## 2023-07-05 RX ORDER — OXYCODONE AND ACETAMINOPHEN 5; 325 MG/1; MG/1
1 TABLET ORAL
Refills: 0 | DISCHARGE

## 2023-07-05 RX ORDER — FAMOTIDINE 10 MG/ML
1 INJECTION INTRAVENOUS
Qty: 0 | Refills: 0 | DISCHARGE

## 2023-07-05 RX ORDER — ERGOCALCIFEROL 1.25 MG/1
1 CAPSULE ORAL
Qty: 0 | Refills: 0 | DISCHARGE

## 2023-07-05 NOTE — H&P PST ADULT - REASON FOR ADMISSION
77 Y/O FEMALE HERE FOR PRE-ADMISSION SURGICAL TESTING. PATIENT REPORTS SHE HAS A H/O MI D/T GI BLEED FROM [facility] IN THE PAST. IN JAN, SHE FOLLOWED UP WITH HER CARDIOLOGIST AND HE SAW PULMONARY NODULES ON AN X-RAY. FURTHER WORKUP INCLUDING A LUNG BIOPSY REVEALED + NON-SMALL CELL LUNG CANCER ON THE RIGHT. SHE NOW HAS AN AREA ON THE LEFT SIDE THAT WAS ORIGINALLY TOO SMALL TO BIOPSY, BUT NOW IT HAS GROWN IT NEEDS TO BE BIOPSIED TO SEE IF IT'S PRIMARY OR IF IT'S METS FROM SOMEWHERE. SHE HAS A THORACIC SCHWANNOMA THAT IS CAUSING HER SEVERE PAIN.  NOW FOR SCHEDULED LUNG BIOPSY. 77 Y/O FEMALE HERE FOR PRE-ADMISSION SURGICAL TESTING. PATIENT REPORTS SHE HAS A H/O MI D/T GI BLEED FROM [facility] IN THE PAST. IN JAN, SHE FOLLOWED UP WITH HER CARDIOLOGIST AND HE SAW PULMONARY NODULES ON AN X-RAY. FURTHER WORKUP INCLUDING A LUNG BIOPSY REVEALED + NON-SMALL CELL LUNG CANCER ON THE RIGHT. SHE NOW HAS AN AREA ON THE LEFT SIDE THAT WAS ORIGINALLY TOO SMALL TO BIOPSY, BUT NOW IT HAS GROWN IT NEEDS TO BE BIOPSIED TO SEE IF IT'S PRIMARY OR IF IT'S METS FROM SOMEWHERE. SHE HAS A THORACIC SCHWANNOMA THAT IS CAUSING HER SEVERE PAIN.  NOW FOR SCHEDULED LUNG BIOPSY.    ADDENDUM: 7/6/23 AT 7:23PM H/H NOTED THIS MORNING WAS 6.4/22.0. I NOTIFIED DR. ABDULLAHI, DR ALEXIS, EMILIA NP. PATIENT WAS NOTIFIED TO GO TO ER BUT REFUSED. I TRIED TO GET HER TO GO TO THE [facility], BUT THEY COULDN'T ACCOMMODATE HER. SHE WAS ADVISED ER, BUT STILL REFUSED. STATING THAT SHE IS IN A LOT OF PAIN, CAN'T WAIT LONG AND THE ER IS "DIRTY". I ADVISED HER OF THE RISKS OF A DANGEROUSLY LOW H/H ESPECIALLY WITH HER PREVIOUS HISTORY, SHE STATED, "I DON'T CARE". SHE AGREED TO GO TO THE ER TOMORROW AT THE [facility].

## 2023-07-05 NOTE — H&P PST ADULT - NSICDXPASTMEDICALHX_GEN_ALL_CORE_FT
PAST MEDICAL HISTORY:  Anemia     CAD (coronary artery disease)     Former smoker     H/O: duodenal ulcer     H/O: GI bleed     Heart attack     High cholesterol     HTN (hypertension)     Myasthenia gravis     Non-small cell lung cancer     Schwannoma

## 2023-07-05 NOTE — H&P PST ADULT - HISTORY OF PRESENT ILLNESS
PATIENT DENIES CHEST PAIN, SHORTNESS OF BREATH, PALPITATIONS, COUGHING, FEVER, DYSURIA.  CANNOT WALK UP ANY STEPS WITHOUT SOB, AND DUE TO SEVERE PAIN.      NO COUGH, FEVER, SORE THROAT, HEADACHE, LOSS OF TASTE OR SMELL. NO KNOWN EXPOSURE TO ANYONE WITH COVID. PATIENT WAS INSTRUCTED TO ISOLATE FROM NOW UNTIL THE SURGERY.    Anesthesia Alert  NO--Difficult Airway  NO--History of neck surgery or radiation  NO--Limited ROM of neck  NO--History of Malignant hyperthermia  NO--Personal or family history of Pseudocholinesterase deficiency  NO--Prior Anesthesia Complication  NO--Latex Allergy  NO--Loose teeth  NO--History of Rheumatoid Arthritis  NO--MARY  + bleeding risk on ASA  wheelchair bound

## 2023-07-06 DIAGNOSIS — D64.9 ANEMIA, UNSPECIFIED: ICD-10-CM

## 2023-07-06 DIAGNOSIS — Z01.818 ENCOUNTER FOR OTHER PREPROCEDURAL EXAMINATION: ICD-10-CM

## 2023-07-06 DIAGNOSIS — R91.8 OTHER NONSPECIFIC ABNORMAL FINDING OF LUNG FIELD: ICD-10-CM

## 2023-07-07 ENCOUNTER — EMERGENCY (EMERGENCY)
Facility: HOSPITAL | Age: 78
LOS: 0 days | Discharge: HOME | End: 2023-07-07
Attending: EMERGENCY MEDICINE
Payer: MEDICARE

## 2023-07-07 VITALS
RESPIRATION RATE: 18 BRPM | OXYGEN SATURATION: 100 % | SYSTOLIC BLOOD PRESSURE: 185 MMHG | DIASTOLIC BLOOD PRESSURE: 77 MMHG | TEMPERATURE: 96 F | HEART RATE: 65 BPM

## 2023-07-07 VITALS
HEIGHT: 64 IN | HEART RATE: 76 BPM | OXYGEN SATURATION: 99 % | DIASTOLIC BLOOD PRESSURE: 99 MMHG | SYSTOLIC BLOOD PRESSURE: 236 MMHG | WEIGHT: 154.1 LBS | TEMPERATURE: 96 F | RESPIRATION RATE: 18 BRPM

## 2023-07-07 DIAGNOSIS — Z88.8 ALLERGY STATUS TO OTHER DRUGS, MEDICAMENTS AND BIOLOGICAL SUBSTANCES: ICD-10-CM

## 2023-07-07 DIAGNOSIS — Z98.890 OTHER SPECIFIED POSTPROCEDURAL STATES: Chronic | ICD-10-CM

## 2023-07-07 DIAGNOSIS — D18.00 HEMANGIOMA UNSPECIFIED SITE: Chronic | ICD-10-CM

## 2023-07-07 DIAGNOSIS — Z90.89 ACQUIRED ABSENCE OF OTHER ORGANS: Chronic | ICD-10-CM

## 2023-07-07 DIAGNOSIS — D64.9 ANEMIA, UNSPECIFIED: ICD-10-CM

## 2023-07-07 DIAGNOSIS — Z85.118 PERSONAL HISTORY OF OTHER MALIGNANT NEOPLASM OF BRONCHUS AND LUNG: ICD-10-CM

## 2023-07-07 DIAGNOSIS — Z98.49 CATARACT EXTRACTION STATUS, UNSPECIFIED EYE: Chronic | ICD-10-CM

## 2023-07-07 DIAGNOSIS — I25.10 ATHEROSCLEROTIC HEART DISEASE OF NATIVE CORONARY ARTERY WITHOUT ANGINA PECTORIS: ICD-10-CM

## 2023-07-07 DIAGNOSIS — Z79.82 LONG TERM (CURRENT) USE OF ASPIRIN: ICD-10-CM

## 2023-07-07 DIAGNOSIS — Z87.19 PERSONAL HISTORY OF OTHER DISEASES OF THE DIGESTIVE SYSTEM: ICD-10-CM

## 2023-07-07 PROBLEM — C34.90 MALIGNANT NEOPLASM OF UNSPECIFIED PART OF UNSPECIFIED BRONCHUS OR LUNG: Chronic | Status: ACTIVE | Noted: 2023-07-05

## 2023-07-07 PROBLEM — I21.9 ACUTE MYOCARDIAL INFARCTION, UNSPECIFIED: Chronic | Status: ACTIVE | Noted: 2023-07-05

## 2023-07-07 LAB
ALBUMIN SERPL ELPH-MCNC: 4.1 G/DL — SIGNIFICANT CHANGE UP (ref 3.5–5.2)
ALP SERPL-CCNC: 163 U/L — HIGH (ref 30–115)
ALT FLD-CCNC: 16 U/L — SIGNIFICANT CHANGE UP (ref 0–41)
ANION GAP SERPL CALC-SCNC: 9 MMOL/L — SIGNIFICANT CHANGE UP (ref 7–14)
APTT BLD: 32.9 SEC — SIGNIFICANT CHANGE UP (ref 27–39.2)
AST SERPL-CCNC: 25 U/L — SIGNIFICANT CHANGE UP (ref 0–41)
BASOPHILS # BLD AUTO: 0.05 K/UL — SIGNIFICANT CHANGE UP (ref 0–0.2)
BASOPHILS NFR BLD AUTO: 0.7 % — SIGNIFICANT CHANGE UP (ref 0–1)
BILIRUB SERPL-MCNC: <0.2 MG/DL — SIGNIFICANT CHANGE UP (ref 0.2–1.2)
BLD GP AB SCN SERPL QL: SIGNIFICANT CHANGE UP
BUN SERPL-MCNC: 17 MG/DL — SIGNIFICANT CHANGE UP (ref 10–20)
CALCIUM SERPL-MCNC: 9.4 MG/DL — SIGNIFICANT CHANGE UP (ref 8.4–10.5)
CHLORIDE SERPL-SCNC: 102 MMOL/L — SIGNIFICANT CHANGE UP (ref 98–110)
CO2 SERPL-SCNC: 23 MMOL/L — SIGNIFICANT CHANGE UP (ref 17–32)
CREAT SERPL-MCNC: 1.1 MG/DL — SIGNIFICANT CHANGE UP (ref 0.7–1.5)
EGFR: 52 ML/MIN/1.73M2 — LOW
EOSINOPHIL # BLD AUTO: 0.83 K/UL — HIGH (ref 0–0.7)
EOSINOPHIL NFR BLD AUTO: 11.5 % — HIGH (ref 0–8)
GLUCOSE SERPL-MCNC: 101 MG/DL — HIGH (ref 70–99)
HCT VFR BLD CALC: 21.3 % — LOW (ref 37–47)
HGB BLD-MCNC: 6.3 G/DL — CRITICAL LOW (ref 12–16)
IMM GRANULOCYTES NFR BLD AUTO: 0.4 % — HIGH (ref 0.1–0.3)
INR BLD: 0.86 RATIO — SIGNIFICANT CHANGE UP (ref 0.65–1.3)
LYMPHOCYTES # BLD AUTO: 0.92 K/UL — LOW (ref 1.2–3.4)
LYMPHOCYTES # BLD AUTO: 12.7 % — LOW (ref 20.5–51.1)
MCHC RBC-ENTMCNC: 22 PG — LOW (ref 27–31)
MCHC RBC-ENTMCNC: 29.6 G/DL — LOW (ref 32–37)
MCV RBC AUTO: 74.2 FL — LOW (ref 81–99)
MONOCYTES # BLD AUTO: 0.49 K/UL — SIGNIFICANT CHANGE UP (ref 0.1–0.6)
MONOCYTES NFR BLD AUTO: 6.8 % — SIGNIFICANT CHANGE UP (ref 1.7–9.3)
NEUTROPHILS # BLD AUTO: 4.9 K/UL — SIGNIFICANT CHANGE UP (ref 1.4–6.5)
NEUTROPHILS NFR BLD AUTO: 67.9 % — SIGNIFICANT CHANGE UP (ref 42.2–75.2)
NRBC # BLD: 0 /100 WBCS — SIGNIFICANT CHANGE UP (ref 0–0)
PLATELET # BLD AUTO: 324 K/UL — SIGNIFICANT CHANGE UP (ref 130–400)
PMV BLD: 9.2 FL — SIGNIFICANT CHANGE UP (ref 7.4–10.4)
POTASSIUM SERPL-MCNC: 4.3 MMOL/L — SIGNIFICANT CHANGE UP (ref 3.5–5)
POTASSIUM SERPL-SCNC: 4.3 MMOL/L — SIGNIFICANT CHANGE UP (ref 3.5–5)
PROT SERPL-MCNC: 6.8 G/DL — SIGNIFICANT CHANGE UP (ref 6–8)
PROTHROM AB SERPL-ACNC: 9.7 SEC — LOW (ref 9.95–12.87)
RBC # BLD: 2.87 M/UL — LOW (ref 4.2–5.4)
RBC # FLD: 18.8 % — HIGH (ref 11.5–14.5)
SODIUM SERPL-SCNC: 134 MMOL/L — LOW (ref 135–146)
WBC # BLD: 7.22 K/UL — SIGNIFICANT CHANGE UP (ref 4.8–10.8)
WBC # FLD AUTO: 7.22 K/UL — SIGNIFICANT CHANGE UP (ref 4.8–10.8)

## 2023-07-07 PROCEDURE — 86900 BLOOD TYPING SEROLOGIC ABO: CPT

## 2023-07-07 PROCEDURE — 99285 EMERGENCY DEPT VISIT HI MDM: CPT | Mod: 25

## 2023-07-07 PROCEDURE — 86923 COMPATIBILITY TEST ELECTRIC: CPT

## 2023-07-07 PROCEDURE — 99285 EMERGENCY DEPT VISIT HI MDM: CPT | Mod: FS

## 2023-07-07 PROCEDURE — 96374 THER/PROPH/DIAG INJ IV PUSH: CPT

## 2023-07-07 PROCEDURE — 85730 THROMBOPLASTIN TIME PARTIAL: CPT

## 2023-07-07 PROCEDURE — 80053 COMPREHEN METABOLIC PANEL: CPT

## 2023-07-07 PROCEDURE — 86901 BLOOD TYPING SEROLOGIC RH(D): CPT

## 2023-07-07 PROCEDURE — 85025 COMPLETE CBC W/AUTO DIFF WBC: CPT

## 2023-07-07 PROCEDURE — P9016: CPT

## 2023-07-07 PROCEDURE — 36415 COLL VENOUS BLD VENIPUNCTURE: CPT

## 2023-07-07 PROCEDURE — 96376 TX/PRO/DX INJ SAME DRUG ADON: CPT

## 2023-07-07 PROCEDURE — 85610 PROTHROMBIN TIME: CPT

## 2023-07-07 PROCEDURE — 36430 TRANSFUSION BLD/BLD COMPNT: CPT

## 2023-07-07 PROCEDURE — 86850 RBC ANTIBODY SCREEN: CPT

## 2023-07-07 RX ORDER — MORPHINE SULFATE 50 MG/1
4 CAPSULE, EXTENDED RELEASE ORAL ONCE
Refills: 0 | Status: DISCONTINUED | OUTPATIENT
Start: 2023-07-07 | End: 2023-07-07

## 2023-07-07 RX ADMIN — MORPHINE SULFATE 4 MILLIGRAM(S): 50 CAPSULE, EXTENDED RELEASE ORAL at 08:27

## 2023-07-07 RX ADMIN — MORPHINE SULFATE 4 MILLIGRAM(S): 50 CAPSULE, EXTENDED RELEASE ORAL at 12:34

## 2023-07-07 RX ADMIN — MORPHINE SULFATE 4 MILLIGRAM(S): 50 CAPSULE, EXTENDED RELEASE ORAL at 15:33

## 2023-07-07 RX ADMIN — MORPHINE SULFATE 4 MILLIGRAM(S): 50 CAPSULE, EXTENDED RELEASE ORAL at 13:04

## 2023-07-07 NOTE — ED PROVIDER NOTE - ATTENDING APP SHARED VISIT CONTRIBUTION OF CARE
Patient complains of weakness.  She denies chest pain, shortness of breath, or abdominal pain.  Vital signs noted.  No apparent distress.  Patient appears pale.  Chest is clear.  Heart regular rate.  Abdomen nontender.  Diagnostic testing reviewed.  Profound anemia is confirmed.  Patient transfused 2 units of packed cells.  She tolerated the transfusion well.

## 2023-07-07 NOTE — ED PROVIDER NOTE - PHYSICAL EXAMINATION
VITAL SIGNS: I have reviewed nursing notes and confirm.  CONSTITUTIONAL: Well-developed; well-nourished  SKIN: skin exam is warm and dry, no acute rash.    HEAD: Normocephalic; atraumatic.  EYES: conjunctiva and sclera clear.  ENT: No nasal discharge; airway clear.  CARD: S1, S2 normal; no murmurs, gallops, or rubs. Regular rate and rhythm.   RESP: No wheezes, rales or rhonchi.  EXT: Normal ROM.  No clubbing, cyanosis or edema.   NEURO: Alert, oriented, grossly unremarkable
Color consistent with ethnicity/race, warm, dry intact, resilient.

## 2023-07-07 NOTE — ED ADULT NURSE NOTE - NSFALLUNIVINTERV_ED_ALL_ED
Bed/Stretcher in lowest position, wheels locked, appropriate side rails in place/Call bell, personal items and telephone in reach/Instruct patient to call for assistance before getting out of bed/chair/stretcher/Non-slip footwear applied when patient is off stretcher/[place] to call system/Physically safe environment - no spills, clutter or unnecessary equipment/Purposeful proactive rounding/Room/bathroom lighting operational, light cord in reach

## 2023-07-07 NOTE — ED PROVIDER NOTE - PATIENT PORTAL LINK FT
You can access the FollowMyHealth Patient Portal offered by [facility] by registering at the following website: [email or web address]. By joining [facility]’s FollowMyHealth portal, you will also be able to view your health information using other applications (apps) compatible with our system.

## 2023-07-11 ENCOUNTER — TRANSCRIPTION ENCOUNTER (OUTPATIENT)
Age: 78
End: 2023-07-11

## 2023-07-11 ENCOUNTER — RESULT REVIEW (OUTPATIENT)
Age: 78
End: 2023-07-11

## 2023-07-11 ENCOUNTER — OUTPATIENT (OUTPATIENT)
Dept: OUTPATIENT SERVICES | Facility: HOSPITAL | Age: 78
LOS: 1 days | Discharge: HOME | End: 2023-07-11
Payer: MEDICARE

## 2023-07-11 VITALS
HEIGHT: 66 IN | TEMPERATURE: 97 F | WEIGHT: 154.1 LBS | DIASTOLIC BLOOD PRESSURE: 103 MMHG | RESPIRATION RATE: 16 BRPM | HEART RATE: 70 BPM | SYSTOLIC BLOOD PRESSURE: 243 MMHG | OXYGEN SATURATION: 99 %

## 2023-07-11 VITALS — DIASTOLIC BLOOD PRESSURE: 79 MMHG | SYSTOLIC BLOOD PRESSURE: 199 MMHG | OXYGEN SATURATION: 100 %

## 2023-07-11 DIAGNOSIS — Z98.890 OTHER SPECIFIED POSTPROCEDURAL STATES: Chronic | ICD-10-CM

## 2023-07-11 DIAGNOSIS — Z90.89 ACQUIRED ABSENCE OF OTHER ORGANS: Chronic | ICD-10-CM

## 2023-07-11 DIAGNOSIS — Z98.49 CATARACT EXTRACTION STATUS, UNSPECIFIED EYE: Chronic | ICD-10-CM

## 2023-07-11 DIAGNOSIS — D18.00 HEMANGIOMA UNSPECIFIED SITE: Chronic | ICD-10-CM

## 2023-07-11 DIAGNOSIS — R91.8 OTHER NONSPECIFIC ABNORMAL FINDING OF LUNG FIELD: ICD-10-CM

## 2023-07-11 DIAGNOSIS — C34.90 MALIGNANT NEOPLASM OF UNSPECIFIED PART OF UNSPECIFIED BRONCHUS OR LUNG: ICD-10-CM

## 2023-07-11 DIAGNOSIS — R91.1 SOLITARY PULMONARY NODULE: ICD-10-CM

## 2023-07-11 LAB
HCT VFR BLD CALC: 32.9 % — LOW (ref 37–47)
HGB BLD-MCNC: 10.2 G/DL — LOW (ref 12–16)
MCHC RBC-ENTMCNC: 24.2 PG — LOW (ref 27–31)
MCHC RBC-ENTMCNC: 31 G/DL — LOW (ref 32–37)
MCV RBC AUTO: 78.1 FL — LOW (ref 81–99)
NRBC # BLD: 0 /100 WBCS — SIGNIFICANT CHANGE UP (ref 0–0)
PLATELET # BLD AUTO: 286 K/UL — SIGNIFICANT CHANGE UP (ref 130–400)
PMV BLD: 10 FL — SIGNIFICANT CHANGE UP (ref 7.4–10.4)
RBC # BLD: 4.21 M/UL — SIGNIFICANT CHANGE UP (ref 4.2–5.4)
RBC # FLD: 17.1 % — HIGH (ref 11.5–14.5)
WBC # BLD: 6.86 K/UL — SIGNIFICANT CHANGE UP (ref 4.8–10.8)
WBC # FLD AUTO: 6.86 K/UL — SIGNIFICANT CHANGE UP (ref 4.8–10.8)

## 2023-07-11 PROCEDURE — 36415 COLL VENOUS BLD VENIPUNCTURE: CPT

## 2023-07-11 PROCEDURE — 88333 PATH CONSLTJ SURG CYTO XM 1: CPT

## 2023-07-11 PROCEDURE — 88333 PATH CONSLTJ SURG CYTO XM 1: CPT | Mod: 26,59

## 2023-07-11 PROCEDURE — 88305 TISSUE EXAM BY PATHOLOGIST: CPT | Mod: 26

## 2023-07-11 PROCEDURE — 88305 TISSUE EXAM BY PATHOLOGIST: CPT

## 2023-07-11 PROCEDURE — 85027 COMPLETE CBC AUTOMATED: CPT

## 2023-07-11 PROCEDURE — 88342 IMHCHEM/IMCYTCHM 1ST ANTB: CPT | Mod: 26

## 2023-07-11 PROCEDURE — 71045 X-RAY EXAM CHEST 1 VIEW: CPT | Mod: 26

## 2023-07-11 PROCEDURE — 88344 IMHCHEM/IMCYTCHM EA MLT ANTB: CPT

## 2023-07-11 PROCEDURE — 88173 CYTOPATH EVAL FNA REPORT: CPT

## 2023-07-11 PROCEDURE — 32408 CORE NDL BX LNG/MED PERQ: CPT

## 2023-07-11 PROCEDURE — 71045 X-RAY EXAM CHEST 1 VIEW: CPT

## 2023-07-11 PROCEDURE — 88173 CYTOPATH EVAL FNA REPORT: CPT | Mod: 26

## 2023-07-11 RX ORDER — PYRIDOSTIGMINE BROMIDE 60 MG/5ML
60 SOLUTION ORAL ONCE
Refills: 0 | Status: COMPLETED | OUTPATIENT
Start: 2023-07-11 | End: 2023-07-11

## 2023-07-11 RX ORDER — MORPHINE SULFATE 50 MG/1
2 CAPSULE, EXTENDED RELEASE ORAL ONCE
Refills: 0 | Status: DISCONTINUED | OUTPATIENT
Start: 2023-07-11 | End: 2023-07-11

## 2023-07-11 RX ADMIN — PYRIDOSTIGMINE BROMIDE 60 MILLIGRAM(S): 60 SOLUTION ORAL at 12:16

## 2023-07-11 RX ADMIN — MORPHINE SULFATE 2 MILLIGRAM(S): 50 CAPSULE, EXTENDED RELEASE ORAL at 09:29

## 2023-07-11 RX ADMIN — MORPHINE SULFATE 2 MILLIGRAM(S): 50 CAPSULE, EXTENDED RELEASE ORAL at 08:39

## 2023-07-11 NOTE — ASU PREOP CHECKLIST - TAMPON REMOVED
ED HPI GENERAL MEDICAL PROBLEM





- General


Chief Complaint: OB/GYN Problem


Stated Complaint: Vaginal bleeding


Time Seen by Provider: 17 18:00


Source of Information: Reports: Patient, RN notes reviewed


History Limitations: Reports: No Limitations





- History of Present Illness


INITIAL COMMENTS - FREE TEXT/NARRATIVE: 





29 year old pregnant female presents to the ED with sudden onset of vaginal 

bleeding. She was in the ED waiting room when symptoms started. She reports 

passing a large red clot, the size of a peach. She decided to check in and 

continues to have bleeding. She has no pelvic pain or cramping. She is a 

 3, para 2. Her LMP was 17 with a due date of 17. This makes 

her approximately 11 weeks 0 days gestation. No previous miscarriages. She 

reports some intermittent nausea related to pregnancy. She denies 

lightheadedness and syncope. Denies urinary symptoms. Her OBGYN is Dr. Osei. 








- Related Data


 Allergies











Allergy/AdvReac Type Severity Reaction Status Date / Time


 


No Known Allergies Allergy   Verified 17 17:19











Home Meds: 


 Home Meds





L.acidoph,Paracasei, B.lactis [Probiotic] 1 tab PO DAILY 17 [History]


Multivitamin [Multivitamins] 1 tab PO DAILY 17 [History]


Propranolol [Inderal] 10 mg PO BID 17 [History]











Past Medical History


OB/GYN History: Reports: Other (see below)


Other OB/BYN History: 





- Past Surgical History


HEENT Surgical History: Reports: Tonsillectomy


GI Surgical History: Reports: Colonoscopy, EGD





Social & Family History





- Tobacco Use


Smoking Status *Q: Never Smoker





- Caffeine Use


Caffeine Use: Reports: Coffee





- Recreational Drug Use


Recreational Drug Use: No





ED ROS GENERAL





- Review of Systems


Review Of Systems: See Below


Constitutional: Reports: No Symptoms.  Denies: Fever, Chills


Respiratory: Reports: No Symptoms


Cardiovascular: Reports: No Symptoms


GI/Abdominal: Reports: Nausea.  Denies: Abdominal Pain, Vomiting


: Reports: Other (vaginal bleeding).  Denies: Dysuria, Urgency





ED EXAM PREGNANCY





- Physical Exam


Exam: See Below


Exam Limited By: No Limitations


General Appearance: Alert, WD/WN, No Apparent Distress


Respiratory/Chest: No Respiratory Distress, Lungs Clear, Normal Breath Sounds


Cardiovascular: Regular Rate, Rhythm


GI/Abdominal: Normal Bowel Sounds, Soft, Non-Tender, No Distention


Neurological: Alert, Oriented, Normal Cognition


Skin Exam: Warm, Dry, Intact





Course





- Vital Signs


Last Recorded V/S: 


 Last Vital Signs











Temp  98.6 F   17 17:16


 


Pulse  86   17 17:16


 


Resp  18   17 20:21


 


BP  126/64   17 17:16


 


Pulse Ox  100   17 17:16














- Orders/Labs/Meds


Orders: 


 Active Orders 24 hr











 Category Date Time Status


 


 ABO/RH TYPE [BBK] Stat Lab  17 18:12 Results


 


 PATIENT RETYPE [BBK] Stat Lab  17 18:12 Results











Labs: 


 Laboratory Tests











  17 Range/Units





  18:12 18:12 18:12 


 


WBC  11.89 H    (3.98-10.04)  K/mm3


 


RBC  4.92    (3.98-5.22)  M/mm3


 


Hgb  14.2    (11.2-15.7)  gm/L


 


Hct  41.7    (34.1-44.9)  %


 


MCV  84.8    (79.4-94.8)  fl


 


MCH  28.9    (25.6-32.2)  pg


 


MCHC  34.1    (32.2-35.5)  g/dl


 


RDW Std Deviation  40.4    (36.4-46.3)  fL


 


Plt Count  194    (182-369)  K/mm3


 


MPV  12.1    (9.4-12.3)  fl


 


Neut % (Auto)  75.8 H    (34.0-71.1)  %


 


Lymph % (Auto)  17.5 L    (19.3-51.7)  %


 


Mono % (Auto)  4.8    (4.7-12.5)  %


 


Eos % (Auto)  1.4    (0.7-5.8)  


 


Baso % (Auto)  0.2    (0.1-1.2)  %


 


Neut # (Auto)  9.02 H    (1.56-6.13)  K/mm3


 


Lymph # (Auto)  2.08    (1.18-3.74)  K/mm3


 


Mono # (Auto)  0.57 H    (0.24-0.36)  K/mm3


 


Eos # (Auto)  0.17    (0.04-0.36)  K/mm3


 


Baso # (Auto)  0.02    (0.01-0.08)  K/mm3


 


HCG, Quant   38310.0   mIU/mL


 


Blood Type    AB POSITIVE  














- Re-Assessments/Exams


Free Text/Narrative Re-Assessment/Exam: 





CBC reveals a mildly elevated WBC of 11.8 which is expected in pregnancy. 

Normal differential. Quant hcg is 21,473. ABO/rh reported by lab as AB 

positive. No need for rhogam. 





Ultrasound read by Dr. Jaramillo. Impressoin:


1. Findings compatible with subchorionic hemorrohage/blood clot with the lower 

uterine segment next to the internal cervical os. 


2. Single intrauterine gestation is seen with dates as noted 


3. normal heart activity is seen





Patient was notified of findings. Thoroughly educated on pelvic rest and return 

precautions. She was instructed to see Dr. Osei on Monday or Tuesday. 

Instructed to return with heavy bleeding or worsening pain. 








Departure





- Departure


Time of Disposition: 20:00


Disposition: Home, Self-Care 01


Condition: good


Clinical Impression: 


Subchorionic bleed


Qualifiers:


 Fetus number: single or unspecified fetus Trimester: first trimester Qualified 

Code(s): O41.8X10 - Other specified disorders of amniotic fluid and membranes, 

first trimester, not applicable or unspecified








- Discharge Information


Referrals: 


Denisha Osei MD [Primary Care Provider] - 


Forms:  ED Department Discharge


Additional Instructions: 


Pelvic rest: no intercourse, no heavy lifting, no strenuous exercise


Tylenol as needed for pain


Follow-up with Dr. Osei next week


Return to Er with worsening bleeding, worsening pain, lightheadedness, passing 

out, or additional concerns. 





- My Orders


Last 24 Hours: 


My Active Orders





17 18:12


ABO/RH TYPE [BBK] Stat 


PATIENT RETYPE [BBK] Stat 














- Assessment/Plan


Last 24 Hours: 


My Active Orders





17 18:12


ABO/RH TYPE [BBK] Stat 


PATIENT RETYPE [BBK] Stat
n/a

## 2023-07-11 NOTE — ASU PATIENT PROFILE, ADULT - NS PRO PASSIVE SMOKE EXP
[facility] Discharge Summary  Patient Name: Blake Allen  Date: 7/30/2018  Referral Diagnosis: Iliotibial band syndrome of both sides  Referring provider: Rodrigo Morfin*  Visit Diagnosis:   1. Chronic left lumbar radiculopathy     2. Decreased strength         Goals:  Pt. will demonstrate/verbalize independence in self-management of condition in : 6 weeks  Pt. will be independent with home exercise program in : 6 weeks  Pt. will have improved quality of sleep: with less pain;waking less times/night;in 6 weeks  Pt. will improve posture : and demonstrate posture with minimal to no cuing;in 6 weeks  Pt. will be able to walk : 30 minutes;with no pain;for work;in 6 weeks  Patient will stand : 30 minutes;with no pain;for work;in 6 weeks  No Data Recorded    Patient was seen for 1 visits physical therapy.    The patient attended therapy initially, but did not finish the therapy sessions prescribed.  Goals were not fully achieved. Explanation for goals not achieved: The patient discontinued therapy, did not return.    Therapy will be discontinued at this time.  Please see progress note dated 5/10/18 for patient status.      Thank you for your referral.  Jordan Olguin, PT  7/30/2018  9:59 AM         [facility]   [place] Initial Evaluation    Patient Name: Blake Allen  Date of evaluation: 5/10/2018  Referral Diagnosis: Iliotibial band syndrome of both sides  Referring provider: Rodrigo Morfin*  Visit Diagnosis:     ICD-10-CM    1. Chronic left lumbar radiculopathy M54.16    2. Decreased strength R53.1        Assessment:      Impairments in  pain, posture, ROM, joint mobility, strength  Patient's signs and symptoms are consistent with radiating pain and symptoms from lumbar spine.    Patient responded well to manual therapy and HEP.  Prognosis to achieve goals is  fair   Pt. is appropriate for skilled PT intervention as outlined in the Plan of Care  (POC).    Goals:  Pt. will demonstrate/verbalize independence in self-management of condition in : 6 weeks  Pt. will be independent with home exercise program in : 6 weeks  Pt. will have improved quality of sleep: with less pain;waking less times/night;in 6 weeks  Pt. will improve posture : and demonstrate posture with minimal to no cuing;in 6 weeks  Pt. will be able to walk : 30 minutes;with no pain;for work;in 6 weeks  Patient will stand : 30 minutes;with no pain;for work;in 6 weeks  No Data Recorded    Barriers to Learning or Achieving Goals:  No Barriers.    Patient's expectations/goals are realistic.    A shared decision making model was used.  The patient's values and choices were respected.  The following represents what was discussed and decided upon by the physical therapist and the patient.          Plan / Patient Instructions:        Plan of Care:   Communication with: Referral Source  Patient Related Instruction: Nature of Condition;Body mechanics;Posture;Treatment plan and rationale;Precautions;Next steps;Self Care instruction;Expected outcome;Basis of treatment  Times per Week: 2  Number of Weeks: 6  Number of Visits: 12  Discharge Planning: to include self management strategies and HEP  Precautions / Restrictions : possible ankylosing spondylitis, seeing rheumatology  Therapeutic Exercise: ROM;Stretching;Strengthening  Neuromuscular Reeducation: posture;TNE;core  Manual Therapy: soft tissue mobilization;myofascial release;joint mobilization;muscle energy  Modalities: cold pack;hot pack (trials as needed.)    POC and pathology of condition were reviewed with patient.  Pt. is in agreement with the Plan of Care  A Home Exercise Program (HEP) was initiated today.  Pt. was instructed in exercises by PT and patient was given a handout with detailed instructions.    Plan for next visit: review HEP and continue manual therapy     Subjective:           History of Present Illness:    Blake is a 25 y.o. male  who presents to therapy today with complaints of pain from left glut to left foot and right lateral hip and thigh. Date of onset:  10/2016. Onset was sudden. Symptoms are constant and not improving. He denies history of similar symptoms. He describes their previous level of function as not limited    Pain Ratin  Pain rating at best: 2  Pain rating at worst: 8  Pain description: aching, numbness, pain, sharp, shooting, soreness, tingling and weakness    Functional limitations are described as occurring with:   ascending and descending stairs or curbs  bending  lifting  sleeping  standing    walking             Objective:      Note: Items left blank indicates the item was not performed or not indicated at the time of the evaluation.    Patient Outcome Measures :    No Data Recorded   Scores range from 0-100%, where a score of 0% represents minimal pain and maximal function. The minimal clinically important difference is a score reduction of 12%.    Examination  1. Chronic left lumbar radiculopathy     2. Decreased strength       Precautions / Restrictions : possible ankylosing spondylitis, seeing rheumatology   Involved side: Bilateral  Posture Observation:      Cervical:  Mild forward head  Shoulder/Thoracic complex: Moderate bilateral scapular protraction   Lumbopelvic complex: Mildly increased lumbar lordosis    Lumbar ROM:    Date:       *Indicate scale AROM AROM AROM   Lumbar Flexion min     Lumbar Extension min      Right Left Right Left Right Left   Lumbar Sidebending min min       Lumbar Rotation min min       Thoracic Flexion      Thoracic Extension      Thoracic Sidebending         Thoracic Rotation           Lower Extremity Strength:    Date:      LE strength/5 Right Left Right Left Right Left   Hip Flexion (L1-3) 5 4       Hip Extension (L5-S1) 4 4       Hip Abduction (L4-5) 4 4       Hip Adduction (L2-3) 4 4       Hip External Rotation         Hip Internal Rotation         Knee Extension (L3-4) 5 5      "  Knee Flexion 4 4       Ankle Dorsiflexion (L4-5) 5 4       Great Toe Extension (L5) 5 5       Ankle Plantar flexion (S1)         Abdominals fair       Sensation           Reflex Testing:     Lumbar Dermatomes Right Left UE Reflexes Right Left   Iliac Crest and Groin (L1) WNL WNL Biceps (C5-6) 2+ 2+   Anterior Medial Thigh (L2) WNL WNL Brachioradialis (C5-6) 2+ 2+   Anterior Thigh, Medial Epicondyle Femur (L3) WNL WNL Triceps (C7-8) 2+ 2+   Lateral Thigh, Anterior Knee, Medial Leg/Malleolus (L4) WNL WNL Arianna s test - -   Lateral Leg, Dorsal Foot (L5) WNL dim LE Reflexes     Lateral Foot (S1) WNL dim Patellar (L3-4) 4+ 4+   Posterior Leg (S2) WNL WNL Achilles (S1-2) 4+ 4+   Other:   Babinski Response - -     Palpation:  Flexibility:  Limited hamstrings, quad, hip flexors.    Lumbar Special Tests:      Lumbar Special Tests Right Left SI Tests Right  Left   Quadrant test   SI Compression     Straight leg raise - + SI Distraction     Crossover response - - POSH Test     Slump - + Sacral Thrust     Sit-up test  FADIR     Trunk extensor endurance test  Resisted Abduction     Prone instability test  Other:     Pubic shotgun - Other:       Repeated Motion Testing:  Peripheralizes with Flexion, Extension    Passive Mobility - Joint Integrity:  pain at L5 and S1    LE Screen:  Hip PROM:  Limited IR bilateral .  Hip Scour:   negative.    Treatment Today       Therapeutic Exercises:  Exercise #1: gastroc stretch 30\"   Comment #1: supine piriformis stretch 30\"   Exercise #2: supine hamstring/nerve glide x 10  Comment #2: slump slider x 10     MFR layers 1-3 left: glut max, piriformis, hamstrings, quad, gastroc    Manual LE nerve glide.    TREATMENT MINUTES COMMENTS   Evaluation 30    Self-care/ Home management     Manual therapy 25    Neuromuscular Re-education     Therapeutic Activity     Therapeutic Exercises 5    Gait training     Modality__________________                Total 60    Blank areas are intentional and mean " the treatment did not include these items.     PT Evaluation Code: (Please list factors)  Patient History/Comorbidities:   Patient Active Problem List   Diagnosis     Migraine Headache      Examination: as above  Clinical Presentation: stable  Clinical Decision Making: low complexity.    Patient History/  Comorbidities Examination  (body structures and functions, activity limitations, and/or participation restrictions) Clinical Presentation Clinical Decision Making (Complexity)   No documented Comorbidities or personal factors 1-2 Elements Stable and/or uncomplicated Low   1-2 documented comorbidities or personal factor 3 Elements Evolving clinical presentation with changing characteristics Moderate   3-4 documented comorbidities or personal factors 4 or more Unstable and unpredictable High               Jordan Olguin, PT  5/10/2018  12:34 PM                 Unknown

## 2023-07-11 NOTE — PROCEDURE NOTE - PROCEDURE FINDINGS AND DETAILS
Successful CT guided left lower lobe lung nodule core needle biopsy (20 gauge) , small pneumothorax present

## 2023-07-11 NOTE — PROCEDURE NOTE - NSINFORMCONSENT_GEN_A_CORE
patient/Benefits, risks, and possible complications of procedure explained to patient/caregiver who verbalized understanding and gave verbal consent.
Benefits, risks, and possible complications of procedure explained to patient/caregiver who verbalized understanding and gave written consent.

## 2023-07-11 NOTE — ASU PATIENT PROFILE, ADULT - FALL HARM RISK - RISK INTERVENTIONS

## 2023-07-11 NOTE — ASU DISCHARGE PLAN (ADULT/PEDIATRIC) - NS MD DC FALL RISK RISK
For information on Fall & Injury Prevention, visit: [email or web address] OR  [email or web address] OR  https://www.cdc.gov/steadi/patient.html

## 2023-07-12 ENCOUNTER — OUTPATIENT (OUTPATIENT)
Dept: OUTPATIENT SERVICES | Facility: HOSPITAL | Age: 78
LOS: 1 days | End: 2023-07-12
Payer: MEDICARE

## 2023-07-12 ENCOUNTER — RESULT REVIEW (OUTPATIENT)
Age: 78
End: 2023-07-12

## 2023-07-12 DIAGNOSIS — R06.02 SHORTNESS OF BREATH: ICD-10-CM

## 2023-07-12 DIAGNOSIS — Z98.49 CATARACT EXTRACTION STATUS, UNSPECIFIED EYE: Chronic | ICD-10-CM

## 2023-07-12 DIAGNOSIS — Z98.890 OTHER SPECIFIED POSTPROCEDURAL STATES: Chronic | ICD-10-CM

## 2023-07-12 DIAGNOSIS — D18.00 HEMANGIOMA UNSPECIFIED SITE: Chronic | ICD-10-CM

## 2023-07-12 DIAGNOSIS — Z90.89 ACQUIRED ABSENCE OF OTHER ORGANS: Chronic | ICD-10-CM

## 2023-07-12 PROCEDURE — 71046 X-RAY EXAM CHEST 2 VIEWS: CPT | Mod: 26

## 2023-07-12 PROCEDURE — 71046 X-RAY EXAM CHEST 2 VIEWS: CPT

## 2023-07-13 DIAGNOSIS — R06.02 SHORTNESS OF BREATH: ICD-10-CM

## 2023-07-14 LAB — NON-GYNECOLOGICAL CYTOLOGY STUDY: SIGNIFICANT CHANGE UP

## 2023-07-20 ENCOUNTER — NON-APPOINTMENT (OUTPATIENT)
Age: 78
End: 2023-07-20

## 2023-07-25 ENCOUNTER — OUTPATIENT (OUTPATIENT)
Dept: OUTPATIENT SERVICES | Facility: HOSPITAL | Age: 78
LOS: 1 days | End: 2023-07-25
Payer: MEDICARE

## 2023-07-25 DIAGNOSIS — Z98.49 CATARACT EXTRACTION STATUS, UNSPECIFIED EYE: Chronic | ICD-10-CM

## 2023-07-25 DIAGNOSIS — D18.00 HEMANGIOMA UNSPECIFIED SITE: Chronic | ICD-10-CM

## 2023-07-25 DIAGNOSIS — Z98.890 OTHER SPECIFIED POSTPROCEDURAL STATES: Chronic | ICD-10-CM

## 2023-07-25 DIAGNOSIS — Z90.89 ACQUIRED ABSENCE OF OTHER ORGANS: Chronic | ICD-10-CM

## 2023-07-25 PROCEDURE — 77334 RADIATION TREATMENT AID(S): CPT | Mod: 26

## 2023-07-25 PROCEDURE — 77263 THER RADIOLOGY TX PLNG CPLX: CPT

## 2023-07-25 PROCEDURE — 77334 RADIATION TREATMENT AID(S): CPT

## 2023-07-26 DIAGNOSIS — D36.14 BENIGN NEOPLASM OF PERIPHERAL NERVES AND AUTONOMIC NERVOUS SYSTEM OF THORAX: ICD-10-CM

## 2023-07-26 DIAGNOSIS — C34.90 MALIGNANT NEOPLASM OF UNSPECIFIED PART OF UNSPECIFIED BRONCHUS OR LUNG: ICD-10-CM

## 2023-07-26 DIAGNOSIS — C34.91 MALIGNANT NEOPLASM OF UNSPECIFIED PART OF RIGHT BRONCHUS OR LUNG: ICD-10-CM

## 2023-08-07 ENCOUNTER — OUTPATIENT (OUTPATIENT)
Dept: OUTPATIENT SERVICES | Facility: HOSPITAL | Age: 78
LOS: 1 days | End: 2023-08-07
Payer: MEDICARE

## 2023-08-07 ENCOUNTER — NON-APPOINTMENT (OUTPATIENT)
Age: 78
End: 2023-08-07

## 2023-08-07 DIAGNOSIS — D18.00 HEMANGIOMA UNSPECIFIED SITE: Chronic | ICD-10-CM

## 2023-08-07 DIAGNOSIS — Z98.890 OTHER SPECIFIED POSTPROCEDURAL STATES: Chronic | ICD-10-CM

## 2023-08-07 DIAGNOSIS — Z90.89 ACQUIRED ABSENCE OF OTHER ORGANS: Chronic | ICD-10-CM

## 2023-08-07 DIAGNOSIS — Z98.49 CATARACT EXTRACTION STATUS, UNSPECIFIED EYE: Chronic | ICD-10-CM

## 2023-08-07 PROCEDURE — 77373 STRTCTC BDY RAD THER TX DLVR: CPT

## 2023-08-08 NOTE — VITALS
[Maximal Pain Intensity: 10/10] : 10/10 [Least Pain Intensity: 2/10] : 2/10 [OTC] : OTC [Opioid] : opioid [70: Cares for self; unalbe to carry on normal activity or do active work.] : 70: Cares for self; unable to carry on normal activity or do active work.

## 2023-08-09 ENCOUNTER — OUTPATIENT (OUTPATIENT)
Dept: OUTPATIENT SERVICES | Facility: HOSPITAL | Age: 78
LOS: 1 days | End: 2023-08-09

## 2023-08-09 DIAGNOSIS — C34.91 MALIGNANT NEOPLASM OF UNSPECIFIED PART OF RIGHT BRONCHUS OR LUNG: ICD-10-CM

## 2023-08-09 DIAGNOSIS — C34.90 MALIGNANT NEOPLASM OF UNSPECIFIED PART OF UNSPECIFIED BRONCHUS OR LUNG: ICD-10-CM

## 2023-08-09 DIAGNOSIS — D36.14 BENIGN NEOPLASM OF PERIPHERAL NERVES AND AUTONOMIC NERVOUS SYSTEM OF THORAX: ICD-10-CM

## 2023-08-09 DIAGNOSIS — Z90.89 ACQUIRED ABSENCE OF OTHER ORGANS: Chronic | ICD-10-CM

## 2023-08-09 DIAGNOSIS — D18.00 HEMANGIOMA UNSPECIFIED SITE: Chronic | ICD-10-CM

## 2023-08-09 DIAGNOSIS — Z98.49 CATARACT EXTRACTION STATUS, UNSPECIFIED EYE: Chronic | ICD-10-CM

## 2023-08-09 DIAGNOSIS — Z98.890 OTHER SPECIFIED POSTPROCEDURAL STATES: Chronic | ICD-10-CM

## 2023-08-11 ENCOUNTER — OUTPATIENT (OUTPATIENT)
Dept: OUTPATIENT SERVICES | Facility: HOSPITAL | Age: 78
LOS: 1 days | End: 2023-08-11

## 2023-08-11 DIAGNOSIS — Z90.89 ACQUIRED ABSENCE OF OTHER ORGANS: Chronic | ICD-10-CM

## 2023-08-11 DIAGNOSIS — Z98.49 CATARACT EXTRACTION STATUS, UNSPECIFIED EYE: Chronic | ICD-10-CM

## 2023-08-11 DIAGNOSIS — Z98.890 OTHER SPECIFIED POSTPROCEDURAL STATES: Chronic | ICD-10-CM

## 2023-08-11 DIAGNOSIS — D36.14 BENIGN NEOPLASM OF PERIPHERAL NERVES AND AUTONOMIC NERVOUS SYSTEM OF THORAX: ICD-10-CM

## 2023-08-11 DIAGNOSIS — C34.91 MALIGNANT NEOPLASM OF UNSPECIFIED PART OF RIGHT BRONCHUS OR LUNG: ICD-10-CM

## 2023-08-11 DIAGNOSIS — C34.90 MALIGNANT NEOPLASM OF UNSPECIFIED PART OF UNSPECIFIED BRONCHUS OR LUNG: ICD-10-CM

## 2023-08-11 DIAGNOSIS — D18.00 HEMANGIOMA UNSPECIFIED SITE: Chronic | ICD-10-CM

## 2023-08-11 PROCEDURE — 77435 SBRT MANAGEMENT: CPT

## 2023-08-14 ENCOUNTER — APPOINTMENT (OUTPATIENT)
Dept: HEMATOLOGY ONCOLOGY | Facility: CLINIC | Age: 78
End: 2023-08-14
Payer: MEDICARE

## 2023-08-14 ENCOUNTER — OUTPATIENT (OUTPATIENT)
Dept: OUTPATIENT SERVICES | Facility: HOSPITAL | Age: 78
LOS: 1 days | End: 2023-08-14
Payer: MEDICARE

## 2023-08-14 DIAGNOSIS — C34.90 MALIGNANT NEOPLASM OF UNSPECIFIED PART OF UNSPECIFIED BRONCHUS OR LUNG: ICD-10-CM

## 2023-08-14 DIAGNOSIS — I25.10 ATHEROSCLEROTIC HEART DISEASE OF NATIVE CORONARY ARTERY W/OUT ANGINA PECTORIS: ICD-10-CM

## 2023-08-14 DIAGNOSIS — Z90.89 ACQUIRED ABSENCE OF OTHER ORGANS: Chronic | ICD-10-CM

## 2023-08-14 DIAGNOSIS — D36.10 BENIGN NEOPLASM OF PERIPHERAL NERVES AND AUTONOMIC NERVOUS SYSTEM, UNSPECIFIED: ICD-10-CM

## 2023-08-14 DIAGNOSIS — D18.00 HEMANGIOMA UNSPECIFIED SITE: Chronic | ICD-10-CM

## 2023-08-14 DIAGNOSIS — Z98.890 OTHER SPECIFIED POSTPROCEDURAL STATES: Chronic | ICD-10-CM

## 2023-08-14 DIAGNOSIS — Z98.49 CATARACT EXTRACTION STATUS, UNSPECIFIED EYE: Chronic | ICD-10-CM

## 2023-08-14 DIAGNOSIS — G70.00 MYASTHENIA GRAVIS W/OUT (ACUTE) EXACERBATION: ICD-10-CM

## 2023-08-14 PROCEDURE — 99205 OFFICE O/P NEW HI 60 MIN: CPT

## 2023-08-14 NOTE — DISEASE MANAGEMENT
[Clinical] : TNM Stage: c [IA] : IA [N/A] : Currently not applicable [FreeTextEntry4] : Thoracic Scwannoma [TTNM] : 1c [NTNM] : 0 [MTNM] : 0 [de-identified] : 800cGy [de-identified] : 2400cGY [de-identified] : Thoracic Schwannoma

## 2023-08-14 NOTE — HISTORY OF PRESENT ILLNESS
[FreeTextEntry1] : 08/07/2023 OTV 1/3 fx 800/2400 cGy to the Spine / thoracic schwannoma. Ms. Harris tolerated her first treatment to the thoracic spine well. She appears tired, refuses to have her vitals or weight taken, offers no complaints at this time,

## 2023-08-14 NOTE — REVIEW OF SYSTEMS
[Fever] : no fever [Chills] : no chills [Night Sweats] : no night sweats [Fatigue] : fatigue [Recent Change In Weight] : ~T no recent weight change [Joint Pain] : no joint pain [Joint Stiffness] : no joint stiffness [Muscle Pain] : no muscle pain [Muscle Weakness] : muscle weakness [Negative] : Allergic/Immunologic [FreeTextEntry9] : Muscle weakness of LE

## 2023-08-15 ENCOUNTER — NON-APPOINTMENT (OUTPATIENT)
Age: 78
End: 2023-08-15

## 2023-08-15 DIAGNOSIS — C34.90 MALIGNANT NEOPLASM OF UNSPECIFIED PART OF UNSPECIFIED BRONCHUS OR LUNG: ICD-10-CM

## 2023-08-20 NOTE — END OF VISIT
[] : Fellow [FreeTextEntry3] : This is a pleasant 78-year-old female who presents for evaluation of lung cancer.  She has myasthenia gravis, current issues have been with her eyes there is well controlled.  She also now has either stage IV lung cancer or which likely is the case synchronous contralateral cancers.  We went over different treatment options as above at this point in time it is unclear she does have synchronous cancers or stage IV disease.  NGS is still pending on the contralateral tumor in the left side and once this is back we can determine treatment course.  For example I explained that if this is indeed metastatic suggest we initiate  Sotorasib given that she is not interested in chemotherapy and will decline at also immunotherapy can be given granted risky given myasthenia gravis and we can SRS the left sided tumor. She is not inrested in chemoRT.  She is undergong RT now for her schwannoma and hopefully this will improve her ambulation and symptoms.  I reached out to [facility] and they are indeed processing the left-sided tumor and we should get these results back hopefully in about 7 to 10 days  She will see me back in 2 weeks and we will finalize plan than

## 2023-08-20 NOTE — CONSULT LETTER
[Dear  ___] : Dear  [unfilled], [Consult Letter:] : I had the pleasure of evaluating your patient, [unfilled]. [Please see my note below.] : Please see my note below. [Consult Closing:] : Thank you very much for allowing me to participate in the care of this patient.  If you have any questions, please do not hesitate to contact me. [Sincerely,] : Sincerely, [DrSuraj  ___] : Dr. IRAHETA [DrSuraj ___] : Dr. IRAHETA [FreeTextEntry3] : Dr. Lira

## 2023-08-20 NOTE — REASON FOR VISIT
[Initial Consultation] : an initial consultation [Spouse] : spouse [Family Member] : family member [FreeTextEntry2] : NSCLC

## 2023-08-20 NOTE — ASSESSMENT
[FreeTextEntry1] : 78-year-old female active smoker (25 pack-year) with a history of CAD, gastric ulcers, thoracic schwannoma of pleural based mass and MG. She was found to have multiple nodules in lungs, including an approximate 2.2 x 1.4 cm cavitary nodule medial right lower lobe, superior segment (4/107) after she had a routine chest CT on 1/8/2023. She was diagnosed with biopsy-proven-RT lung adenocarcinoma on 3/2023. She eventually underwent LLL biopsy showing adenocarcinoma of the Lung on 7/11/23. She likely has two synchronous lungs CA given her smoking hx.   # RLL adenocarcinoma with R hilar/suprahilar uptake noted on PET/CT on 6/15/2023 # LLL adenocarcinoma  - we are likely dealing with two synchronous lung Michaela with R lung cancer likely stage 2 and L lung CA likely stage 1  - molecular testing from RLL adenocarcinoma shows PDL1 of 80%, Tumor Mutational [place] - 14 Muts/Mb and KRAS G12C - pending molecular testing from LLL adenocarcinoma - PET 6/15/23 shows: 1. Since January 30, 2023, new FDG avid right hilar/suprahilar uptake, with max SUV 10.5. 2. No significant change in a 1.9 cm FDG avid cavitary right lower lobe pulmonary nodule, with max SUV 16.5. 3. Multiple additional minimally FDG avid predominantly left-sided pulmonary nodules are overall unchanged. 4. Overall unchanged mildly FDG avid right upper lobe ground glass opacity; previously seen minimally FDG avid peripheral left upper lobe opacity has nearly completely resolved. 5. No additional site of pathologic FDG uptake.  # Thoracic schwannoma s/p radiation  # Myasthenia gravis, stable - follows with Dr. Caraballo   Plan: - We had a detailed discussion with the patient and family. We reviewed the radiological findings, pathology and staging with her. We explained that she likely has two primary lung cancer granted possibly has metastasis as well . We went over the following options with her: - 1) For her RLL adenocarinoma, to achieve a cure, she will need chemoRT. For her LLL adenocarcinoma, she will need RT. She is hesitant regarding chemo, and she appears frail (ECOG of 2-3) to withstand chemo. - 2) We can also treat her NSCLC as locally advanced and given her PDL1 of 80%, it is reasonable to offer her single agent Keytruda. However, she does have an history of Myasthenia gravis and there is always a risk of exacerbation with Keytruda.  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 Although it is used as second-line therapy, since she is contraindicated to use Keytruda due to MG and she is too frail to be on chemo. It is reasonable for us to use Sotorasib as first line therapy. - Pt is leaning towards the third option of Sotorasib. We went over the efficacy data with her as a second-line therapy with MCKAY of 36% and Stable disease of 44%.  - We are still pending for molecular testing for her LLL adenocarcinoma to make the final decision  since if this is a new primary will have different NGS and PD-l1 granted we can offer SRS to this and Sotorasib if she agrees for treatment - we requested pt to obtain MR head w/wo contrast in the meantime to r/o brain mets, pt deferred - f/u with her neurologist and Dr. Carrasquillo as scheduled   RTC in 2 weeks to go over molecular testing result of her LLL adenocarcinoma and decide on treatment option.

## 2023-08-20 NOTE — HISTORY OF PRESENT ILLNESS
[de-identified] : 78-year-old female active smoker (25 pack-year) with past history of CAD, gastric ulcers, thoracic schwannoma of pleural based mass and MG. She was found to have non-small cell lung CA after she had a routine CT chest on 1/8/2023. She was diagnosed with biopsy-proven -RT lung adenocarcinoma -3/2023. She was seen by Dr. Toño Palomino and was presented on TB. She was deemed not a surgical candidate and recommended SBRT.  She also had left pulmonary nodules and was recommended a second biopsy to confirm synchronous lung CA. However, it was too small to be biopsy by IR at that time. She eventually saw Dr. Carrasquillo regarding her Rt lung CA. At that time, she decided against proceeding with RT to lung. Due to chronic pain from her thoracic schwannoma, she underwent RT for that instead. She subsequently had PET/CT that showed: 1. Since January 30, 2023, new FDG avid right hilar/suprahilar uptake, with max SUV 10.5. 2. No significant change in a 1.9 cm FDG avid cavitary right lower lobe pulmonary nodule, with max SUV 16.5. 3. Multiple additional minimally FDG avid predominantly left-sided pulmonary nodules are overall unchanged. 4. Overall unchanged mildly FDG avid right upper lobe ground glass opacity; previously seen minimally FDG avid peripheral left upper lobe opacity has nearly completely resolved. 5. No additional site of pathologic FDG uptake.  She finally underwent LLL biopsy showing adenocarcinoma of the Lung and was referred to medical oncology for further recommendation.  Today, pt continues to experience back pain from her schwannoma leading to her being in the wheel chairs. She has no need new complaint to offer.

## 2023-08-23 ENCOUNTER — NON-APPOINTMENT (OUTPATIENT)
Age: 78
End: 2023-08-23

## 2023-08-23 DIAGNOSIS — Z86.69 PERSONAL HISTORY OF OTHER DISEASES OF THE NERVOUS SYSTEM AND SENSE ORGANS: ICD-10-CM

## 2023-08-23 DIAGNOSIS — E53.8 DEFICIENCY OF OTHER SPECIFIED B GROUP VITAMINS: ICD-10-CM

## 2023-08-23 DIAGNOSIS — M54.17 RADICULOPATHY, LUMBOSACRAL REGION: ICD-10-CM

## 2023-08-23 DIAGNOSIS — G54.4 LUMBOSACRAL ROOT DISORDERS, NOT ELSEWHERE CLASSIFIED: ICD-10-CM

## 2023-08-23 DIAGNOSIS — G51.39 CLONIC HEMIFACIAL SPASM, UNSPECIFIED: ICD-10-CM

## 2023-08-23 DIAGNOSIS — M79.10 MYALGIA, UNSPECIFIED SITE: ICD-10-CM

## 2023-08-23 RX ORDER — CYANOCOBALAMIN 1000 UG/ML
1000 INJECTION INTRAMUSCULAR; SUBCUTANEOUS
Refills: 0 | Status: ACTIVE | COMMUNITY

## 2023-08-23 RX ORDER — PYRIDOSTIGMINE BROMIDE 180 MG/1
180 TABLET, EXTENDED RELEASE ORAL
Refills: 0 | Status: ACTIVE | COMMUNITY

## 2023-08-23 RX ORDER — PANTOPRAZOLE 40 MG/1
40 TABLET, DELAYED RELEASE ORAL
Refills: 0 | Status: ACTIVE | COMMUNITY

## 2023-09-01 ENCOUNTER — APPOINTMENT (OUTPATIENT)
Dept: HEMATOLOGY ONCOLOGY | Facility: CLINIC | Age: 78
End: 2023-09-01
Payer: MEDICARE

## 2023-09-01 ENCOUNTER — OUTPATIENT (OUTPATIENT)
Dept: OUTPATIENT SERVICES | Facility: HOSPITAL | Age: 78
LOS: 1 days | End: 2023-09-01
Payer: MEDICARE

## 2023-09-01 DIAGNOSIS — C34.92 MALIGNANT NEOPLASM OF UNSPECIFIED PART OF LEFT BRONCHUS OR LUNG: ICD-10-CM

## 2023-09-01 DIAGNOSIS — D18.00 HEMANGIOMA UNSPECIFIED SITE: Chronic | ICD-10-CM

## 2023-09-01 DIAGNOSIS — Z98.890 OTHER SPECIFIED POSTPROCEDURAL STATES: Chronic | ICD-10-CM

## 2023-09-01 DIAGNOSIS — Z98.49 CATARACT EXTRACTION STATUS, UNSPECIFIED EYE: Chronic | ICD-10-CM

## 2023-09-01 DIAGNOSIS — C34.91 MALIGNANT NEOPLASM OF UNSPECIFIED PART OF RIGHT BRONCHUS OR LUNG: ICD-10-CM

## 2023-09-01 DIAGNOSIS — C34.90 MALIGNANT NEOPLASM OF UNSPECIFIED PART OF UNSPECIFIED BRONCHUS OR LUNG: ICD-10-CM

## 2023-09-01 DIAGNOSIS — Z90.89 ACQUIRED ABSENCE OF OTHER ORGANS: Chronic | ICD-10-CM

## 2023-09-01 PROCEDURE — 99215 OFFICE O/P EST HI 40 MIN: CPT

## 2023-09-01 NOTE — HISTORY OF PRESENT ILLNESS
[de-identified] : 78-year-old female active smoker (25 pack-year) with past history of CAD, gastric ulcers, thoracic schwannoma of pleural based mass and MG. She was found to have non-small cell lung CA after she had a routine CT chest on 1/8/2023. She was diagnosed with biopsy-proven -RT lung adenocarcinoma -3/2023. She was seen by Dr. Toño Palomino and was presented on TB. She was deemed not a surgical candidate and recommended SBRT.  She also had left pulmonary nodules and was recommended a second biopsy to confirm synchronous lung CA. However, it was too small to be biopsy by IR at that time. She eventually saw Dr. Carrasquillo regarding her Rt lung CA. At that time, she decided against proceeding with RT to lung. Due to chronic pain from her thoracic schwannoma, she underwent RT for that instead. She subsequently had PET/CT that showed: 1. Since January 30, 2023, new FDG avid right hilar/suprahilar uptake, with max SUV 10.5. 2. No significant change in a 1.9 cm FDG avid cavitary right lower lobe pulmonary nodule, with max SUV 16.5. 3. Multiple additional minimally FDG avid predominantly left-sided pulmonary nodules are overall unchanged. 4. Overall unchanged mildly FDG avid right upper lobe ground glass opacity; previously seen minimally FDG avid peripheral left upper lobe opacity has nearly completely resolved. 5. No additional site of pathologic FDG uptake.  She finally underwent LLL biopsy showing adenocarcinoma of the Lung and was referred to medical oncology for further recommendation.  Today, pt continues to experience back pain from her schwannoma leading to her being in the wheel chairs. She has no need new complaint to offer.   [de-identified] : 9/1/2023 She is here for follow up.  She is in a wheelchair.  She now has more pain in the upper back but its more in the right side since completing radiation on that showed a some sort of a flare phenomenon.  She does not take NSAIDs because of history of PUD and her OxyContin and oxycodone are not helping.  Otherwise she is doing well.  I explained that she has 2 completely different primaries.

## 2023-09-01 NOTE — ASSESSMENT
[FreeTextEntry1] : 78-year-old female active smoker (25 pack-year) with a history of CAD, gastric ulcers, thoracic schwannoma of pleural based mass and MG. She was found to have multiple nodules in lungs, including an approximate 2.2 x 1.4 cm cavitary nodule medial right lower lobe, superior segment (4/107) after she had a routine chest CT on 1/8/2023. She was diagnosed with biopsy-proven-RT lung adenocarcinoma on 3/2023. She eventually underwent LLL biopsy showing adenocarcinoma of the Lung on 7/11/23. She has two synchronous lungs CA  She has other pulmonary nodules but they are also stable of course and not sure if these are other malignancies but given the size and stability may be just reactive  # RLL adenocarcinoma with R hilar/suprahilar uptake noted on PET/CT on 6/15/2023 Stage IIB clinically  # LLL adenocarcinoma  stage I clincailly - we are  dealing with two synchronous lung Michaela with R lung cancer likely stage 2 and L lung CA likely stage 1  - molecular testing from RLL adenocarcinoma shows PDL1 of 80%, Tumor Mutational [place] - 14 Muts/Mb and KRAS G12C, NF2, MSH6, SETD2 TP53 - molecular testing from LLL adenocarcinoma:  Tumor Proportion Score (TPS) (%):  0 :APC, [id number], MTORQ - PET 6/15/23 shows: 1. Since January 30, 2023, new FDG avid right hilar/suprahilar uptake, with max SUV 10.5. 2. No significant change in a 1.9 cm FDG avid cavitary right lower lobe pulmonary nodule, with max SUV 16.5. 3. Multiple additional minimally FDG avid predominantly left-sided pulmonary nodules are overall unchanged. 4. Overall unchanged mildly FDG avid right upper lobe ground glass opacity; previously seen minimally FDG avid peripheral left upper lobe opacity has nearly completely resolved. 5. No additional site of pathologic FDG uptake.  # Thoracic schwannoma s/p radiation  # Myasthenia gravis, stable - follows with Dr. Caraballo   Plan:  - 1) For her RLL adenocarcinoma, to achieve a cure, she will need chemoRT. For her LLL adenocarcinoma, she will need RT.  ECOG is 2 likely due to her schwannoma.  Today she was willing to consider chemoradiation with carboplatin and Taxol and I would admit adjuvant durvalumab given history of myasthenia gravis - 2) We can also treat her NSCLC as locally advanced and given her PDL1 of 80%, it is reasonable to offer her single agent Keytruda. 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 Although it is used as second-line therapy, since she is contraindicated to use Keytruda due to MG and she is too frail to be on chemo. It is reasonable for us to use Sotorasib as first line therapy.  Granted once again her contralateral cancer would not respond, and this would be palliative  I went over side effects of carboplatin and Taxol in detail admitting information provided she is aware would be weekly for 6 weeks and side effects include fatigue, cytopenias, hair loss, allergic reactions, neuropathy can lead to liver enzyme elevations  Given that her most recent PET/CT is now over 2 months old I suggested we obtain a repeat PET/CT prior to initiating definitive treatment and she agreed  She is not interested in MRI of the brain and this is reasonable to admit for now  She will notify me with her decision next week and we will proceed from there   #New upper neck/shoulder pain this is on the right side hurts from numbers on the left but may be possibly somehow related to the radiation -She is already on narcotics cannot take NSAIDs because of an ulcer we therefore decided to try dexamethasone 2 mg twice daily for 1 week side effects went over in detail  RTC depending on her decision [Curative] : Goals of care discussed with patient: Curative

## 2023-09-02 DIAGNOSIS — C34.90 MALIGNANT NEOPLASM OF UNSPECIFIED PART OF UNSPECIFIED BRONCHUS OR LUNG: ICD-10-CM

## 2023-09-07 ENCOUNTER — RX RENEWAL (OUTPATIENT)
Age: 78
End: 2023-09-07

## 2023-09-13 ENCOUNTER — NON-APPOINTMENT (OUTPATIENT)
Age: 78
End: 2023-09-13

## 2023-09-14 ENCOUNTER — RX RENEWAL (OUTPATIENT)
Age: 78
End: 2023-09-14

## 2023-09-14 RX ORDER — ISOSORBIDE MONONITRATE 30 MG/1
30 TABLET, EXTENDED RELEASE ORAL DAILY
Qty: 90 | Refills: 3 | Status: ACTIVE | COMMUNITY
Start: 2021-07-09 | End: 1900-01-01

## 2023-09-19 ENCOUNTER — RX RENEWAL (OUTPATIENT)
Age: 78
End: 2023-09-19

## 2023-09-19 RX ORDER — DEXAMETHASONE 2 MG/1
2 TABLET ORAL TWICE DAILY
Qty: 14 | Refills: 0 | Status: ACTIVE | COMMUNITY
Start: 2023-09-01 | End: 1900-01-01

## 2023-09-21 ENCOUNTER — APPOINTMENT (OUTPATIENT)
Dept: RADIATION ONCOLOGY | Facility: HOSPITAL | Age: 78
End: 2023-09-21

## 2023-09-22 ENCOUNTER — NON-APPOINTMENT (OUTPATIENT)
Age: 78
End: 2023-09-22

## 2023-09-22 NOTE — ED ADULT TRIAGE NOTE - IDEAL BODY WEIGHT(KG)
62 Bed in lowest position, wheels locked, appropriate side rails in place/Call bell, personal items and telephone in reach/Instruct patient to call for assistance before getting out of bed or chair/Non-slip footwear when patient is out of bed/[place] to call system/Physically safe environment - no spills, clutter or unnecessary equipment/Purposeful Proactive Rounding/Room/bathroom lighting operational, light cord in reach Bed in lowest position, wheels locked, appropriate side rails in place/Call bell, personal items and telephone in reach/Instruct patient to call for assistance before getting out of bed or chair/Non-slip footwear when patient is out of bed/[place] to call system/Physically safe environment - no spills, clutter or unnecessary equipment/Purposeful Proactive Rounding/Room/bathroom lighting operational, light cord in reach Bed in lowest position, wheels locked, appropriate side rails in place/Call bell, personal items and telephone in reach/Instruct patient to call for assistance before getting out of bed or chair/Non-slip footwear when patient is out of bed/[place] to call system/Physically safe environment - no spills, clutter or unnecessary equipment/Purposeful Proactive Rounding/Room/bathroom lighting operational, light cord in reach

## 2023-09-25 ENCOUNTER — NON-APPOINTMENT (OUTPATIENT)
Age: 78
End: 2023-09-25

## 2023-09-26 ENCOUNTER — NON-APPOINTMENT (OUTPATIENT)
Age: 78
End: 2023-09-26

## 2023-09-27 ENCOUNTER — NON-APPOINTMENT (OUTPATIENT)
Age: 78
End: 2023-09-27

## 2023-10-02 ENCOUNTER — NON-APPOINTMENT (OUTPATIENT)
Age: 78
End: 2023-10-02

## 2023-10-04 ENCOUNTER — NON-APPOINTMENT (OUTPATIENT)
Age: 78
End: 2023-10-04

## 2023-10-12 ENCOUNTER — APPOINTMENT (OUTPATIENT)
Dept: NEUROLOGY | Facility: CLINIC | Age: 78
End: 2023-10-12

## 2023-12-11 ENCOUNTER — NON-APPOINTMENT (OUTPATIENT)
Age: 78
End: 2023-12-11

## 2023-12-11 DIAGNOSIS — Z87.39 PERSONAL HISTORY OF OTHER DISEASES OF THE MUSCULOSKELETAL SYSTEM AND CONNECTIVE TISSUE: ICD-10-CM

## 2023-12-15 ENCOUNTER — APPOINTMENT (OUTPATIENT)
Dept: NEUROLOGY | Facility: CLINIC | Age: 78
End: 2023-12-15

## 2024-04-05 NOTE — ED ADULT NURSE NOTE - CAS TRG GEN SKIN CONDITION
Warm You can access the FollowMyHealth Patient Portal offered by [facility] by registering at the following website: [email or web address]. By joining [employer]’s FollowMyHealth portal, you will also be able to view your health information using other applications (apps) compatible with our system.

## 2024-09-05 NOTE — ED PROVIDER NOTE - ATTENDING SHARED VISIT SELECTORS
Patient informed.  Pended actonel sent to the [facility] as patient requested.   Exam/Medical Decision Making

## 2025-03-28 NOTE — ASU PREOP CHECKLIST - SPO2 (%)
Magi KRISTINA Roche  [address]  [place] [address]    March 28, 2025     Dear Ms. Sheppard:    Below are the results from your recent visit:    Resulted Orders   T4, Free   Result Value Ref Range    Free T4 1.71 (H) 0.92 - 1.68 ng/dL   TSH   Result Value Ref Range    TSH 0.465 0.270 - 4.200 uIU/mL   Vitamin D,25-Hydroxy   Result Value Ref Range    25 Hydroxy, Vitamin D 109.0 (H) 30.0 - 100.0 ng/ml       Vitamin D level is high - reduce supplement by half  T 4 level is high but TSH is normal- no change in supplement dosing for now.      If you have any questions or concerns, please don't hesitate to call.         Sincerely,        Nahomy Gibson MD      
[phone]

## 2025-05-05 NOTE — ASU PREOP CHECKLIST - NSBLOODTRANS_GEN_A_CORE_SIUH
You may receive an electronic survey after this visit.  We would appreciate your feedback.    
no...
